# Patient Record
Sex: MALE | Race: WHITE | HISPANIC OR LATINO | Employment: OTHER | URBAN - METROPOLITAN AREA
[De-identification: names, ages, dates, MRNs, and addresses within clinical notes are randomized per-mention and may not be internally consistent; named-entity substitution may affect disease eponyms.]

---

## 2019-10-27 ENCOUNTER — TRANSFERRED RECORDS (OUTPATIENT)
Dept: HEALTH INFORMATION MANAGEMENT | Facility: CLINIC | Age: 29
End: 2019-10-27

## 2019-10-29 ENCOUNTER — TRANSFERRED RECORDS (OUTPATIENT)
Dept: HEALTH INFORMATION MANAGEMENT | Facility: CLINIC | Age: 29
End: 2019-10-29

## 2019-11-05 ENCOUNTER — MEDICAL CORRESPONDENCE (OUTPATIENT)
Dept: HEALTH INFORMATION MANAGEMENT | Facility: CLINIC | Age: 29
End: 2019-11-05

## 2019-11-20 ENCOUNTER — TRANSFERRED RECORDS (OUTPATIENT)
Dept: HEALTH INFORMATION MANAGEMENT | Facility: CLINIC | Age: 29
End: 2019-11-20

## 2019-11-22 ENCOUNTER — REFERRAL (OUTPATIENT)
Dept: TRANSPLANT | Facility: CLINIC | Age: 29
End: 2019-11-22

## 2019-11-22 DIAGNOSIS — K70.31 ALCOHOLIC CIRRHOSIS OF LIVER WITH ASCITES (H): Primary | ICD-10-CM

## 2019-11-22 DIAGNOSIS — K76.6 PORTAL HYPERTENSION (H): ICD-10-CM

## 2019-11-22 NOTE — LETTER
LIVER TRANSPLANT  EVALUATION SCHEDULE    Patient:   Obed Wiley  MR#:    [id number]  Coordinator:  Hudson       [phone]  :     Mulu                 [phone]  [facility]  Date(s):    January 6, 2020 & January 7, 2020          This is your evaluation schedule, please follow dates and times.  You will   receive reminder phone calls for other tests, but please follow this schedule  only!  If you have any questions about dates and times, please call us on  number listed above.  Thank you, Transplant Services     *NO FOOD or DRINK AFTER MIDNIGHT*  (You may only have small amounts of water)    Day/Date:    Monday, January 6, 2020  Time Location Activity   8:15 AM Imaging and Lab testing  ([facility] floor [facility] ) Blood tests    8:30 AM Imaging and Lab testing  ([facility] floor [facility] ) Chest X-ray    8:40 AM Imaging and Lab testing  ([address] [place] [address] and [facility] ) EKG   9:30 AM-11:30 AM Transplant Services  ([facility] floor [facility] and [facility]) Pre-transplant class               Day/Date:    Tuesday, January 7, 2020  Time Location Activity   6:45 AM Imaging and Lab testing  ([facility] floor [facility] ) Liver Ultrasound with dopplers=NO FOOD or DRINK 8 HOURS BEFORE TEST!!!   7:30 AM [facility]   ([facility] floor [facility]) Pulmonary Function Tests Please do not wear any deodorant, perfume, or scented products.   9:00 AM Medicine Specialties  ([facility] floor [facility]) Appointment with Dr. Santos,  Hepatologist   10:00 AM Transplant Services  ([facility] floor [facility]) Appointment with Dr. Marsh,  Transplant Surgeon   10:30 AM Transplant Services  ([facility] floor [facility]) Appointment with Anabel Gustafson  Registered Dietitian   12:00 PM Transplant Services  ([facility] floor [facility]) Appointment with Wale Trimble,     *  1:30 PM [facility]  1st Floor/Entrance of [facility]  [facility] to Hospital  (Shuttle is [facility] with Maroon Lettering)   2:00 PM [facility] Waiting Room  (2nd floor [facility]) Dobutamine Stress Echo = SEE ENCLOSED INSTRUCTIONS for TEST!               Day/Date:    Every Thursday *OPTIONAL*  Time Location Activity   12:00 PM-1:30 PM Liver Transplant Support Group  Hospital Station [facility]  Room 7-120 Every Thursday *OPTIONAL*     *IF ON LINE CHECK IN IS NOT DONE, YOU WILL NEED TO CHECK IN 15 MINUTES EARLIER THEN THE FIRST SCHEDULED APPOINTMENT*

## 2019-11-27 ENCOUNTER — TRANSFERRED RECORDS (OUTPATIENT)
Dept: HEALTH INFORMATION MANAGEMENT | Facility: CLINIC | Age: 29
End: 2019-11-27

## 2019-12-03 VITALS — HEIGHT: 72 IN | BODY MASS INDEX: 27.09 KG/M2 | WEIGHT: 200 LBS

## 2019-12-03 ASSESSMENT — MIFFLIN-ST. JEOR: SCORE: 1906.78

## 2019-12-03 NOTE — TELEPHONE ENCOUNTER
Patient was asked the following questions during liver intake call.     Referring Provider: Anjelica Reyes   Referring Diagnosis: Alcoholic Cirrhosis of the Liver   PCP: Anjelica Reyes     1)Do you know why you have liver disease: Yes             If Alcoholic Cirrhosis is present when was your last drink: 2018 (18 months ago.)             Have you ever been through treatment for alcohol: No, on waiting list.   2) Presence of Ascites: Yes Paracentesis: Yes, twice weekly.   3) Presence of Hepatic Encephalopathy: No Medications: No  4) History of GI Bleeding: Yes in the past   5) EGD: Yes at [facility]   6) Colonoscopy: Yes at [facility]   7) MELD Score: 11  8) Insurance information: [employer]       Policy ag: Self      Subscriber/policy/ID number: [id number]      Group Number:     Referral intake process completed.  Patient is aware that after financial approval is received, medical records will be requested.   Patient confirmed for a callback from transplant coordinator on 12/5/2019.  Tentative evaluation date TBD.    Confirmed coordinator will discuss evaluation process in more detail at the time of their call.   Patient is aware of the need to arrange age appropriate cancer screening, vaccinations, and dental care.  Reminded patient to complete questionnaire, complete medical records release, and review packet prior to evaluation visit .  Assessed patient for special needs (ie--wheelchair, assistance, guardian, and ):  No   Patient instructed to call [phone] with questions.     CLAUDIA Olea, LPN   Solid Organ Transplant

## 2019-12-05 NOTE — TELEPHONE ENCOUNTER
Spoke to patient and penciled in for 1/6-1/7    Will call him back next week with specifics once we have PFR approval.

## 2019-12-21 ENCOUNTER — TRANSFERRED RECORDS (OUTPATIENT)
Dept: HEALTH INFORMATION MANAGEMENT | Facility: CLINIC | Age: 29
End: 2019-12-21

## 2019-12-21 LAB
ALT SERPL-CCNC: 65 U/L (ref 30–65)
AST SERPL-CCNC: 61 U/L (ref 15–37)
HBA1C MFR BLD: 8 % (ref 4.8–6)

## 2019-12-22 LAB
CREAT SERPL-MCNC: 0.7 MG/DL (ref 0.7–1.2)
GFR SERPL CREATININE-BSD FRML MDRD: 143.4 ML/MIN/1.73M2
GLUCOSE SERPL-MCNC: 481 MG/DL (ref 70–110)
POTASSIUM SERPL-SCNC: 4.3 MEQ/L (ref 3.5–5)

## 2019-12-23 PROBLEM — K70.31 ALCOHOLIC CIRRHOSIS OF LIVER WITH ASCITES (H): Status: ACTIVE | Noted: 2019-12-23

## 2019-12-23 NOTE — TELEPHONE ENCOUNTER
Patient is a 29 year old male This is a 29-year-old male with alcoholic cirrhosis, chronic pancreatitis, HE, with DM on insulin.   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 Patient is planning on starting program closer to home now that he has moved.    History of IVC thrombosis with filter placement  History of mesenteric vein, DVT, and pulmonary embolism     MELD 18 -     Plan - testing & class on Monday 1/6/20 with full eval appt on Tuesday 1/7/20. Will plan on him making another trip back in the weeks/months after given he will have some sort of CD work to do (and MELD 18).    See notes in CE from [place] trip in 1/2019-2/2019    Discharge summary 11/27/19:  Celso Garza MD - 11/27/2019 3:46 PM CST  FINAL DIAGNOSIS:   1. Spontaneous bacterial peritonitis.  2. Acute pancreatitis, exacerbation of chronic pancreatitis.     SECONDARY DIAGNOSIS:   1. Chronic liver cirrhosis with recurrent peritoneal fluid accumulation despite maximal medical therapy.   2. Diabetes mellitus type 2.   3. Asthma.  4. Deep venous thrombosis.  5. Esophageal varices.   6. History of ischemic bowel.   7. History of pulmonary embolism.   8. Umbilical hernia.  9. Inferior vena cava thrombosis.   10. Status post insertion of inferior vena cava filter.   11. Thrombocytopenia.  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

## 2020-01-06 ENCOUNTER — PRE VISIT (OUTPATIENT)
Dept: GASTROENTEROLOGY | Facility: CLINIC | Age: 30
End: 2020-01-06

## 2020-01-06 ENCOUNTER — OFFICE VISIT (OUTPATIENT)
Dept: TRANSPLANT | Facility: CLINIC | Age: 30
End: 2020-01-06
Attending: INTERNAL MEDICINE
Payer: MEDICAID

## 2020-01-06 ENCOUNTER — DOCUMENTATION ONLY (OUTPATIENT)
Dept: LAB | Facility: CLINIC | Age: 30
End: 2020-01-06

## 2020-01-06 ENCOUNTER — ANCILLARY PROCEDURE (OUTPATIENT)
Dept: GENERAL RADIOLOGY | Facility: CLINIC | Age: 30
End: 2020-01-06
Attending: INTERNAL MEDICINE

## 2020-01-06 DIAGNOSIS — K70.31 ALCOHOLIC CIRRHOSIS OF LIVER WITH ASCITES (H): ICD-10-CM

## 2020-01-06 DIAGNOSIS — K76.6 PORTAL HYPERTENSION (H): ICD-10-CM

## 2020-01-06 DIAGNOSIS — K74.60 CIRRHOSIS OF LIVER (H): Primary | ICD-10-CM

## 2020-01-06 LAB
ABO + RH BLD: NORMAL
ALBUMIN SERPL-MCNC: 3.3 G/DL (ref 3.4–5)
ALBUMIN UR-MCNC: NEGATIVE MG/DL
ALP SERPL-CCNC: 138 U/L (ref 40–150)
ALT SERPL W P-5'-P-CCNC: 64 U/L (ref 0–70)
ANION GAP SERPL CALCULATED.3IONS-SCNC: 6 MMOL/L (ref 3–14)
APPEARANCE UR: ABNORMAL
AST SERPL W P-5'-P-CCNC: 72 U/L (ref 0–45)
BILIRUB DIRECT SERPL-MCNC: 0.4 MG/DL (ref 0–0.2)
BILIRUB SERPL-MCNC: 0.9 MG/DL (ref 0.2–1.3)
BILIRUB UR QL STRIP: NEGATIVE
BLD GP AB SCN SERPL QL: NORMAL
BLOOD BANK CMNT PATIENT-IMP: NORMAL
BUN SERPL-MCNC: 14 MG/DL (ref 7–30)
CALCIUM SERPL-MCNC: 8.8 MG/DL (ref 8.5–10.1)
CAOX CRY #/AREA URNS HPF: ABNORMAL /HPF
CHLORIDE SERPL-SCNC: 101 MMOL/L (ref 94–109)
CHOLEST SERPL-MCNC: 107 MG/DL
CO2 SERPL-SCNC: 26 MMOL/L (ref 20–32)
COLOR UR AUTO: YELLOW
CREAT SERPL-MCNC: 0.93 MG/DL (ref 0.66–1.25)
CREAT UR-MCNC: 175 MG/DL
DEPRECATED CALCIDIOL+CALCIFEROL SERPL-MC: 27 UG/L (ref 20–75)
ERYTHROCYTE [DISTWIDTH] IN BLOOD BY AUTOMATED COUNT: 21.8 % (ref 10–15)
GFR SERPL CREATININE-BSD FRML MDRD: >90 ML/MIN/{1.73_M2}
GLUCOSE SERPL-MCNC: 184 MG/DL (ref 70–99)
GLUCOSE UR STRIP-MCNC: 150 MG/DL
GRAN CASTS #/AREA URNS LPF: 3 /LPF
HCT VFR BLD AUTO: 37.3 % (ref 40–53)
HDLC SERPL-MCNC: 67 MG/DL
HGB BLD-MCNC: 11.2 G/DL (ref 13.3–17.7)
HGB UR QL STRIP: NEGATIVE
HYALINE CASTS #/AREA URNS LPF: 1 /LPF (ref 0–2)
INR PPP: 1.34 (ref 0.86–1.14)
KETONES UR STRIP-MCNC: NEGATIVE MG/DL
LDLC SERPL CALC-MCNC: 31 MG/DL
LEUKOCYTE ESTERASE UR QL STRIP: NEGATIVE
MCH RBC QN AUTO: 25.1 PG (ref 26.5–33)
MCHC RBC AUTO-ENTMCNC: 30 G/DL (ref 31.5–36.5)
MCV RBC AUTO: 84 FL (ref 78–100)
MIXED CELL CASTS #/AREA URNS LPF: 3 /LPF
MUCOUS THREADS #/AREA URNS LPF: PRESENT /LPF
NITRATE UR QL: NEGATIVE
NONHDLC SERPL-MCNC: 40 MG/DL
PH UR STRIP: 6 PH (ref 5–7)
PLATELET # BLD AUTO: 57 10E9/L (ref 150–450)
POTASSIUM SERPL-SCNC: 3.7 MMOL/L (ref 3.4–5.3)
PROT SERPL-MCNC: 7.4 G/DL (ref 6.8–8.8)
PROT UR-MCNC: 0.1 G/L
PROT/CREAT 24H UR: 0.06 G/G CR (ref 0–0.2)
RBC # BLD AUTO: 4.46 10E12/L (ref 4.4–5.9)
RBC #/AREA URNS AUTO: 1 /HPF (ref 0–2)
SODIUM SERPL-SCNC: 132 MMOL/L (ref 133–144)
SOURCE: ABNORMAL
SP GR UR STRIP: 1.02 (ref 1–1.03)
SPECIMEN EXP DATE BLD: NORMAL
SPECIMEN EXP DATE BLD: NORMAL
SQUAMOUS #/AREA URNS AUTO: <1 /HPF (ref 0–1)
T PALLIDUM AB SER QL: NONREACTIVE
T4 FREE SERPL-MCNC: 0.97 NG/DL (ref 0.76–1.46)
TRIGL SERPL-MCNC: 49 MG/DL
TSH SERPL DL<=0.005 MIU/L-ACNC: 4.09 MU/L (ref 0.4–4)
UROBILINOGEN UR STRIP-MCNC: 0 MG/DL (ref 0–2)
WBC # BLD AUTO: 3.6 10E9/L (ref 4–11)
WBC #/AREA URNS AUTO: 5 /HPF (ref 0–5)

## 2020-01-06 PROCEDURE — 86481 TB AG RESPONSE T-CELL SUSP: CPT | Performed by: INTERNAL MEDICINE

## 2020-01-06 PROCEDURE — 85027 COMPLETE CBC AUTOMATED: CPT | Performed by: INTERNAL MEDICINE

## 2020-01-06 PROCEDURE — 80061 LIPID PANEL: CPT | Performed by: INTERNAL MEDICINE

## 2020-01-06 PROCEDURE — 82306 VITAMIN D 25 HYDROXY: CPT | Performed by: INTERNAL MEDICINE

## 2020-01-06 PROCEDURE — 84443 ASSAY THYROID STIM HORMONE: CPT | Performed by: INTERNAL MEDICINE

## 2020-01-06 PROCEDURE — 81001 URINALYSIS AUTO W/SCOPE: CPT | Performed by: INTERNAL MEDICINE

## 2020-01-06 PROCEDURE — 86900 BLOOD TYPING SEROLOGIC ABO: CPT | Performed by: INTERNAL MEDICINE

## 2020-01-06 PROCEDURE — 80307 DRUG TEST PRSMV CHEM ANLYZR: CPT | Performed by: INTERNAL MEDICINE

## 2020-01-06 PROCEDURE — 86886 COOMBS TEST INDIRECT TITER: CPT | Performed by: INTERNAL MEDICINE

## 2020-01-06 PROCEDURE — 85610 PROTHROMBIN TIME: CPT | Performed by: INTERNAL MEDICINE

## 2020-01-06 PROCEDURE — 80321 ALCOHOLS BIOMARKERS 1OR 2: CPT | Performed by: INTERNAL MEDICINE

## 2020-01-06 PROCEDURE — 84439 ASSAY OF FREE THYROXINE: CPT | Performed by: INTERNAL MEDICINE

## 2020-01-06 PROCEDURE — 86901 BLOOD TYPING SEROLOGIC RH(D): CPT | Performed by: INTERNAL MEDICINE

## 2020-01-06 PROCEDURE — 36415 COLL VENOUS BLD VENIPUNCTURE: CPT | Performed by: INTERNAL MEDICINE

## 2020-01-06 PROCEDURE — 84156 ASSAY OF PROTEIN URINE: CPT | Mod: XU | Performed by: INTERNAL MEDICINE

## 2020-01-06 PROCEDURE — 80076 HEPATIC FUNCTION PANEL: CPT | Performed by: INTERNAL MEDICINE

## 2020-01-06 PROCEDURE — 86780 TREPONEMA PALLIDUM: CPT | Performed by: INTERNAL MEDICINE

## 2020-01-06 PROCEDURE — 86850 RBC ANTIBODY SCREEN: CPT | Performed by: INTERNAL MEDICINE

## 2020-01-06 PROCEDURE — 80048 BASIC METABOLIC PNL TOTAL CA: CPT | Performed by: INTERNAL MEDICINE

## 2020-01-06 NOTE — TELEPHONE ENCOUNTER
Pre liver eval appointments, pre visit planning completed by transplant office.    Radha Carrington, LEXY CMA  1/6/2020 9:09 AM

## 2020-01-06 NOTE — PROGRESS NOTES
Rapid Response Epic Documentation     Situation: Pt feeling lightheaded and nauseous after blood draw. RRT called    Objective:     Pt in lab chair feeling lightheaded, shaky, and nauseous.      Assessment: Pt A&Ox4. Pt states he has been having BGL issues lately and did fasting labs this morning. Pt took insulin this AM, and has been having high BGL's.     Vitals: 0848  BP:  95/68  Pulse: 84  Respiration: 18  Glucose: 220 mg/dl  Mental Status: Alert  CMS: Intact  Stroke Scale: Negative  EKG: Not Performed      Plan: Pt drinking water, mom with him and he will eat some eggs and sausage. Pt has continuing appointments today and will call if needing more assistance. Pt able to ambulate to bathroom without difficulties. Mom has a wheelchair to push patient to next appointment.     Medication: None      Location:    Disposition:      Stable (D/C home)    Protocol Used:     Other Lightheaded

## 2020-01-06 NOTE — LETTER
1/6/2020       RE: Obed Wiley  [address] [facility] [address]     Dear Colleague,    Thank you for referring your patient, Obed Wiley, to the [facility] SOLID ORGAN TRANSPLANT at [facility]. Please see a copy of my visit note below.    Liver Transplant Evaluation/Teaching    TEACHING TOPICS: Evaluation Process, Evaluation Items, Diagnostic Studies, Consultation, Chemical Dependency Policy, CD Eval, Donor Source, Liver Allocation, MELD System, UNOS, Waiting List, Follow up while on transplant list, Follow up after transplantation, Infection and Rejection, Immunosuppression , Medication Teaching, Lab Recording after transplant, Laboratory Frequency after transplant , Consent for evaluation and One year survival rates  INSTRUCTIONAL MATERIAL USED/GIVEN: Liver Transplant Handbook, MELD Booklet, Donor Booklet, Web Sites Options, Verbal instructions, Multiple Listing Brochure , Consent for evaluation signed, One year survival rates and SRTR (Scientific Registry) Data  Person(s) involved in teaching: patient and his mother  Asks Questions: YES  Eager to Learn: YES  Cooperative: YES  Receptive (willing/able to accept information): YES  Reason for the appointment, diagnosis and treatment plan:YES  Knowledge of proper use of medications and conditions for which they are ordered (with special attention to potential side effects or drug interactions): YES  Which situations necessitate calling provider and whom to contact:YES  Other: NA  Teaching Concerns Addressed   Comments: Patient and his mother attended transplant class. Asked excellent questions. Eval consent signed.   Proper use and care of (medical equip, care aids, etc.):YES  Nutritional needs and diet plan: YES  Pain management techniques: YES  Wound Care: YES  How and/when to access community resources: YES  Patient is aware of and agrees to required commitment to post-op care and long term follow-up: YES  Patient  has name and phone number of transplant coordinator.   Time Spent face-to-face teachin minutes.  Ruthie Dexter RN  Liver Transplant Coordinator                 Again, thank you for allowing me to participate in the care of your patient.      Sincerely,    Transplant Class

## 2020-01-07 ENCOUNTER — HOSPITAL ENCOUNTER (OUTPATIENT)
Dept: CARDIOLOGY | Facility: CLINIC | Age: 30
Discharge: HOME | End: 2020-01-07
Attending: INTERNAL MEDICINE | Admitting: INTERNAL MEDICINE
Payer: MEDICAID

## 2020-01-07 ENCOUNTER — ANCILLARY PROCEDURE (OUTPATIENT)
Dept: ULTRASOUND IMAGING | Facility: CLINIC | Age: 30
End: 2020-01-07
Attending: INTERNAL MEDICINE

## 2020-01-07 ENCOUNTER — ALLIED HEALTH/NURSE VISIT (OUTPATIENT)
Dept: TRANSPLANT | Facility: CLINIC | Age: 30
End: 2020-01-07
Attending: INTERNAL MEDICINE
Payer: MEDICAID

## 2020-01-07 ENCOUNTER — COMMITTEE REVIEW (OUTPATIENT)
Dept: TRANSPLANT | Facility: CLINIC | Age: 30
End: 2020-01-07

## 2020-01-07 ENCOUNTER — OFFICE VISIT (OUTPATIENT)
Dept: GASTROENTEROLOGY | Facility: CLINIC | Age: 30
End: 2020-01-07
Attending: INTERNAL MEDICINE
Payer: MEDICAID

## 2020-01-07 ENCOUNTER — OFFICE VISIT (OUTPATIENT)
Dept: TRANSPLANT | Facility: CLINIC | Age: 30
End: 2020-01-07
Attending: TRANSPLANT SURGERY
Payer: MEDICAID

## 2020-01-07 VITALS
BODY MASS INDEX: 25.7 KG/M2 | SYSTOLIC BLOOD PRESSURE: 110 MMHG | OXYGEN SATURATION: 96 % | HEART RATE: 98 BPM | WEIGHT: 190 LBS | DIASTOLIC BLOOD PRESSURE: 70 MMHG

## 2020-01-07 DIAGNOSIS — K70.31 ALCOHOLIC CIRRHOSIS OF LIVER WITH ASCITES (H): ICD-10-CM

## 2020-01-07 DIAGNOSIS — K76.6 PORTAL HYPERTENSION (H): ICD-10-CM

## 2020-01-07 DIAGNOSIS — Z79.4 DIABETES MELLITUS DUE TO UNDERLYING CONDITION WITH HYPEROSMOLARITY WITHOUT COMA, WITH LONG-TERM CURRENT USE OF INSULIN (H): ICD-10-CM

## 2020-01-07 DIAGNOSIS — K74.60 CIRRHOSIS OF LIVER (H): Primary | ICD-10-CM

## 2020-01-07 DIAGNOSIS — E11.9 DIABETES (H): ICD-10-CM

## 2020-01-07 DIAGNOSIS — E08.00 DIABETES MELLITUS DUE TO UNDERLYING CONDITION WITH HYPEROSMOLARITY WITHOUT COMA, WITH LONG-TERM CURRENT USE OF INSULIN (H): ICD-10-CM

## 2020-01-07 DIAGNOSIS — K70.31 ALCOHOLIC CIRRHOSIS OF LIVER WITH ASCITES (H): Primary | ICD-10-CM

## 2020-01-07 DIAGNOSIS — D68.9 COAGULOPATHY (H): Primary | ICD-10-CM

## 2020-01-07 DIAGNOSIS — I82.409 DVT (DEEP VENOUS THROMBOSIS) (H): ICD-10-CM

## 2020-01-07 DIAGNOSIS — Z76.82 AWAITING ORGAN TRANSPLANT STATUS: Primary | ICD-10-CM

## 2020-01-07 LAB
DLCOCOR-%PRED-PRE: 103 %
DLCOCOR-PRE: 35.96 ML/MIN/MMHG
DLCOUNC-%PRED-PRE: 92 %
DLCOUNC-PRE: 31.97 ML/MIN/MMHG
DLCOUNC-PRED: 34.71 ML/MIN/MMHG
ERV-%PRED-PRE: 77 %
ERV-PRE: 1.42 L
ERV-PRED: 1.82 L
ETHYL GLUCURONIDE UR QL: NEGATIVE
EXPTIME-PRE: 7.18 SEC
FEF2575-%PRED-PRE: 80 %
FEF2575-PRE: 3.86 L/SEC
FEF2575-PRED: 4.79 L/SEC
FEFMAX-%PRED-PRE: 90 %
FEFMAX-PRE: 9.73 L/SEC
FEFMAX-PRED: 10.71 L/SEC
FEV1-%PRED-PRE: 91 %
FEV1-PRE: 4.39 L
FEV1FEV6-PRE: 78 %
FEV1FEV6-PRED: 83 %
FEV1FVC-PRE: 78 %
FEV1FVC-PRED: 82 %
FEV1SVC-PRE: 78 %
FEV1SVC-PRED: 79 %
FIFMAX-PRE: 6.32 L/SEC
FRCPLETH-%PRED-PRE: 104 %
FRCPLETH-PRE: 3.61 L
FRCPLETH-PRED: 3.46 L
FVC-%PRED-PRE: 96 %
FVC-PRE: 5.6 L
FVC-PRED: 5.83 L
GAMMA INTERFERON BACKGROUND BLD IA-ACNC: 0.02 IU/ML
IC-%PRED-PRE: 99 %
IC-PRE: 4.19 L
IC-PRED: 4.21 L
INTERPRETATION ECG - MUSE: NORMAL
M TB IFN-G BLD-IMP: NEGATIVE
M TB IFN-G CD4+ BCKGRND COR BLD-ACNC: >10 IU/ML
MITOGEN IGNF BCKGRD COR BLD-ACNC: 0 IU/ML
MITOGEN IGNF BCKGRD COR BLD-ACNC: 0 IU/ML
PETH BLD-MCNC: NEGATIVE NG/ML
RVPLETH-%PRED-PRE: 119 %
RVPLETH-PRE: 2.19 L
RVPLETH-PRED: 1.83 L
TLCPLETH-%PRED-PRE: 103 %
TLCPLETH-PRE: 7.79 L
TLCPLETH-PRED: 7.53 L
VA-%PRED-PRE: 100 %
VA-PRE: 7.12 L
VC-%PRED-PRE: 92 %
VC-PRE: 5.61 L
VC-PRED: 6.03 L

## 2020-01-07 PROCEDURE — 25500064 ZZH RX 255 OP 636: Performed by: INTERNAL MEDICINE

## 2020-01-07 PROCEDURE — 40000264 ECHO STRESS ECHOCARDIOGRAM

## 2020-01-07 PROCEDURE — 25000128 H RX IP 250 OP 636: Performed by: INTERNAL MEDICINE

## 2020-01-07 PROCEDURE — 93321 DOPPLER ECHO F-UP/LMTD STD: CPT | Mod: 26 | Performed by: INTERNAL MEDICINE

## 2020-01-07 PROCEDURE — 93016 CV STRESS TEST SUPVJ ONLY: CPT | Performed by: INTERNAL MEDICINE

## 2020-01-07 PROCEDURE — 93018 CV STRESS TEST I&R ONLY: CPT | Performed by: INTERNAL MEDICINE

## 2020-01-07 PROCEDURE — 97802 MEDICAL NUTRITION INDIV IN: CPT | Mod: ZF

## 2020-01-07 PROCEDURE — 93325 DOPPLER ECHO COLOR FLOW MAPG: CPT | Mod: 26 | Performed by: INTERNAL MEDICINE

## 2020-01-07 PROCEDURE — 93350 STRESS TTE ONLY: CPT | Mod: 26 | Performed by: INTERNAL MEDICINE

## 2020-01-07 PROCEDURE — 25000125 ZZHC RX 250: Performed by: INTERNAL MEDICINE

## 2020-01-07 RX ORDER — QUETIAPINE FUMARATE 25 MG/1
25 TABLET, FILM COATED ORAL
COMMUNITY

## 2020-01-07 RX ORDER — FAMOTIDINE 20 MG
2000 TABLET ORAL DAILY
COMMUNITY

## 2020-01-07 RX ORDER — DOBUTAMINE HYDROCHLORIDE 200 MG/100ML
10-50 INJECTION INTRAVENOUS CONTINUOUS
Status: ACTIVE | OUTPATIENT
Start: 2020-01-07 | End: 2020-01-07

## 2020-01-07 RX ORDER — CHOLECALCIFEROL (VITAMIN D3) 1250 MCG
50000 CAPSULE ORAL
COMMUNITY

## 2020-01-07 RX ORDER — SODIUM CHLORIDE 9 MG/ML
INJECTION, SOLUTION INTRAVENOUS CONTINUOUS
Status: ACTIVE | OUTPATIENT
Start: 2020-01-07 | End: 2020-01-07

## 2020-01-07 RX ORDER — INSULIN GLARGINE 100 [IU]/ML
26 INJECTION, SOLUTION SUBCUTANEOUS 2 TIMES DAILY
Status: ON HOLD | COMMUNITY
End: 2020-01-10

## 2020-01-07 RX ORDER — FOLIC ACID 1 MG/1
1 TABLET ORAL 2 TIMES DAILY
COMMUNITY

## 2020-01-07 RX ORDER — CALCIUM CARBONATE/VITAMIN D3 500-10/5ML
400 LIQUID (ML) ORAL 3 TIMES DAILY
COMMUNITY

## 2020-01-07 RX ORDER — METOPROLOL TARTRATE 1 MG/ML
1-20 INJECTION, SOLUTION INTRAVENOUS
Status: ACTIVE | OUTPATIENT
Start: 2020-01-07 | End: 2020-01-07

## 2020-01-07 RX ORDER — PROMETHAZINE HYDROCHLORIDE 12.5 MG/1
25 TABLET ORAL EVERY 6 HOURS PRN
COMMUNITY

## 2020-01-07 RX ORDER — POLYETHYLENE GLYCOL 3350 17 G/17G
1 POWDER, FOR SOLUTION ORAL DAILY
COMMUNITY

## 2020-01-07 RX ORDER — OXYCODONE AND ACETAMINOPHEN 10; 325 MG/1; MG/1
1 TABLET ORAL EVERY 4 HOURS PRN
Status: ON HOLD | COMMUNITY
End: 2020-07-27

## 2020-01-07 RX ORDER — PANTOPRAZOLE SODIUM 40 MG/1
40 TABLET, DELAYED RELEASE ORAL DAILY
COMMUNITY

## 2020-01-07 RX ORDER — SPIRONOLACTONE 100 MG/1
100 TABLET, FILM COATED ORAL 3 TIMES DAILY
COMMUNITY

## 2020-01-07 RX ORDER — FUROSEMIDE 40 MG
40 TABLET ORAL 3 TIMES DAILY
Status: ON HOLD | COMMUNITY
End: 2020-01-10

## 2020-01-07 RX ORDER — TRAZODONE HYDROCHLORIDE 50 MG/1
150 TABLET, FILM COATED ORAL AT BEDTIME
Status: ON HOLD | COMMUNITY
End: 2021-02-24

## 2020-01-07 RX ORDER — TRETINOIN 0.25 MG/G
CREAM TOPICAL AT BEDTIME
COMMUNITY

## 2020-01-07 RX ORDER — GABAPENTIN 400 MG/1
400 CAPSULE ORAL 3 TIMES DAILY
COMMUNITY

## 2020-01-07 RX ORDER — LACTULOSE 10 G/15ML
20 SOLUTION ORAL 3 TIMES DAILY
Status: ON HOLD | COMMUNITY
End: 2020-01-10

## 2020-01-07 RX ORDER — FERROUS SULFATE 325(65) MG
325 TABLET ORAL
COMMUNITY

## 2020-01-07 RX ORDER — ATROPINE SULFATE 0.4 MG/ML
.2-2 AMPUL (ML) INJECTION
Status: COMPLETED | OUTPATIENT
Start: 2020-01-07 | End: 2020-01-07

## 2020-01-07 RX ORDER — CIPROFLOXACIN 500 MG/1
500 TABLET, FILM COATED ORAL 2 TIMES DAILY
Status: ON HOLD | COMMUNITY
End: 2020-01-10

## 2020-01-07 RX ADMIN — METOPROLOL TARTRATE 5 MG: 5 INJECTION INTRAVENOUS at 14:38

## 2020-01-07 RX ADMIN — DOBUTAMINE HYDROCHLORIDE 10 MCG/KG/MIN: 200 INJECTION INTRAVENOUS at 14:20

## 2020-01-07 RX ADMIN — ATROPINE SULFATE 2 MG: 0.4 INJECTION, SOLUTION INTRAMUSCULAR; INTRAVENOUS; SUBCUTANEOUS at 14:32

## 2020-01-07 RX ADMIN — HUMAN ALBUMIN MICROSPHERES AND PERFLUTREN 7 ML: 10; .22 INJECTION, SOLUTION INTRAVENOUS at 14:37

## 2020-01-07 NOTE — PROGRESS NOTES
Outpatient MNT: Liver Transplant Evaluation    Current BMI: 26 kg/m2 (HT 72 in,  lbs/86 kg)  BMI is within criteria of <40 for liver transplant    Additional:  Hx of Yan-en-Y in 2008     Time Spent: 15 minutes  Visit Type: Initial   Referring Physician: Maximo   Pt accompanied by: Mom    Medical dx associated with RD referral  Liver cirrhosis     History of previous txp: none     Nutrition Assessment  Appetite: The pt reports that his appetite is fair. He has uncontrolled DM2 and watches his CHO intake closely. He also has a history of Yan-en-y and takes several laxatives to prevent encephalopathy so he feels like food goes right through him with out being absorbed. Pt currently follows a moderate CHO (3-4 CHO choices per meal) and high fat diet to maintain/gain weight. He eats TID meals and 3-4 snacks per day.       Vitamins, Supplements, Pertinent Meds: Folic acid, multivitamin, Ca, magnesium, D3, Iron, Hair and nail multivitamin   Herbal Medicines/Supplements: Topical tiger bomb for back pain     Diet Recall  Breakfast Toast with jelly, 3-6 eggs, sausage and other meat, yogurt, oat meal, granola   Lunch Salad, sandwich, wrap   Dinner Salad, soup, grilled poultry, pork, steak, fish   Snacks Beef jerky, cheese, salad, nuts, seeds, berries, melon, citrus fruit, popcorn, frozen yogurt, hard boiled eggs    Beverages Water, crystal light, Fresca, Gatorade no EtOH - sober for last 1.5 years   Dining out 2 meals per month, fast food     Physical Activity  Minimal d/t pain      Anthropometrics  Height:   72 in   BMI:    26 kg/m2    Weight Status:Overweight BMI 25-29.9   Weight:  190 lbs (86 kg)       Wt Readings from Last 10 Encounters:   01/07/20 86.2 kg (190 lb)   12/03/19 90.7 kg (200 lb)       IBW (lb): 178#  % IBW: 107%    Wt Hx: pt reports losing >200# after gastric bypass surgery but not has a hard time keeping wt on. The pt has unintentionally lost 10# (5%) in 1 month.      Adj/dosing BW: 190 lbs/86  kg       Malnutrition  % Intake: No decreased intake noted  % Weight Loss: Up to 5% in 1 month (non-severe malnutrition)  Subcutaneous Fat Loss: None noted   Muscle Loss: Difficult to assess d/t excess skin after wt loss from gastric bypass   Fluid Accumulation/Edema: None noted  Malnutrition Diagnosis: Patient does not meet two of the above criteria necessary for diagnosing malnutrition    Estimated Nutrition Needs  Energy  5065-1405+ kcal/day      (25-30+ kcal/kg for increased needs with altered GI anatomy and unintentional wt loss)   Protein   g/day    (1-1.2 g/kg for increased needs)      Fluid  1 ml/kcal or per MD        Micronutrient   Na+: <2000 mg/day            Nutrition Diagnosis  Food and nutrition related knowledge deficit r/t pre liver transplant eval AEB pt verbalized not hearing pre/post transplant diet guidelines.    Nutrition Intervention  Nutrition education provided:  Discussed sodium intake (low sodium foods and drinks, seasoning food without salt and tips for low sodium diet).    Reviewed adequate protein intake. Encouraged receiving protein from both animal and plant based sources.     Reviewed post txp diet guidelines in brief (will review in further detail post txp):  (1) Review of proper food safety measures d/t immunosuppressant therapy post-op and increased risk for food-borne illness    (2) Avoid the following post txp d/t risk for rejection, unknown effects on the organs, and/or potential interactions with immunosuppressants:  - Herbal, Chinese, holistic, chiropractic, natural, alternative medicines and supplements  - Detoxes and cleanses  - Weight loss pills  - Protein powders or other products with extracts or herbs (ie green tea extract)    (3) Med regimen and possible side effects    Patient Understanding: Pt verbalized understanding of education provided.  Expected Compliance: Good   Follow-Up Plans: PRN     Nutrition Goals  1. Limit Na+ <2000mg/day  2. Pt to verbalize  understanding of 3 aspects of post txp education provided      Bridget Ybarra RD, LD  [facility] [phone]

## 2020-01-07 NOTE — COMMITTEE REVIEW
Abdominal Committee Review Note     Evaluation Date: 1/6/2020  Committee Review Date: 1/7/2020    Organ being evaluated for: Liver    Transplant Phase: Evaluation  Transplant Status: Active    Transplant Coordinator: Konrad Gaitan Jr.  Transplant Surgeon:   Terrance Marhs    Referring Physician: Anjelica Reyes    Primary Diagnosis: Alcoholic Cirrhosis  Secondary Diagnosis:     Committee Review Members:  Nutrition Bridget Ybarra, RD   Pharmacy Artie Samayoa, [facility]    - Clinical Wale Trimble, MSW, Caridad Sosa, [facility]   Transplant Celso Najera MD, Jr Konrad Gaitan, HUY, Mulu Garcia MD, Airam Rodríguez, APRN CNP, Kat Coffman, RN, Viola Santos MD, Chet Elder MD, Ruthie Dexter, HUY, Levi Galarza MD       Transplant Eligibility: Cirrhosis with MELD, ETOH    Committee Review Decision: Needs Re-presentation    Relative Contraindications: Other, pending evaluation    Absolute Contraindications: None    Committee Chair Viola Santos MD verbally attested to the committee's decision.    Committee Discussion Details:     Re-discuss pending, but not limited, the following:    - Patient to see Lexi asap for pancreas follow up.   - hematology - Resighini for coag work-up and following  - Dr. Santos in 3 month  - dental  - CD treatment

## 2020-01-07 NOTE — PROGRESS NOTES
Pt here for dobutamine stress test.  Test, meds and side effects reviewed with patient.  Patient fell short of target HR at 50 mcg Dobutamine and a total of 2 mg IV atropine.  Patient slept throughout the stress test. Gave a total of 5 mg IV Metoprolol to bring HR back to baseline.  Post monitoring complete and VSS.  Pt escorted out to the gold waiting room.

## 2020-01-07 NOTE — LETTER
1/7/2020       RE: Obed Wiley  [address] [facility] [address]     Dear Colleague,    Thank you for referring your patient, Obed Wiley, to the [facility] SOLID ORGAN TRANSPLANT at [facility]. Please see a copy of my visit note below.    HPI      ROS      Physical Exam    Assessment and Plan:  1. liver transplant evaluation - patient is a good candidate overall. Benefits and surgical risks of a liver transplantation were discussed.  2.  End stage liver disease due to Laennec's    Surgical evaluation:  1. Portal Vein:Patent  2. Hepatic Artery: Open  3. TIPS: absent  4. Previous Abdominal Surgery: Yes; cholecystectomy, Gastric bypass done in [place]  5. Hepatocellular Carcinoma: None  6. Ascites: Present - large amount  7. Costal Angle: narrow  8. Portopulmonary Hypertension: absent  9. Hepatopulmonary Syndrome: absent  10. Cardiac Evaluation: needs stress echocardiogram  11. Nutritional Status: Good  12. Diabetes: yes, type II on insulin  13.Hypertension no  14. Smoker:no  115: Fraility index:no      Recommendations:   He needs to complete evaluation, and consider living donors he is meld sodium today is only 9.  He does have 2 potential donors and we have strongly encouraged him to go on that path      Patients overall evaluation will be discussed at the Liver Transplant selection committee meeting with a final recommendation on the patients suitability for transplant to be made at that time.    Consult Full  Details:  Obed Wiley was seen in consultation at the request of Dr. Santos for evaluation as a potential liver transplant recipient.    Reason for Visit:  Obed Wiley is a 29 year old year old male with Laennec's, who presents for liver transplant evaluation.    HPI:  Presenting complaint: Encephatopathy     Patient has been diagnosed to have Laënnec's cirrhosis.  He has decompensated in the form of ascites and encephalopathy.  He has required multiple  abdominal paracenteses.  He does have 2 episodes of spontaneous bacterial peritonitis and is currently on prophylaxis with ciprofloxacin.  There is no history of variceal bleeding.  He received Yan-en-Y gastric bypass in 2009 in [place].  The surgical details are not clear but that is what the patient remembers.  He does not give any history of angina or cardiac disease, but he is diabetic on insulin.    Past Medical History:   Diagnosis Date     Alcoholic cirrhosis of liver with ascites (H) 12/23/2019     Diabetes (H)     Type 2 DM, Uses Insulin      Hepatitis     Hep A when an infant      History of blood transfusion     2019 at [facility]      SBP (spontaneous bacterial peritonitis) (H) 11/2019     Past Surgical History:   Procedure Laterality Date     ABDOMEN SURGERY      Gastric Bypass 2009. [facility]      CARDIAC SURGERY      [facility]      COLONOSCOPY       ENT SURGERY      Tonsils and Adenoids Removed at 6-7 Years Old      GALLBLADDER SURGERY  2017     GI SURGERY      Upper GI      Past Surgical History:   Procedure Laterality Date     ABDOMEN SURGERY      Gastric Bypass 2009. [facility]      CARDIAC SURGERY      IVC      COLONOSCOPY       ENT SURGERY      Tonsils and Adenoids Removed at 6-7 Years Old      GALLBLADDER SURGERY  2017     GI SURGERY      Upper GI      No family history on file.  Allergies   Allergen Reactions     Bee Anaphylaxis     Adhesive Tape Rash     Sulfa Drugs Itching     Prior to Admission medications    Medication Sig Start Date End Date Taking? Authorizing Provider   blood glucose (NO BRAND SPECIFIED) lancets standard Use to test blood sugar 6 times daily or as directed.   Yes Reported, Patient   Calcium Carbonate Antacid 400 MG CHEW Take by mouth 2 times daily   Yes Reported, Patient   cholecalciferol (VITAMIN D3) 27248 units (1250 mcg) capsule Take 50,000 Units by mouth every 7 days   Yes Reported, Patient   ciprofloxacin (CIPRO) 500 MG tablet Take 500 mg by mouth 2 times  daily   Yes Reported, Patient   ferrous sulfate (FEROSUL) 325 (65 Fe) MG tablet Take 325 mg by mouth daily (with breakfast)   Yes Reported, Patient   folic acid (FOLVITE) 1 MG tablet Take 1 mg by mouth 2 times daily   Yes Reported, Patient   furosemide (LASIX) 40 MG tablet Take 40 mg by mouth 3 times daily   Yes Reported, Patient   gabapentin (NEURONTIN) 400 MG capsule Take 400 mg by mouth 3 times daily   Yes Reported, Patient   insulin aspart (NOVOLOG FLEXPEN) 100 UNIT/ML pen As directed   Yes Reported, Patient   insulin glargine (LANTUS VIAL) 100 UNIT/ML vial Inject Subcutaneous At Bedtime 26 units bid daily   Yes Reported, Patient   lactulose (CHRONULAC) 10 GM/15ML solution Take 20 g by mouth 3 times daily 20mg / 30 ml tid daily/ prn   Yes Reported, Patient   magnesium oxide 400 MG CAPS Take by mouth 3 times daily   Yes Reported, Patient   MULTIPLE VITAMIN-FOLIC ACID PO Take 1 tablet by mouth 1 daily   Yes Reported, Patient   Nutritional Supplements (ENSURE PO) Ensure/ boost - 237 ml bid daily   Yes Reported, Patient   oxyCODONE-acetaminophen (PERCOCET)  MG per tablet Take 1 tablet by mouth every 4 hours   Yes Reported, Patient   pantoprazole (PROTONIX) 40 MG EC tablet Take 40 mg by mouth daily   Yes Reported, Patient   polyethylene glycol (MIRALAX/GLYCOLAX) packet Take 1 packet by mouth daily prn   Yes Reported, Patient   promethazine (PHENERGAN) 12.5 MG tablet Take 25 mg by mouth every 6 hours as needed for nausea   Yes Reported, Patient   QUEtiapine (SEROQUEL) 25 MG tablet Take 25 mg by mouth 2 times daily Taking 1-2 tabs @@ h.s   Yes Reported, Patient   rifaximin (XIFAXAN) 550 MG TABS tablet Take 200 mg by mouth 2 times daily   Yes Reported, Patient   spironolactone (ALDACTONE) 100 MG tablet Take 100 mg by mouth 3 times daily   Yes Reported, Patient   traZODone (DESYREL) 50 MG tablet Take 50 mg by mouth At Bedtime Prn   Yes Reported, Patient   tretinoin (RETIN-A) 0.025 % external cream Apply topically  At Bedtime   Yes Reported, Patient   Vitamin D, Cholecalciferol, 25 MCG (1000 UT) CAPS 2000 units daily   Yes Reported, Patient       Previous Transplant Hx: No    Cardiovascular Hx:       h/o Cardiac Issues: No       Exercise Tolerance: shortness of breath with exertion.    Potential Donor(s): Yes -  Potentially to donors    ROS:    REVIEW OF SYSTEMS (check box if normal)  [x]                GENERAL  [x]                  PULMONARY [x]                 GENITOURINARY  [x]                 CNS                 [x]                  CARDIAC  [x]                  ENDOCRINE  [x]                 EARS,NOSE,THROAT [x]                  GASTROINTESTINAL [x]                  NEUROLOGIC    [x]                 MUSCLOSKELTAL  [x]                   HEMATOLOGY    Examination:     Vitals:  /70   Pulse 98   Wt 86.2 kg (190 lb)   SpO2 96%   BMI 25.70 kg/m       GENERAL APPEARANCE: alert and no distress  EYES: PERRL  HENT: mouth without ulcers or lesions  NECK: supple, no adenopathy  RESP: lungs clear to auscultation - no rales, rhonchi or wheezes  CV: regular rhythm, normal rate, no rub   ABDOMEN:  soft, nontender,  large amount of ascites present and umbilical hernia present,.no HSM or masses and bowel sounds normal  MS: extremities normal- no gross deformities noted, no evidence of inflammation in joints, no muscle tenderness  SKIN: no rash  NEURO: Normal strength and tone, sensory exam grossly normal, mentation intact and speech normal  PSYCH: mentation appears normal. and affect normal/bright      Results:   Recent Results (from the past 168 hour(s))   EKG 12-lead, tracing only [EKG1]    Collection Time: 01/06/20  8:07 AM   Result Value Ref Range    Interpretation ECG Click View Image link to view waveform and result    Treponema Abs w Reflex to RPR and Titer    Collection Time: 01/06/20  8:31 AM   Result Value Ref Range    Treponema Antibodies Nonreactive NR^Nonreactive   CBC with platelets    Collection Time: 01/06/20   8:31 AM   Result Value Ref Range    WBC 3.6 (L) 4.0 - 11.0 10e9/L    RBC Count 4.46 4.4 - 5.9 10e12/L    Hemoglobin 11.2 (L) 13.3 - 17.7 g/dL    Hematocrit 37.3 (L) 40.0 - 53.0 %    MCV 84 78 - 100 fl    MCH 25.1 (L) 26.5 - 33.0 pg    MCHC 30.0 (L) 31.5 - 36.5 g/dL    RDW 21.8 (H) 10.0 - 15.0 %    Platelet Count 57 (L) 150 - 450 10e9/L   INR    Collection Time: 01/06/20  8:31 AM   Result Value Ref Range    INR 1.34 (H) 0.86 - 1.14   Vitamin D Deficiency    Collection Time: 01/06/20  8:31 AM   Result Value Ref Range    Vitamin D Deficiency screening 27 20 - 75 ug/L   TSH with free T4 reflex    Collection Time: 01/06/20  8:31 AM   Result Value Ref Range    TSH 4.09 (H) 0.40 - 4.00 mU/L   Lipid Profile    Collection Time: 01/06/20  8:31 AM   Result Value Ref Range    Cholesterol 107 <200 mg/dL    Triglycerides 49 <150 mg/dL    HDL Cholesterol 67 >39 mg/dL    LDL Cholesterol Calculated 31 <100 mg/dL    Non HDL Cholesterol 40 <130 mg/dL   Hepatic panel    Collection Time: 01/06/20  8:31 AM   Result Value Ref Range    Bilirubin Direct 0.4 (H) 0.0 - 0.2 mg/dL    Bilirubin Total 0.9 0.2 - 1.3 mg/dL    Albumin 3.3 (L) 3.4 - 5.0 g/dL    Protein Total 7.4 6.8 - 8.8 g/dL    Alkaline Phosphatase 138 40 - 150 U/L    ALT 64 0 - 70 U/L    AST 72 (H) 0 - 45 U/L   Basic metabolic panel    Collection Time: 01/06/20  8:31 AM   Result Value Ref Range    Sodium 132 (L) 133 - 144 mmol/L    Potassium 3.7 3.4 - 5.3 mmol/L    Chloride 101 94 - 109 mmol/L    Carbon Dioxide 26 20 - 32 mmol/L    Anion Gap 6 3 - 14 mmol/L    Glucose 184 (H) 70 - 99 mg/dL    Urea Nitrogen 14 7 - 30 mg/dL    Creatinine 0.93 0.66 - 1.25 mg/dL    GFR Estimate >90 >60 mL/min/[1.73_m2]    GFR Estimate If Black >90 >60 mL/min/[1.73_m2]    Calcium 8.8 8.5 - 10.1 mg/dL   ABO/Rh type and screen    Collection Time: 01/06/20  8:31 AM   Result Value Ref Range    ABO AB     RH(D) Pos     Antibody Screen Neg     Test Valid Only At          [facility]  [facility]    Specimen Expires 01/09/2020    T4 free    Collection Time: 01/06/20  8:31 AM   Result Value Ref Range    T4 Free 0.97 0.76 - 1.46 ng/dL   ABO type [SRR9773]    Collection Time: 01/06/20  8:34 AM   Result Value Ref Range    ABO AB     RH(D) Pos     Specimen Expires 01/09/2020    UA reflex to Microscopic and Culture    Collection Time: 01/06/20  9:14 AM   Result Value Ref Range    Color Urine Yellow     Appearance Urine Slightly Cloudy     Glucose Urine 150 (A) NEG^Negative mg/dL    Bilirubin Urine Negative NEG^Negative    Ketones Urine Negative NEG^Negative mg/dL    Specific Gravity Urine 1.020 1.003 - 1.035    Blood Urine Negative NEG^Negative    pH Urine 6.0 5.0 - 7.0 pH    Protein Albumin Urine Negative NEG^Negative mg/dL    Urobilinogen mg/dL 0.0 0.0 - 2.0 mg/dL    Nitrite Urine Negative NEG^Negative    Leukocyte Esterase Urine Negative NEG^Negative    Source Midstream Urine     RBC Urine 1 0 - 2 /HPF    WBC Urine 5 0 - 5 /HPF    Squamous Epithelial /HPF Urine <1 0 - 1 /HPF    Mucous Urine Present (A) NEG^Negative /LPF    Hyaline Casts 1 0 - 2 /LPF    Granular Casts 3 (A) NEG^Negative /LPF    Cellular Cast 3 (A) NEG^Negative /LPF    Calcium Oxalate Few (A) NEG^Negative /HPF   Protein  random urine with Creat Ratio    Collection Time: 01/06/20  9:14 AM   Result Value Ref Range    Protein Random Urine 0.10 g/L    Protein Total Urine g/gr Creatinine 0.06 0 - 0.2 g/g Cr   Ethyl Glucuronide Urine    Collection Time: 01/06/20  9:14 AM   Result Value Ref Range    Ethyl Glucuronide Urine Negative      Creatinine urine calculation only    Collection Time: 01/06/20  9:14 AM   Result Value Ref Range    Creatinine Urine 175 mg/dL   General PFT Lab (Please always keep checked)    Collection Time: 01/07/20  7:34 AM   Result Value Ref Range    FVC-Pred 5.83 L    FVC-Pre 5.60 L    FVC-%Pred-Pre 96 %    FEV1-Pre 4.39 L    FEV1-%Pred-Pre 91 %    FEV1FVC-Pred 82 %    FEV1FVC-Pre 78 %    FEFMax-Pred 10.71  L/sec    FEFMax-Pre 9.73 L/sec    FEFMax-%Pred-Pre 90 %    FEF2575-Pred 4.79 L/sec    FEF2575-Pre 3.86 L/sec    ABS2071-%Pred-Pre 80 %    ExpTime-Pre 7.18 sec    FIFMax-Pre 6.32 L/sec    VC-Pred 6.03 L    VC-Pre 5.61 L    VC-%Pred-Pre 92 %    IC-Pred 4.21 L    IC-Pre 4.19 L    IC-%Pred-Pre 99 %    ERV-Pred 1.82 L    ERV-Pre 1.42 L    ERV-%Pred-Pre 77 %    FEV1FEV6-Pred 83 %    FEV1FEV6-Pre 78 %    FRCPleth-Pred 3.46 L    FRCPleth-Pre 3.61 L    FRCPleth-%Pred-Pre 104 %    RVPleth-Pred 1.83 L    RVPleth-Pre 2.19 L    RVPleth-%Pred-Pre 119 %    TLCPleth-Pred 7.53 L    TLCPleth-Pre 7.79 L    TLCPleth-%Pred-Pre 103 %    DLCOunc-Pred 34.71 ml/min/mmHg    DLCOunc-Pre 31.97 ml/min/mmHg    DLCOunc-%Pred-Pre 92 %    DLCOcor-Pre 35.96 ml/min/mmHg    DLCOcor-%Pred-Pre 103 %    VA-Pre 7.12 L    VA-%Pred-Pre 100 %    FEV1SVC-Pred 79 %    FEV1SVC-Pre 78 %     I had a long discussion with the patient regarding liver transplantation which included but was not limited to  the following points:    1. Liver transplant selection committee process.  2. The federal rules for cadaveric waiting list, the size and blood type matching of the organ. The availability of living-related donor transplantation.  3. The types of donors: brain death donors, non-heart beating donors, partial liver grafts: splits and living donor grafts  4. Extended criteria  Donors (older age, steasosis) and the increased  risk of primary non-function using the extended criteria donors  5. The CDC high risk donors,  Risk of donor transmitted infections and donor transmitted malignancy  6. The liver transplant operation and the associated risks and technical complications which can include intraoperative death, post operative death,  Primary non-function, bleeding requiring re-operations, arterial and biliary complications, bowel perforations, and intra abdominal abscess. Some of these complicaitons may require a second operation.  7. The postoperative course, the ICU  stay and risk of postoperative complications which can include sepsis, MI, stroke, brain injury, pneumonia, pleural effusions, and renal dysfunction.  8. The current 1 year and 5 year graft and patient survivals.  9. The need for life long immunosuppressive therapy and the side effects of these medications, including the possibility of toxicity, opportunistic infections, risk of cancer including lymphoma, and the possibility of rejection even if the patient is taking the medication exactly as prescribed.  10. The need for compliance with medications and follow-up visits in the clinic and thereafter.  11. The patient and family understand these risks and wish to proceed to transplantation       I spent .60......minutes with the patient and more than 50% of the time was spend in direct face to face counseling.      Again, thank you for allowing me to participate in the care of your patient.      Sincerely,    MD LORIE Goddard JESSICA L    Copy to patient  Parent(s) of Obed Wiley  [address] [place] [id number]

## 2020-01-07 NOTE — PROGRESS NOTES
[facility]    Hepatology New Patient Visit    Referring provider:  Anjelica Reyes    CHIEF COMPLAINT/REASON FOR VISIT:  Alcoholic liver disease.      HISTORY OF PRESENT ILLNESS:  Mr. Wiley is a 29-year-old white male with a history of Yan-en-Y for obesity in 2007, type 2 diabetes mellitus, insulin requiring, a past history of DVT in both lower extremities, a history of PE, IVC filter placement and a long history of alcohol use.  He did progress to cirrhosis and, in fact, developed fluid retention mostly manifesting itself with ascites.  He initially required biweekly paracentesis and weekly paracentesis, but in the last 1 month, he did only once.  He has also hepatic encephalopathy and has required admissions to the hospital.  At one point, and that was sometime in 01/2009, he had hepatic encephalopathy and developed acute hypoxic hypercapnic respiratory failure.  At that time he was intubated, and that is the only time he got intubated.  Otherwise, he did not have any significant gastrointestinal bleeding, although he had hematochezia.  Had EGDs in the past and had banding.    Today he is telling me he is lucid and clear, although that changes every now and then.  He does not have confusion or memory issues.  He narrates his history appropriately.  He has no melena, also, and no hematemesis, but has occasional hematochezia.  He claims that he had a colonoscopy, and this was identified as coming from the lower GI tract.  He does not have any jaundice now.  He has minimal edema, but still has abdominal distention.  He has a past history of SBP, which required hospitalizations, and in fact, he has multiple hospital admissions for SBP and also for hepatic encephalopathy.  He has abdominal pain, according to him.  He has nausea, but no vomiting.  He is moving his bowels 5-7 times a day.  In the last month he lost 10 pounds, although he has lost more than that, according to his mother,  prior to that.     Medical hx Surgical hx   Past Medical History:   Diagnosis Date     Alcoholic cirrhosis of liver with ascites (H) 12/23/2019     Diabetes (H)     Type 2 DM, Uses Insulin      DVT (deep vein thrombosis) in pregnancy      H/O protein C deficiency      Hepatic encephalopathy (H)      Hepatitis     Hep A when an infant      History of blood transfusion     2019 at [facility]      SBP (spontaneous bacterial peritonitis) (H) 11/2019      Past Surgical History:   Procedure Laterality Date     ABDOMEN SURGERY      Gastric Bypass 2009. [facility]      CARDIAC SURGERY      IVC      COLONOSCOPY       ENT SURGERY      Tonsils and Adenoids Removed at 6-7 Years Old      GALLBLADDER SURGERY  2017     GI SURGERY      Upper GI           Medications  Prior to Admission medications    Medication Sig Start Date End Date Taking? Authorizing Provider   blood glucose (NO BRAND SPECIFIED) lancets standard Use to test blood sugar 6 times daily or as directed.    Reported, Patient   Calcium Carbonate Antacid 400 MG CHEW Take by mouth 2 times daily    Reported, Patient   cholecalciferol (VITAMIN D3) 22965 units (1250 mcg) capsule Take 50,000 Units by mouth every 7 days    Reported, Patient   ciprofloxacin (CIPRO) 500 MG tablet Take 500 mg by mouth 2 times daily    Reported, Patient   ferrous sulfate (FEROSUL) 325 (65 Fe) MG tablet Take 325 mg by mouth daily (with breakfast)    Reported, Patient   folic acid (FOLVITE) 1 MG tablet Take 1 mg by mouth 2 times daily    Reported, Patient   furosemide (LASIX) 40 MG tablet Take 40 mg by mouth 3 times daily    Reported, Patient   gabapentin (NEURONTIN) 400 MG capsule Take 400 mg by mouth 3 times daily    Reported, Patient   insulin aspart (NOVOLOG FLEXPEN) 100 UNIT/ML pen As directed    Reported, Patient   insulin glargine (LANTUS VIAL) 100 UNIT/ML vial Inject Subcutaneous At Bedtime 26 units bid daily    Reported, Patient   lactulose (CHRONULAC) 10 GM/15ML solution Take 20 g by  mouth 3 times daily 20mg / 30 ml tid daily/ prn    Reported, Patient   magnesium oxide 400 MG CAPS Take by mouth 3 times daily    Reported, Patient   MULTIPLE VITAMIN-FOLIC ACID PO Take 1 tablet by mouth 1 daily    Reported, Patient   Nutritional Supplements (ENSURE PO) Ensure/ boost - 237 ml bid daily    Reported, Patient   oxyCODONE-acetaminophen (PERCOCET)  MG per tablet Take 1 tablet by mouth every 4 hours    Reported, Patient   pantoprazole (PROTONIX) 40 MG EC tablet Take 40 mg by mouth daily    Reported, Patient   polyethylene glycol (MIRALAX/GLYCOLAX) packet Take 1 packet by mouth daily prn    Reported, Patient   promethazine (PHENERGAN) 12.5 MG tablet Take 25 mg by mouth every 6 hours as needed for nausea    Reported, Patient   QUEtiapine (SEROQUEL) 25 MG tablet Take 25 mg by mouth 2 times daily Taking 1-2 tabs @@ h.s    Reported, Patient   rifaximin (XIFAXAN) 550 MG TABS tablet Take 200 mg by mouth 2 times daily    Reported, Patient   spironolactone (ALDACTONE) 100 MG tablet Take 100 mg by mouth 3 times daily    Reported, Patient   traZODone (DESYREL) 50 MG tablet Take 50 mg by mouth At Bedtime Prn    Reported, Patient   tretinoin (RETIN-A) 0.025 % external cream Apply topically At Bedtime    Reported, Patient   Vitamin D, Cholecalciferol, 25 MCG (1000 UT) CAPS 2000 units daily    Reported, Patient       Allergies  Allergies   Allergen Reactions     Bee Anaphylaxis     Adhesive Tape Rash     Sulfa Drugs Itching       Family hx Social hx   Family History   Problem Relation Age of Onset     Protein C deficiency Mother      Pancreatic Cancer Father      Deonte Parkinson White syndrome Father      Alcoholism Brother      Heart Failure Maternal Grandmother      Heart Failure Maternal Grandfather       Social History     Tobacco Use     Smoking status: Never Smoker     Smokeless tobacco: Never Used   Substance Use Topics     Alcohol use: Not Currently     Drug use: Not Currently          REVIEW OF SYMPTOMS:   Mr. Wiley denies any infections now.  He has significant fatigue.  No headaches, but had 1 episode of seizure, which he is not able to identify the cause.  He does not have any cough, but has shortness of breath when he is extremely distended.  No chest pain.  He has also no joint issues.  He claims that he has anxiety and is on medications.  He has also anemia and bruises easily.  Above all, he has protein C deficiency, which led to all those clotting issues.  He has been on anticoagulation at one point, but he did discontinue now.  He has also no eye or hearing problems, and he does not have any skin disease except that he has the easy bruising and related skin sequelae.  Otherwise, a comprehensive review of symptoms was noncontributory.     Examination  /70   Pulse 98   Wt 86.2 kg (190 lb)   SpO2 96%   BMI 25.70 kg/m    Gen- well, NAD, A+Ox3, normal color  Eye- EOMI  ENT- MMM, normal oropharynx  Lym- no palpable lymphadenopathy  CVS- S1, S2 normal, no added sounds, RRR  RS- CTA  Abd- Distended. Positive shifting dullness.  Extr- pulses good, no BETH  MS- hands normal- no clubbing  Neuro- A+Ox3, no asterixis  Skin- no rash or jaundice  Psych- normal mood    Laboratory  Lab Results   Component Value Date     01/06/2020    POTASSIUM 3.7 01/06/2020    CHLORIDE 101 01/06/2020    CO2 26 01/06/2020    BUN 14 01/06/2020    CR 0.93 01/06/2020       Lab Results   Component Value Date    BILITOTAL 0.9 01/06/2020    ALT 64 01/06/2020    AST 72 01/06/2020    ALKPHOS 138 01/06/2020       Lab Results   Component Value Date    ALBUMIN 3.3 01/06/2020    PROTTOTAL 7.4 01/06/2020        Lab Results   Component Value Date    WBC 3.6 01/06/2020    HGB 11.2 01/06/2020    MCV 84 01/06/2020    PLT 57 01/06/2020       Lab Results   Component Value Date    INR 1.34 01/06/2020     MELD-Na score: 9 at 1/6/2020  8:31 AM  MELD score: 9 at 1/6/2020  8:31 AM  Calculated from:  Serum Creatinine: 0.93 mg/dL (Rounded to 1 mg/dL) at  2020  8:31 AM  Serum Sodium: 132 mmol/L at 2020  8:31 AM  Total Bilirubin: 0.9 mg/dL (Rounded to 1 mg/dL) at 2020  8:31 AM  INR(ratio): 1.34 at 2020  8:31 AM  Age: 29 years      Radiology    ASSESSMENT AND PLAN:  Alcoholic liver disease.        Mr. Wiley has alcoholic liver disease.  He had drunk for quite a long time and a significant amount of this.  He did progress to cirrhosis and decompensated.  His main issues are ascites and hepatic encephalopathy.  His ascites initially required therapeutic paracentesis twice a week and then once a week and now in the last 1 month only once.  He had a couple of  episodes of spontaneous bacterial peritonitis.  It might be prudent to reduce the number of paracenteses and do a paracentesis if he requires it.  This is dictated usually by shortness of breath related with that or significant abdominal distention with a lot of discomfort.  As far as the hepatic encephalopathy is concerned, his MELD score is 9, so we thought that there could be a precipitant, and in this case, my feeling is that it could be narcotics, which the patient takes.  We advised against it, and he is open to listening to us.  Besides that, he has thrombophilia.  He has inherited from his mom protein C deficiency.  He had historically bilateral DVTs of his lower extremities, and he did have, also, pulmonary emboli.  We told him that he will need followup with his hematologist, and in the meantime, he will come also here and see one of our hematologists if at all we decide to proceed forward with the evaluation.  He is going to see our surgeon, also, today.  Intriguing is that the patient had 2 episodes of pancreatitis.  He is 29 years old, and his dad  of pancreatic cancer at age 36, so a correlation between these two is not very clear.  We did talk with our pancreas group.  They will need to talk with him, and they will need, also, to decide if he could have an EUS to check the  pancreas.  Otherwise, he will go on surveillance for HCC with imaging.  We will continue doing EGD surveillance for esophageal varices, and he will be seen here in 3-6 months for followup.  We did explain that to him and his mom.      This was a 1 hour visit, of which more than 50% was spent in explaining to the patient and his mom what our plan of care was.  We answered all their questions.      cc:     Primary Care Provider   CC:  Lj Acosta MD   [facility]    [address] [place] [id number]         Viola Santos MD  Hepatology  [facility]

## 2020-01-07 NOTE — PROGRESS NOTES
HPI      ROS      Physical Exam    Assessment and Plan:  1. liver transplant evaluation - patient is a good candidate overall. Benefits and surgical risks of a liver transplantation were discussed.  2.  End stage liver disease due to Laennec's    Surgical evaluation:  1. Portal Vein:Patent  2. Hepatic Artery: Open  3. TIPS: absent  4. Previous Abdominal Surgery: Yes; cholecystectomy, Gastric bypass done in [place]  5. Hepatocellular Carcinoma: None  6. Ascites: Present - large amount  7. Costal Angle: narrow  8. Portopulmonary Hypertension: absent  9. Hepatopulmonary Syndrome: absent  10. Cardiac Evaluation: needs stress echocardiogram  11. Nutritional Status: Good  12. Diabetes: yes, type II on insulin  13.Hypertension no  14. Smoker:no  115: Fraility index:no      Recommendations:   He needs to complete evaluation, and consider living donors he is meld sodium today is only 9.  He does have 2 potential donors and we have strongly encouraged him to go on that path      Patients overall evaluation will be discussed at the Liver Transplant selection committee meeting with a final recommendation on the patients suitability for transplant to be made at that time.    Consult Full  Details:  Obed Wiley was seen in consultation at the request of Dr. Santos for evaluation as a potential liver transplant recipient.    Reason for Visit:  Obed Wiley is a 29 year old year old male with Laennec's, who presents for liver transplant evaluation.    HPI:  Presenting complaint: Encephatopathy     Patient has been diagnosed to have Laënnec's cirrhosis.  He has decompensated in the form of ascites and encephalopathy.  He has required multiple abdominal paracenteses.  He does have 2 episodes of spontaneous bacterial peritonitis and is currently on prophylaxis with ciprofloxacin.  There is no history of variceal bleeding.  He received Yan-en-Y gastric bypass in 2009 in [place].  The surgical details are not clear but that is  what the patient remembers.  He does not give any history of angina or cardiac disease, but he is diabetic on insulin.    Past Medical History:   Diagnosis Date     Alcoholic cirrhosis of liver with ascites (H) 12/23/2019     Diabetes (H)     Type 2 DM, Uses Insulin      Hepatitis     Hep A when an infant      History of blood transfusion     2019 at [facility]      SBP (spontaneous bacterial peritonitis) (H) 11/2019     Past Surgical History:   Procedure Laterality Date     ABDOMEN SURGERY      Gastric Bypass 2009. [facility]      CARDIAC SURGERY      [facility]      COLONOSCOPY       ENT SURGERY      Tonsils and Adenoids Removed at 6-7 Years Old      GALLBLADDER SURGERY  2017     GI SURGERY      Upper GI      Past Surgical History:   Procedure Laterality Date     ABDOMEN SURGERY      Gastric Bypass 2009. [facility]      CARDIAC SURGERY      [facility]      COLONOSCOPY       ENT SURGERY      Tonsils and Adenoids Removed at 6-7 Years Old      GALLBLADDER SURGERY  2017     GI SURGERY      Upper GI      No family history on file.  Allergies   Allergen Reactions     Bee Anaphylaxis     Adhesive Tape Rash     Sulfa Drugs Itching     Prior to Admission medications    Medication Sig Start Date End Date Taking? Authorizing Provider   blood glucose (NO BRAND SPECIFIED) lancets standard Use to test blood sugar 6 times daily or as directed.   Yes Reported, Patient   Calcium Carbonate Antacid 400 MG CHEW Take by mouth 2 times daily   Yes Reported, Patient   cholecalciferol (VITAMIN D3) 50071 units (1250 mcg) capsule Take 50,000 Units by mouth every 7 days   Yes Reported, Patient   ciprofloxacin (CIPRO) 500 MG tablet Take 500 mg by mouth 2 times daily   Yes Reported, Patient   ferrous sulfate (FEROSUL) 325 (65 Fe) MG tablet Take 325 mg by mouth daily (with breakfast)   Yes Reported, Patient   folic acid (FOLVITE) 1 MG tablet Take 1 mg by mouth 2 times daily   Yes Reported, Patient   furosemide (LASIX) 40 MG tablet Take 40 mg by mouth 3  times daily   Yes Reported, Patient   gabapentin (NEURONTIN) 400 MG capsule Take 400 mg by mouth 3 times daily   Yes Reported, Patient   insulin aspart (NOVOLOG FLEXPEN) 100 UNIT/ML pen As directed   Yes Reported, Patient   insulin glargine (LANTUS VIAL) 100 UNIT/ML vial Inject Subcutaneous At Bedtime 26 units bid daily   Yes Reported, Patient   lactulose (CHRONULAC) 10 GM/15ML solution Take 20 g by mouth 3 times daily 20mg / 30 ml tid daily/ prn   Yes Reported, Patient   magnesium oxide 400 MG CAPS Take by mouth 3 times daily   Yes Reported, Patient   MULTIPLE VITAMIN-FOLIC ACID PO Take 1 tablet by mouth 1 daily   Yes Reported, Patient   Nutritional Supplements (ENSURE PO) Ensure/ boost - 237 ml bid daily   Yes Reported, Patient   oxyCODONE-acetaminophen (PERCOCET)  MG per tablet Take 1 tablet by mouth every 4 hours   Yes Reported, Patient   pantoprazole (PROTONIX) 40 MG EC tablet Take 40 mg by mouth daily   Yes Reported, Patient   polyethylene glycol (MIRALAX/GLYCOLAX) packet Take 1 packet by mouth daily prn   Yes Reported, Patient   promethazine (PHENERGAN) 12.5 MG tablet Take 25 mg by mouth every 6 hours as needed for nausea   Yes Reported, Patient   QUEtiapine (SEROQUEL) 25 MG tablet Take 25 mg by mouth 2 times daily Taking 1-2 tabs @@ h.s   Yes Reported, Patient   rifaximin (XIFAXAN) 550 MG TABS tablet Take 200 mg by mouth 2 times daily   Yes Reported, Patient   spironolactone (ALDACTONE) 100 MG tablet Take 100 mg by mouth 3 times daily   Yes Reported, Patient   traZODone (DESYREL) 50 MG tablet Take 50 mg by mouth At Bedtime Prn   Yes Reported, Patient   tretinoin (RETIN-A) 0.025 % external cream Apply topically At Bedtime   Yes Reported, Patient   Vitamin D, Cholecalciferol, 25 MCG (1000 UT) CAPS 2000 units daily   Yes Reported, Patient       Previous Transplant Hx: No    Cardiovascular Hx:       h/o Cardiac Issues: No       Exercise Tolerance: shortness of breath with exertion.    Potential Donor(s):  Yes -  Potentially to donors    ROS:    REVIEW OF SYSTEMS (check box if normal)  [x]                GENERAL  [x]                  PULMONARY [x]                 GENITOURINARY  [x]                 CNS                 [x]                  CARDIAC  [x]                  ENDOCRINE  [x]                 EARS,NOSE,THROAT [x]                  GASTROINTESTINAL [x]                  NEUROLOGIC    [x]                 MUSCLOSKELTAL  [x]                   HEMATOLOGY    Examination:     Vitals:  /70   Pulse 98   Wt 86.2 kg (190 lb)   SpO2 96%   BMI 25.70 kg/m      GENERAL APPEARANCE: alert and no distress  EYES: PERRL  HENT: mouth without ulcers or lesions  NECK: supple, no adenopathy  RESP: lungs clear to auscultation - no rales, rhonchi or wheezes  CV: regular rhythm, normal rate, no rub   ABDOMEN:  soft, nontender,  large amount of ascites present and umbilical hernia present,.no HSM or masses and bowel sounds normal  MS: extremities normal- no gross deformities noted, no evidence of inflammation in joints, no muscle tenderness  SKIN: no rash  NEURO: Normal strength and tone, sensory exam grossly normal, mentation intact and speech normal  PSYCH: mentation appears normal. and affect normal/bright      Results:   Recent Results (from the past 168 hour(s))   EKG 12-lead, tracing only [EKG1]    Collection Time: 01/06/20  8:07 AM   Result Value Ref Range    Interpretation ECG Click View Image link to view waveform and result    Treponema Abs w Reflex to RPR and Titer    Collection Time: 01/06/20  8:31 AM   Result Value Ref Range    Treponema Antibodies Nonreactive NR^Nonreactive   CBC with platelets    Collection Time: 01/06/20  8:31 AM   Result Value Ref Range    WBC 3.6 (L) 4.0 - 11.0 10e9/L    RBC Count 4.46 4.4 - 5.9 10e12/L    Hemoglobin 11.2 (L) 13.3 - 17.7 g/dL    Hematocrit 37.3 (L) 40.0 - 53.0 %    MCV 84 78 - 100 fl    MCH 25.1 (L) 26.5 - 33.0 pg    MCHC 30.0 (L) 31.5 - 36.5 g/dL    RDW 21.8 (H) 10.0 - 15.0 %     Platelet Count 57 (L) 150 - 450 10e9/L   INR    Collection Time: 01/06/20  8:31 AM   Result Value Ref Range    INR 1.34 (H) 0.86 - 1.14   Vitamin D Deficiency    Collection Time: 01/06/20  8:31 AM   Result Value Ref Range    Vitamin D Deficiency screening 27 20 - 75 ug/L   TSH with free T4 reflex    Collection Time: 01/06/20  8:31 AM   Result Value Ref Range    TSH 4.09 (H) 0.40 - 4.00 mU/L   Lipid Profile    Collection Time: 01/06/20  8:31 AM   Result Value Ref Range    Cholesterol 107 <200 mg/dL    Triglycerides 49 <150 mg/dL    HDL Cholesterol 67 >39 mg/dL    LDL Cholesterol Calculated 31 <100 mg/dL    Non HDL Cholesterol 40 <130 mg/dL   Hepatic panel    Collection Time: 01/06/20  8:31 AM   Result Value Ref Range    Bilirubin Direct 0.4 (H) 0.0 - 0.2 mg/dL    Bilirubin Total 0.9 0.2 - 1.3 mg/dL    Albumin 3.3 (L) 3.4 - 5.0 g/dL    Protein Total 7.4 6.8 - 8.8 g/dL    Alkaline Phosphatase 138 40 - 150 U/L    ALT 64 0 - 70 U/L    AST 72 (H) 0 - 45 U/L   Basic metabolic panel    Collection Time: 01/06/20  8:31 AM   Result Value Ref Range    Sodium 132 (L) 133 - 144 mmol/L    Potassium 3.7 3.4 - 5.3 mmol/L    Chloride 101 94 - 109 mmol/L    Carbon Dioxide 26 20 - 32 mmol/L    Anion Gap 6 3 - 14 mmol/L    Glucose 184 (H) 70 - 99 mg/dL    Urea Nitrogen 14 7 - 30 mg/dL    Creatinine 0.93 0.66 - 1.25 mg/dL    GFR Estimate >90 >60 mL/min/[1.73_m2]    GFR Estimate If Black >90 >60 mL/min/[1.73_m2]    Calcium 8.8 8.5 - 10.1 mg/dL   ABO/Rh type and screen    Collection Time: 01/06/20  8:31 AM   Result Value Ref Range    ABO AB     RH(D) Pos     Antibody Screen Neg     Test Valid Only At          [facility]    Specimen Expires 01/09/2020    T4 free    Collection Time: 01/06/20  8:31 AM   Result Value Ref Range    T4 Free 0.97 0.76 - 1.46 ng/dL   ABO type [[id number]]    Collection Time: 01/06/20  8:34 AM   Result Value Ref Range    ABO AB     RH(D) Pos     Specimen Expires 01/09/2020     UA reflex to Microscopic and Culture    Collection Time: 01/06/20  9:14 AM   Result Value Ref Range    Color Urine Yellow     Appearance Urine Slightly Cloudy     Glucose Urine 150 (A) NEG^Negative mg/dL    Bilirubin Urine Negative NEG^Negative    Ketones Urine Negative NEG^Negative mg/dL    Specific Gravity Urine 1.020 1.003 - 1.035    Blood Urine Negative NEG^Negative    pH Urine 6.0 5.0 - 7.0 pH    Protein Albumin Urine Negative NEG^Negative mg/dL    Urobilinogen mg/dL 0.0 0.0 - 2.0 mg/dL    Nitrite Urine Negative NEG^Negative    Leukocyte Esterase Urine Negative NEG^Negative    Source Midstream Urine     RBC Urine 1 0 - 2 /HPF    WBC Urine 5 0 - 5 /HPF    Squamous Epithelial /HPF Urine <1 0 - 1 /HPF    Mucous Urine Present (A) NEG^Negative /LPF    Hyaline Casts 1 0 - 2 /LPF    Granular Casts 3 (A) NEG^Negative /LPF    Cellular Cast 3 (A) NEG^Negative /LPF    Calcium Oxalate Few (A) NEG^Negative /HPF   Protein  random urine with Creat Ratio    Collection Time: 01/06/20  9:14 AM   Result Value Ref Range    Protein Random Urine 0.10 g/L    Protein Total Urine g/gr Creatinine 0.06 0 - 0.2 g/g Cr   Ethyl Glucuronide Urine    Collection Time: 01/06/20  9:14 AM   Result Value Ref Range    Ethyl Glucuronide Urine Negative      Creatinine urine calculation only    Collection Time: 01/06/20  9:14 AM   Result Value Ref Range    Creatinine Urine 175 mg/dL   General PFT Lab (Please always keep checked)    Collection Time: 01/07/20  7:34 AM   Result Value Ref Range    FVC-Pred 5.83 L    FVC-Pre 5.60 L    FVC-%Pred-Pre 96 %    FEV1-Pre 4.39 L    FEV1-%Pred-Pre 91 %    FEV1FVC-Pred 82 %    FEV1FVC-Pre 78 %    FEFMax-Pred 10.71 L/sec    FEFMax-Pre 9.73 L/sec    FEFMax-%Pred-Pre 90 %    FEF2575-Pred 4.79 L/sec    FEF2575-Pre 3.86 L/sec    EUL7143-%Pred-Pre 80 %    ExpTime-Pre 7.18 sec    FIFMax-Pre 6.32 L/sec    VC-Pred 6.03 L    VC-Pre 5.61 L    VC-%Pred-Pre 92 %    IC-Pred 4.21 L    IC-Pre 4.19 L    IC-%Pred-Pre 99 %     ERV-Pred 1.82 L    ERV-Pre 1.42 L    ERV-%Pred-Pre 77 %    FEV1FEV6-Pred 83 %    FEV1FEV6-Pre 78 %    FRCPleth-Pred 3.46 L    FRCPleth-Pre 3.61 L    FRCPleth-%Pred-Pre 104 %    RVPleth-Pred 1.83 L    RVPleth-Pre 2.19 L    RVPleth-%Pred-Pre 119 %    TLCPleth-Pred 7.53 L    TLCPleth-Pre 7.79 L    TLCPleth-%Pred-Pre 103 %    DLCOunc-Pred 34.71 ml/min/mmHg    DLCOunc-Pre 31.97 ml/min/mmHg    DLCOunc-%Pred-Pre 92 %    DLCOcor-Pre 35.96 ml/min/mmHg    DLCOcor-%Pred-Pre 103 %    VA-Pre 7.12 L    VA-%Pred-Pre 100 %    FEV1SVC-Pred 79 %    FEV1SVC-Pre 78 %     I had a long discussion with the patient regarding liver transplantation which included but was not limited to  the following points:    1. Liver transplant selection committee process.  2. The federal rules for cadaveric waiting list, the size and blood type matching of the organ. The availability of living-related donor transplantation.  3. The types of donors: brain death donors, non-heart beating donors, partial liver grafts: splits and living donor grafts  4. Extended criteria  Donors (older age, steasosis) and the increased  risk of primary non-function using the extended criteria donors  5. The CDC high risk donors,  Risk of donor transmitted infections and donor transmitted malignancy  6. The liver transplant operation and the associated risks and technical complications which can include intraoperative death, post operative death,  Primary non-function, bleeding requiring re-operations, arterial and biliary complications, bowel perforations, and intra abdominal abscess. Some of these complicaitons may require a second operation.  7. The postoperative course, the ICU stay and risk of postoperative complications which can include sepsis, MI, stroke, brain injury, pneumonia, pleural effusions, and renal dysfunction.  8. The current 1 year and 5 year graft and patient survivals.  9. The need for life long immunosuppressive therapy and the side effects of these  medications, including the possibility of toxicity, opportunistic infections, risk of cancer including lymphoma, and the possibility of rejection even if the patient is taking the medication exactly as prescribed.  10. The need for compliance with medications and follow-up visits in the clinic and thereafter.  11. The patient and family understand these risks and wish to proceed to transplantation       I spent .60......minutes with the patient and more than 50% of the time was spend in direct face to face counseling.  CC  MARGAUX CANO    Copy to patient  MARIA DEL ROSARIO KENNY   [address] [place] [id number]

## 2020-01-07 NOTE — PROGRESS NOTES
Liver Transplant Evaluation/Teaching    TEACHING TOPICS: Evaluation Process, Evaluation Items, Diagnostic Studies, Consultation, Chemical Dependency Policy, CD Eval, Donor Source, Liver Allocation, MELD System, UNOS, Waiting List, Follow up while on transplant list, Follow up after transplantation, Infection and Rejection, Immunosuppression , Medication Teaching, Lab Recording after transplant, Laboratory Frequency after transplant , Consent for evaluation and One year survival rates  INSTRUCTIONAL MATERIAL USED/GIVEN: Liver Transplant Handbook, MELD Booklet, Donor Booklet, Web Sites Options, Verbal instructions, Multiple Listing Brochure , Consent for evaluation signed, One year survival rates and SRTR (Scientific Registry) Data  Person(s) involved in teaching: patient and his mother  Asks Questions: YES  Eager to Learn: YES  Cooperative: YES  Receptive (willing/able to accept information): YES  Reason for the appointment, diagnosis and treatment plan:YES  Knowledge of proper use of medications and conditions for which they are ordered (with special attention to potential side effects or drug interactions): YES  Which situations necessitate calling provider and whom to contact:YES  Other: NA  Teaching Concerns Addressed   Comments: Patient and his mother attended transplant class. Asked excellent questions. Eval consent signed.   Proper use and care of (medical equip, care aids, etc.):YES  Nutritional needs and diet plan: YES  Pain management techniques: YES  Wound Care: YES  How and/when to access community resources: YES  Patient is aware of and agrees to required commitment to post-op care and long term follow-up: YES  Patient has name and phone number of transplant coordinator.   Time Spent face-to-face teachin minutes.  Ruthie Dexter RN  Liver Transplant Coordinator

## 2020-01-07 NOTE — LETTER
1/7/2020       RE: Obed Wiley  [address] [facility] [address]     Dear Colleague,    Thank you for referring your patient, Obed Wiley, to the [facility] HEPATOLOGY at [facility]. Please see a copy of my visit note below.    [facility]    Hepatology New Patient Visit    Referring provider:  Anjelica Reyes    CHIEF COMPLAINT/REASON FOR VISIT:  Alcoholic liver disease.      HISTORY OF PRESENT ILLNESS:  Mr. Wiley is a 29-year-old white male with a history of Yan-en-Y for obesity in 2007, type 2 diabetes mellitus, insulin requiring, a past history of DVT in both lower extremities, a history of PE, IVC filter placement and a long history of alcohol use.  He did progress to cirrhosis and, in fact, developed fluid retention mostly manifesting itself with ascites.  He initially required biweekly paracentesis and weekly paracentesis, but in the last 1 month, he did only once.  He has also hepatic encephalopathy and has required admissions to the hospital.  At one point, and that was sometime in 01/2009, he had hepatic encephalopathy and developed acute hypoxic hypercapnic respiratory failure.  At that time he was intubated, and that is the only time he got intubated.  Otherwise, he did not have any significant gastrointestinal bleeding, although he had hematochezia.  Had EGDs in the past and had banding.    Today he is telling me he is lucid and clear, although that changes every now and then.  He does not have confusion or memory issues.  He narrates his history appropriately.  He has no melena, also, and no hematemesis, but has occasional hematochezia.  He claims that he had a colonoscopy, and this was identified as coming from the lower GI tract.  He does not have any jaundice now.  He has minimal edema, but still has abdominal distention.  He has a past history of SBP, which required hospitalizations, and in fact, he has multiple  hospital admissions for SBP and also for hepatic encephalopathy.  He has abdominal pain, according to him.  He has nausea, but no vomiting.  He is moving his bowels 5-7 times a day.  In the last month he lost 10 pounds, although he has lost more than that, according to his mother, prior to that.     Medical hx Surgical hx   Past Medical History:   Diagnosis Date     Alcoholic cirrhosis of liver with ascites (H) 12/23/2019     Diabetes (H)     Type 2 DM, Uses Insulin      DVT (deep vein thrombosis) in pregnancy      H/O protein C deficiency      Hepatic encephalopathy (H)      Hepatitis     Hep A when an infant      History of blood transfusion     2019 at [facility]      SBP (spontaneous bacterial peritonitis) (H) 11/2019      Past Surgical History:   Procedure Laterality Date     ABDOMEN SURGERY      Gastric Bypass 2009. [facility]      CARDIAC SURGERY      IVC      COLONOSCOPY       ENT SURGERY      Tonsils and Adenoids Removed at 6-7 Years Old      GALLBLADDER SURGERY  2017     GI SURGERY      Upper GI           Medications  Prior to Admission medications    Medication Sig Start Date End Date Taking? Authorizing Provider   blood glucose (NO BRAND SPECIFIED) lancets standard Use to test blood sugar 6 times daily or as directed.    Reported, Patient   Calcium Carbonate Antacid 400 MG CHEW Take by mouth 2 times daily    Reported, Patient   cholecalciferol (VITAMIN D3) 72549 units (1250 mcg) capsule Take 50,000 Units by mouth every 7 days    Reported, Patient   ciprofloxacin (CIPRO) 500 MG tablet Take 500 mg by mouth 2 times daily    Reported, Patient   ferrous sulfate (FEROSUL) 325 (65 Fe) MG tablet Take 325 mg by mouth daily (with breakfast)    Reported, Patient   folic acid (FOLVITE) 1 MG tablet Take 1 mg by mouth 2 times daily    Reported, Patient   furosemide (LASIX) 40 MG tablet Take 40 mg by mouth 3 times daily    Reported, Patient   gabapentin (NEURONTIN) 400 MG capsule Take 400 mg by mouth 3 times daily     Reported, Patient   insulin aspart (NOVOLOG FLEXPEN) 100 UNIT/ML pen As directed    Reported, Patient   insulin glargine (LANTUS VIAL) 100 UNIT/ML vial Inject Subcutaneous At Bedtime 26 units bid daily    Reported, Patient   lactulose (CHRONULAC) 10 GM/15ML solution Take 20 g by mouth 3 times daily 20mg / 30 ml tid daily/ prn    Reported, Patient   magnesium oxide 400 MG CAPS Take by mouth 3 times daily    Reported, Patient   MULTIPLE VITAMIN-FOLIC ACID PO Take 1 tablet by mouth 1 daily    Reported, Patient   Nutritional Supplements (ENSURE PO) Ensure/ boost - 237 ml bid daily    Reported, Patient   oxyCODONE-acetaminophen (PERCOCET)  MG per tablet Take 1 tablet by mouth every 4 hours    Reported, Patient   pantoprazole (PROTONIX) 40 MG EC tablet Take 40 mg by mouth daily    Reported, Patient   polyethylene glycol (MIRALAX/GLYCOLAX) packet Take 1 packet by mouth daily prn    Reported, Patient   promethazine (PHENERGAN) 12.5 MG tablet Take 25 mg by mouth every 6 hours as needed for nausea    Reported, Patient   QUEtiapine (SEROQUEL) 25 MG tablet Take 25 mg by mouth 2 times daily Taking 1-2 tabs @@ h.s    Reported, Patient   rifaximin (XIFAXAN) 550 MG TABS tablet Take 200 mg by mouth 2 times daily    Reported, Patient   spironolactone (ALDACTONE) 100 MG tablet Take 100 mg by mouth 3 times daily    Reported, Patient   traZODone (DESYREL) 50 MG tablet Take 50 mg by mouth At Bedtime Prn    Reported, Patient   tretinoin (RETIN-A) 0.025 % external cream Apply topically At Bedtime    Reported, Patient   Vitamin D, Cholecalciferol, 25 MCG (1000 UT) CAPS 2000 units daily    Reported, Patient       Allergies  Allergies   Allergen Reactions     Bee Anaphylaxis     Adhesive Tape Rash     Sulfa Drugs Itching       Family hx Social hx   Family History   Problem Relation Age of Onset     Protein C deficiency Mother      Pancreatic Cancer Father      Deonte Parkinson White syndrome Father      Alcoholism Brother      Heart  Failure Maternal Grandmother      Heart Failure Maternal Grandfather       Social History     Tobacco Use     Smoking status: Never Smoker     Smokeless tobacco: Never Used   Substance Use Topics     Alcohol use: Not Currently     Drug use: Not Currently          REVIEW OF SYMPTOMS:  Mr. Wiley denies any infections now.  He has significant fatigue.  No headaches, but had 1 episode of seizure, which he is not able to identify the cause.  He does not have any cough, but has shortness of breath when he is extremely distended.  No chest pain.  He has also no joint issues.  He claims that he has anxiety and is on medications.  He has also anemia and bruises easily.  Above all, he has protein C deficiency, which led to all those clotting issues.  He has been on anticoagulation at one point, but he did discontinue now.  He has also no eye or hearing problems, and he does not have any skin disease except that he has the easy bruising and related skin sequelae.  Otherwise, a comprehensive review of symptoms was noncontributory.     Examination  /70   Pulse 98   Wt 86.2 kg (190 lb)   SpO2 96%   BMI 25.70 kg/m     Gen- well, NAD, A+Ox3, normal color  Eye- EOMI  ENT- MMM, normal oropharynx  Lym- no palpable lymphadenopathy  CVS- S1, S2 normal, no added sounds, RRR  RS- CTA  Abd- Distended. Positive shifting dullness.  Extr- pulses good, no BETH  MS- hands normal- no clubbing  Neuro- A+Ox3, no asterixis  Skin- no rash or jaundice  Psych- normal mood    Laboratory  Lab Results   Component Value Date     01/06/2020    POTASSIUM 3.7 01/06/2020    CHLORIDE 101 01/06/2020    CO2 26 01/06/2020    BUN 14 01/06/2020    CR 0.93 01/06/2020       Lab Results   Component Value Date    BILITOTAL 0.9 01/06/2020    ALT 64 01/06/2020    AST 72 01/06/2020    ALKPHOS 138 01/06/2020       Lab Results   Component Value Date    ALBUMIN 3.3 01/06/2020    PROTTOTAL 7.4 01/06/2020        Lab Results   Component Value Date    WBC 3.6  01/06/2020    HGB 11.2 01/06/2020    MCV 84 01/06/2020    PLT 57 01/06/2020       Lab Results   Component Value Date    INR 1.34 01/06/2020     MELD-Na score: 9 at 1/6/2020  8:31 AM  MELD score: 9 at 1/6/2020  8:31 AM  Calculated from:  Serum Creatinine: 0.93 mg/dL (Rounded to 1 mg/dL) at 1/6/2020  8:31 AM  Serum Sodium: 132 mmol/L at 1/6/2020  8:31 AM  Total Bilirubin: 0.9 mg/dL (Rounded to 1 mg/dL) at 1/6/2020  8:31 AM  INR(ratio): 1.34 at 1/6/2020  8:31 AM  Age: 29 years      Radiology    ASSESSMENT AND PLAN:  Alcoholic liver disease.        Mr. Wiley has alcoholic liver disease.  He had drunk for quite a long time and a significant amount of this.  He did progress to cirrhosis and decompensated.  His main issues are ascites and hepatic encephalopathy.  His ascites initially required therapeutic paracentesis twice a week and then once a week and now in the last 1 month only once.  He had a couple of  episodes of spontaneous bacterial peritonitis.  It might be prudent to reduce the number of paracenteses and do a paracentesis if he requires it.  This is dictated usually by shortness of breath related with that or significant abdominal distention with a lot of discomfort.  As far as the hepatic encephalopathy is concerned, his MELD score is 9, so we thought that there could be a precipitant, and in this case, my feeling is that it could be narcotics, which the patient takes.  We advised against it, and he is open to listening to us.  Besides that, he has thrombophilia.  He has inherited from his mom protein C deficiency.  He had historically bilateral DVTs of his lower extremities, and he did have, also, pulmonary emboli.  We told him that he will need followup with his hematologist, and in the meantime, he will come also here and see one of our hematologists if at all we decide to proceed forward with the evaluation.  He is going to see our surgeon, also, today.  Intriguing is that the patient had 2 episodes of  pancreatitis.  He is 29 years old, and his dad  of pancreatic cancer at age 36, so a correlation between these two is not very clear.  We did talk with our pancreas group.  They will need to talk with him, and they will need, also, to decide if he could have an EUS to check the pancreas.  Otherwise, he will go on surveillance for HCC with imaging.  We will continue doing EGD surveillance for esophageal varices, and he will be seen here in 3-6 months for followup.  We did explain that to him and his mom.      This was a 1 hour visit, of which more than 50% was spent in explaining to the patient and his mom what our plan of care was.  We answered all their questions.      cc:     Primary Care Provider      Lj Acosta MD   [facility]    [address] [place] [address]     Viola Santos MD  Hepatology  [facility]

## 2020-01-08 ENCOUNTER — HOSPITAL ENCOUNTER (INPATIENT)
Facility: CLINIC | Age: 30
LOS: 3 days | Discharge: HOME | End: 2020-01-11
Attending: EMERGENCY MEDICINE | Admitting: SURGERY
Payer: MEDICAID

## 2020-01-08 ENCOUNTER — CARE COORDINATION (OUTPATIENT)
Dept: GASTROENTEROLOGY | Facility: CLINIC | Age: 30
End: 2020-01-08

## 2020-01-08 ENCOUNTER — APPOINTMENT (OUTPATIENT)
Dept: CT IMAGING | Facility: CLINIC | Age: 30
End: 2020-01-08
Attending: EMERGENCY MEDICINE
Payer: MEDICAID

## 2020-01-08 ENCOUNTER — APPOINTMENT (OUTPATIENT)
Dept: ULTRASOUND IMAGING | Facility: CLINIC | Age: 30
End: 2020-01-08
Attending: EMERGENCY MEDICINE
Payer: MEDICAID

## 2020-01-08 DIAGNOSIS — K70.31 ALCOHOLIC CIRRHOSIS OF LIVER WITH ASCITES (H): ICD-10-CM

## 2020-01-08 DIAGNOSIS — Z86.711 HISTORY OF PULMONARY EMBOLISM: ICD-10-CM

## 2020-01-08 DIAGNOSIS — Z79.4 DIABETES MELLITUS DUE TO UNDERLYING CONDITION WITH HYPEROSMOLARITY WITHOUT COMA, WITH LONG-TERM CURRENT USE OF INSULIN (H): Primary | ICD-10-CM

## 2020-01-08 DIAGNOSIS — K74.60 CIRRHOSIS OF LIVER WITH ASCITES, UNSPECIFIED HEPATIC CIRRHOSIS TYPE (H): ICD-10-CM

## 2020-01-08 DIAGNOSIS — K92.2 GASTROINTESTINAL HEMORRHAGE, UNSPECIFIED GASTROINTESTINAL HEMORRHAGE TYPE: ICD-10-CM

## 2020-01-08 DIAGNOSIS — K55.059 ACUTE MESENTERIC ISCHEMIA (H): ICD-10-CM

## 2020-01-08 DIAGNOSIS — E08.00 DIABETES MELLITUS DUE TO UNDERLYING CONDITION WITH HYPEROSMOLARITY WITHOUT COMA, WITH LONG-TERM CURRENT USE OF INSULIN (H): Primary | ICD-10-CM

## 2020-01-08 DIAGNOSIS — K59.00 CONSTIPATION, UNSPECIFIED CONSTIPATION TYPE: ICD-10-CM

## 2020-01-08 DIAGNOSIS — E86.0 DEHYDRATION: ICD-10-CM

## 2020-01-08 DIAGNOSIS — R18.8 CIRRHOSIS OF LIVER WITH ASCITES, UNSPECIFIED HEPATIC CIRRHOSIS TYPE (H): ICD-10-CM

## 2020-01-08 DIAGNOSIS — R00.0 SINUS TACHYCARDIA: ICD-10-CM

## 2020-01-08 DIAGNOSIS — D72.819 LEUKOPENIA, UNSPECIFIED TYPE: ICD-10-CM

## 2020-01-08 LAB
ABO + RH BLD: NORMAL
ABO + RH BLD: NORMAL
ALBUMIN SERPL-MCNC: 3.2 G/DL (ref 3.4–5)
ALP SERPL-CCNC: 114 U/L (ref 40–150)
ALT SERPL W P-5'-P-CCNC: 58 U/L (ref 0–70)
AMMONIA PLAS-SCNC: 30 UMOL/L (ref 10–50)
ANION GAP SERPL CALCULATED.3IONS-SCNC: 6 MMOL/L (ref 3–14)
APTT PPP: 33 SEC (ref 22–37)
AST SERPL W P-5'-P-CCNC: 57 U/L (ref 0–45)
BASOPHILS # BLD AUTO: 0 10E9/L (ref 0–0.2)
BASOPHILS NFR BLD AUTO: 0.3 %
BILIRUB SERPL-MCNC: 1.6 MG/DL (ref 0.2–1.3)
BLD GP AB SCN SERPL QL: NORMAL
BLOOD BANK CMNT PATIENT-IMP: NORMAL
BUN SERPL-MCNC: 21 MG/DL (ref 7–30)
CALCIUM SERPL-MCNC: 8.7 MG/DL (ref 8.5–10.1)
CHLORIDE SERPL-SCNC: 99 MMOL/L (ref 94–109)
CO2 BLDCOV-SCNC: 14 MMOL/L (ref 21–28)
CO2 BLDCOV-SCNC: 23 MMOL/L (ref 21–28)
CO2 SERPL-SCNC: 23 MMOL/L (ref 20–32)
CREAT SERPL-MCNC: 0.91 MG/DL (ref 0.66–1.25)
DIFFERENTIAL METHOD BLD: ABNORMAL
EOSINOPHIL # BLD AUTO: 0 10E9/L (ref 0–0.7)
EOSINOPHIL NFR BLD AUTO: 0.4 %
ERYTHROCYTE [DISTWIDTH] IN BLOOD BY AUTOMATED COUNT: 22.2 % (ref 10–15)
ERYTHROCYTE [DISTWIDTH] IN BLOOD BY AUTOMATED COUNT: 22.5 % (ref 10–15)
FIBRINOGEN PPP-MCNC: 230 MG/DL (ref 200–420)
GFR SERPL CREATININE-BSD FRML MDRD: >90 ML/MIN/{1.73_M2}
GLUCOSE BLDC GLUCOMTR-MCNC: 143 MG/DL (ref 70–99)
GLUCOSE BLDC GLUCOMTR-MCNC: 150 MG/DL (ref 70–99)
GLUCOSE SERPL-MCNC: 204 MG/DL (ref 70–99)
HCT VFR BLD AUTO: 35.7 % (ref 40–53)
HCT VFR BLD AUTO: 43.7 % (ref 40–53)
HGB BLD-MCNC: 10.6 G/DL (ref 13.3–17.7)
HGB BLD-MCNC: 13.3 G/DL (ref 13.3–17.7)
IMM GRANULOCYTES # BLD: 0 10E9/L (ref 0–0.4)
IMM GRANULOCYTES NFR BLD: 0.1 %
INR PPP: 1.38 (ref 0.86–1.14)
INTERPRETATION ECG - MUSE: NORMAL
IRON SATN MFR SERPL: 10 % (ref 15–46)
IRON SERPL-MCNC: 45 UG/DL (ref 35–180)
LACTATE BLD-SCNC: 1.1 MMOL/L (ref 0.7–2.1)
LACTATE BLD-SCNC: 3.2 MMOL/L (ref 0.7–2.1)
LACTATE SERPL-SCNC: 1.8 MMOL/L (ref 0.4–2)
LIPASE SERPL-CCNC: 48 U/L (ref 73–393)
LYMPHOCYTES # BLD AUTO: 0.4 10E9/L (ref 0.8–5.3)
LYMPHOCYTES NFR BLD AUTO: 5.5 %
MAGNESIUM SERPL-MCNC: 1.6 MG/DL (ref 1.6–2.3)
MCH RBC QN AUTO: 25.4 PG (ref 26.5–33)
MCH RBC QN AUTO: 25.4 PG (ref 26.5–33)
MCHC RBC AUTO-ENTMCNC: 29.7 G/DL (ref 31.5–36.5)
MCHC RBC AUTO-ENTMCNC: 30.4 G/DL (ref 31.5–36.5)
MCV RBC AUTO: 84 FL (ref 78–100)
MCV RBC AUTO: 86 FL (ref 78–100)
MONOCYTES # BLD AUTO: 0.7 10E9/L (ref 0–1.3)
MONOCYTES NFR BLD AUTO: 9.4 %
MRSA DNA SPEC QL NAA+PROBE: NEGATIVE
NEUTROPHILS # BLD AUTO: 6.2 10E9/L (ref 1.6–8.3)
NEUTROPHILS NFR BLD AUTO: 84.3 %
NRBC # BLD AUTO: 0 10*3/UL
NRBC BLD AUTO-RTO: 0 /100
PCO2 BLDV: 25 MM HG (ref 40–50)
PCO2 BLDV: 36 MM HG (ref 40–50)
PH BLDV: 7.34 PH (ref 7.32–7.43)
PH BLDV: 7.42 PH (ref 7.32–7.43)
PLATELET # BLD AUTO: 52 10E9/L (ref 150–450)
PLATELET # BLD AUTO: 60 10E9/L (ref 150–450)
PO2 BLDV: 29 MM HG (ref 25–47)
PO2 BLDV: 50 MM HG (ref 25–47)
POTASSIUM SERPL-SCNC: 4.7 MMOL/L (ref 3.4–5.3)
PROT SERPL-MCNC: 7.2 G/DL (ref 6.8–8.8)
RBC # BLD AUTO: 4.17 10E12/L (ref 4.4–5.9)
RBC # BLD AUTO: 5.23 10E12/L (ref 4.4–5.9)
RETICS # AUTO: 112.6 10E9/L (ref 25–95)
RETICS/RBC NFR AUTO: 2.7 % (ref 0.5–2)
SAO2 % BLDV FROM PO2: 56 %
SAO2 % BLDV FROM PO2: 84 %
SODIUM SERPL-SCNC: 129 MMOL/L (ref 133–144)
SPECIMEN EXP DATE BLD: NORMAL
SPECIMEN SOURCE: NORMAL
TIBC SERPL-MCNC: 442 UG/DL (ref 240–430)
TRANSFERRIN SERPL-MCNC: 300 MG/DL (ref 210–360)
WBC # BLD AUTO: 4.4 10E9/L (ref 4–11)
WBC # BLD AUTO: 7.3 10E9/L (ref 4–11)

## 2020-01-08 PROCEDURE — 86850 RBC ANTIBODY SCREEN: CPT | Performed by: EMERGENCY MEDICINE

## 2020-01-08 PROCEDURE — 36415 COLL VENOUS BLD VENIPUNCTURE: CPT | Performed by: STUDENT IN AN ORGANIZED HEALTH CARE EDUCATION/TRAINING PROGRAM

## 2020-01-08 PROCEDURE — 84466 ASSAY OF TRANSFERRIN: CPT | Performed by: EMERGENCY MEDICINE

## 2020-01-08 PROCEDURE — 83605 ASSAY OF LACTIC ACID: CPT

## 2020-01-08 PROCEDURE — 25000128 H RX IP 250 OP 636: Performed by: STUDENT IN AN ORGANIZED HEALTH CARE EDUCATION/TRAINING PROGRAM

## 2020-01-08 PROCEDURE — 96361 HYDRATE IV INFUSION ADD-ON: CPT | Performed by: EMERGENCY MEDICINE

## 2020-01-08 PROCEDURE — 83550 IRON BINDING TEST: CPT | Performed by: EMERGENCY MEDICINE

## 2020-01-08 PROCEDURE — 25000132 ZZH RX MED GY IP 250 OP 250 PS 637

## 2020-01-08 PROCEDURE — 86901 BLOOD TYPING SEROLOGIC RH(D): CPT | Performed by: EMERGENCY MEDICINE

## 2020-01-08 PROCEDURE — 85610 PROTHROMBIN TIME: CPT | Performed by: EMERGENCY MEDICINE

## 2020-01-08 PROCEDURE — 25800030 ZZH RX IP 258 OP 636

## 2020-01-08 PROCEDURE — 99292 CRITICAL CARE ADDL 30 MIN: CPT | Mod: 25 | Performed by: EMERGENCY MEDICINE

## 2020-01-08 PROCEDURE — 85730 THROMBOPLASTIN TIME PARTIAL: CPT | Performed by: EMERGENCY MEDICINE

## 2020-01-08 PROCEDURE — 85025 COMPLETE CBC W/AUTO DIFF WBC: CPT | Performed by: EMERGENCY MEDICINE

## 2020-01-08 PROCEDURE — 96365 THER/PROPH/DIAG IV INF INIT: CPT | Performed by: EMERGENCY MEDICINE

## 2020-01-08 PROCEDURE — 99221 1ST HOSP IP/OBS SF/LOW 40: CPT | Mod: GC | Performed by: SURGERY

## 2020-01-08 PROCEDURE — 25000128 H RX IP 250 OP 636: Performed by: EMERGENCY MEDICINE

## 2020-01-08 PROCEDURE — 87641 MR-STAPH DNA AMP PROBE: CPT

## 2020-01-08 PROCEDURE — 83690 ASSAY OF LIPASE: CPT | Performed by: EMERGENCY MEDICINE

## 2020-01-08 PROCEDURE — 99285 EMERGENCY DEPT VISIT HI MDM: CPT | Mod: 25 | Performed by: EMERGENCY MEDICINE

## 2020-01-08 PROCEDURE — 93975 VASCULAR STUDY: CPT | Mod: TC

## 2020-01-08 PROCEDURE — 85027 COMPLETE CBC AUTOMATED: CPT | Performed by: STUDENT IN AN ORGANIZED HEALTH CARE EDUCATION/TRAINING PROGRAM

## 2020-01-08 PROCEDURE — 83090 ASSAY OF HOMOCYSTEINE: CPT

## 2020-01-08 PROCEDURE — 93005 ELECTROCARDIOGRAM TRACING: CPT | Performed by: EMERGENCY MEDICINE

## 2020-01-08 PROCEDURE — 80053 COMPREHEN METABOLIC PANEL: CPT | Performed by: EMERGENCY MEDICINE

## 2020-01-08 PROCEDURE — 20000004 ZZH R&B ICU UMMC

## 2020-01-08 PROCEDURE — 85045 AUTOMATED RETICULOCYTE COUNT: CPT | Performed by: STUDENT IN AN ORGANIZED HEALTH CARE EDUCATION/TRAINING PROGRAM

## 2020-01-08 PROCEDURE — 83605 ASSAY OF LACTIC ACID: CPT | Performed by: STUDENT IN AN ORGANIZED HEALTH CARE EDUCATION/TRAINING PROGRAM

## 2020-01-08 PROCEDURE — 82140 ASSAY OF AMMONIA: CPT | Performed by: EMERGENCY MEDICINE

## 2020-01-08 PROCEDURE — 82803 BLOOD GASES ANY COMBINATION: CPT

## 2020-01-08 PROCEDURE — 40000141 ZZH STATISTIC PERIPHERAL IV START W/O US GUIDANCE

## 2020-01-08 PROCEDURE — 25000132 ZZH RX MED GY IP 250 OP 250 PS 637: Performed by: EMERGENCY MEDICINE

## 2020-01-08 PROCEDURE — 96366 THER/PROPH/DIAG IV INF ADDON: CPT | Performed by: EMERGENCY MEDICINE

## 2020-01-08 PROCEDURE — 83605 ASSAY OF LACTIC ACID: CPT | Performed by: PHYSICIAN ASSISTANT

## 2020-01-08 PROCEDURE — 85384 FIBRINOGEN ACTIVITY: CPT

## 2020-01-08 PROCEDURE — 96375 TX/PRO/DX INJ NEW DRUG ADDON: CPT | Performed by: EMERGENCY MEDICINE

## 2020-01-08 PROCEDURE — 87640 STAPH A DNA AMP PROBE: CPT

## 2020-01-08 PROCEDURE — 83090 ASSAY OF HOMOCYSTEINE: CPT | Performed by: INTERNAL MEDICINE

## 2020-01-08 PROCEDURE — 25000128 H RX IP 250 OP 636

## 2020-01-08 PROCEDURE — 00000146 ZZHCL STATISTIC GLUCOSE BY METER IP

## 2020-01-08 PROCEDURE — 99291 CRITICAL CARE FIRST HOUR: CPT | Mod: 25 | Performed by: EMERGENCY MEDICINE

## 2020-01-08 PROCEDURE — 86900 BLOOD TYPING SEROLOGIC ABO: CPT | Performed by: EMERGENCY MEDICINE

## 2020-01-08 PROCEDURE — 83010 ASSAY OF HAPTOGLOBIN QUANT: CPT | Performed by: EMERGENCY MEDICINE

## 2020-01-08 PROCEDURE — 74177 CT ABD & PELVIS W/CONTRAST: CPT

## 2020-01-08 PROCEDURE — C9113 INJ PANTOPRAZOLE SODIUM, VIA: HCPCS | Performed by: EMERGENCY MEDICINE

## 2020-01-08 PROCEDURE — 87040 BLOOD CULTURE FOR BACTERIA: CPT | Performed by: EMERGENCY MEDICINE

## 2020-01-08 PROCEDURE — 93010 ELECTROCARDIOGRAM REPORT: CPT | Mod: Z6 | Performed by: EMERGENCY MEDICINE

## 2020-01-08 PROCEDURE — 36415 COLL VENOUS BLD VENIPUNCTURE: CPT

## 2020-01-08 PROCEDURE — 83540 ASSAY OF IRON: CPT | Performed by: EMERGENCY MEDICINE

## 2020-01-08 PROCEDURE — 96376 TX/PRO/DX INJ SAME DRUG ADON: CPT | Performed by: EMERGENCY MEDICINE

## 2020-01-08 PROCEDURE — 36415 COLL VENOUS BLD VENIPUNCTURE: CPT | Performed by: PHYSICIAN ASSISTANT

## 2020-01-08 PROCEDURE — 96368 THER/DIAG CONCURRENT INF: CPT | Performed by: EMERGENCY MEDICINE

## 2020-01-08 PROCEDURE — 83735 ASSAY OF MAGNESIUM: CPT | Performed by: EMERGENCY MEDICINE

## 2020-01-08 PROCEDURE — 25800030 ZZH RX IP 258 OP 636: Performed by: EMERGENCY MEDICINE

## 2020-01-08 RX ORDER — ONDANSETRON 2 MG/ML
4 INJECTION INTRAMUSCULAR; INTRAVENOUS EVERY 6 HOURS PRN
Status: DISCONTINUED | OUTPATIENT
Start: 2020-01-08 | End: 2020-01-11 | Stop reason: HOSPADM

## 2020-01-08 RX ORDER — DEXTROSE MONOHYDRATE 25 G/50ML
25-50 INJECTION, SOLUTION INTRAVENOUS
Status: DISCONTINUED | OUTPATIENT
Start: 2020-01-08 | End: 2020-01-11 | Stop reason: HOSPADM

## 2020-01-08 RX ORDER — FOLIC ACID 1 MG/1
1 TABLET ORAL 2 TIMES DAILY
Status: DISCONTINUED | OUTPATIENT
Start: 2020-01-09 | End: 2020-01-11 | Stop reason: HOSPADM

## 2020-01-08 RX ORDER — ONDANSETRON 2 MG/ML
4 INJECTION INTRAMUSCULAR; INTRAVENOUS EVERY 6 HOURS PRN
Status: CANCELLED | OUTPATIENT
Start: 2020-01-08

## 2020-01-08 RX ORDER — SODIUM CHLORIDE 9 MG/ML
INJECTION, SOLUTION INTRAVENOUS CONTINUOUS
Status: DISCONTINUED | OUTPATIENT
Start: 2020-01-08 | End: 2020-01-08

## 2020-01-08 RX ORDER — ONDANSETRON 4 MG/1
4 TABLET, ORALLY DISINTEGRATING ORAL EVERY 6 HOURS PRN
Status: CANCELLED | OUTPATIENT
Start: 2020-01-08

## 2020-01-08 RX ORDER — CEFTRIAXONE 1 G/1
1 INJECTION, POWDER, FOR SOLUTION INTRAMUSCULAR; INTRAVENOUS ONCE
Status: COMPLETED | OUTPATIENT
Start: 2020-01-08 | End: 2020-01-08

## 2020-01-08 RX ORDER — QUETIAPINE FUMARATE 25 MG/1
25 TABLET, FILM COATED ORAL 2 TIMES DAILY
Status: CANCELLED | OUTPATIENT
Start: 2020-01-08

## 2020-01-08 RX ORDER — FAMOTIDINE 20 MG
TABLET ORAL DAILY
Status: CANCELLED | OUTPATIENT
Start: 2020-01-08

## 2020-01-08 RX ORDER — SODIUM CHLORIDE, SODIUM LACTATE, POTASSIUM CHLORIDE, CALCIUM CHLORIDE 600; 310; 30; 20 MG/100ML; MG/100ML; MG/100ML; MG/100ML
INJECTION, SOLUTION INTRAVENOUS CONTINUOUS
Status: DISCONTINUED | OUTPATIENT
Start: 2020-01-08 | End: 2020-01-09

## 2020-01-08 RX ORDER — SPIRONOLACTONE 100 MG/1
100 TABLET, FILM COATED ORAL 3 TIMES DAILY
Status: CANCELLED | OUTPATIENT
Start: 2020-01-08

## 2020-01-08 RX ORDER — LACTULOSE 10 G/15ML
20 SOLUTION ORAL DAILY PRN
Status: ON HOLD | COMMUNITY
End: 2020-01-10

## 2020-01-08 RX ORDER — MULTIVITAMIN,THERAPEUTIC
1 TABLET ORAL DAILY
Status: DISCONTINUED | OUTPATIENT
Start: 2020-01-09 | End: 2020-01-09

## 2020-01-08 RX ORDER — POTASSIUM CHLORIDE 29.8 MG/ML
20 INJECTION INTRAVENOUS
Status: DISCONTINUED | OUTPATIENT
Start: 2020-01-08 | End: 2020-01-11 | Stop reason: HOSPADM

## 2020-01-08 RX ORDER — IOPAMIDOL 755 MG/ML
116 INJECTION, SOLUTION INTRAVASCULAR ONCE
Status: COMPLETED | OUTPATIENT
Start: 2020-01-08 | End: 2020-01-08

## 2020-01-08 RX ORDER — LANOLIN ALCOHOL/MO/W.PET/CERES
3 CREAM (GRAM) TOPICAL
Status: DISCONTINUED | OUTPATIENT
Start: 2020-01-08 | End: 2020-01-11 | Stop reason: HOSPADM

## 2020-01-08 RX ORDER — DOCUSATE SODIUM 100 MG/1
100 CAPSULE, LIQUID FILLED ORAL 2 TIMES DAILY
Status: CANCELLED | OUTPATIENT
Start: 2020-01-08

## 2020-01-08 RX ORDER — LORAZEPAM 2 MG/ML
1 INJECTION INTRAMUSCULAR ONCE
Status: COMPLETED | OUTPATIENT
Start: 2020-01-08 | End: 2020-01-08

## 2020-01-08 RX ORDER — OCTREOTIDE ACETATE 50 UG/ML
50 INJECTION, SOLUTION INTRAVENOUS; SUBCUTANEOUS ONCE
Status: COMPLETED | OUTPATIENT
Start: 2020-01-08 | End: 2020-01-08

## 2020-01-08 RX ORDER — POTASSIUM CL/LIDO/0.9 % NACL 10MEQ/0.1L
10 INTRAVENOUS SOLUTION, PIGGYBACK (ML) INTRAVENOUS
Status: DISCONTINUED | OUTPATIENT
Start: 2020-01-08 | End: 2020-01-11 | Stop reason: HOSPADM

## 2020-01-08 RX ORDER — HYDROMORPHONE HYDROCHLORIDE 1 MG/ML
0.5 INJECTION, SOLUTION INTRAMUSCULAR; INTRAVENOUS; SUBCUTANEOUS
Status: DISCONTINUED | OUTPATIENT
Start: 2020-01-08 | End: 2020-01-08

## 2020-01-08 RX ORDER — IBUPROFEN 600 MG/1
600 TABLET, FILM COATED ORAL EVERY 6 HOURS PRN
Status: CANCELLED | OUTPATIENT
Start: 2020-01-08

## 2020-01-08 RX ORDER — QUETIAPINE FUMARATE 25 MG/1
25 TABLET, FILM COATED ORAL AT BEDTIME
Status: DISCONTINUED | OUTPATIENT
Start: 2020-01-08 | End: 2020-01-09

## 2020-01-08 RX ORDER — HEPARIN SODIUM 10000 [USP'U]/100ML
0-3500 INJECTION, SOLUTION INTRAVENOUS CONTINUOUS
Status: DISCONTINUED | OUTPATIENT
Start: 2020-01-08 | End: 2020-01-09

## 2020-01-08 RX ORDER — HYDROMORPHONE HYDROCHLORIDE 1 MG/ML
0.5 INJECTION, SOLUTION INTRAMUSCULAR; INTRAVENOUS; SUBCUTANEOUS
Status: COMPLETED | OUTPATIENT
Start: 2020-01-08 | End: 2020-01-08

## 2020-01-08 RX ORDER — ONDANSETRON 2 MG/ML
4 INJECTION INTRAMUSCULAR; INTRAVENOUS EVERY 30 MIN PRN
Status: DISCONTINUED | OUTPATIENT
Start: 2020-01-08 | End: 2020-01-08

## 2020-01-08 RX ORDER — NICOTINE POLACRILEX 4 MG
15-30 LOZENGE BUCCAL
Status: DISCONTINUED | OUTPATIENT
Start: 2020-01-08 | End: 2020-01-11 | Stop reason: HOSPADM

## 2020-01-08 RX ORDER — POLYETHYLENE GLYCOL 3350 17 G/17G
17 POWDER, FOR SOLUTION ORAL DAILY
Status: CANCELLED | OUTPATIENT
Start: 2020-01-08

## 2020-01-08 RX ORDER — POLYETHYLENE GLYCOL 3350 17 G/17G
17 POWDER, FOR SOLUTION ORAL DAILY PRN
Status: CANCELLED | OUTPATIENT
Start: 2020-01-08

## 2020-01-08 RX ORDER — POTASSIUM CHLORIDE 7.45 MG/ML
10 INJECTION INTRAVENOUS
Status: DISCONTINUED | OUTPATIENT
Start: 2020-01-08 | End: 2020-01-11 | Stop reason: HOSPADM

## 2020-01-08 RX ORDER — HYDROMORPHONE HYDROCHLORIDE 1 MG/ML
.3-.5 INJECTION, SOLUTION INTRAMUSCULAR; INTRAVENOUS; SUBCUTANEOUS
Status: DISCONTINUED | OUTPATIENT
Start: 2020-01-08 | End: 2020-01-09

## 2020-01-08 RX ORDER — FUROSEMIDE 20 MG
40 TABLET ORAL 3 TIMES DAILY
Status: CANCELLED | OUTPATIENT
Start: 2020-01-08

## 2020-01-08 RX ORDER — FOLIC ACID 1 MG/1
1 TABLET ORAL 2 TIMES DAILY
Status: CANCELLED | OUTPATIENT
Start: 2020-01-08

## 2020-01-08 RX ORDER — PROMETHAZINE HYDROCHLORIDE 25 MG/1
25 TABLET ORAL EVERY 6 HOURS PRN
Status: CANCELLED | OUTPATIENT
Start: 2020-01-08

## 2020-01-08 RX ORDER — POTASSIUM CHLORIDE 750 MG/1
20-40 TABLET, EXTENDED RELEASE ORAL
Status: DISCONTINUED | OUTPATIENT
Start: 2020-01-08 | End: 2020-01-11 | Stop reason: HOSPADM

## 2020-01-08 RX ORDER — NALOXONE HYDROCHLORIDE 0.4 MG/ML
.1-.4 INJECTION, SOLUTION INTRAMUSCULAR; INTRAVENOUS; SUBCUTANEOUS
Status: CANCELLED | OUTPATIENT
Start: 2020-01-08

## 2020-01-08 RX ORDER — MAGNESIUM SULFATE HEPTAHYDRATE 40 MG/ML
4 INJECTION, SOLUTION INTRAVENOUS EVERY 4 HOURS PRN
Status: DISCONTINUED | OUTPATIENT
Start: 2020-01-08 | End: 2020-01-11 | Stop reason: HOSPADM

## 2020-01-08 RX ORDER — PIPERACILLIN SODIUM, TAZOBACTAM SODIUM 3; .375 G/15ML; G/15ML
3.38 INJECTION, POWDER, LYOPHILIZED, FOR SOLUTION INTRAVENOUS ONCE
Status: COMPLETED | OUTPATIENT
Start: 2020-01-08 | End: 2020-01-08

## 2020-01-08 RX ORDER — NALOXONE HYDROCHLORIDE 0.4 MG/ML
.1-.4 INJECTION, SOLUTION INTRAMUSCULAR; INTRAVENOUS; SUBCUTANEOUS
Status: DISCONTINUED | OUTPATIENT
Start: 2020-01-08 | End: 2020-01-11 | Stop reason: HOSPADM

## 2020-01-08 RX ORDER — SENNOSIDES A AND B 8.6 MG/1
1 TABLET, FILM COATED ORAL 3 TIMES DAILY
Status: ON HOLD | COMMUNITY
End: 2020-01-10

## 2020-01-08 RX ORDER — PIPERACILLIN SODIUM, TAZOBACTAM SODIUM 3; .375 G/15ML; G/15ML
3.38 INJECTION, POWDER, LYOPHILIZED, FOR SOLUTION INTRAVENOUS EVERY 6 HOURS
Status: DISCONTINUED | OUTPATIENT
Start: 2020-01-08 | End: 2020-01-10

## 2020-01-08 RX ORDER — VITAMIN B COMPLEX
2000 TABLET ORAL DAILY
Status: DISCONTINUED | OUTPATIENT
Start: 2020-01-09 | End: 2020-01-09

## 2020-01-08 RX ORDER — DEXTROSE MONOHYDRATE 25 G/50ML
25-50 INJECTION, SOLUTION INTRAVENOUS
Status: CANCELLED | OUTPATIENT
Start: 2020-01-08

## 2020-01-08 RX ORDER — QUETIAPINE FUMARATE 25 MG/1
50 TABLET, FILM COATED ORAL AT BEDTIME
COMMUNITY
End: 2021-02-19

## 2020-01-08 RX ORDER — LACTULOSE 10 G/15ML
20 SOLUTION ORAL 3 TIMES DAILY
Status: CANCELLED | OUTPATIENT
Start: 2020-01-08

## 2020-01-08 RX ORDER — NICOTINE POLACRILEX 4 MG
15-30 LOZENGE BUCCAL
Status: CANCELLED | OUTPATIENT
Start: 2020-01-08

## 2020-01-08 RX ORDER — POTASSIUM CHLORIDE 1.5 G/1.58G
20-40 POWDER, FOR SOLUTION ORAL
Status: DISCONTINUED | OUTPATIENT
Start: 2020-01-08 | End: 2020-01-11 | Stop reason: HOSPADM

## 2020-01-08 RX ORDER — LIDOCAINE 40 MG/G
CREAM TOPICAL
Status: CANCELLED | OUTPATIENT
Start: 2020-01-08

## 2020-01-08 RX ORDER — OXYCODONE AND ACETAMINOPHEN 10; 325 MG/1; MG/1
1 TABLET ORAL EVERY 4 HOURS
Status: CANCELLED | OUTPATIENT
Start: 2020-01-08

## 2020-01-08 RX ADMIN — DICLOFENAC 2 G: 10 GEL TOPICAL at 22:11

## 2020-01-08 RX ADMIN — PROMETHAZINE HYDROCHLORIDE 12.5 MG: 25 INJECTION INTRAMUSCULAR; INTRAVENOUS at 13:26

## 2020-01-08 RX ADMIN — HYDROMORPHONE HYDROCHLORIDE 0.5 MG: 1 INJECTION, SOLUTION INTRAMUSCULAR; INTRAVENOUS; SUBCUTANEOUS at 20:51

## 2020-01-08 RX ADMIN — SODIUM CHLORIDE 80 MG: 9 INJECTION, SOLUTION INTRAVENOUS at 12:11

## 2020-01-08 RX ADMIN — RIFAXIMIN 550 MG: 550 TABLET ORAL at 18:31

## 2020-01-08 RX ADMIN — SODIUM CHLORIDE 1000 ML: 900 INJECTION, SOLUTION INTRAVENOUS at 11:52

## 2020-01-08 RX ADMIN — PIPERACILLIN AND TAZOBACTAM 3.38 G: 3; .375 INJECTION, POWDER, LYOPHILIZED, FOR SOLUTION INTRAVENOUS at 16:20

## 2020-01-08 RX ADMIN — SODIUM CHLORIDE 8 MG/HR: 9 INJECTION, SOLUTION INTRAVENOUS at 12:20

## 2020-01-08 RX ADMIN — HYDROMORPHONE HYDROCHLORIDE 0.5 MG: 1 INJECTION, SOLUTION INTRAMUSCULAR; INTRAVENOUS; SUBCUTANEOUS at 18:37

## 2020-01-08 RX ADMIN — LORAZEPAM 1 MG: 2 INJECTION INTRAMUSCULAR; INTRAVENOUS at 18:32

## 2020-01-08 RX ADMIN — HEPARIN SODIUM 1550 UNITS/HR: 10000 INJECTION, SOLUTION INTRAVENOUS at 20:16

## 2020-01-08 RX ADMIN — HYDROMORPHONE HYDROCHLORIDE 0.5 MG: 1 INJECTION, SOLUTION INTRAMUSCULAR; INTRAVENOUS; SUBCUTANEOUS at 22:23

## 2020-01-08 RX ADMIN — PIPERACILLIN AND TAZOBACTAM 3.38 G: 3; .375 INJECTION, POWDER, FOR SOLUTION INTRAVENOUS at 22:04

## 2020-01-08 RX ADMIN — PROMETHAZINE HYDROCHLORIDE 12.5 MG: 25 INJECTION INTRAMUSCULAR; INTRAVENOUS at 21:36

## 2020-01-08 RX ADMIN — OCTREOTIDE ACETATE 50 MCG: 50 INJECTION, SOLUTION INTRAVENOUS; SUBCUTANEOUS at 13:30

## 2020-01-08 RX ADMIN — OCTREOTIDE ACETATE 50 MCG/HR: 200 INJECTION, SOLUTION INTRAVENOUS; SUBCUTANEOUS at 13:50

## 2020-01-08 RX ADMIN — QUETIAPINE FUMARATE 25 MG: 25 TABLET ORAL at 22:04

## 2020-01-08 RX ADMIN — HYDROMORPHONE HYDROCHLORIDE 0.5 MG: 1 INJECTION, SOLUTION INTRAMUSCULAR; INTRAVENOUS; SUBCUTANEOUS at 15:40

## 2020-01-08 RX ADMIN — IOPAMIDOL 116 ML: 755 INJECTION, SOLUTION INTRAVENOUS at 14:13

## 2020-01-08 RX ADMIN — HYDROMORPHONE HYDROCHLORIDE 0.5 MG: 1 INJECTION, SOLUTION INTRAMUSCULAR; INTRAVENOUS; SUBCUTANEOUS at 12:59

## 2020-01-08 RX ADMIN — SODIUM CHLORIDE 1000 ML: 900 INJECTION, SOLUTION INTRAVENOUS at 12:10

## 2020-01-08 RX ADMIN — SODIUM CHLORIDE, POTASSIUM CHLORIDE, SODIUM LACTATE AND CALCIUM CHLORIDE: 600; 310; 30; 20 INJECTION, SOLUTION INTRAVENOUS at 20:19

## 2020-01-08 RX ADMIN — CEFTRIAXONE 1 G: 1 INJECTION, POWDER, FOR SOLUTION INTRAMUSCULAR; INTRAVENOUS at 12:11

## 2020-01-08 RX ADMIN — HYDROMORPHONE HYDROCHLORIDE 0.5 MG: 1 INJECTION, SOLUTION INTRAMUSCULAR; INTRAVENOUS; SUBCUTANEOUS at 17:46

## 2020-01-08 RX ADMIN — SODIUM CHLORIDE 1000 ML: 9 INJECTION, SOLUTION INTRAVENOUS at 17:30

## 2020-01-08 ASSESSMENT — ENCOUNTER SYMPTOMS
VOMITING: 1
FEVER: 1
NAUSEA: 1
ABDOMINAL PAIN: 1
BLOOD IN STOOL: 1
DIARRHEA: 1
FATIGUE: 1
SHORTNESS OF BREATH: 0

## 2020-01-08 ASSESSMENT — ACTIVITIES OF DAILY LIVING (ADL)
TRANSFERRING: 0-->INDEPENDENT
AMBULATION: 0-->INDEPENDENT
SWALLOWING: 0-->SWALLOWS FOODS/LIQUIDS WITHOUT DIFFICULTY
DRESS: 0-->INDEPENDENT
FALL_HISTORY_WITHIN_LAST_SIX_MONTHS: NO
COGNITION: 0 - NO COGNITION ISSUES REPORTED
RETIRED_EATING: 0-->INDEPENDENT
ADLS_ACUITY_SCORE: 10
TOILETING: 0-->INDEPENDENT
RETIRED_COMMUNICATION: 0-->UNDERSTANDS/COMMUNICATES WITHOUT DIFFICULTY
BATHING: 0-->INDEPENDENT

## 2020-01-08 ASSESSMENT — MIFFLIN-ST. JEOR: SCORE: 1897.88

## 2020-01-08 NOTE — PHARMACY-ADMISSION MEDICATION HISTORY
Admission medication history interview status for the 1/8/2020 admission is complete. See Epic admission navigator for allergy information, pharmacy, prior to admission medications and immunization status.     Medication history interview sources:  Patient and his mother    Changes made to PTA medication list (reason)  Added: germaine rawls  Deleted: none  Changed: Lantus from QHS to BID, quetiapine to QHS plus PRN, oxycodone/APAP to PRN, rifaximin 200mg to 550mg, Miralax from PRN to daily    Additional medication history information (including reliability of information, actions taken by pharmacist):None      Prior to Admission medications    Medication Sig Last Dose Taking? Auth Provider   Calcium Carbonate Antacid 400 MG CHEW Take 400 mg by mouth 2 times daily  1/7/2020 at Unknown time Yes Reported, Patient   cholecalciferol (VITAMIN D3) 62572 units (1250 mcg) capsule Take 50,000 Units by mouth every 7 days Saturdays 1/4/2020 Yes Reported, Patient   ciprofloxacin (CIPRO) 500 MG tablet Take 500 mg by mouth 2 times daily 1/7/2020 at Unknown time Yes Reported, Patient   diclofenac (VOLTAREN) 1 % topical gel Place onto the skin 4 times daily as needed for moderate pain  at prn Yes Unknown, Entered By History   ferrous sulfate (FEROSUL) 325 (65 Fe) MG tablet Take 325 mg by mouth daily (with breakfast) 1/7/2020 at Unknown time Yes Reported, Patient   folic acid (FOLVITE) 1 MG tablet Take 1 mg by mouth 2 times daily 1/7/2020 at Unknown time Yes Reported, Patient   furosemide (LASIX) 40 MG tablet Take 40 mg by mouth 3 times daily 1/7/2020 at Unknown time Yes Reported, Patient   gabapentin (NEURONTIN) 400 MG capsule Take 400 mg by mouth 3 times daily 1/7/2020 at Unknown time Yes Reported, Patient   insulin aspart (NOVOLOG FLEXPEN) 100 UNIT/ML pen Inject Subcutaneous 3 times daily (with meals) Sliding scale as directed 1/7/2020 at Unknown time Yes Reported, Patient   insulin glargine (LANTUS VIAL) 100 UNIT/ML vial  Inject 26 Units Subcutaneous 2 times daily 26 units bid daily  1/7/2020 at Unknown time Yes Reported, Patient   lactulose (CHRONULAC) 10 GM/15ML solution Take 20 g by mouth daily as needed  Yes Unknown, Entered By History   lactulose (CHRONULAC) 10 GM/15ML solution Take 20 g by mouth 3 times daily 20mg / 30 ml tid daily/ prn 1/7/2020 at Unknown time Yes Reported, Patient   magnesium oxide 400 MG CAPS Take 400 mg by mouth 3 times daily  1/7/2020 at Unknown time Yes Reported, Patient   MULTIPLE VITAMIN-FOLIC ACID PO Take 1 tablet by mouth 1 daily 1/7/2020 at Unknown time Yes Reported, Patient   oxyCODONE-acetaminophen (PERCOCET)  MG per tablet Take 1 tablet by mouth every 4 hours as needed for moderate to severe pain   at PRN Yes Reported, Patient   pantoprazole (PROTONIX) 40 MG EC tablet Take 40 mg by mouth daily 1/7/2020 at Unknown time Yes Reported, Patient   polyethylene glycol (MIRALAX/GLYCOLAX) packet Take 1 packet by mouth daily  1/7/2020 at Unknown time Yes Reported, Patient   promethazine (PHENERGAN) 12.5 MG tablet Take 25 mg by mouth every 6 hours as needed for nausea  at PRN Yes Reported, Patient   QUEtiapine (SEROQUEL) 25 MG tablet Take 50 mg by mouth At Bedtime 1/7/2020 at pm Yes Unknown, Entered By History   QUEtiapine (SEROQUEL) 25 MG tablet Take 25 mg by mouth daily as needed   at PRN Yes Reported, Patient   rifaximin (XIFAXAN) 550 MG TABS tablet Take 550 mg by mouth 2 times daily  1/7/2020 at Unknown time Yes Reported, Patient   senna (SENOKOT) 8.6 MG tablet Take 1 tablet by mouth 3 times daily 1/7/2020 at Unknown time Yes Unknown, Entered By History   spironolactone (ALDACTONE) 100 MG tablet Take 100 mg by mouth 3 times daily 1/7/2020 at Unknown time Yes Reported, Patient   traZODone (DESYREL) 50 MG tablet Take 50 mg by mouth At Bedtime Prn 1/7/2020 at Unknown time Yes Reported, Patient   tretinoin (RETIN-A) 0.025 % external cream Apply topically At Bedtime  Yes Reported, Patient   Vitamin D,  Cholecalciferol, 25 MCG (1000 UT) CAPS Take 2,000 Units by mouth daily  1/7/2020 at Unknown time Yes Reported, Patient   blood glucose (NO BRAND SPECIFIED) lancets standard Use to test blood sugar 6 times daily or as directed.   Reported, Patient   Nutritional Supplements (ENSURE PO) Ensure/ boost - 237 ml bid daily   Reported, Patient     Medication history completed by: Eva Lyles, PharmD, BCPS

## 2020-01-08 NOTE — PROGRESS NOTES
Care Coordination New Patient Referral  [facility]    NP referral date: 01/08/20  Referred to MD:  Dave    Referring MD: Tom  Referring contact information see initial referral note - no - referral from transplant  Referral for possible EUS    Diagnosis: pancreas cyst    Imaging:   CT   Location: [facility]  Date:  12/27/19; 11/25/19; 11/21/19 and multiple other scans  MRI  no    Procedures:  none    Referral will be reviewed by Dr. Acosta when imaging is here.  I have asked BECCA Gaitan RN transplant to obtain these for review and also to let us know when patient will be returning for other appointments so that we can optimize his time here.    1/9/20 patient currently admitted to hospital and per transplant may have consult done at during this time. Will update as needed.      01/09/20 patient is currently in hospital.  Per in basket message to Transplant RN BECCA Gaitan, instructed to place consult for panc/bili team to see if needed.  Will cancel this consult at outpatient.    Adela Clark  BSN, HNBC  RN Care Coordinator  [facility]  Ph: [phone]  Email: [email or web address]

## 2020-01-08 NOTE — ED PROVIDER NOTES
History     Chief Complaint   Patient presents with     Abdominal Pain     Rectal Bleeding     HPI  Obed Wiley is a 29 year old male with a history of Yan-en-Y for obesity in 2007, DM-2, DVT, PE, and alcoholic liver cirrhosis who presents to the Emergency Department today for evaluation of abdominal pain and melena.  History is provided by the patient's mother as the patient is somnolent and in pain.  Mother reports that the patient had been doing well yesterday, though notes that the day was a very long day with appointments to evaluate for liver transplant.  The mother reports that the patient was doing well last evening and received all of his medications; however, at 1:30 AM today the patient woke up with severe abdominal pain.  She reports that the patient was buckled over in pain and he was vomiting.  The mother reports that the pain was described to her as a cramping pain.  The patient had also had an increase in the amount of diarrhea he typically has (he is on lactulose and has diarrhea at baseline).  She reports that the patient did take oxycodone at 5:30 AM but subsequently vomited shortly after.  She reports that the patient then had a bowel movement that filled the toilet with bright red blood.  In route, they stopped at a gas station to the patient to use the bathroom and he reported to the mother that he had another bowel movement with bright red blood as well as black stool.  The mother notes that the patient does have a history of GI bleed in his colon with the last one being over 3 years ago.  She denies the patient having blood in his emesis.  Here, the patient is somnolent moaning in pain in the mother states that this is similar to previous presentations prior to encephalopathy.  The patient was able to state that he is having pain throughout his entire abdomen.  The patient had a blood glucose of 253 this morning and has not yet taken his diabetes medication due to vomiting.  The patient  last had a paracentesis performed about 1 month ago.  She has had abdominal infections in the past.    I have reviewed the Medications, Allergies, Past Medical and Surgical History, and Social History in the [facility] system.    Past Medical History:   Diagnosis Date     Alcoholic cirrhosis of liver with ascites (H) 12/23/2019     Diabetes (H)     Type 2 DM, Uses Insulin      DVT (deep vein thrombosis) in pregnancy      H/O protein C deficiency      Hepatic encephalopathy (H)      Hepatitis     Hep A when an infant      History of blood transfusion     2019 at [facility]      SBP (spontaneous bacterial peritonitis) (H) 11/2019     Past Surgical History:   Procedure Laterality Date     ABDOMEN SURGERY      Gastric Bypass 2009. [facility]      CARDIAC SURGERY      IVC      COLONOSCOPY       ENT SURGERY      Tonsils and Adenoids Removed at 6-7 Years Old      GALLBLADDER SURGERY  2017     GI SURGERY      Upper GI      Family History   Problem Relation Age of Onset     Protein C deficiency Mother      Pancreatic Cancer Father      Deonte Parkinson White syndrome Father      Alcoholism Brother      Heart Failure Maternal Grandmother      Heart Failure Maternal Grandfather      Social History     Tobacco Use     Smoking status: Never Smoker     Smokeless tobacco: Never Used   Substance Use Topics     Alcohol use: Not Currently     Current Facility-Administered Medications   Medication     HYDROmorphone (PF) (DILAUDID) injection 0.5 mg     naloxone (NARCAN) injection 0.1-0.4 mg     octreotide (sandoSTATIN) 1,250 mcg in sodium chloride 0.9 % 250 mL     ondansetron (ZOFRAN) injection 4 mg     pantoprazole (PROTONIX) 80 mg in sodium chloride 0.9 % 100 mL infusion     promethazine (PHENERGAN) 12.5 mg in sodium chloride 0.9 % 50 mL intermittent infusion     sodium chloride 0.9% infusion     Current Outpatient Medications   Medication     Calcium Carbonate Antacid 400 MG CHEW     cholecalciferol (VITAMIN D3) 12545 units (1250 mcg)  "capsule     ciprofloxacin (CIPRO) 500 MG tablet     diclofenac (VOLTAREN) 1 % topical gel     ferrous sulfate (FEROSUL) 325 (65 Fe) MG tablet     folic acid (FOLVITE) 1 MG tablet     furosemide (LASIX) 40 MG tablet     gabapentin (NEURONTIN) 400 MG capsule     insulin aspart (NOVOLOG FLEXPEN) 100 UNIT/ML pen     insulin glargine (LANTUS VIAL) 100 UNIT/ML vial     lactulose (CHRONULAC) 10 GM/15ML solution     lactulose (CHRONULAC) 10 GM/15ML solution     magnesium oxide 400 MG CAPS     MULTIPLE VITAMIN-FOLIC ACID PO     oxyCODONE-acetaminophen (PERCOCET)  MG per tablet     pantoprazole (PROTONIX) 40 MG EC tablet     polyethylene glycol (MIRALAX/GLYCOLAX) packet     promethazine (PHENERGAN) 12.5 MG tablet     QUEtiapine (SEROQUEL) 25 MG tablet     QUEtiapine (SEROQUEL) 25 MG tablet     rifaximin (XIFAXAN) 550 MG TABS tablet     senna (SENOKOT) 8.6 MG tablet     spironolactone (ALDACTONE) 100 MG tablet     traZODone (DESYREL) 50 MG tablet     tretinoin (RETIN-A) 0.025 % external cream     Vitamin D, Cholecalciferol, 25 MCG (1000 UT) CAPS     blood glucose (NO BRAND SPECIFIED) lancets standard     Nutritional Supplements (ENSURE PO)     Allergies   Allergen Reactions     Bee Anaphylaxis     Adhesive Tape Rash     Sulfa Drugs Itching      Review of Systems   Constitutional: Positive for fatigue and fever.   Respiratory: Negative for shortness of breath.    Cardiovascular: Negative for chest pain.   Gastrointestinal: Positive for abdominal pain, blood in stool, diarrhea, nausea and vomiting.   All other systems reviewed and are negative.    Physical Exam   BP: 123/73  Pulse: 118  Heart Rate: 114  Temp: 99.5  F (37.5  C)  Resp: 18  Height: 185.4 cm (6' 1\")  SpO2: 100 %    Physical Exam  Constitutional:       Appearance: He is ill-appearing. He is not diaphoretic.   HENT:      Head: Atraumatic.      Mouth/Throat:      Mouth: Mucous membranes are dry.      Pharynx: No oropharyngeal exudate.      Comments: Dry mucous " membranes with possible thrush on tongue  Eyes:      General: No scleral icterus.     Pupils: Pupils are equal, round, and reactive to light.   Neck:      Musculoskeletal: Neck supple.   Cardiovascular:      Rate and Rhythm: Tachycardia present.      Heart sounds: Normal heart sounds.   Pulmonary:      Effort: No respiratory distress.      Breath sounds: Normal breath sounds.   Abdominal:      General: Bowel sounds are normal.      Palpations: Abdomen is soft.      Tenderness: There is generalized abdominal tenderness. There is no guarding or rebound.      Hernia: No hernia is present.   Musculoskeletal:         General: No tenderness.   Skin:     General: Skin is warm.      Findings: No rash.       ED Course        Procedures             EKG Interpretation:      Interpreted by Jarrod Mijares MD  Time reviewed: 11:10 AM  Symptoms at time of EKG: Decreased LOC  Rhythm: Sinus tachycardia  Rate: 115  Axis: normal  Ectopy: none  Conduction: normal  ST Segments/ T Waves: No ST-T wave changes  Q Waves: none  Comparison to prior: Unchanged    Clinical Impression: Sinus tachycardia          Critical Care time:  was 75 minutes for this patient excluding procedures.       The patient has signs of Severe Sepsis  as evidenced by:    1. 2 SIRS criteria, AND  2. Suspected infection, AND   3. Organ dysfunction: Lactic Acid > 2.0 and Acute encephalopathy due to sepsis    Time severe sepsis diagnosis confirmed: 1141 01/08/20  as this was the time when Lactate resulted, and the level was > 2.0    3 Hour Severe Sepsis Bundle Completion:  1. Initial Lactic Acid Result:   Recent Labs   Lab Test 01/08/20  1642 01/08/20  1141   LACT 1.1 3.2*     2. Blood Cultures before Antibiotics: Yes  3. Broad Spectrum Antibiotics Administered:  yes       Anti-infectives (From admission through now)    Start     Dose/Rate Route Frequency Ordered Stop    01/08/20 1845  rifaximin (XIFAXAN) tablet 550 mg      550 mg Oral ONCE 01/08/20 1819  01/08/20 1831    01/08/20 1523  piperacillin-tazobactam (ZOSYN) 3.375 g vial to attach to  mL bag      3.375 g  over 30 Minutes Intravenous ONCE 01/08/20 1522 01/08/20 1655    01/08/20 1129  cefTRIAXone (ROCEPHIN) 1 g vial to attach to  mL bag for ADULTS or NS 50 mL bag for PEDS      1 g  over 15-30 Minutes Intravenous ONCE 01/08/20 1128 01/08/20 1300          4. Volume of IV Fluid administered in ED: 3000       Labs Ordered and Resulted from Time of ED Arrival Up to the Time of Departure from the ED   CBC WITH PLATELETS DIFFERENTIAL - Abnormal; Notable for the following components:       Result Value    MCH 25.4 (*)     MCHC 30.4 (*)     RDW 22.5 (*)     Platelet Count 60 (*)     Absolute Lymphocytes 0.4 (*)     All other components within normal limits   COMPREHENSIVE METABOLIC PANEL - Abnormal; Notable for the following components:    Sodium 129 (*)     Glucose 204 (*)     Bilirubin Total 1.6 (*)     Albumin 3.2 (*)     AST 57 (*)     All other components within normal limits   INR - Abnormal; Notable for the following components:    INR 1.38 (*)     All other components within normal limits   LIPASE - Abnormal; Notable for the following components:    Lipase 48 (*)     All other components within normal limits   GLUCOSE BY METER - Abnormal; Notable for the following components:    Glucose 150 (*)     All other components within normal limits   ISTAT  GASES LACTATE EDIS POCT - Abnormal; Notable for the following components:    PCO2 Venous 36 (*)     Lactic Acid 3.2 (*)     All other components within normal limits   ISTAT  GASES LACTATE EDIS POCT - Abnormal; Notable for the following components:    PCO2 Venous 25 (*)     PO2 Venous 50 (*)     Bicarbonate Venous 14 (*)     All other components within normal limits   PARTIAL THROMBOPLASTIN TIME   AMMONIA   GLUCOSE MONITOR NURSING POCT   LACTIC ACID   ISTAT CG4 GASES LACTATE EDIS NURSING POCT   FREE WATER   ISTAT CG4 GASES LACTATE EDIS NURSING POCT   VITAL  SIGNS   PAIN ASSESSMENT   INITIATE   CARDIAC CONTINUOUS MONITORING   PULSE OXIMETRY NURSING   NO VTE PROPHYLAXIS   IP ASSIGN PROVIDER TEAM TO TREATMENT TEAM   ADVANCED CARDIACE LIFE SUPPORT (ACLS) GUIDELINES   INTAKE AND OUTPUT   IV ACCESS   ACTIVITY   ABO/RH TYPE AND SCREEN   BLOOD CULTURE   BLOOD CULTURE   METHICILLIN RESIST/SENS S. AUREUS PCR            Assessments & Plan (with Medical Decision Making)   The patient is ill-appearing with signs of diffuse abdominal tenderness and strong history of GI bleed today.  He has a past history of GI bleeds and underlying liver disease.  He did vomit today without any blood making varices less likely.  He was started on a PPI, antibiotics and IV fluids immediately after seeing him.  Laboratory tests demonstrate a hemoglobin of 13, which is actually higher than it was in the clinic 2 days ago where his hemoglobin of 11.  He does appear significantly dehydrated secondary to poor p.o. intake.  He is also diabetic and his sugars have been running higher.  The patient has had paracenteses in the past but none in a month.  He does not have a large amount of fluid on exam.  Given his toxic appearance on arrival antibiotics were started but he will likely need a diagnostic paracentesis.  CT of the abdomen is ordered to evaluate for possible mesenteric ischemia given his past history of the same 3 years ago.  He is not currently anticoagulated.  Per GI he has a history of protein C deficiency and hypercoagulable state.  Arrangements were made to admit the patient to internal medicine on the intermediate care unit.  He was given IV fluids as above to resuscitate for signs of infection and possible early sepsis.  CT scan returned with signs of mesenteric ischemia with pneumatosis and air in the S MV.  The patient continues to look ill.  I spoke with general surgery who evaluated the patient.  I talked to Dr. Kang to make arrangements to admit the patient in the ICU as he will  likely continue to worsen.  He is not a good surgical candidate and Dr. Samayoa from surgery evaluated the patient and agreed that as he is starting to clinically look improved with the fluid resuscitation and antibiotics and his lactate is improving they will keep him n.p.o. tonight and continue to monitor with serial exams.  The patient has been in the ED for 8 hours and is becoming somewhat anxious.  It has been challenging to control his pain despite multiple doses of IV Dilaudid.  He was given 1 dose of Ativan to help with anxiety.  They had questions about continuing his insulin but his glucose is well managed and he is currently n.p.o.  They were also worried about skipping his lactulose as he is very prone to hepatic encephalopathy and the surgery team felt that we should hold on that tonight but could give his rifaximin.      I have reviewed the nursing notes.    I have reviewed the findings, diagnosis, plan and need for follow up with the patient.  New Prescriptions    No medications on file     Final diagnoses:   Gastrointestinal hemorrhage, unspecified gastrointestinal hemorrhage type   Cirrhosis of liver with ascites, unspecified hepatic cirrhosis type (H)   Dehydration   I, Artie Willis, am serving as a trained medical scribe to document services personally performed by Byron Mijares MD, based on the provider's statements to me.   IByron MD, was physically present and have reviewed and verified the accuracy of this note documented by Artie Willis.     1/8/2020   [facility], EMERGENCY DEPARTMENT     Jarrod Mijares MD  01/08/20 [id number]

## 2020-01-08 NOTE — H&P
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     PLAN:  ===NEURO===  # Sedation: None   # Paralysis: None     # Hepatic Encephalopathy   - Holding lactulose & rifaximin for now; consider resuming tomorrow   - Continue monitoring & place on [facility] protocol once more stable     # H/o MDD & Anxiety   Not currently on antidepressant medication. No active concerns for suicidal or homicidal ideation.    - Continue to monitor     # Insomnia   - Holding pta Seroquel 50mg    - Holding pta trazadone 50mg   - Melatonin 3mg at bedtime     ===CARDIOVASCULAR===  EKG: Sinus Tach, QTc 509  Dobutamine Stress test (1/7/20): No inducible ischemia, normal Bi-ventricular function     No active concerns at this time & hemodynamically stable at present.     ===PULMONARY===  # Asthma  # H/o Acute Hypoxic Hypercapnic Respiratory Failure requiring Intubation (2009)  PFTs normal on 1/7/20: FVC 5.6, FEV1 4.39, FVC% 96, FEV1% 91    ===GASTROINTESTINAL===  # Hematochezia   # Concern for Bowel Ischemia   # H/O ischemic bowel disease secondary to Mesenteric Vein Thrombosis (8/2014)  # Elevated Lactic Acid   # H/o Yan-en-Y gastric bypass (2007)  CT reveals portal venous gas in the SMV & mesenteric branches. No evidence on imaging for GI bleed. Hgb 13.3 on admission. Pt complains of hematochezia - could be from mesenteric ischemia, hemorrhoids, varices. Given his hemodynamic stability, brisk GI bleed is unlikely. Pain  does not seem to be out of proportion on exam. Intraabdominal hernia is possible given Yan-en-Y bypass altering his anatomy & predisposing him to hernia.   - S/p Octreotide in ED; no need to continue at this time given no variceal bleeding   - 80mg PPI push in ED; continue 40mg IV BID for now   - Trend Hgb   - Trend Lactate   - LR at 125cc/hr as maintenance fluid  - Pain mgmt with Dilaudid 0.3-0.5mg Q2  - S/p Ceftriaxone & Zosyn in the ED   - Continue Zosyn for now   - Serial abdominal exams   - Avoid oversedating/over treating pain as to mask symptoms on exam   - Consider more dedicated imaging such as CTA   - Surgery following, appreciate assistance     # Decompensated Alcoholic Cirrhosis with Ascites  # Esophageal Varices Grade III s/p Banding (10/2018)   # H/o SBP   # H/o Hepatic Encephalopathy   # Chronic Pancreatitis with h/o Pseudocyst   Established care in the hepatology clinic on 1/7/20. Currently being evaluated/worked up for liver transplant.   Several episodes of SBP. On chronic Ciprofloxacin for SBP prophylaxis. Last paracentesis was on 11/29/19. No organisms grew from culture at that time; however fluid had high WBC with high percentage of PMN. Ascitic fluid noted on CT - no safe pocket to drain at bedside.   - Trend Hgb; transfuse for Hgb < 7  - Holding pta lactulose 20 daily; consider restarting in the AM   - Holding Furosemide 40mg for now; consider restarting in the AM    - Holding spironolactone for now; consider restarting in the AM   - S/p Rifaximin in the ED; consider resuming in the AM  - Holding Docusate 100mg BID   - S/p Ceftriaxone & Zosyn in the ED   - Continue Zosyn for now   - Minimal ascitic fluid on imaging; consider doing paracentesis     MELD-Na score: 19   MELD score: 12  Calculated from:  Serum Creatinine: 0.91 mg/dL  Serum Sodium: 129 mmol/L   Total Bilirubin: 1.6 mg/dL   INR(ratio): 1.38   Age: 29 years    # H/o Incarcerated umbilical hernia   No evidence of strangulation or  incarceration at this time     # Nutrition:   - NPO in case of surgical intervention   - If no surgical intervention planned, resume diet   - Consider resuming folic acid tomorrow 1mg BID     ===RENAL===  UOP: adequate     # Hyponatremia   Na 129 on admission. Has had issues with hyponatremia in the past. Most likely secondary to volume depletion. Did not obtain urine studies or serum osms as he was s/p 3L IVF resuscitation prior to admission.   - Continue to monitor  - Caution with rapid overcorrection - Na goal is < 136 in 24 hours     # Elevated Lactic Acid - improving   LA 3.2 on admission. Down trended to 1.1 after fluid resuscitation.   - Continue to trend given concern for bowel ischemia    # Electrolyte disturbance   - Holding pta Magnesium oxide   - Electrolyte replacement protocol     # History of Vitamin D deficiency  Vitamin D level normal on recent check. No need for supplementation at this time.     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 He was dx w/ ischemic bowel 2013 (uncertain arterial or venous), tx w/ warfarin but failed d/t GIB and changed to Xarelto through 2015, where he was diagnosed with DVT 11/2015. Per chart review, it is believed that this was extended for three additional months w/ ASA. He had hemoperitoneum 8/2016 and found to have b/l pulmonary emboli for which he had IVC filter placed. He took 81 ASA for one year until he was placed on heparin 5K TID through 3/2018. He has been off all AC for 6m. Now presenting with c/f acute mesentery ischemia. History unclear regarding provoked vs unprovoked thrombi in past. His coagulation complicated by underlying  cirrhosis, recurrent GI bleeds and possible nonadherence to medicines. Previously tested negative for Factor V Leiden, prothrombin gene, thrombophilia panel, beta two glycoprotein, and antiphospholipid antibodies Thrombophilia panel 1/29/2019 previously noted synthetic liver dysfunction was with decreased VitK dep factors, elevated fibrin d dimer, decreased antithrombin and protein C. Prior h/o hypofibrinogenemia also c/w liver dysfunction.  - Hematology consulted  - Trending Hgb and lactate as noted above  - Homocysteine ordered  - Fe panel, PT/PTT, fibrinogen ordered   - Consider peripheral blood smear   - Start high dose Heparin ggt   - Daily INR     ===ENDOCRINE===  # Diabetes Mellitus II  Last Hgb A1C 8.4% (11/2019). Home regimen: 26 units glargine BID.   - Holding home insulin for now given NPO status   - Start sliding scale insulin     # Sick Euthyroid   TSH 4.09, T4 0.97 when checked for liver transplant work up.   - No indication to treat at T4 is wnl.   - Recommend outpatient follow up if signs/symptoms of hypothyroidism.     ===INFECTIOUS DISEASE===  # Potential Mesenteric Ischemia  # Potential SBP  Ascites noted on CT imaging, but pocket not large enough for bedside paracentesis. Started on Zosyn & Ceftriaxone in the ED. Will continue on Zosyn at this time, which should cover for SBP appropriately. Will also continue on antibiotics given concerns for bowel ischemia.     # Antimicrobials:  Zosyn: 1/8 - P   Ceftriaxone: 1/8   Cipro pta chronic medication (unsure of compliance)    ===SKIN/MSK===  # Chronic low back pain  # Sciatica   Home pain regimen: Percocet  Q4hrs, Gabapentin 400 TID   - Conservative management; avoid NSAIDs given bleeding risk   - Resume pta Diclofenac gel   - Holding pta Gabapentin 400 TID   - Holding pta Percocet  Q4hrs    Prophylaxis:  DVT: High Dose Heparin   GI: IV Pantoprazole   Family: Patient with plans to update   Disposition: Critically Ill     Code Status: Full      Patient was staffed with the overnight attending, Dr. Chaparro. He will be formally staffed with the MICU Team 1 attending, Dr. Kang, in the morning.     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 He was admitted given concerns for bowel ischemia.      In the ED he was afebrile & hemodynamically stable. Labs were most notable for an initial lactic acid of 3.2.   CT abdomen was concerning for bowel ischemia, although benign pneumatosis superimposed on  infectious/inflammatory colitis/enteritis or portal colopathy/enteropathy were also listed as possible etiologies. There was no evidence of active GI bleeding. Portosystemic varices, splenomegaly, & ascites were also noted, along with sequelae of chronic pancreatitis. US abdomen was normal & did not reveal gas in the portal vein or SMV. General surgery was consulted & the decision was made to manage conservatively with fluids & antibiotics & monitor closely with serial abdominal exams. Low threshold for surgical intervention if acutely worsening. He was started on Zosyn & Ceftriaxone & given 3L IVF before being admitted to the MICU service for further management.            Review of Systems:   Review of systems negative unless otherwise indicated in the HPI.          Past Medical History:   Medical History reviewed.   Past Medical History:   Diagnosis Date     Alcoholic cirrhosis of liver with ascites (H)  12/23/2019     Diabetes (H)     Type 2 DM, Uses Insulin      DVT (deep vein thrombosis) in pregnancy      H/O protein C deficiency      Hepatic encephalopathy (H)      Hepatitis     Hep A when an infant      History of blood transfusion     2019 at [facility]      SBP (spontaneous bacterial peritonitis) (H) 11/2019             Past Surgical History:   Surgical History reviewed.   Past Surgical History:   Procedure Laterality Date     ABDOMEN SURGERY      Gastric Bypass 2009. [facility]      CARDIAC SURGERY      IVC      COLONOSCOPY       ENT SURGERY      Tonsils and Adenoids Removed at 6-7 Years Old      GALLBLADDER SURGERY  2017     GI SURGERY      Upper GI              Social History:   Social History reviewed.  Social History     Tobacco Use     Smoking status: Never Smoker     Smokeless tobacco: Never Used   Substance Use Topics     Alcohol use: Not Currently             Family History:   Family History reviewed.   Family History   Problem Relation Age of Onset     Protein C deficiency Mother      Pancreatic Cancer Father      Deonte Parkinson White syndrome Father      Alcoholism Brother      Heart Failure Maternal Grandmother      Heart Failure Maternal Grandfather              Allergies:     Allergies   Allergen Reactions     Bee Anaphylaxis     Adhesive Tape Rash     Sulfa Drugs Itching             Medications:   Medications Reviewed.   Current Facility-Administered Medications   Medication     0.9% sodium chloride BOLUS     naloxone (NARCAN) injection 0.1-0.4 mg     octreotide (sandoSTATIN) 1,250 mcg in sodium chloride 0.9 % 250 mL     ondansetron (ZOFRAN) injection 4 mg     pantoprazole (PROTONIX) 80 mg in sodium chloride 0.9 % 100 mL infusion     piperacillin-tazobactam (ZOSYN) 3.375 g vial to attach to  mL bag     promethazine (PHENERGAN) 12.5 mg in sodium chloride 0.9 % 50 mL intermittent infusion     sodium chloride 0.9% infusion     Current Outpatient Medications   Medication Sig     Calcium  Carbonate Antacid 400 MG CHEW Take 400 mg by mouth 2 times daily      cholecalciferol (VITAMIN D3) 61435 units (1250 mcg) capsule Take 50,000 Units by mouth every 7 days Saturdays     ciprofloxacin (CIPRO) 500 MG tablet Take 500 mg by mouth 2 times daily     diclofenac (VOLTAREN) 1 % topical gel Place onto the skin 4 times daily as needed for moderate pain     ferrous sulfate (FEROSUL) 325 (65 Fe) MG tablet Take 325 mg by mouth daily (with breakfast)     folic acid (FOLVITE) 1 MG tablet Take 1 mg by mouth 2 times daily     furosemide (LASIX) 40 MG tablet Take 40 mg by mouth 3 times daily     gabapentin (NEURONTIN) 400 MG capsule Take 400 mg by mouth 3 times daily     insulin aspart (NOVOLOG FLEXPEN) 100 UNIT/ML pen Inject Subcutaneous 3 times daily (with meals) Sliding scale as directed     insulin glargine (LANTUS VIAL) 100 UNIT/ML vial Inject 26 Units Subcutaneous 2 times daily 26 units bid daily      lactulose (CHRONULAC) 10 GM/15ML solution Take 20 g by mouth daily as needed     lactulose (CHRONULAC) 10 GM/15ML solution Take 20 g by mouth 3 times daily 20mg / 30 ml tid daily/ prn     magnesium oxide 400 MG CAPS Take 400 mg by mouth 3 times daily      MULTIPLE VITAMIN-FOLIC ACID PO Take 1 tablet by mouth 1 daily     oxyCODONE-acetaminophen (PERCOCET)  MG per tablet Take 1 tablet by mouth every 4 hours as needed for moderate to severe pain      pantoprazole (PROTONIX) 40 MG EC tablet Take 40 mg by mouth daily     polyethylene glycol (MIRALAX/GLYCOLAX) packet Take 1 packet by mouth daily      promethazine (PHENERGAN) 12.5 MG tablet Take 25 mg by mouth every 6 hours as needed for nausea     QUEtiapine (SEROQUEL) 25 MG tablet Take 50 mg by mouth At Bedtime     QUEtiapine (SEROQUEL) 25 MG tablet Take 25 mg by mouth daily as needed      rifaximin (XIFAXAN) 550 MG TABS tablet Take 550 mg by mouth 2 times daily      senna (SENOKOT) 8.6 MG tablet Take 1 tablet by mouth 3 times daily     spironolactone (ALDACTONE)  "100 MG tablet Take 100 mg by mouth 3 times daily     traZODone (DESYREL) 50 MG tablet Take 50 mg by mouth At Bedtime Prn     tretinoin (RETIN-A) 0.025 % external cream Apply topically At Bedtime     Vitamin D, Cholecalciferol, 25 MCG (1000 UT) CAPS Take 2,000 Units by mouth daily      blood glucose (NO BRAND SPECIFIED) lancets standard Use to test blood sugar 6 times daily or as directed.     Nutritional Supplements (ENSURE PO) Ensure/ boost - 237 ml bid daily             Physical Exam:   Vitals were reviewed.  Blood pressure 99/64, pulse 83, temperature 99.5  F (37.5  C), temperature source Oral, resp. rate 8, height 1.854 m (6' 1\"), SpO2 98 %.    General: AAOx3, uncomfortable but in no acute distress    Skin: Not jaundiced, no rash, no ecchymoses  HEENT: no scleral icterus,PERRLA, EOM intact  CV: RRR, normal S1S2, no murmur, clicks, rubs  Resp: Clear to auscultation bilaterally, no wheezes, rhonchi  Abd: normoactive bowel sounds, soft, diffuse tenderness in all quadrants, no guarding or rebound, reducible umbilical hernia noted   Extremities: warm and well perfused, palpable pulses, no edema  Neuro: No lateralizing symptoms or focal neurologic deficits           Data:   No intake/output data recorded.    ROUTINE LABS (Last four results)  CMP  Recent Labs   Lab 01/08/20  1140 01/06/20  0831   * 132*   POTASSIUM 4.7 3.7   CHLORIDE 99 101   CO2 23 26   ANIONGAP 6 6   * 184*   BUN 21 14   CR 0.91 0.93   GFRESTIMATED >90 >90   GFRESTBLACK >90 >90   NATE 8.7 8.8   PROTTOTAL 7.2 7.4   ALBUMIN 3.2* 3.3*   BILITOTAL 1.6* 0.9   ALKPHOS 114 138   AST 57* 72*   ALT 58 64     CBC  Recent Labs   Lab 01/08/20  1140 01/06/20  0831   WBC 7.3 3.6*   RBC 5.23 4.46   HGB 13.3 11.2*   HCT 43.7 37.3*   MCV 84 84   MCH 25.4* 25.1*   MCHC 30.4* 30.0*   RDW 22.5* 21.8*   PLT 60* 57*     INR  Recent Labs   Lab 01/08/20  1140 01/06/20  0831   INR 1.38* 1.34*       Microbiology:   BCx 1/8: Pending     IMAGING  CT Abdomen/Pelvis " (1/8/20)  1. Portal venous gas, predominantly involving the superior mesenteric  vein and mesenteric venous branches, with bowel wall thickening,  hypoenhancement, and possible pneumatosis involving the right colon  and ileum, as described. These findings are concerning for bowel  ischemia, however benign pneumatosis superimposed on  infectious/inflammatory colitis/enteritis or portal  colopathy/enteropathy remain on the differential.  2. No evidence of active GI bleed.  3. Cirrhosis with evidence of portal hypertension including  portosystemic varices, splenomegaly, and ascites.  4. Sequelae of chronic pancreatitis.    Abdominal US 1/8/20  Normal abdominal ultrasound with Doppler assessment. The known gas in  the portal vein and superior mesenteric vein is not appreciated on  this ultrasound.

## 2020-01-08 NOTE — ED NOTES
Bed: IN01  Expected date:   Expected time:   Means of arrival:   Comments:  Obed Wiley M - [phone]   29 yr male  Alcoholic cirrhosis, diabetes  GI bleed - blood in stool

## 2020-01-08 NOTE — ED NOTES
[facility], [place]   ED Nurse to Floor Handoff     Obed Wiley is a 29 year old male who speaks English and lives with others,  in a home  They arrived in the ED by car from home    ED Chief Complaint: Abdominal Pain and Rectal Bleeding    ED Dx;   Final diagnoses:   Gastrointestinal hemorrhage, unspecified gastrointestinal hemorrhage type   Cirrhosis of liver with ascites, unspecified hepatic cirrhosis type (H)   Dehydration         Needed?: No    Allergies:   Allergies   Allergen Reactions     Bee Anaphylaxis     Adhesive Tape Rash     Sulfa Drugs Itching   .  Past Medical Hx:   Past Medical History:   Diagnosis Date     Alcoholic cirrhosis of liver with ascites (H) 12/23/2019     Diabetes (H)     Type 2 DM, Uses Insulin      DVT (deep vein thrombosis) in pregnancy      H/O protein C deficiency      Hepatic encephalopathy (H)      Hepatitis     Hep A when an infant      History of blood transfusion     2019 at [facility]      SBP (spontaneous bacterial peritonitis) (H) 11/2019      Baseline Mental status: WDL  Current Mental Status changes: at basesline    Infection present or suspected this encounter: cultures pending  Sepsis suspected: Yes  Isolation type: No active isolations     Activity level - Baseline/Home:  Stand with Assist  Activity Level - Current:   Unknown    Bariatric equipment needed?: No    In the ED these meds were given:   Medications   0.9% sodium chloride BOLUS (0 mLs Intravenous Stopped 1/8/20 1258)     Followed by   0.9% sodium chloride BOLUS (0 mLs Intravenous Stopped 1/8/20 1258)     Followed by   sodium chloride 0.9% infusion (has no administration in time range)   pantoprazole (PROTONIX) 80 mg in sodium chloride 0.9 % 100 mL infusion (8 mg/hr Intravenous New Bag 1/8/20 1220)   ondansetron (ZOFRAN) injection 4 mg (has no administration in time range)   promethazine (PHENERGAN) 12.5 mg in sodium chloride 0.9 % 50 mL intermittent infusion (has no  administration in time range)   octreotide (sandoSTATIN) injection 50 mcg (has no administration in time range)   octreotide (sandoSTATIN) 1,250 mcg in sodium chloride 0.9 % 250 mL (has no administration in time range)   pantoprazole (PROTONIX) 80 mg in sodium chloride 0.9 % 100 mL intermittent infusion (0 mg Intravenous Stopped 1/8/20 1220)   cefTRIAXone (ROCEPHIN) 1 g vial to attach to  mL bag for ADULTS or NS 50 mL bag for PEDS (0 g Intravenous Stopped 1/8/20 1300)   HYDROmorphone (PF) (DILAUDID) injection 0.5 mg (0.5 mg Intravenous Given 1/8/20 1259)       Drips running?  Yes Protonix 8ml/hr.    Home pump  No    Current LDAs  Peripheral IV 01/08/20 Right Lower forearm (Active)   Site Assessment [facility] 1/8/2020 11:46 AM   Line Status Infusing 1/8/2020 11:46 AM   Phlebitis Scale 0-->no symptoms 1/8/2020 11:46 AM   Infiltration Scale 0 1/8/2020 11:46 AM   Number of days: 0       Peripheral IV 01/08/20 Right Upper forearm (Active)   Site Assessment [facility] 1/8/2020 12:00 PM   Line Status Saline locked 1/8/2020 12:00 PM   Number of days: 0       Labs results:   Labs Ordered and Resulted from Time of ED Arrival Up to the Time of Departure from the ED   CBC WITH PLATELETS DIFFERENTIAL - Abnormal; Notable for the following components:       Result Value    MCH 25.4 (*)     MCHC 30.4 (*)     RDW 22.5 (*)     Platelet Count 60 (*)     Absolute Lymphocytes 0.4 (*)     All other components within normal limits   COMPREHENSIVE METABOLIC PANEL - Abnormal; Notable for the following components:    Sodium 129 (*)     Glucose 204 (*)     Bilirubin Total 1.6 (*)     Albumin 3.2 (*)     AST 57 (*)     All other components within normal limits   INR - Abnormal; Notable for the following components:    INR 1.38 (*)     All other components within normal limits   LIPASE - Abnormal; Notable for the following components:    Lipase 48 (*)     All other components within normal limits   ISTAT  GASES LACTATE EDIS POCT - Abnormal; Notable for  the following components:    PCO2 Venous 36 (*)     Lactic Acid 3.2 (*)     All other components within normal limits   PARTIAL THROMBOPLASTIN TIME   AMMONIA   ISTAT CG4 GASES LACTATE EDIS NURSING POCT   FREE WATER   ABO/RH TYPE AND SCREEN   BLOOD CULTURE   BLOOD CULTURE       Imaging Studies:   Recent Results (from the past 24 hour(s))   Echo Dobutamine Stress Test    Narrative    [id number]  [id number]  [id number]  [id number]^SAUL^YARELI^ENRIQUE FUNES           [facility],[place]  Echocardiography Laboratory  [address] [place] [id number]     Name: ELIDA KENNY  MRN: [id number]  : 1990  Study Date: 2020 02:07 PM  Age: 29 yrs  Gender: Male  Patient Location: [facility]  Reason For Study: Alcoholic cirrhosis of liver with ascites, Portal  hypertension  Ordering Physician: YARELI HUGHES  Referring Physician: YARELI HUGHES  Performed By: Carrie Clemons RDCS     BSA: 2.1 m2  Height: 72 in  Weight: 190 lb  HR: 83  BP: 99/61 mmHg  _____________________________________________________________________________  __     _____________________________________________________________________________  __        Interpretation Summary  Almost maximal dobutamine stress echocardiogram. At the achieved level of  stress there was no evidence of inducible ischemia.     Target heart rate was almost achieved. Heart rate and blood pressure response  to dobutamine were normal. No regional wall motion abnormalities at rest. With  stress, the left ventricular ejection fraction increased from 55-60% to  greater than 65% and the left ventricular size decreased appropriately.  No subjective symptoms to suggest ischemia.  There was no ECG evidence of inducible ischemia at the achieved level of  stress..     Screening echocardiogram is with normal biventricular function. There is trace  MR and TR. Normal estimated pulmonary artery pressure. The aortic root appears  dilated at around  "4cm.  _____________________________________________________________________________  __     Stress  The patient did not exhibit any symptoms during drug infusion.  Drug infusion stopped due to maximum amount of Dobutamine/atropine used.  The maximum dose of dobutamine was 50mcg/kg/min.  The maximum dose of atropine was 2.0mg.  The maximum dose of metoprolol was 5.0mg.  Optison (NDC #6588-1998-27) given intravenously.  Patient was given 7 ml mixture of 3 ml Optison and 6 ml saline.  2 ml wasted.  Optison Expiration 02/25/21 .  Optison Lot # [id number] .     Stress Results                                       Maximum Predicted HR:   190 bpm             Target HR: 162 bpm        % Maximum Predicted HR: 80 %                           Stage DurationHeart Rate  BP   Dose                               (mm:ss)   (bpm)                      Baseline  0:00      83     99/61 0.00                        Peak    13:52     152   108/4950.00                           Stress Duration:   13:52 mm:ss *                     Maximum Stress HR: 152 bpm *     Procedure  Dobutamine stress echo with two dimensional color and spectral Doppler  performed. Contrast Optison.  _____________________________________________________________________________  __     MMode/2D Measurements & Calculations  Ao root diam: 4.1 cm  asc Aorta Diam: 3.5 cm  LVOT diam: 2.5 cm  LVOT area: 4.8 cm2                    _____________________________________________________________________________  __        Report approved by: MILY Martin 01/07/2020 03:14 PM          Recent vital signs:   /66   Pulse 99   Temp 99.5  F (37.5  C) (Oral)   Resp 17   Ht 1.854 m (6' 1\")   SpO2 96%   BMI 25.07 kg/m      [place] Coma Scale Score: 15 (01/08/20 1130)       Cardiac Rhythm: Normal Sinus  Pt needs tele? Yes  Skin/wound Issues: None    Code Status: Not on file.    Pain control: fair    Nausea control: fair    Abnormal labs/tests/findings requiring intervention: " Lactic acid 3.2    Family present during ED course? Yes   Family Comments/Social Situation comments: family at bedside involved in pt care.     Tasks needing completion: None    Twin Schuster, RN  [phone] [facility]

## 2020-01-08 NOTE — ED NOTES
[facility], [place]   ED Nurse to Floor Handoff     Obed Wiley is a 29 year old male who speaks English and lives with family members,  in a home  They arrived in the ED by car from home    ED Chief Complaint: Abdominal Pain and Rectal Bleeding    ED Dx;   Final diagnoses:   Gastrointestinal hemorrhage, unspecified gastrointestinal hemorrhage type   Cirrhosis of liver with ascites, unspecified hepatic cirrhosis type (H)   Dehydration         Needed?: No    Allergies:   Allergies   Allergen Reactions     Bee Anaphylaxis     Adhesive Tape Rash     Sulfa Drugs Itching   .  Past Medical Hx:   Past Medical History:   Diagnosis Date     Alcoholic cirrhosis of liver with ascites (H) 12/23/2019     Diabetes (H)     Type 2 DM, Uses Insulin      DVT (deep vein thrombosis) in pregnancy      H/O protein C deficiency      Hepatic encephalopathy (H)      Hepatitis     Hep A when an infant      History of blood transfusion     2019 at [facility]      SBP (spontaneous bacterial peritonitis) (H) 11/2019      Baseline Mental status: WDL  Current Mental Status changes: at basesline    Infection present or suspected this encounter: yes skin/wound/contact  Sepsis suspected: No  Isolation type: No active isolations     Activity level - Baseline/Home:  Independent  Activity Level - Current:   Stand with Assist    Bariatric equipment needed?: No    In the ED these meds were given:   Medications   0.9% sodium chloride BOLUS (0 mLs Intravenous Stopped 1/8/20 1258)     Followed by   0.9% sodium chloride BOLUS (0 mLs Intravenous Stopped 1/8/20 1258)     Followed by   sodium chloride 0.9% infusion (has no administration in time range)   pantoprazole (PROTONIX) 80 mg in sodium chloride 0.9 % 100 mL infusion (8 mg/hr Intravenous Rate/Dose Verify 1/8/20 1622)   ondansetron (ZOFRAN) injection 4 mg (has no administration in time range)   promethazine (PHENERGAN) 12.5 mg in sodium chloride 0.9 % 50 mL  intermittent infusion (12.5 mg Intravenous Given 1/8/20 1326)   octreotide (sandoSTATIN) 1,250 mcg in sodium chloride 0.9 % 250 mL (50 mcg/hr Intravenous Rate/Dose Verify 1/8/20 1622)   0.9% sodium chloride BOLUS (has no administration in time range)   naloxone (NARCAN) injection 0.1-0.4 mg (has no administration in time range)   pantoprazole (PROTONIX) 80 mg in sodium chloride 0.9 % 100 mL intermittent infusion (0 mg Intravenous Stopped 1/8/20 1220)   cefTRIAXone (ROCEPHIN) 1 g vial to attach to  mL bag for ADULTS or NS 50 mL bag for PEDS (0 g Intravenous Stopped 1/8/20 1300)   HYDROmorphone (PF) (DILAUDID) injection 0.5 mg (0.5 mg Intravenous Given 1/8/20 1259)   octreotide (sandoSTATIN) injection 50 mcg (50 mcg Intravenous Given 1/8/20 1330)   iopamidol (ISOVUE-370) solution 116 mL (116 mLs Intravenous Given 1/8/20 1413)   sodium chloride (PF) 0.9% PF flush 79 mL (79 mLs Intravenous Given 1/8/20 1413)   HYDROmorphone (PF) (DILAUDID) injection 0.5 mg (0.5 mg Intravenous Given 1/8/20 1540)   piperacillin-tazobactam (ZOSYN) 3.375 g vial to attach to  mL bag (0 g Intravenous Stopped 1/8/20 1655)       Drips running?  Yes    Home pump  No    Current LDAs  Peripheral IV 01/08/20 Right Lower forearm (Active)   Site Assessment [facility] 1/8/2020 11:46 AM   Line Status Infusing 1/8/2020 11:46 AM   Phlebitis Scale 0-->no symptoms 1/8/2020 11:46 AM   Infiltration Scale 0 1/8/2020 11:46 AM   Number of days: 0       Peripheral IV 01/08/20 Right Upper forearm (Active)   Site Assessment [facility] 1/8/2020 12:00 PM   Line Status Saline locked 1/8/2020 12:00 PM   Number of days: 0       Labs results:   Labs Ordered and Resulted from Time of ED Arrival Up to the Time of Departure from the ED   CBC WITH PLATELETS DIFFERENTIAL - Abnormal; Notable for the following components:       Result Value    MCH 25.4 (*)     MCHC 30.4 (*)     RDW 22.5 (*)     Platelet Count 60 (*)     Absolute Lymphocytes 0.4 (*)     All other components  "within normal limits   COMPREHENSIVE METABOLIC PANEL - Abnormal; Notable for the following components:    Sodium 129 (*)     Glucose 204 (*)     Bilirubin Total 1.6 (*)     Albumin 3.2 (*)     AST 57 (*)     All other components within normal limits   INR - Abnormal; Notable for the following components:    INR 1.38 (*)     All other components within normal limits   LIPASE - Abnormal; Notable for the following components:    Lipase 48 (*)     All other components within normal limits   GLUCOSE BY METER - Abnormal; Notable for the following components:    Glucose 150 (*)     All other components within normal limits   ISTAT  GASES LACTATE EDIS POCT - Abnormal; Notable for the following components:    PCO2 Venous 36 (*)     Lactic Acid 3.2 (*)     All other components within normal limits   ISTAT  GASES LACTATE EDIS POCT - Abnormal; Notable for the following components:    PCO2 Venous 25 (*)     PO2 Venous 50 (*)     Bicarbonate Venous 14 (*)     All other components within normal limits   PARTIAL THROMBOPLASTIN TIME   AMMONIA   GLUCOSE MONITOR NURSING POCT   LACTIC ACID   ISTAT CG4 GASES LACTATE EDIS NURSING POCT   FREE WATER   ISTAT CG4 GASES LACTATE EDIS NURSING POCT   VITAL SIGNS   PAIN ASSESSMENT   INITIATE   CARDIAC CONTINUOUS MONITORING   PULSE OXIMETRY NURSING   NO VTE PROPHYLAXIS   IP ASSIGN PROVIDER TEAM TO TREATMENT TEAM   ADVANCED CARDIACE LIFE SUPPORT (ACLS) GUIDELINES   INTAKE AND OUTPUT   IV ACCESS   ACTIVITY   ABO/RH TYPE AND SCREEN   BLOOD CULTURE   BLOOD CULTURE   METHICILLIN RESIST/SENS S. AUREUS PCR       Imaging Studies:   Recent Results (from the past 24 hour(s))   CT Abdomen Pelvis w Contrast    Narrative    Exam: CT abdomen and pelvis with contrast    Comparison: None    History: History of \"clot and intestines\" that presented the the same.  History of cirrhosis, now with GI bleed and diffuse abdominal pain    Technique: CT of the abdomen and pelvis was obtained with intravenous  contrast. Images " were obtained in the arterial and portal venous  phases. Coronal and sagittal reconstructions were obtained and  reviewed.    Contrast dose: iopamidol (ISOVUE-370) solution 116 mL    Findings:    Abdomen/pelvis: There are scattered foci of gas within the main portal  vein, superior mesenteric vein, and smaller mesenteric venous  branches. Additional scattered foci of gas within the intrahepatic  portal venous system. There is diffuse bowel wall thickening,  predominantly involving the terminal ileum, ascending colon, and the  hepatic flexure, with subtle hypoenhancement of the bowel wall in  these regions. There are a few tiny foci of gas inferior to the liver  margin (series 11 image 64) which are suspicious for pneumatosis.  Additional questionable trace pneumatosis involving a loop of small  bowel in the right mid abdomen (for example series 10 image 295).  Additional branching foci of air in the region of the ascending colon  may represent pneumatosis versus air within decompressed segments of  colon. No dilated loops of small bowel. No pooling of contrast to  suggest active GI bleeding. Postoperative changes of Yan-en-Y gastric  bypass.    Cirrhotic configuration of the liver. Splenomegaly. Calcified splenic  granuloma. Cholecystectomy. Pancreatic calcifications and pancreatic  atrophy, likely secondary to chronic pancreatitis. Normal adrenal  glands and kidneys (with the exception of a 9 mm simple cyst arising  from the right kidney). No obstructing urinary tract calculi. Bladder  is partially distended and unremarkable. Normal prostate.    Normal caliber of the infrarenal aorta. No appreciable arterial  occlusion. IVC filter. No convincing free intraperitoneal air.  Portosystemic varices, including a splenorenal shunt. Small volume  ascites. Diffuse mesenteric edema. Numerous prominent mesenteric nodes  measuring up to 1.2 cm (series 10 image 247), nonspecific in the  setting of ascites and intrinsic hepatic  parenchymal disease.    Lower chest: Dependent atelectasis. No consolidative airspace opacity.  No pleural effusion or pneumothorax. Small hiatal hernia.    Bones: Degenerative changes of the spine. No acute or suspicious  appearing bony abnormalities. Small umbilical hernia which contains  ascitic fluid.      Impression    Impression:   1. Portal venous gas, predominantly involving the superior mesenteric  vein and mesenteric venous branches, with bowel wall thickening,  hypoenhancement, and possible pneumatosis involving the right colon  and ileum, as described. These findings are concerning for bowel  ischemia, however benign pneumatosis superimposed on  infectious/inflammatory colitis/enteritis or portal  colopathy/enteropathy remain on the differential.  2. No evidence of active GI bleed.  3. Cirrhosis with evidence of portal hypertension including  portosystemic varices, splenomegaly, and ascites.  4. Sequelae of chronic pancreatitis.      [Result: Findings concerning for bowel ischemia]    Finding was identified on 1/8/2020 2:45 PM.     Dr. Mijares was contacted by Dr. Pickering on 1/8/2020 3:10 PM and  verbalized understanding of the result.    I have personally reviewed the examination and initial interpretation  and I agree with the findings.    GURINDER ARITA, DO   US Abd/Pelvis Duplex Complete Portable    Narrative    EXAMINATION: US ABDOMEN OR PELVIS DOPPLER COMPLETE PORTABLE, 1/8/2020  4:05 PM     COMPARISON: CTA today    HISTORY: worsening abd pain with air in smv;     TECHNIQUE: Limited midline Doppler evaluation of the abdomen.    Findings:    Liver: Nodular contour of the liver with coarsened echotexture. No  evidence of a focal hepatic mass.     Extrahepatic portal vein flow is antegrade, measuring 19 cm/sec.  Right portal vein flow is antegrade, measuring 17 cm/sec.  Left portal vein flow is antegrade, measuring 7 cm/sec.      Flow in the hepatic artery is towards the liver and:  66 cm/sec peak  "systolic  0.78 resistive index.     The splenic vein is patent and flow is towards the liver.  The left,  middle, and right hepatic veins are patent with flow towards the IVC.     Gallbladder: Cholecystectomy.          Impression    Impression:   Normal abdominal ultrasound with Doppler assessment. The known gas in  the portal vein and superior mesenteric vein is not appreciated on  this ultrasound.    I have personally reviewed the examination and initial interpretation  and I agree with the findings.    RAMANA CARRASCO MD       Recent vital signs:   BP 99/64   Pulse 83   Temp 99.5  F (37.5  C) (Oral)   Resp 13   Ht 1.854 m (6' 1\")   SpO2 100%   BMI 25.07 kg/m      [place] Coma Scale Score: 15 (01/08/20 1130)       Cardiac Rhythm: Normal Sinus  Pt needs tele? Yes  Skin/wound Issues: None    Code Status: Full Code    Pain control: poor    Nausea control: fair    Abnormal labs/tests/findings requiring intervention: see epic    Family present during ED course? Yes   Family Comments/Social Situation comments:     Tasks needing completion: None    Alem Guajardo, RN  [phone] [facility]      "

## 2020-01-08 NOTE — CONSULTS
"GENERAL SURGERY ADMISSION HISTORY & PHYSICAL  Obed Wiley MRN# [id number]   YOB: 1990 Age: 29 year old     Date of Admission: 01/08/20    CC: mesenteric ischemia    HPI: Obed Wiley is a 29 year old year old male with a history of diabetes, alcoholic liver cirrhosis, DVT and PE, recurrent pancreatitis, morbid obesity s/p mykel-en-y in 2007, who started having severe crampy abdominal pain at approximately 1 am this morning. Also had nausea and vomiting at that time. He had two bowel movement of bright red blood at home and another bowel movement with mixed melena and bright red blood on his way in to the ED. He had a previous colonic GI bleed 3 years ago.  In the ED, he had a CT scan which had findings concerning for mesenteric ischemia.     He has persistent diarrhea at baseline due to lactulose therapy.     Of note, he has had \"at least a half dozen\" episodes of pancreatitis and has had \"over a hundred\" paracenteses, which typically occur weekly for 7-8L of fluid removed. His most recent MELD, calculated yesterday, was 9.    REVIEW OF SYSTEMS: The remainder of the complete ROS was negative unless noted in the HPI. Denies visual changes, headache, sore throat, rhinorrhea, chest pain, sob, constipation, fevers, night sweats, weight loss.    PAST MEDICAL HISTORY:   Past Medical History:   Diagnosis Date     Alcoholic cirrhosis of liver with ascites (H) 12/23/2019     Diabetes (H)     Type 2 DM, Uses Insulin      DVT (deep vein thrombosis) in pregnancy      H/O protein C deficiency      Hepatic encephalopathy (H)      Hepatitis     Hep A when an infant      History of blood transfusion     2019 at [facility]      SBP (spontaneous bacterial peritonitis) (H) 11/2019       PAST SURGICAL HISTORY:   Past Surgical History:   Procedure Laterality Date     ABDOMEN SURGERY      Gastric Bypass 2009. [facility]      CARDIAC SURGERY      IVC      COLONOSCOPY       ENT SURGERY      Tonsils and Adenoids " Removed at 6-7 Years Old      GALLBLADDER SURGERY  2017     GI SURGERY      Upper GI        ALLERGIES:    Allergies   Allergen Reactions     Bee Anaphylaxis     Adhesive Tape Rash     Sulfa Drugs Itching       HOME MEDICATIONS: [] DOBUTamine 500 mg in dextrose 5% 250 mL (adult std conc) premix  [] metoprolol (LOPRESSOR) injection 1-20 mg  [] sodium chloride 0.9% infusion    Calcium Carbonate Antacid 400 MG CHEW, Take 400 mg by mouth 2 times daily   cholecalciferol (VITAMIN D3) 01241 units (1250 mcg) capsule, Take 50,000 Units by mouth every 7 days   ciprofloxacin (CIPRO) 500 MG tablet, Take 500 mg by mouth 2 times daily  diclofenac (VOLTAREN) 1 % topical gel, Place onto the skin 4 times daily as needed for moderate pain  ferrous sulfate (FEROSUL) 325 (65 Fe) MG tablet, Take 325 mg by mouth daily (with breakfast)  folic acid (FOLVITE) 1 MG tablet, Take 1 mg by mouth 2 times daily  furosemide (LASIX) 40 MG tablet, Take 40 mg by mouth 3 times daily  gabapentin (NEURONTIN) 400 MG capsule, Take 400 mg by mouth 3 times daily  insulin aspart (NOVOLOG FLEXPEN) 100 UNIT/ML pen, Inject Subcutaneous 3 times daily (with meals) Sliding scale as directed  insulin glargine (LANTUS VIAL) 100 UNIT/ML vial, Inject 26 Units Subcutaneous 2 times daily 26 units bid daily   lactulose (CHRONULAC) 10 GM/15ML solution, Take 20 g by mouth daily as needed  lactulose (CHRONULAC) 10 GM/15ML solution, Take 20 g by mouth 3 times daily 20mg / 30 ml tid daily/ prn  magnesium oxide 400 MG CAPS, Take 400 mg by mouth 3 times daily   MULTIPLE VITAMIN-FOLIC ACID PO, Take 1 tablet by mouth 1 daily  oxyCODONE-acetaminophen (PERCOCET)  MG per tablet, Take 1 tablet by mouth every 4 hours as needed for moderate to severe pain   pantoprazole (PROTONIX) 40 MG EC tablet, Take 40 mg by mouth daily  polyethylene glycol (MIRALAX/GLYCOLAX) packet, Take 1 packet by mouth daily   promethazine (PHENERGAN) 12.5 MG tablet, Take 25 mg  "by mouth every 6 hours as needed for nausea  QUEtiapine (SEROQUEL) 25 MG tablet, Take 50 mg by mouth At Bedtime  QUEtiapine (SEROQUEL) 25 MG tablet, Take 25 mg by mouth daily as needed   rifaximin (XIFAXAN) 550 MG TABS tablet, Take 550 mg by mouth 2 times daily   senna (SENOKOT) 8.6 MG tablet, Take 1 tablet by mouth 3 times daily  spironolactone (ALDACTONE) 100 MG tablet, Take 100 mg by mouth 3 times daily  traZODone (DESYREL) 50 MG tablet, Take 50 mg by mouth At Bedtime Prn  tretinoin (RETIN-A) 0.025 % external cream, Apply topically At Bedtime  Vitamin D, Cholecalciferol, 25 MCG (1000 UT) CAPS, Take 2,000 Units by mouth daily   blood glucose (NO BRAND SPECIFIED) lancets standard, Use to test blood sugar 6 times daily or as directed.  Nutritional Supplements (ENSURE PO), Ensure/ boost - 237 ml bid daily        SOCIAL HISTORY:   Social History     Tobacco Use     Smoking status: Never Smoker     Smokeless tobacco: Never Used   Substance Use Topics     Alcohol use: Not Currently     Drug use: Not Currently       FAMILY HISTORY:   Family History   Problem Relation Age of Onset     Protein C deficiency Mother      Pancreatic Cancer Father      Deonte Parkinson White syndrome Father      Alcoholism Brother      Heart Failure Maternal Grandmother      Heart Failure Maternal Grandfather        PHYSICAL EXAMINATION:  BP 99/64   Pulse 83   Temp 99.5  F (37.5  C) (Oral)   Resp 8   Ht 1.854 m (6' 1\")   SpO2 98%   BMI 25.07 kg/m       General: NAD, awake and alert   CV: Non-cyanotic, RRR  Pulm: No increased work of breathing on room air   Abd: soft, globally tender to palpation, nondistended, reducible umbilical hernia  Extremities: WWP without edema  Neuro: No focal deficits noted, patient moves all extremities spontaneously    LABS:  Recent Labs   Lab 01/08/20  1140   WBC 7.3   RBC 5.23   HGB 13.3   HCT 43.7   MCV 84   MCH 25.4*   MCHC 30.4*   RDW 22.5*   PLT 60*       Recent Labs   Lab 01/08/20  1140   * "   POTASSIUM 4.7   CHLORIDE 99   CO2 23   BUN 21   CR 0.91   *   NATE 8.7       Recent Labs   Lab 01/08/20  1140   AST 57*   ALT 58   ALKPHOS 114   BILITOTAL 1.6*   ALBUMIN 3.2*   INR 1.38*       IMAGING:  Exam: CT abdomen and pelvis with contrast                                                                Impression:   1. Portal venous gas, predominantly involving the superior mesenteric  vein and mesenteric venous branches, with bowel wall thickening,  hypoenhancement, and possible pneumatosis involving the right colon  and ileum, as described. These findings are concerning for bowel  ischemia, however benign pneumatosis superimposed on  infectious/inflammatory colitis/enteritis or portal  colopathy/enteropathy remain on the differential.  2. No evidence of active GI bleed.  3. Cirrhosis with evidence of portal hypertension including  portosystemic varices, splenomegaly, and ascites.  4. Sequelae of chronic pancreatitis.       A/P: Obed Wiley is a 29 year old male with alcoholic cirrhosis, MELD of 9, diabetes, DVT and PE, recurrent pancreatitis, morbid obesity s/p mykel-en-y in 2007, now with GI bleed and mesenteric ischemia    - Keep NPO  - IVF resuscitation  - Agree with abx  - Will review imaging further with staff    Discussed with Dr. OLVIN Gomez who will discuss with staff.    Constantino Gates MD  General Surgery PGY1  [phone]

## 2020-01-09 LAB
ALBUMIN SERPL-MCNC: 2.3 G/DL (ref 3.4–5)
ALBUMIN SERPL-MCNC: 2.4 G/DL (ref 3.4–5)
ALP SERPL-CCNC: 73 U/L (ref 40–150)
ALP SERPL-CCNC: 80 U/L (ref 40–150)
ALT SERPL W P-5'-P-CCNC: 39 U/L (ref 0–70)
ALT SERPL W P-5'-P-CCNC: 43 U/L (ref 0–70)
ANION GAP SERPL CALCULATED.3IONS-SCNC: 5 MMOL/L (ref 3–14)
ANION GAP SERPL CALCULATED.3IONS-SCNC: 7 MMOL/L (ref 3–14)
AST SERPL W P-5'-P-CCNC: 35 U/L (ref 0–45)
AST SERPL W P-5'-P-CCNC: 42 U/L (ref 0–45)
BASOPHILS # BLD AUTO: 0 10E9/L (ref 0–0.2)
BASOPHILS NFR BLD AUTO: 1.7 %
BILIRUB SERPL-MCNC: 1.1 MG/DL (ref 0.2–1.3)
BILIRUB SERPL-MCNC: 1.4 MG/DL (ref 0.2–1.3)
BUN SERPL-MCNC: 21 MG/DL (ref 7–30)
BUN SERPL-MCNC: 21 MG/DL (ref 7–30)
CALCIUM SERPL-MCNC: 7.3 MG/DL (ref 8.5–10.1)
CALCIUM SERPL-MCNC: 7.5 MG/DL (ref 8.5–10.1)
CHLORIDE SERPL-SCNC: 103 MMOL/L (ref 94–109)
CHLORIDE SERPL-SCNC: 104 MMOL/L (ref 94–109)
CO2 SERPL-SCNC: 21 MMOL/L (ref 20–32)
CO2 SERPL-SCNC: 22 MMOL/L (ref 20–32)
CREAT SERPL-MCNC: 0.9 MG/DL (ref 0.66–1.25)
CREAT SERPL-MCNC: 0.92 MG/DL (ref 0.66–1.25)
DIFFERENTIAL METHOD BLD: ABNORMAL
EOSINOPHIL # BLD AUTO: 0.1 10E9/L (ref 0–0.7)
EOSINOPHIL NFR BLD AUTO: 4.7 %
ERYTHROCYTE [DISTWIDTH] IN BLOOD BY AUTOMATED COUNT: 21.5 % (ref 10–15)
ERYTHROCYTE [DISTWIDTH] IN BLOOD BY AUTOMATED COUNT: 21.6 % (ref 10–15)
ERYTHROCYTE [DISTWIDTH] IN BLOOD BY AUTOMATED COUNT: 22 % (ref 10–15)
FERRITIN SERPL-MCNC: 19 NG/ML (ref 26–388)
FOLATE SERPL-MCNC: 26.5 NG/ML
GFR SERPL CREATININE-BSD FRML MDRD: >90 ML/MIN/{1.73_M2}
GFR SERPL CREATININE-BSD FRML MDRD: >90 ML/MIN/{1.73_M2}
GLUCOSE BLDC GLUCOMTR-MCNC: 138 MG/DL (ref 70–99)
GLUCOSE BLDC GLUCOMTR-MCNC: 182 MG/DL (ref 70–99)
GLUCOSE BLDC GLUCOMTR-MCNC: 187 MG/DL (ref 70–99)
GLUCOSE BLDC GLUCOMTR-MCNC: 208 MG/DL (ref 70–99)
GLUCOSE SERPL-MCNC: 207 MG/DL (ref 70–99)
GLUCOSE SERPL-MCNC: 385 MG/DL (ref 70–99)
HAPTOGLOB SERPL-MCNC: 3 MG/DL (ref 32–197)
HAPTOGLOB SERPL-MCNC: <3 MG/DL (ref 32–197)
HCT VFR BLD AUTO: 31.6 % (ref 40–53)
HCT VFR BLD AUTO: 32.2 % (ref 40–53)
HCT VFR BLD AUTO: 33.6 % (ref 40–53)
HGB BLD-MCNC: 10.2 G/DL (ref 13.3–17.7)
HGB BLD-MCNC: 9.6 G/DL (ref 13.3–17.7)
HGB BLD-MCNC: 9.9 G/DL (ref 13.3–17.7)
IMM GRANULOCYTES # BLD: 0 10E9/L (ref 0–0.4)
IMM GRANULOCYTES NFR BLD: 0 %
INR PPP: 1.62 (ref 0.86–1.14)
INR PPP: 1.75 (ref 0.86–1.14)
IRON SATN MFR SERPL: 9 % (ref 15–46)
IRON SATN MFR SERPL: NORMAL % (ref 15–46)
IRON SERPL-MCNC: 22 UG/DL (ref 35–180)
IRON SERPL-MCNC: NORMAL UG/DL (ref 35–180)
LACTATE BLD-SCNC: 1 MMOL/L (ref 0.7–2)
LACTATE BLD-SCNC: 2 MMOL/L (ref 0.7–2)
LACTATE BLD-SCNC: 2.1 MMOL/L (ref 0.7–2)
LACTATE BLD-SCNC: 2.3 MMOL/L (ref 0.7–2)
LACTATE BLD-SCNC: 3.2 MMOL/L (ref 0.7–2)
LMWH PPP CHRO-ACNC: 0.24 IU/ML
LMWH PPP CHRO-ACNC: 0.26 IU/ML
LYMPHOCYTES # BLD AUTO: 0.8 10E9/L (ref 0.8–5.3)
LYMPHOCYTES NFR BLD AUTO: 36.1 %
MAGNESIUM SERPL-MCNC: 1.5 MG/DL (ref 1.6–2.3)
MCH RBC QN AUTO: 25.5 PG (ref 26.5–33)
MCH RBC QN AUTO: 25.9 PG (ref 26.5–33)
MCH RBC QN AUTO: 26 PG (ref 26.5–33)
MCHC RBC AUTO-ENTMCNC: 30.4 G/DL (ref 31.5–36.5)
MCHC RBC AUTO-ENTMCNC: 30.4 G/DL (ref 31.5–36.5)
MCHC RBC AUTO-ENTMCNC: 30.7 G/DL (ref 31.5–36.5)
MCV RBC AUTO: 84 FL (ref 78–100)
MCV RBC AUTO: 85 FL (ref 78–100)
MCV RBC AUTO: 85 FL (ref 78–100)
MONOCYTES # BLD AUTO: 0.3 10E9/L (ref 0–1.3)
MONOCYTES NFR BLD AUTO: 14.6 %
NEUTROPHILS # BLD AUTO: 1 10E9/L (ref 1.6–8.3)
NEUTROPHILS NFR BLD AUTO: 42.9 %
NRBC # BLD AUTO: 0 10*3/UL
NRBC BLD AUTO-RTO: 0 /100
PLATELET # BLD AUTO: 34 10E9/L (ref 150–450)
PLATELET # BLD AUTO: 42 10E9/L (ref 150–450)
PLATELET # BLD AUTO: 42 10E9/L (ref 150–450)
POTASSIUM SERPL-SCNC: 3.8 MMOL/L (ref 3.4–5.3)
POTASSIUM SERPL-SCNC: 4 MMOL/L (ref 3.4–5.3)
PROT SERPL-MCNC: 5.4 G/DL (ref 6.8–8.8)
PROT SERPL-MCNC: 5.6 G/DL (ref 6.8–8.8)
RBC # BLD AUTO: 3.76 10E12/L (ref 4.4–5.9)
RBC # BLD AUTO: 3.81 10E12/L (ref 4.4–5.9)
RBC # BLD AUTO: 3.94 10E12/L (ref 4.4–5.9)
RETICS # AUTO: 100.6 10E9/L (ref 25–95)
RETICS/RBC NFR AUTO: 2.6 % (ref 0.5–2)
SODIUM SERPL-SCNC: 129 MMOL/L (ref 133–144)
SODIUM SERPL-SCNC: 133 MMOL/L (ref 133–144)
TIBC SERPL-MCNC: 261 UG/DL (ref 240–430)
TIBC SERPL-MCNC: NORMAL UG/DL (ref 240–430)
VIT B12 SERPL-MCNC: 373 PG/ML (ref 193–986)
WBC # BLD AUTO: 2.3 10E9/L (ref 4–11)
WBC # BLD AUTO: 2.6 10E9/L (ref 4–11)
WBC # BLD AUTO: 3.4 10E9/L (ref 4–11)

## 2020-01-09 PROCEDURE — 85027 COMPLETE CBC AUTOMATED: CPT | Performed by: STUDENT IN AN ORGANIZED HEALTH CARE EDUCATION/TRAINING PROGRAM

## 2020-01-09 PROCEDURE — 85610 PROTHROMBIN TIME: CPT | Performed by: INTERNAL MEDICINE

## 2020-01-09 PROCEDURE — 25000128 H RX IP 250 OP 636: Performed by: STUDENT IN AN ORGANIZED HEALTH CARE EDUCATION/TRAINING PROGRAM

## 2020-01-09 PROCEDURE — 82607 VITAMIN B-12: CPT | Performed by: INTERNAL MEDICINE

## 2020-01-09 PROCEDURE — 25000132 ZZH RX MED GY IP 250 OP 250 PS 637: Performed by: STUDENT IN AN ORGANIZED HEALTH CARE EDUCATION/TRAINING PROGRAM

## 2020-01-09 PROCEDURE — 83550 IRON BINDING TEST: CPT | Performed by: INTERNAL MEDICINE

## 2020-01-09 PROCEDURE — 12000001 ZZH R&B MED SURG/OB UMMC

## 2020-01-09 PROCEDURE — 00000146 ZZHCL STATISTIC GLUCOSE BY METER IP

## 2020-01-09 PROCEDURE — 85025 COMPLETE CBC W/AUTO DIFF WBC: CPT | Performed by: INTERNAL MEDICINE

## 2020-01-09 PROCEDURE — 40000611 ZZHCL STATISTIC MORPHOLOGY W/INTERP HEMEPATH TC 85060: Performed by: INTERNAL MEDICINE

## 2020-01-09 PROCEDURE — 25000132 ZZH RX MED GY IP 250 OP 250 PS 637: Performed by: INTERNAL MEDICINE

## 2020-01-09 PROCEDURE — C9113 INJ PANTOPRAZOLE SODIUM, VIA: HCPCS

## 2020-01-09 PROCEDURE — 25800030 ZZH RX IP 258 OP 636: Performed by: STUDENT IN AN ORGANIZED HEALTH CARE EDUCATION/TRAINING PROGRAM

## 2020-01-09 PROCEDURE — 85045 AUTOMATED RETICULOCYTE COUNT: CPT | Performed by: INTERNAL MEDICINE

## 2020-01-09 PROCEDURE — 80053 COMPREHEN METABOLIC PANEL: CPT | Performed by: STUDENT IN AN ORGANIZED HEALTH CARE EDUCATION/TRAINING PROGRAM

## 2020-01-09 PROCEDURE — 25000128 H RX IP 250 OP 636

## 2020-01-09 PROCEDURE — 83735 ASSAY OF MAGNESIUM: CPT | Performed by: STUDENT IN AN ORGANIZED HEALTH CARE EDUCATION/TRAINING PROGRAM

## 2020-01-09 PROCEDURE — 83540 ASSAY OF IRON: CPT | Performed by: INTERNAL MEDICINE

## 2020-01-09 PROCEDURE — 83605 ASSAY OF LACTIC ACID: CPT | Performed by: STUDENT IN AN ORGANIZED HEALTH CARE EDUCATION/TRAINING PROGRAM

## 2020-01-09 PROCEDURE — 25000131 ZZH RX MED GY IP 250 OP 636 PS 637

## 2020-01-09 PROCEDURE — 82728 ASSAY OF FERRITIN: CPT | Performed by: INTERNAL MEDICINE

## 2020-01-09 PROCEDURE — 25000132 ZZH RX MED GY IP 250 OP 250 PS 637

## 2020-01-09 PROCEDURE — 25000131 ZZH RX MED GY IP 250 OP 636 PS 637: Performed by: STUDENT IN AN ORGANIZED HEALTH CARE EDUCATION/TRAINING PROGRAM

## 2020-01-09 PROCEDURE — 83010 ASSAY OF HAPTOGLOBIN QUANT: CPT

## 2020-01-09 PROCEDURE — 83605 ASSAY OF LACTIC ACID: CPT | Performed by: INTERNAL MEDICINE

## 2020-01-09 PROCEDURE — 99232 SBSQ HOSP IP/OBS MODERATE 35: CPT | Mod: GC | Performed by: INTERNAL MEDICINE

## 2020-01-09 PROCEDURE — 25000131 ZZH RX MED GY IP 250 OP 636 PS 637: Performed by: NURSE PRACTITIONER

## 2020-01-09 PROCEDURE — 85610 PROTHROMBIN TIME: CPT | Performed by: STUDENT IN AN ORGANIZED HEALTH CARE EDUCATION/TRAINING PROGRAM

## 2020-01-09 PROCEDURE — 85520 HEPARIN ASSAY: CPT | Performed by: INTERNAL MEDICINE

## 2020-01-09 PROCEDURE — 82746 ASSAY OF FOLIC ACID SERUM: CPT | Performed by: INTERNAL MEDICINE

## 2020-01-09 PROCEDURE — 85520 HEPARIN ASSAY: CPT | Performed by: STUDENT IN AN ORGANIZED HEALTH CARE EDUCATION/TRAINING PROGRAM

## 2020-01-09 PROCEDURE — 83010 ASSAY OF HAPTOGLOBIN QUANT: CPT | Performed by: STUDENT IN AN ORGANIZED HEALTH CARE EDUCATION/TRAINING PROGRAM

## 2020-01-09 RX ORDER — HYDROXYZINE HYDROCHLORIDE 25 MG/1
25 TABLET, FILM COATED ORAL ONCE
Status: DISCONTINUED | OUTPATIENT
Start: 2020-01-09 | End: 2020-01-09

## 2020-01-09 RX ORDER — POLYETHYLENE GLYCOL 3350 17 G/17G
17 POWDER, FOR SOLUTION ORAL
Status: COMPLETED | OUTPATIENT
Start: 2020-01-09 | End: 2020-01-09

## 2020-01-09 RX ORDER — LACTULOSE 10 G/15ML
30 SOLUTION ORAL 3 TIMES DAILY
Status: DISCONTINUED | OUTPATIENT
Start: 2020-01-09 | End: 2020-01-11 | Stop reason: HOSPADM

## 2020-01-09 RX ORDER — PANTOPRAZOLE SODIUM 40 MG/1
40 TABLET, DELAYED RELEASE ORAL
Status: DISCONTINUED | OUTPATIENT
Start: 2020-01-09 | End: 2020-01-11 | Stop reason: HOSPADM

## 2020-01-09 RX ORDER — DIPHENHYDRAMINE HCL 25 MG
12.5 CAPSULE ORAL EVERY 6 HOURS PRN
Status: DISCONTINUED | OUTPATIENT
Start: 2020-01-09 | End: 2020-01-09

## 2020-01-09 RX ORDER — MULTIVITAMIN,THERAPEUTIC
1 TABLET ORAL DAILY
Status: DISCONTINUED | OUTPATIENT
Start: 2020-01-09 | End: 2020-01-11 | Stop reason: HOSPADM

## 2020-01-09 RX ORDER — CIPROFLOXACIN 500 MG/1
500 TABLET, FILM COATED ORAL 2 TIMES DAILY
Status: DISCONTINUED | OUTPATIENT
Start: 2020-01-09 | End: 2020-01-10

## 2020-01-09 RX ORDER — DIPHENHYDRAMINE HYDROCHLORIDE 50 MG/ML
12.5 INJECTION INTRAMUSCULAR; INTRAVENOUS ONCE
Status: COMPLETED | OUTPATIENT
Start: 2020-01-09 | End: 2020-01-09

## 2020-01-09 RX ORDER — VITAMIN B COMPLEX
2000 TABLET ORAL DAILY
Status: DISCONTINUED | OUTPATIENT
Start: 2020-01-09 | End: 2020-01-11 | Stop reason: HOSPADM

## 2020-01-09 RX ORDER — PROMETHAZINE HYDROCHLORIDE 25 MG/1
12.5 TABLET ORAL EVERY 6 HOURS PRN
Status: DISCONTINUED | OUTPATIENT
Start: 2020-01-09 | End: 2020-01-11 | Stop reason: HOSPADM

## 2020-01-09 RX ORDER — QUETIAPINE FUMARATE 25 MG/1
50 TABLET, FILM COATED ORAL AT BEDTIME
Status: DISCONTINUED | OUTPATIENT
Start: 2020-01-09 | End: 2020-01-11 | Stop reason: HOSPADM

## 2020-01-09 RX ORDER — DIPHENHYDRAMINE HCL 25 MG
25 CAPSULE ORAL EVERY 6 HOURS PRN
Status: DISCONTINUED | OUTPATIENT
Start: 2020-01-09 | End: 2020-01-11 | Stop reason: HOSPADM

## 2020-01-09 RX ORDER — DIPHENHYDRAMINE HYDROCHLORIDE 50 MG/ML
50 INJECTION INTRAMUSCULAR; INTRAVENOUS
Status: DISCONTINUED | OUTPATIENT
Start: 2020-01-09 | End: 2020-01-11 | Stop reason: HOSPADM

## 2020-01-09 RX ORDER — LACTULOSE 10 G/15ML
20 SOLUTION ORAL 3 TIMES DAILY
Status: DISCONTINUED | OUTPATIENT
Start: 2020-01-09 | End: 2020-01-09

## 2020-01-09 RX ORDER — SENNOSIDES 8.6 MG
8.6 TABLET ORAL 2 TIMES DAILY PRN
Status: DISCONTINUED | OUTPATIENT
Start: 2020-01-09 | End: 2020-01-11 | Stop reason: HOSPADM

## 2020-01-09 RX ORDER — PROMETHAZINE HYDROCHLORIDE 25 MG/1
25 TABLET ORAL EVERY 6 HOURS PRN
Status: DISCONTINUED | OUTPATIENT
Start: 2020-01-09 | End: 2020-01-09

## 2020-01-09 RX ORDER — METHYLPREDNISOLONE SODIUM SUCCINATE 125 MG/2ML
125 INJECTION, POWDER, LYOPHILIZED, FOR SOLUTION INTRAMUSCULAR; INTRAVENOUS
Status: DISCONTINUED | OUTPATIENT
Start: 2020-01-09 | End: 2020-01-11 | Stop reason: HOSPADM

## 2020-01-09 RX ORDER — HYDROMORPHONE HYDROCHLORIDE 1 MG/ML
.3-.5 INJECTION, SOLUTION INTRAMUSCULAR; INTRAVENOUS; SUBCUTANEOUS
Status: DISCONTINUED | OUTPATIENT
Start: 2020-01-09 | End: 2020-01-09

## 2020-01-09 RX ORDER — HEPARIN SODIUM 10000 [USP'U]/100ML
0-3500 INJECTION, SOLUTION INTRAVENOUS CONTINUOUS
Status: DISCONTINUED | OUTPATIENT
Start: 2020-01-09 | End: 2020-01-09

## 2020-01-09 RX ORDER — MAGNESIUM OXIDE 400 MG/1
400 TABLET ORAL 3 TIMES DAILY
Status: DISCONTINUED | OUTPATIENT
Start: 2020-01-09 | End: 2020-01-11 | Stop reason: HOSPADM

## 2020-01-09 RX ORDER — QUETIAPINE FUMARATE 25 MG/1
25 TABLET, FILM COATED ORAL DAILY PRN
Status: DISCONTINUED | OUTPATIENT
Start: 2020-01-09 | End: 2020-01-11 | Stop reason: HOSPADM

## 2020-01-09 RX ORDER — TRAZODONE HYDROCHLORIDE 50 MG/1
50 TABLET, FILM COATED ORAL AT BEDTIME
Status: DISCONTINUED | OUTPATIENT
Start: 2020-01-09 | End: 2020-01-11 | Stop reason: HOSPADM

## 2020-01-09 RX ORDER — LORAZEPAM 2 MG/ML
0.5 INJECTION INTRAMUSCULAR ONCE
Status: COMPLETED | OUTPATIENT
Start: 2020-01-09 | End: 2020-01-09

## 2020-01-09 RX ORDER — LACTULOSE 10 G/15ML
10 SOLUTION ORAL
Status: DISCONTINUED | OUTPATIENT
Start: 2020-01-09 | End: 2020-01-11 | Stop reason: HOSPADM

## 2020-01-09 RX ORDER — OXYCODONE AND ACETAMINOPHEN 10; 325 MG/1; MG/1
1 TABLET ORAL EVERY 4 HOURS PRN
Status: DISCONTINUED | OUTPATIENT
Start: 2020-01-09 | End: 2020-01-09

## 2020-01-09 RX ORDER — CALCIUM CARBONATE 500 MG/1
500 TABLET, CHEWABLE ORAL 2 TIMES DAILY
Status: DISCONTINUED | OUTPATIENT
Start: 2020-01-09 | End: 2020-01-11 | Stop reason: HOSPADM

## 2020-01-09 RX ORDER — PANTOPRAZOLE SODIUM 40 MG/1
40 TABLET, DELAYED RELEASE ORAL
Status: DISCONTINUED | OUTPATIENT
Start: 2020-01-10 | End: 2020-01-09

## 2020-01-09 RX ORDER — OXYCODONE HYDROCHLORIDE 5 MG/1
5 TABLET ORAL
Status: COMPLETED | OUTPATIENT
Start: 2020-01-09 | End: 2020-01-10

## 2020-01-09 RX ORDER — ACETAMINOPHEN 500 MG
500 TABLET ORAL EVERY 6 HOURS PRN
Status: DISCONTINUED | OUTPATIENT
Start: 2020-01-09 | End: 2020-01-11 | Stop reason: HOSPADM

## 2020-01-09 RX ADMIN — MELATONIN 2000 UNITS: at 16:09

## 2020-01-09 RX ADMIN — SODIUM CHLORIDE, POTASSIUM CHLORIDE, SODIUM LACTATE AND CALCIUM CHLORIDE 1000 ML: 600; 310; 30; 20 INJECTION, SOLUTION INTRAVENOUS at 03:08

## 2020-01-09 RX ADMIN — Medication 400 MG: at 16:09

## 2020-01-09 RX ADMIN — DICLOFENAC 2 G: 10 GEL TOPICAL at 08:21

## 2020-01-09 RX ADMIN — DIPHENHYDRAMINE HYDROCHLORIDE 25 MG: 25 CAPSULE ORAL at 10:11

## 2020-01-09 RX ADMIN — INSULIN ASPART 2 UNITS: 100 INJECTION, SOLUTION INTRAVENOUS; SUBCUTANEOUS at 17:58

## 2020-01-09 RX ADMIN — QUETIAPINE FUMARATE 50 MG: 25 TABLET ORAL at 21:10

## 2020-01-09 RX ADMIN — DIPHENHYDRAMINE HYDROCHLORIDE 12.5 MG: 50 INJECTION, SOLUTION INTRAMUSCULAR; INTRAVENOUS at 02:41

## 2020-01-09 RX ADMIN — IRON SUCROSE 200 MG: 20 INJECTION, SOLUTION INTRAVENOUS at 19:33

## 2020-01-09 RX ADMIN — LACTULOSE 30 G: 20 SOLUTION ORAL at 19:30

## 2020-01-09 RX ADMIN — CIPROFLOXACIN HYDROCHLORIDE 500 MG: 500 TABLET, FILM COATED ORAL at 19:28

## 2020-01-09 RX ADMIN — RIFAXIMIN 550 MG: 550 TABLET ORAL at 19:28

## 2020-01-09 RX ADMIN — LORAZEPAM 0.5 MG: 2 INJECTION INTRAMUSCULAR; INTRAVENOUS at 08:28

## 2020-01-09 RX ADMIN — THERA TABS 1 TABLET: TAB at 16:09

## 2020-01-09 RX ADMIN — QUETIAPINE FUMARATE 25 MG: 25 TABLET ORAL at 16:09

## 2020-01-09 RX ADMIN — GABAPENTIN 400 MG: 300 CAPSULE ORAL at 16:08

## 2020-01-09 RX ADMIN — INSULIN GLARGINE 20 UNITS: 100 INJECTION, SOLUTION SUBCUTANEOUS at 21:47

## 2020-01-09 RX ADMIN — LACTULOSE 30 G: 20 SOLUTION ORAL at 16:09

## 2020-01-09 RX ADMIN — HYDROMORPHONE HYDROCHLORIDE 0.5 MG: 1 INJECTION, SOLUTION INTRAMUSCULAR; INTRAVENOUS; SUBCUTANEOUS at 04:22

## 2020-01-09 RX ADMIN — PIPERACILLIN AND TAZOBACTAM 3.38 G: 3; .375 INJECTION, POWDER, FOR SOLUTION INTRAVENOUS at 21:47

## 2020-01-09 RX ADMIN — PIPERACILLIN AND TAZOBACTAM 3.38 G: 3; .375 INJECTION, POWDER, FOR SOLUTION INTRAVENOUS at 04:22

## 2020-01-09 RX ADMIN — INSULIN ASPART 2 UNITS: 100 INJECTION, SOLUTION INTRAVENOUS; SUBCUTANEOUS at 00:53

## 2020-01-09 RX ADMIN — LACTULOSE 20 G: 20 POWDER, FOR SOLUTION ORAL at 13:28

## 2020-01-09 RX ADMIN — RIFAXIMIN 550 MG: 550 TABLET ORAL at 08:21

## 2020-01-09 RX ADMIN — PIPERACILLIN AND TAZOBACTAM 3.38 G: 3; .375 INJECTION, POWDER, FOR SOLUTION INTRAVENOUS at 10:12

## 2020-01-09 RX ADMIN — SODIUM CHLORIDE, POTASSIUM CHLORIDE, SODIUM LACTATE AND CALCIUM CHLORIDE 1000 ML: 600; 310; 30; 20 INJECTION, SOLUTION INTRAVENOUS at 05:39

## 2020-01-09 RX ADMIN — HEPARIN SODIUM 1850 UNITS/HR: 10000 INJECTION, SOLUTION INTRAVENOUS at 14:30

## 2020-01-09 RX ADMIN — HYDROMORPHONE HYDROCHLORIDE 0.5 MG: 1 INJECTION, SOLUTION INTRAMUSCULAR; INTRAVENOUS; SUBCUTANEOUS at 00:30

## 2020-01-09 RX ADMIN — LACTULOSE 20 G: 20 POWDER, FOR SOLUTION ORAL at 11:15

## 2020-01-09 RX ADMIN — TRAZODONE HYDROCHLORIDE 50 MG: 50 TABLET ORAL at 21:10

## 2020-01-09 RX ADMIN — DIPHENHYDRAMINE HYDROCHLORIDE 12.5 MG: 50 INJECTION, SOLUTION INTRAMUSCULAR; INTRAVENOUS at 03:07

## 2020-01-09 RX ADMIN — POLYETHYLENE GLYCOL 3350 17 G: 17 POWDER, FOR SOLUTION ORAL at 11:52

## 2020-01-09 RX ADMIN — PIPERACILLIN AND TAZOBACTAM 3.38 G: 3; .375 INJECTION, POWDER, FOR SOLUTION INTRAVENOUS at 16:09

## 2020-01-09 RX ADMIN — HYDROMORPHONE HYDROCHLORIDE 0.5 MG: 1 INJECTION, SOLUTION INTRAMUSCULAR; INTRAVENOUS; SUBCUTANEOUS at 02:27

## 2020-01-09 RX ADMIN — ENOXAPARIN SODIUM 90 MG: 100 INJECTION SUBCUTANEOUS at 18:15

## 2020-01-09 RX ADMIN — Medication 400 MG: at 19:28

## 2020-01-09 RX ADMIN — PANTOPRAZOLE SODIUM 40 MG: 40 TABLET, DELAYED RELEASE ORAL at 16:09

## 2020-01-09 RX ADMIN — CALCIUM CARBONATE (ANTACID) CHEW TAB 500 MG 500 MG: 500 CHEW TAB at 19:28

## 2020-01-09 RX ADMIN — GABAPENTIN 400 MG: 300 CAPSULE ORAL at 19:28

## 2020-01-09 RX ADMIN — INSULIN ASPART 4 UNITS: 100 INJECTION, SOLUTION INTRAVENOUS; SUBCUTANEOUS at 04:56

## 2020-01-09 RX ADMIN — HEPARIN SODIUM 1700 UNITS/HR: 10000 INJECTION, SOLUTION INTRAVENOUS at 05:41

## 2020-01-09 RX ADMIN — INSULIN ASPART 10 UNITS: 100 INJECTION, SOLUTION INTRAVENOUS; SUBCUTANEOUS at 12:44

## 2020-01-09 RX ADMIN — FOLIC ACID 1 MG: 1 TABLET ORAL at 19:28

## 2020-01-09 RX ADMIN — PANTOPRAZOLE SODIUM 40 MG: 40 INJECTION, POWDER, FOR SOLUTION INTRAVENOUS at 08:21

## 2020-01-09 ASSESSMENT — ACTIVITIES OF DAILY LIVING (ADL)
ADLS_ACUITY_SCORE: 11
ADLS_ACUITY_SCORE: 10
ADLS_ACUITY_SCORE: 11
ADLS_ACUITY_SCORE: 10
ADLS_ACUITY_SCORE: 10
ADLS_ACUITY_SCORE: 11

## 2020-01-09 NOTE — PROGRESS NOTES
"Surgery Progress Note    S: Abdominal exam benign overnight. Vitals stable. Pain is improved this morning. Passing flatus, no BM (has not been taking lactulose). No nausea or vomiting.     O:   BP 98/57   Pulse 80   Temp 98.4  F (36.9  C) (Oral)   Resp 14   Ht 1.854 m (6' 1\")   Wt 88.4 kg (194 lb 14.2 oz)   SpO2 93%   BMI 25.71 kg/m      GEN: NAD  CV: Non-cyanotic   RESP: Nonlabored breathing on RA   ABD: soft, no tenderness to even deep palpation, without guarding or rebound tenderness.   PSYCH: cooperative     Labs reviewed   Cr 0.92  Lactic acid 2.0 (3.2)     Imaging none     Obed Wiley is a 29 year old male with alcoholic cirrhosis, MELD of 9, diabetes, DVT and PE, recurrent pancreatitis, morbid obesity s/p mykel-en-y in 2007, now with GI bleed and concern for mesenteric ischemia. Abdominal pain is improving.        Do not recommend an urgent operation at this time.     Recommend NPO, surgery will continue serial abdominal exams today.     IVF resuscitation    Agree with elio Lopez   Surgery Resident   [id number]             "

## 2020-01-09 NOTE — PROGRESS NOTES
Care Coordinator - Discharge Planning    Admission Date/Time:  1/8/2020  Attending MD:  Shefali att. providers found     Data  Chart reviewed, discussed with interdisciplinary team.   Patient was admitted for:   1. Gastrointestinal hemorrhage, unspecified gastrointestinal hemorrhage type    2. Cirrhosis of liver with ascites, unspecified hepatic cirrhosis type (H)    3. Dehydration    4. Sinus tachycardia        Pt admitted with a medical history significant for alcoholic cirrhosis, grade II esophageal varices s/p banding, hepatic encephalpathy, chronic pancreatities, DVT, PE s/p IVF fliter, type II DM and Yan-eY gastric bypass with concern noted rectal bleeding.     Noted per chart review that pt is restricted status:    Restricted Recipient Program   Please call the [facility] unit for additional information. The telephone number is [phone] or [phone].   Provider number [id number], [facility] CANO} is a provider for a Restricted Recipient for: Physician Services ,  Nurse Practitioner Services  Provider number [id number], [facility]} is a provider for a Restricted Recipient for: Physician Services ,  Nurse Practitioner Services ,  Diagnostic Lab  Provider number [id number], [facility]} is a provider for a Restricted Recipient for: Physician Services ,  Inpatient Hospital ,  Outpatient Hospital ,  Diagnostic Lab  Provider number [id number], [facility]} is a provider for a Restricted Recipient for: Pharmacy     When pt is cleared medically for discharge will need to coordinate plan of care with patients PCP.       Plan  Anticipated Discharge Date:  tBD  Anticipated Discharge Plan:  TBD    Julia Darden, RN BSN, PHN RN Care Coordinator  Internal Medicine   [phone]  Pager: [phone]  [facility] RN Care Coordinator job code * * * [phone]  [facility] [place]  1/9/2020 3:35 PM

## 2020-01-09 NOTE — PROGRESS NOTES
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 He was admitted given concerns for bowel ischemia.      In the ED he was afebrile & hemodynamically stable. Labs were most notable for an initial lactic acid of 3.2.   CT abdomen was concerning for bowel ischemia, although benign pneumatosis superimposed on  infectious/inflammatory colitis/enteritis or portal colopathy/enteropathy were also listed as possible etiologies. There was no evidence of active GI bleeding. Portosystemic varices, splenomegaly, & ascites were also noted, along with sequelae of chronic pancreatitis. US abdomen was normal & did not reveal gas in the portal vein or SMV. General surgery was consulted & the decision was made to manage conservatively with fluids & antibiotics & monitor closely with serial abdominal exams. Low threshold for surgical intervention if acutely worsening. He was started on Zosyn & Ceftriaxone & given 3L IVF before being admitted to the MICU service for further management.     In the MICU, he was placed on mIVF while NPO & continued on Zosyn. He remained hemodynamically stable. General surgery followed, doing frequent abdominal exams. Abdominal exam continued to improve & lactic acid stayed stable. Decision was made to continue to monitor & attempt to avoid surgical intervention as able. Diet was advanced & he tolerated PO intake well. Home medications were also  "resumed & he had brown bowel movements. Hematology will see this patient today & help establish an anticoagulation plan moving forward. Platelets dropped today, but not at threshold to transfuse. Most likely dilutional. Pt expressed feeling much improved & was very eager to go home. He denied chest pain, palpitations, shortness of breath, difficulty with urination, worsening abdominal pain. His main complaint was anxiety related to his current hospitalization. He was transferred to the general medicine team given clinical stability.     PHYSICAL EXAM    Vital signs:  Temp: 98.4  F (36.9  C) Temp src: Oral BP: 104/61 Pulse: 79 Heart Rate: 91 Resp: 14 SpO2: 96 % O2 Device: None (Room air)   Height: 185.4 cm (6' 1\") Weight: 88.4 kg (194 lb 14.2 oz)  Estimated body mass index is 25.71 kg/m  as calculated from the following:    Height as of this encounter: 1.854 m (6' 1\").    Weight as of this encounter: 88.4 kg (194 lb 14.2 oz).    Intake/Output Summary (Last 24 hours) at 1/9/2020 0712  Last data filed at 1/9/2020 0700  Gross per 24 hour   Intake 3134.29 ml   Output --   Net 3134.29 ml       GEN: NAAD, resting comfortably on exam but vocalizing anxiety & frustration that he remains hospitalized  HEENT: no scleral icterus, EOMI, PERRL, missing several teeth   CV: RRR, nl S1/S2, no mumurs  PULM: CTAB, no wheezing   Abdomen: normoactive bowel sounds, soft, non tender/distended   EXTREMITIES: warm, well-perfused, no pedal edema, peripheral pulses intact  SKIN: describing itching, but no rashes, sores or ulcerations,   NEURO: AAOx4, CN II-XII grossly intact, moving all extremities, walking the halls frequently, no obvious deficits     DIAGNOSTIC STUDIES  ROUTINE ICU LABS (Last four results)  CMP  Recent Labs   Lab 01/09/20  0433 01/09/20  0048 01/08/20  1140 01/06/20  0831   * 133 129* 132*   POTASSIUM 3.8 4.0 4.7 3.7   CHLORIDE 103 104 99 101   CO2 21 22 23 26   ANIONGAP 5 7 6 6   * 207* 204* 184*   BUN 21 21 " 21 14   CR 0.92 0.90 0.91 0.93   GFRESTIMATED >90 >90 >90 >90   GFRESTBLACK >90 >90 >90 >90   NATE 7.3* 7.5* 8.7 8.8   MAG  --   --  1.6  --    PROTTOTAL 5.4* 5.6* 7.2 7.4   ALBUMIN 2.3* 2.4* 3.2* 3.3*   BILITOTAL 1.1 1.4* 1.6* 0.9   ALKPHOS 73 80 114 138   AST 35 42 57* 72*   ALT 39 43 58 64     CBC  Recent Labs   Lab 01/09/20  0432 01/09/20  0048 01/08/20  2053 01/08/20  1140   WBC 2.6* 3.4* 4.4 7.3   RBC 3.76* 3.94* 4.17* 5.23   HGB 9.6* 10.2* 10.6* 13.3   HCT 31.6* 33.6* 35.7* 43.7   MCV 84 85 86 84   MCH 25.5* 25.9* 25.4* 25.4*   MCHC 30.4* 30.4* 29.7* 30.4*   RDW 21.6* 22.0* 22.2* 22.5*   PLT 34* 42* 52* 60*     INR  Recent Labs   Lab 01/09/20  0048 01/08/20  1140 01/06/20  0831   INR 1.75* 1.38* 1.34*     Arterial Blood GasNo lab results found in last 7 days.    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 He has improved with conservative measures & exam is much improved. General surgery is following & believes he does not meet threshold for surgical intervention at this time. He does not require ICU level cares & will be transferred to General Medicine.     PLAN:  - Full liquid diet, advance as tolerated   - Monitor for bowel movements   - Stop IV fluids   - Resume home medications, including insulin   - Stop IV analgesics    - Hematology to see today  - Continue Heparin for now, follow up heme recs   - Stop telemetry   - Transfer to General Medicine   - General surgery will continue to follow     ===NEURO===  # Sedation: None   # Paralysis: None      # Hepatic  Encephalopathy   No issues during this hospitalization.   - Resume lactulose & rifaximin  - One time dose Miralax & Senna per patient request     # H/o MDD & Anxiety   Not currently on antidepressant medication. No active concerns for suicidal or homicidal ideation. Spot dosing anxiolytics.   - Continue to monitor     # Insomnia   - Resume pta Seroquel 50mg    - Holding pta trazadone 50mg (given QT prolongation)  - Add Melatonin 3mg at bedtime     ===CARDIOVASCULAR===  EKG: Sinus Tach, QTc 509  Dobutamine Stress test (1/7/20): No inducible ischemia, normal Bi-ventricular function     No active concerns at this time & hemodynamically stable at present.  Avoid QTc prolonging medications as able      ===PULMONARY===  # Ashtma  # H/o Acute Hypoxic Hypercapnic Respiratory Failure requiring Intubation (2009)  PFTs normal on 1/7/20: FVC 5.6, FEV1 4.39, FVC% 96, FEV1% 91.   No active concerns at this time.     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 No evidence on imaging for GI bleed. Hgb 13.3 on admission. Pt complains of hematochezia - could be from mesenteric ischemia, hemorrhoids, varices. Given his hemodynamic stability, brisk GI bleed is unlikely. Pain improving & certainly not out of proportion on exam. Intraabdominal hernia is possible given Yan-en-Y bypass altering his anatomy. Continue to monitor, but overall seems improving.    - S/p Octreotide in ED; no indication to continue at this time    - Transition to oral PPI   - Stop IV pain medications   - Continue Zosyn for now; consider transitioning to oral  - Serial abdominal exams   - Avoid oversedating/overtreating pain as to mask symptoms on exam   - Trending Lactate Q4 for now   - Surgery following, appreciate assistance      ===RENAL===  UOP: adequate     # Hyponatremia   Na 129 on admission. Has had issues with hyponatremia in the past. Most likely secondary to volume depletion. Did not obtain urine studies or serum osms as he was s/p 3L IVF resuscitation prior to admission.   - Continue to monitor    # Elevated Lactic Acid  LA 3.2 on admission. Downtrended to 1.1 after fluid resusitation. However, has continued to wax/wane throughout course.   - Trending Q4 for now while concerned for bowel ischemia     # Electrolyte Distruvbance   - Pta Magnesium oxide   - Electrolyte replacement protocol     # History of Vitamin D deficiency  Vitamin D level normal on recent check. No need for supplementation at this time.      # Fluids: S/p 3L IVF in the ED, mIVF: LR 125cc/hr   Advancing diet, no need for further IVF at this time.    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 He was dx w/ ischemic bowel 2013 (uncertain arterial or venous), tx w/ warfarin but failed d/t GIB and changed to Xarelto through 2015, where he was diagnosed with DVT 11/2015. Per chart review, it is believed that this was extended for three additional months w/ ASA. He had hemoperitoneum 8/2016 and found to have b/l pulmonary emboli for which he had IVC filter placed. He took 81 ASA for one year until he was placed on heparin 5K TID through 3/2018. He has been off all AC for 6m. Now presenting with c/f acute mesentery ischemia. History unclear regarding provoked vs unprovoked thrombi in past. His coagulation complicated by underlying cirrhosis, recurrent GI bleeds and possible nonadherence to medicines. Previously tested negative for Factor V Leiden, prothrombin gene, thrombophilia panel, beta two  glycoprotein, and antiphospholipid antibodies Thrombophilia panel 1/29/2019 previously noted synthetic liver dysfunction was with decreased VitK dep factors, elevated fibrin d dimer, decreased antithrombin and protein C. Prior h/o hypofibrinogenemia also c/w liver dysfunction.  - Hematology consulted, appreciate recs   - Trending Hgb and lactate as noted above  - Homocysteine ordered  - Fe panel, PT/PTT, fibrinogen ordered   - Consider peripheral blood smear   - Continue Heparin ggt   - Platelets dropped today; continue to monitor given heparin ggt     - Daily INR    ===ENDOCRINE===  # Diabetes Mellitus II  Last Hgb A1C 8.4% (11/2019). Home regimen: 26 units glargine BID.   - Resumed home regimen of 26 units glargine BID   - Start sliding scale insulin      # Sick Euthyroid   TSH 4.09, T4 0.97 when checked for liver transplant work up.   - No indication to treat at T4 is wnl.   - Recommend outpatient follow up if signs/symptoms of hypothyroidism.     ===INFECTIOUS DISEASE===  # Potential Mesenteric Ischemia  # Potential SBP  Ascites noted on CT imaging, but pocket not large enough for bedside paracentesis. Started on Zosyn & Ceftriaxone in the ED. Will continue on Zosyn at this time, which should cover for SBP appropriately. Will also continue on antibiotics given concerns for bowel ischemia.      # Antimicrobials:  Zosyn: 1/8 - P   Ceftriaxone: 1/8   Cipro pta chronic medication (unsure of compliance)    ===SKIN/MSK===  # Chronic low back pain  # Sciatica   Home pain regimen: Percocet  Q4hrs, Gabapentin 400 TID   - Avoid NSAIDs given bleeding risk   - Resume pta Diclofenac gel   - Holding pta Gabapentin 400 TID   - Resume pta Percocet  Q4hrs    # Pruritis  Pt described pruritis today. No associated rash or redness. Alleviated by Benadryl.   - Prn Benadryl     Prophylaxis:  DVT: High Dose Heparin   GI: Oral Pantoprazole   Family: Patient with plans to update   Disposition: Improving/stable, no longer  requiring ICU level cares      Code Status: Full     Patient was seen and discussed with attending physician Dr. Kang, who agrees with above assessment and plan.    Gema Duran  Internal Medicine, PGY1  Pager [phone]

## 2020-01-09 NOTE — PROGRESS NOTES
Admitted/transferred from: ED  Reason for admission/transfer: bowel ischemia    Patient status upon admission/transfer: patient arrived to floor approx 1830 prior to writer's arrival.  Interventions: settled in room, oriented to unit routines, discussed plan for pain management, labs, etc.   Plan: trend labs, monitor lactic  2 RN skin assessment: completed by UHY Marin and Jamison SON  Result of skin assessment and interventions/actions: skin intact, scars from previous abd surgeries  Height, weight, drug calc weight: done  Patient belongings (see Flowsheet - Adult Profile for details): remain with patient  MDRO education (if applicable): n/a

## 2020-01-09 NOTE — CONSULTS
[facility], [place]  Consult Note - Hematology   Date of Admission:  1/8/2020  Reason for Consult: Complicated h/o clotting, failed multiple anticoagulants, now with concern for mesenteric ischemia, assist with anticoagulation     Assessment & Plan   Obed Wiley is a 29 year old male with a PMH of possible Protein C deficiency, complicated h/o thomboses and failed anticoagulation, decompensated alcoholic cirrhosis, h/o Yan-en-Y gastric bypass (2007) who presented to hospital with acute abdominal pain and brbpr, with imaging findings concerning for bowel ischemia.     Coagulopathy  Heterozygous Protein C Deficiency   Complicated h/o PE and multiple DVTs, some while on AC  Given that his mother has Protein C deficiency, patient likely has heterozygous Protein C mutation. He had a thrombophillia workup in 1/2019 at [facility], Protein C activity at that time was 23, Factors globally decreased (II 39, V 36, VII 23, X 45), and when mixed 50:50 with normal plasma his coagulopathy corrected, suggesting factor deficiencies as etiology. Heterozygous Protein C carries a 7 fold increase in clot risk over a person without a hereditary coagulopathy so he certainly has risk of clot. It is possible that his underlying liver disease is also contributing to both his risk of clot and risk of bleeding. His h/o Yan-en-Y gastric bypass surgery complicates management of his thrombotic events as oral agents will be inconsistently absorbed and difficult to manage, as likely was the case with his Warfarin and Xarelto. Given that he is so high risk for clot and that his symptoms dramatically improved with IV heparin favor therapeutic anticoagulation acknowledging risk of bleed in this patient. Lovenox is an appropriate choice given his PMH of failed anticoagulation and gastric bypass surgery. His clotting events seem to somewhat correlate with his acute illnesses and hospitalizations and it could be that the acute  inflammation increases his risk of clot when his AC is likely held.     Pancytopenia w component CLARITA  Likely his pancytopenia is multifactorial - with contributions of chronic illness, nutritional deficiencies and liver disease with synthetic dysfunction. He certainly has a component of CLARITA, has been on oral iron supplementation however concern for absorption given his Yan-en-Y anatomy. Ferritin on 1/9/2020 is 19.     H/o Yan-en-Y Gastric Bypass Surgery   Patient likely has nutrient deficiencies with his h/o bypass surgery. He states that he eats a lot of green leafy vegetables but potential for vit K deficiency could be contributing to his coagulopathy. Would also recommend routine check of vit D and b12. With his Yan-en-Y anatomy he will not readily absorb oral iron supplementation and we recommend repletion with injectable iron. Should be noted that given his abnormal anatomy oral anticoagulants with likely also be inconsistently absorbed and hard to manage, this could partially explain his history of failure on Warfarin and Xarelto.     Peripheral smear personally reviewed - noted thrombocytopenia, leukopenia, red cell morphology consistent with CLARITA (cigar cells, target cells), occasional hypersegmented PMNs    Recommendations:   - Recommend continuing Heparin and transitioning to therapeutic   Lovenox (1mg/kg Q12) at discharge with close hematology followup (whether here or with someone closer to home)   - Peripheral smear and add-on differential (done for you)   - Injectable Ferric Carboxymaltose (Injectafer) once while in hospital, should follow up for repeat administration after 7 days  - Will check Vit B12, D, and folate (done for you)       The patient's care was discussed with the Attending Physician, Dr. Calhoun.    Sharee Hung MD  [facility], [place]  Pager: [phone]    Attending  The patient was seen and examined by me separate from the resident/fellow  provider.The note above reflects my assessment and plan. I have personally reviewed today's labs,vital and radiology results. The points of care that were added by me are:    Agree that IV heparin can be transitioned to enoxaparin 1mg/kg BID. Would also address his bariatric surgery anatomy as outlined above. Be sure he has adequate B12  Vit K, folate and Vit D and trace minerals.Stop oral iron and give injectafer 750mg IV no and another 750 in one or 2 weeks.  He should follow up in the [facility] in next 4-8 weeks  John Calhoun M.D.  [phone]      ______________________________________________________________________    Chief Complaint   Abdominal pain     History is obtained from the patient and the patient's mother who is at bedside.    History of Present Illness   Obed Wiley is a 29 year old male with a PMH of PMH of possible Protein C deficiency, complicated h/o thomboses and failed anticoagulation, decompensated alcoholic cirrhosis, h/o Yan-en-Y gastric bypass (2007) who presented to hospital with acute abdominal pain and brbpr, with imaging findings concerning for bowel ischemia.     He began noticing abdominal pain around 1 am on 1/8/19. He describes the pain as being severe, diffuse pain, slightly worse on the R side. Then around 5am he noticed some brbpr. His pain persisted which prompted him to come to the hospital. He otherwise denies feeling lightheaded, fatigued, no n/v/d, no hematemesis, had a good appetite throughout. He was otherwise afebrile and HDS on arrival to the ED, lactate mildly elevated to 3.2 but downtrended with fluid resuscitation. At this time his abdominal pain is much improved, he has not had any new episodes blood loss. Otherwise feeling much better. Has ongoing issues with HE and feeling cloudy.     He has a long complicated history of coagulopathy and anticoagulation. He initially presented in 2013 or 2014 with intestinal ischemia. We was put on  Coumadin. Apparently INR was difficult to control and he was occasionally supratherapeutic. He was on the Warfarin for about 3-4 months before he experienced a DVT while on the Warfarin. He was then transitioned to Xarelto. In 11/2015 he developed a LE DVT. Was transitioned to therapeutic Lovenox which he was on for some time. In 8/2016 he was hospitalized with hemoperitoneum and was found at that time to also have PE. Due to his recent bleed he was not placed on AC but had an IVC filter placed. He is a bit uncertain about timeline but he was eventually put back on Lovenox and developed another LE DVT 10 days after starting the Lovenox. He was transitioned to ASA 81mg only for some time and was then started on Heparin subcutaneous 5000 TID which he did not tolerate well due to abdominal scarring and pain. He saw a hematologist at [place] in 1/2019 and after discussing risks and benefits with him they decided on a prophylactic dose of Lovenox (40mg every day). 6 months ago he stopped all AC and has been off since then.     Review of Systems   The 10 point Review of Systems is negative other than noted in the HPI or here.     Past Medical History    I have reviewed this patient's medical history and updated it with pertinent information if needed.   Past Medical History:   Diagnosis Date     Alcoholic cirrhosis of liver with ascites (H) 12/23/2019     Diabetes (H)     Type 2 DM, Uses Insulin      DVT (deep vein thrombosis) in pregnancy      H/O protein C deficiency      Hepatic encephalopathy (H)      Hepatitis     Hep A when an infant      History of blood transfusion     2019 at [facility]      SBP (spontaneous bacterial peritonitis) (H) 11/2019       Past Surgical History   I have reviewed this patient's surgical history and updated it with pertinent information if needed.  Past Surgical History:   Procedure Laterality Date     ABDOMEN SURGERY      Gastric Bypass 2009. [facility]      CARDIAC SURGERY      IVC       COLONOSCOPY       ENT SURGERY      Tonsils and Adenoids Removed at 6-7 Years Old      GALLBLADDER SURGERY  2017     GI SURGERY      Upper GI        Social History   I have reviewed this patient's social history and updated it with pertinent information if needed.  Social History     Tobacco Use     Smoking status: Never Smoker     Smokeless tobacco: Never Used   Substance Use Topics     Alcohol use: Not Currently     Drug use: Not Currently   Patient has been sober for 20 months     Family History   I have reviewed this patient's family history and updated it with pertinent information if needed.   Family History   Problem Relation Age of Onset     Protein C deficiency Mother      Pancreatic Cancer Father      Deonte Parkinson White syndrome Father      Alcoholism Brother      Heart Failure Maternal Grandmother      Heart Failure Maternal Grandfather        Medications   Medications Prior to Admission   Medication Sig Dispense Refill Last Dose     Calcium Carbonate Antacid 400 MG CHEW Take 400 mg by mouth 2 times daily    1/7/2020 at Unknown time     cholecalciferol (VITAMIN D3) 38599 units (1250 mcg) capsule Take 50,000 Units by mouth every 7 days Saturdays 1/4/2020     ciprofloxacin (CIPRO) 500 MG tablet Take 500 mg by mouth 2 times daily   1/7/2020 at Unknown time     diclofenac (VOLTAREN) 1 % topical gel Place onto the skin 4 times daily as needed for moderate pain    at prn     ferrous sulfate (FEROSUL) 325 (65 Fe) MG tablet Take 325 mg by mouth daily (with breakfast)   1/7/2020 at Unknown time     folic acid (FOLVITE) 1 MG tablet Take 1 mg by mouth 2 times daily   1/7/2020 at Unknown time     furosemide (LASIX) 40 MG tablet Take 40 mg by mouth 3 times daily   1/7/2020 at Unknown time     gabapentin (NEURONTIN) 400 MG capsule Take 400 mg by mouth 3 times daily   1/7/2020 at Unknown time     insulin aspart (NOVOLOG FLEXPEN) 100 UNIT/ML pen Inject Subcutaneous 3 times daily (with meals) Sliding scale as directed    1/7/2020 at Unknown time     insulin glargine (LANTUS VIAL) 100 UNIT/ML vial Inject 26 Units Subcutaneous 2 times daily 26 units bid daily    1/7/2020 at Unknown time     lactulose (CHRONULAC) 10 GM/15ML solution Take 20 g by mouth daily as needed        lactulose (CHRONULAC) 10 GM/15ML solution Take 20 g by mouth 3 times daily 20mg / 30 ml tid daily/ prn   1/7/2020 at Unknown time     magnesium oxide 400 MG CAPS Take 400 mg by mouth 3 times daily    1/7/2020 at Unknown time     MULTIPLE VITAMIN-FOLIC ACID PO Take 1 tablet by mouth 1 daily   1/7/2020 at Unknown time     oxyCODONE-acetaminophen (PERCOCET)  MG per tablet Take 1 tablet by mouth every 4 hours as needed for moderate to severe pain     at PRN     pantoprazole (PROTONIX) 40 MG EC tablet Take 40 mg by mouth daily   1/7/2020 at Unknown time     polyethylene glycol (MIRALAX/GLYCOLAX) packet Take 1 packet by mouth daily    1/7/2020 at Unknown time     promethazine (PHENERGAN) 12.5 MG tablet Take 25 mg by mouth every 6 hours as needed for nausea    at PRN     QUEtiapine (SEROQUEL) 25 MG tablet Take 50 mg by mouth At Bedtime   1/7/2020 at pm     QUEtiapine (SEROQUEL) 25 MG tablet Take 25 mg by mouth daily as needed     at PRN     rifaximin (XIFAXAN) 550 MG TABS tablet Take 550 mg by mouth 2 times daily    1/7/2020 at Unknown time     senna (SENOKOT) 8.6 MG tablet Take 1 tablet by mouth 3 times daily   1/7/2020 at Unknown time     spironolactone (ALDACTONE) 100 MG tablet Take 100 mg by mouth 3 times daily   1/7/2020 at Unknown time     traZODone (DESYREL) 50 MG tablet Take 50 mg by mouth At Bedtime Prn   1/7/2020 at Unknown time     tretinoin (RETIN-A) 0.025 % external cream Apply topically At Bedtime   1/6/2020     Vitamin D, Cholecalciferol, 25 MCG (1000 UT) CAPS Take 2,000 Units by mouth daily    1/7/2020 at Unknown time     blood glucose (NO BRAND SPECIFIED) lancets standard Use to test blood sugar 6 times daily or as directed.   1/7/2020      "Nutritional Supplements (ENSURE PO) Ensure/ boost - 237 ml bid daily   1/7/2020       Allergies   Allergies   Allergen Reactions     Bee Anaphylaxis     Adhesive Tape Rash     Sulfa Drugs Itching       Physical Exam   Vital Signs: Temp: 98.6  F (37  C) Temp src: Oral BP: 97/63 Pulse: 77 Heart Rate: 81 Resp: 18 SpO2: 95 % O2 Device: None (Room air)    Weight: 194 lbs 14.19 oz    General: well appearing male, sitting up comfortably in bed, NAD  HEENT: No Scleral icterus. PERRLA, OMM, missing teeth  Cardiac: Normal rate, regular rhythm. No m/r/g. Normal S1, S2.  Pulm: CTAB, no wheezes, or crackles. Normal respiratory effort  Abd: Soft, non distended, slight ttp RUQ and RLQ, small reducible umbilical hernia, no rashes or bruises, no rigidity or rebound   Skin: No jaundice, no rash on limited skin exam  Extremities: No LE edema  Neuro: A&Ox3, no gross focal deficits, moving all extremities   Nails: flat and chewed some koilnychia      Data   Results for orders placed or performed during the hospital encounter of 01/08/20 (from the past 24 hour(s))   Blood culture   Result Value Ref Range    Specimen Description Blood Right Arm     Special Requests Aerobic and anaerobic bottles received     Culture Micro No growth after 18 hours    CT Abdomen Pelvis w Contrast    Narrative    Exam: CT abdomen and pelvis with contrast    Comparison: None    History: History of \"clot and intestines\" that presented the the same.  History of cirrhosis, now with GI bleed and diffuse abdominal pain    Technique: CT of the abdomen and pelvis was obtained with intravenous  contrast. Images were obtained in the arterial and portal venous  phases. Coronal and sagittal reconstructions were obtained and  reviewed.    Contrast dose: iopamidol (ISOVUE-370) solution 116 mL    Findings:    Abdomen/pelvis: There are scattered foci of gas within the main portal  vein, superior mesenteric vein, and smaller mesenteric venous  branches. Additional scattered " foci of gas within the intrahepatic  portal venous system. There is diffuse bowel wall thickening,  predominantly involving the terminal ileum, ascending colon, and the  hepatic flexure, with subtle hypoenhancement of the bowel wall in  these regions. There are a few tiny foci of gas inferior to the liver  margin (series 11 image 64) which are suspicious for pneumatosis.  Additional questionable trace pneumatosis involving a loop of small  bowel in the right mid abdomen (for example series 10 image 295).  Additional branching foci of air in the region of the ascending colon  may represent pneumatosis versus air within decompressed segments of  colon. No dilated loops of small bowel. No pooling of contrast to  suggest active GI bleeding. Postoperative changes of Yan-en-Y gastric  bypass.    Cirrhotic configuration of the liver. Splenomegaly. Calcified splenic  granuloma. Cholecystectomy. Pancreatic calcifications and pancreatic  atrophy, likely secondary to chronic pancreatitis. Normal adrenal  glands and kidneys (with the exception of a 9 mm simple cyst arising  from the right kidney). No obstructing urinary tract calculi. Bladder  is partially distended and unremarkable. Normal prostate.    Normal caliber of the infrarenal aorta. No appreciable arterial  occlusion. IVC filter. No convincing free intraperitoneal air.  Portosystemic varices, including a splenorenal shunt. Small volume  ascites. Diffuse mesenteric edema. Numerous prominent mesenteric nodes  measuring up to 1.2 cm (series 10 image 247), nonspecific in the  setting of ascites and intrinsic hepatic parenchymal disease.    Lower chest: Dependent atelectasis. No consolidative airspace opacity.  No pleural effusion or pneumothorax. Small hiatal hernia.    Bones: Degenerative changes of the spine. No acute or suspicious  appearing bony abnormalities. Small umbilical hernia which contains  ascitic fluid.      Impression    Impression:   1. Portal venous gas,  predominantly involving the superior mesenteric  vein and mesenteric venous branches, with bowel wall thickening,  hypoenhancement, and possible pneumatosis involving the right colon  and ileum, as described. These findings are concerning for bowel  ischemia, however benign pneumatosis superimposed on  infectious/inflammatory colitis/enteritis or portal  colopathy/enteropathy remain on the differential.  2. No evidence of active GI bleed.  3. Cirrhosis with evidence of portal hypertension including  portosystemic varices, splenomegaly, and ascites.  4. Sequelae of chronic pancreatitis.      [Result: Findings concerning for bowel ischemia]    Finding was identified on 1/8/2020 2:45 PM.     Dr. Mijares was contacted by Dr. Pickering on 1/8/2020 3:10 PM and  verbalized understanding of the result.    I have personally reviewed the examination and initial interpretation  and I agree with the findings.    GURINDER ARITA, DO   Surgery General Adult IP Consult: Patient to be seen: STAT within 1 hr; Call back #: already spoke with resident; mesenteric ischemia; Consultant may enter orders: Yes; Requesting provider? Attending physician    Constantino Galo MD     1/8/2020  4:49 PM  GENERAL SURGERY ADMISSION HISTORY & PHYSICAL  Obed Wiley MRN# [id number]   YOB: 1990 Age: 29 year old     Date of Admission: 01/08/20    CC: mesenteric ischemia    HPI: Obed Wiley is a 29 year old year old male with a history   of diabetes, alcoholic liver cirrhosis, DVT and PE, recurrent   pancreatitis, morbid obesity s/p myekl-en-y in 2007, who started   having severe crampy abdominal pain at approximately 1 am this   morning. Also had nausea and vomiting at that time. He had two   bowel movement of bright red blood at home and another bowel   movement with mixed melena and bright red blood on his way in to   the ED. He had a previous colonic GI bleed 3 years ago.  In the   ED, he had a CT scan which  "had findings concerning for mesenteric   ischemia.     He has persistent diarrhea at baseline due to lactulose therapy.     Of note, he has had \"at least a half dozen\" episodes of   pancreatitis and has had \"over a hundred\" paracenteses, which   typically occur weekly for 7-8L of fluid removed. His most recent   MELD, calculated yesterday, was 9.    REVIEW OF SYSTEMS: The remainder of the complete ROS was negative   unless noted in the HPI. Denies visual changes, headache, sore   throat, rhinorrhea, chest pain, sob, constipation, fevers, night   sweats, weight loss.    PAST MEDICAL HISTORY:   Past Medical History:   Diagnosis Date     Alcoholic cirrhosis of liver with ascites (H) 2019     Diabetes (H)     Type 2 DM, Uses Insulin      DVT (deep vein thrombosis) in pregnancy      H/O protein C deficiency      Hepatic encephalopathy (H)      Hepatitis     Hep A when an infant      History of blood transfusion      at hospitals      SBP (spontaneous bacterial peritonitis) (H) 2019       PAST SURGICAL HISTORY:   Past Surgical History:   Procedure Laterality Date     ABDOMEN SURGERY      Gastric Bypass . [facility]      CARDIAC SURGERY      IVC      COLONOSCOPY       ENT SURGERY      Tonsils and Adenoids Removed at 6-7 Years Old      GALLBLADDER SURGERY  2017     GI SURGERY      Upper GI        ALLERGIES:    Allergies   Allergen Reactions     Bee Anaphylaxis     Adhesive Tape Rash     Sulfa Drugs Itching       HOME MEDICATIONS: [] DOBUTamine 500 mg in dextrose 5% 250   mL (adult std conc) premix  [] metoprolol (LOPRESSOR) injection 1-20 mg  [] sodium chloride 0.9% infusion    Calcium Carbonate Antacid 400 MG CHEW, Take 400 mg by mouth 2   times daily   cholecalciferol (VITAMIN D3) 13045 units (1250 mcg) capsule, Take   50,000 Units by mouth every 7 days   ciprofloxacin (CIPRO) 500 MG tablet, Take 500 mg by mouth 2 times   daily  diclofenac (VOLTAREN) 1 % topical gel, " Place onto the skin 4   times daily as needed for moderate pain  ferrous sulfate (FEROSUL) 325 (65 Fe) MG tablet, Take 325 mg by   mouth daily (with breakfast)  folic acid (FOLVITE) 1 MG tablet, Take 1 mg by mouth 2 times   daily  furosemide (LASIX) 40 MG tablet, Take 40 mg by mouth 3 times   daily  gabapentin (NEURONTIN) 400 MG capsule, Take 400 mg by mouth 3   times daily  insulin aspart (NOVOLOG FLEXPEN) 100 UNIT/ML pen, Inject   Subcutaneous 3 times daily (with meals) Sliding scale as directed  insulin glargine (LANTUS VIAL) 100 UNIT/ML vial, Inject 26 Units   Subcutaneous 2 times daily 26 units bid daily   lactulose (CHRONULAC) 10 GM/15ML solution, Take 20 g by mouth   daily as needed  lactulose (CHRONULAC) 10 GM/15ML solution, Take 20 g by mouth 3   times daily 20mg / 30 ml tid daily/ prn  magnesium oxide 400 MG CAPS, Take 400 mg by mouth 3 times daily   MULTIPLE VITAMIN-FOLIC ACID PO, Take 1 tablet by mouth 1 daily  oxyCODONE-acetaminophen (PERCOCET)  MG per tablet, Take 1   tablet by mouth every 4 hours as needed for moderate to severe   pain   pantoprazole (PROTONIX) 40 MG EC tablet, Take 40 mg by mouth   daily  polyethylene glycol (MIRALAX/GLYCOLAX) packet, Take 1 packet by   mouth daily   promethazine (PHENERGAN) 12.5 MG tablet, Take 25 mg by mouth   every 6 hours as needed for nausea  QUEtiapine (SEROQUEL) 25 MG tablet, Take 50 mg by mouth At   Bedtime  QUEtiapine (SEROQUEL) 25 MG tablet, Take 25 mg by mouth daily as   needed   rifaximin (XIFAXAN) 550 MG TABS tablet, Take 550 mg by mouth 2   times daily   senna (SENOKOT) 8.6 MG tablet, Take 1 tablet by mouth 3 times   daily  spironolactone (ALDACTONE) 100 MG tablet, Take 100 mg by mouth 3   times daily  traZODone (DESYREL) 50 MG tablet, Take 50 mg by mouth At Bedtime   Prn  tretinoin (RETIN-A) 0.025 % external cream, Apply topically At   Bedtime  Vitamin D, Cholecalciferol, 25 MCG (1000 UT) CAPS, Take 2,000   Units by mouth daily   blood glucose  "(NO BRAND SPECIFIED) lancets standard, Use to test   blood sugar 6 times daily or as directed.  Nutritional Supplements (ENSURE PO), Ensure/ boost - 237 ml bid   daily        SOCIAL HISTORY:   Social History     Tobacco Use     Smoking status: Never Smoker     Smokeless tobacco: Never Used   Substance Use Topics     Alcohol use: Not Currently     Drug use: Not Currently       FAMILY HISTORY:   Family History   Problem Relation Age of Onset     Protein C deficiency Mother      Pancreatic Cancer Father      Deonte Parkinson White syndrome Father      Alcoholism Brother      Heart Failure Maternal Grandmother      Heart Failure Maternal Grandfather        PHYSICAL EXAMINATION:  BP 99/64   Pulse 83   Temp 99.5  F (37.5  C) (Oral)   Resp 8     Ht 1.854 m (6' 1\")   SpO2 98%   BMI 25.07 kg/m       General: NAD, awake and alert   CV: Non-cyanotic, RRR  Pulm: No increased work of breathing on room air   Abd: soft, globally tender to palpation, nondistended, reducible   umbilical hernia  Extremities: WWP without edema  Neuro: No focal deficits noted, patient moves all extremities   spontaneously    LABS:  Recent Labs   Lab 01/08/20  1140   WBC 7.3   RBC 5.23   HGB 13.3   HCT 43.7   MCV 84   MCH 25.4*   MCHC 30.4*   RDW 22.5*   PLT 60*       Recent Labs   Lab 01/08/20  1140   *   POTASSIUM 4.7   CHLORIDE 99   CO2 23   BUN 21   CR 0.91   *   NATE 8.7       Recent Labs   Lab 01/08/20  1140   AST 57*   ALT 58   ALKPHOS 114   BILITOTAL 1.6*   ALBUMIN 3.2*   INR 1.38*       IMAGING:  Exam: CT abdomen and pelvis with contrast                                                                  Impression:   1. Portal venous gas, predominantly involving the superior   mesenteric  vein and mesenteric venous branches, with bowel wall thickening,  hypoenhancement, and possible pneumatosis involving the right   colon  and ileum, as described. These findings are concerning for bowel  ischemia, however benign pneumatosis " superimposed on  infectious/inflammatory colitis/enteritis or portal  colopathy/enteropathy remain on the differential.  2. No evidence of active GI bleed.  3. Cirrhosis with evidence of portal hypertension including  portosystemic varices, splenomegaly, and ascites.  4. Sequelae of chronic pancreatitis.       A/P: Obed Wiley is a 29 year old male with alcoholic   cirrhosis, MELD of 9, diabetes, DVT and PE, recurrent   pancreatitis, morbid obesity s/p mykel-en-y in 2007, now with GI   bleed and mesenteric ischemia    - Keep NPO  - IVF resuscitation  - Agree with abx  - Will review imaging further with staff    Discussed with Dr. OLVIN Gomez who will discuss with staff.    Constantino Gates MD  General Surgery PGY1  [phone]     Methicillin Resist/Sens S. aureus PCR   Result Value Ref Range    Specimen Description Nares     Methicillin Resist/Sens S. aureus PCR Negative NEG^Negative   US Abd/Pelvis Duplex Complete Portable    Narrative    EXAMINATION: US ABDOMEN OR PELVIS DOPPLER COMPLETE PORTABLE, 1/8/2020  4:05 PM     COMPARISON: CTA today    HISTORY: worsening abd pain with air in smv;     TECHNIQUE: Limited midline Doppler evaluation of the abdomen.    Findings:    Liver: Nodular contour of the liver with coarsened echotexture. No  evidence of a focal hepatic mass.     Extrahepatic portal vein flow is antegrade, measuring 19 cm/sec.  Right portal vein flow is antegrade, measuring 17 cm/sec.  Left portal vein flow is antegrade, measuring 7 cm/sec.      Flow in the hepatic artery is towards the liver and:  66 cm/sec peak systolic  0.78 resistive index.     The splenic vein is patent and flow is towards the liver.  The left,  middle, and right hepatic veins are patent with flow towards the IVC.     Gallbladder: Cholecystectomy.          Impression    Impression:   Normal abdominal ultrasound with Doppler assessment. The known gas in  the portal vein and superior mesenteric vein is not appreciated  on  this ultrasound.    I have personally reviewed the examination and initial interpretation  and I agree with the findings.    RAMANA CARRASCO MD   Glucose by meter   Result Value Ref Range    Glucose 150 (H) 70 - 99 mg/dL   ISTAT gases lactate tripp POCT   Result Value Ref Range    Ph Venous 7.34 7.32 - 7.43 pH    PCO2 Venous 25 (L) 40 - 50 mm Hg    PO2 Venous 50 (H) 25 - 47 mm Hg    Bicarbonate Venous 14 (L) 21 - 28 mmol/L    O2 Sat Venous 84 %    Lactic Acid 1.1 0.7 - 2.1 mmol/L   Glucose by meter   Result Value Ref Range    Glucose 143 (H) 70 - 99 mg/dL   Lactic acid   Result Value Ref Range    Lactic Acid 1.8 0.4 - 2.0 mmol/L   CBC with platelets   Result Value Ref Range    WBC 4.4 4.0 - 11.0 10e9/L    RBC Count 4.17 (L) 4.4 - 5.9 10e12/L    Hemoglobin 10.6 (L) 13.3 - 17.7 g/dL    Hematocrit 35.7 (L) 40.0 - 53.0 %    MCV 86 78 - 100 fl    MCH 25.4 (L) 26.5 - 33.0 pg    MCHC 29.7 (L) 31.5 - 36.5 g/dL    RDW 22.2 (H) 10.0 - 15.0 %    Platelet Count 52 (L) 150 - 450 10e9/L   Fibrinogen activity (AM Draw)   Result Value Ref Range    Fibrinogen 230 200 - 420 mg/dL   Reticulocyte count   Result Value Ref Range    % Retic 2.7 (H) 0.5 - 2.0 %    Absolute Retic 112.6 (H) 25 - 95 10e9/L   Lactic acid whole blood   Result Value Ref Range    Lactic Acid 2.1 (H) 0.7 - 2.0 mmol/L   Haptoglobin   Result Value Ref Range    Haptoglobin 3 (L) 32 - 197 mg/dL   Heparin Xa (10a) Level   Result Value Ref Range    Heparin 10A Level 0.26 IU/mL   CBC with platelets   Result Value Ref Range    WBC 3.4 (L) 4.0 - 11.0 10e9/L    RBC Count 3.94 (L) 4.4 - 5.9 10e12/L    Hemoglobin 10.2 (L) 13.3 - 17.7 g/dL    Hematocrit 33.6 (L) 40.0 - 53.0 %    MCV 85 78 - 100 fl    MCH 25.9 (L) 26.5 - 33.0 pg    MCHC 30.4 (L) 31.5 - 36.5 g/dL    RDW 22.0 (H) 10.0 - 15.0 %    Platelet Count 42 (LL) 150 - 450 10e9/L   Comprehensive metabolic panel   Result Value Ref Range    Sodium 133 133 - 144 mmol/L    Potassium 4.0 3.4 - 5.3 mmol/L    Chloride 104 94  - 109 mmol/L    Carbon Dioxide 22 20 - 32 mmol/L    Anion Gap 7 3 - 14 mmol/L    Glucose 207 (H) 70 - 99 mg/dL    Urea Nitrogen 21 7 - 30 mg/dL    Creatinine 0.90 0.66 - 1.25 mg/dL    GFR Estimate >90 >60 mL/min/[1.73_m2]    GFR Estimate If Black >90 >60 mL/min/[1.73_m2]    Calcium 7.5 (L) 8.5 - 10.1 mg/dL    Bilirubin Total 1.4 (H) 0.2 - 1.3 mg/dL    Albumin 2.4 (L) 3.4 - 5.0 g/dL    Protein Total 5.6 (L) 6.8 - 8.8 g/dL    Alkaline Phosphatase 80 40 - 150 U/L    ALT 43 0 - 70 U/L    AST 42 0 - 45 U/L   INR   Result Value Ref Range    INR 1.75 (H) 0.86 - 1.14   Glucose by meter   Result Value Ref Range    Glucose 208 (H) 70 - 99 mg/dL   CBC (AM Draw)   Result Value Ref Range    WBC 2.6 (L) 4.0 - 11.0 10e9/L    RBC Count 3.76 (L) 4.4 - 5.9 10e12/L    Hemoglobin 9.6 (L) 13.3 - 17.7 g/dL    Hematocrit 31.6 (L) 40.0 - 53.0 %    MCV 84 78 - 100 fl    MCH 25.5 (L) 26.5 - 33.0 pg    MCHC 30.4 (L) 31.5 - 36.5 g/dL    RDW 21.6 (H) 10.0 - 15.0 %    Platelet Count 34 (LL) 150 - 450 10e9/L   Lactic acid whole blood   Result Value Ref Range    Lactic Acid 3.2 (H) 0.7 - 2.0 mmol/L   Comprehensive metabolic panel   Result Value Ref Range    Sodium 129 (L) 133 - 144 mmol/L    Potassium 3.8 3.4 - 5.3 mmol/L    Chloride 103 94 - 109 mmol/L    Carbon Dioxide 21 20 - 32 mmol/L    Anion Gap 5 3 - 14 mmol/L    Glucose 385 (H) 70 - 99 mg/dL    Urea Nitrogen 21 7 - 30 mg/dL    Creatinine 0.92 0.66 - 1.25 mg/dL    GFR Estimate >90 >60 mL/min/[1.73_m2]    GFR Estimate If Black >90 >60 mL/min/[1.73_m2]    Calcium 7.3 (L) 8.5 - 10.1 mg/dL    Bilirubin Total 1.1 0.2 - 1.3 mg/dL    Albumin 2.3 (L) 3.4 - 5.0 g/dL    Protein Total 5.4 (L) 6.8 - 8.8 g/dL    Alkaline Phosphatase 73 40 - 150 U/L    ALT 39 0 - 70 U/L    AST 35 0 - 45 U/L   Magnesium   Result Value Ref Range    Magnesium 1.5 (L) 1.6 - 2.3 mg/dL   Glucose by meter   Result Value Ref Range    Glucose 138 (H) 70 - 99 mg/dL   Lactic acid whole blood   Result Value Ref Range    Lactic Acid  2.0 0.7 - 2.0 mmol/L   CBC with platelets differential   Result Value Ref Range    WBC 2.3 (L) 4.0 - 11.0 10e9/L    RBC Count 3.81 (L) 4.4 - 5.9 10e12/L    Hemoglobin 9.9 (L) 13.3 - 17.7 g/dL    Hematocrit 32.2 (L) 40.0 - 53.0 %    MCV 85 78 - 100 fl    MCH 26.0 (L) 26.5 - 33.0 pg    MCHC 30.7 (L) 31.5 - 36.5 g/dL    RDW 21.5 (H) 10.0 - 15.0 %    Platelet Count 42 (LL) 150 - 450 10e9/L    Diff Method PENDING     % Neutrophils 42.9 %    % Lymphocytes 36.1 %    % Monocytes 14.6 %    % Eosinophils 4.7 %    % Basophils 1.7 %    % Immature Granulocytes 0.0 %    Nucleated RBCs 0 0 /100    Absolute Neutrophil 1.0 (L) 1.6 - 8.3 10e9/L    Absolute Lymphocytes 0.8 0.8 - 5.3 10e9/L    Absolute Monocytes 0.3 0.0 - 1.3 10e9/L    Absolute Eosinophils 0.1 0.0 - 0.7 10e9/L    Absolute Basophils 0.0 0.0 - 0.2 10e9/L    Abs Immature Granulocytes 0.0 0 - 0.4 10e9/L    Absolute Nucleated RBC 0.0    Reticulocyte count   Result Value Ref Range    % Retic 2.6 (H) 0.5 - 2.0 %    Absolute Retic 100.6 (H) 25 - 95 10e9/L

## 2020-01-09 NOTE — PROGRESS NOTES
Psychosocial Assessment for Liver Transplant  Obed Wiley was seen in the Transplant Center as part of her evaluation as a potential liver transplant recipient.  Obed's mother Olive was also present for this interview.  Living Situation: Obed and his mother Olive live together in a house they rent in [place], [place].  They moved to this rental three months ago.  Obed's mother assists with his medication management.  Obed is independent with his personal cares and ambulation.  [place], [place] is over three hours away from the transplant center.  Obed and Olive are staying at a relative's house in [place], [place] for Obed's appointments this week.  We discussed the requirement to stay locally for up to thirty days post discharge from the hospital at the time of transplantation, and the need for a caregiver to accompany patient.  We also discussed local lodging options, including the [address].   The Caregiver Agreement for Liver Transplant was presented and discussed with patient and his mother.  Financial assistance with travel and lodging through [facility] was discussed.  Education/Employment: Obed graduated high school and completed some college.  He has not been able to work since June of 2018 due to his health.  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   He also has a history of working as a . Obed is not a .  Financial /Income: Obed receives SSDI benefits.  He was approved for disability payments one year ago.  Health Insurance:  [place] Medicaid Restricted.  Obed does not recall why he is restricted but does understand he can only use one pharmacy for medications.  He does plan to appeal his restriction.  This writer talked with Obed and his mother about the financial risks of transplant, particularly about the high cost of transplant related medications and the importance of maintaining adequate health insurance  coverage.  Family/Social Support: Obed has never  and he is not currently in a relationship.    Obed's father is .  He  of pancreatic cancer when Obed was just [age] years of age.  Obed's mother Olive is present today and demonstrating her support of Obed.  She attends all of Obed's medical appointments with him, and she assists with his medication management as previously mentioned.   She is retired, able and willing to provide care giving for Obed.  Obed has one brother, Cihdi, who lives in [place], [place] which is a half hour away from patient and his mother.  This writer stressed the importance of having a stable and involved support network before and after transplant.  Provided Obed and his mother with education about the relationship between a stable support system and better surgical and post-transplant outcomes compared to patients with a limited support system.    Functional Status:  There are no functional concerns to transplantation.  Chemical Dependency:  Obed reports he quit smoking one year ago.  He denies any history of illicit drug use.  He denies pain medication abuse, through his health insurance restricts him to using one pharmacy.    In regards to alcohol, Obed has a history of significant alcohol consumption.  He was diagnosed with alcoholic cirrhosis three years ago and advised to discontinue all consumption.  Obed was also told he would need a liver transplant at this time.  He had been working as a  prior to this and was drinking three to five drinks daily.   Obed stopped drinking alcohol for approximately one year before returning to drinking a couple of days per week, and consuming three to four drinks per occasion.   Obed discontinued all consumption in 2018.  He plans to complete a Rule 25 assessment and attend treatment at [facility] in [place], [place].  He has no history of treatment, or legal consequences of his  consumption.  Mental Health: Obed reports a diagnosis of anxiety for which he takes Seroquel prescribed by his primary care physician.  He denies any history of suicidal ideation or hospitalization for mental health treatment.  Obed is not currently under the care of a mental health provider.  I did encourage him to consider a referral to a mental health provider.  PHQ-9 score today: 13  AKANKSHA-7 score today: 18  Adjustment to Illness:  Obed has alcoholic cirrhosis diagnosed three years ago.  Obed was advised to discontinue all alcohol consumption, which he did for one year before returning to drinking.  Obed and his mother felt the message about alcohol consumption was unclear, and Obed believed he could consume alcohol is reduced amounts.    Obed is having very frequent hospitalizations, and he has chronic pain issues.      This writer provided Obed and his mother with supportive counseling throughout this interview.  This writer also encouraged Obed and his mother Olive to attend the liver transplant support group for additional support and encouragement.   Impression/Recommendations:   Obed and his mother Olive verbalize understanding the psychosocial risks of transplant and teaching provided during this evaluation.  Obed has been sober since June of 2018 and he meets sobriety criteria recommended for listing, however this writer recommends Obed complete a chemical dependency evaluation and any treatment recommendations before being listed for transplant.  He plans to obtain a Rule 25 assessment and complete treatment at [facility] in [place], [place].  I asked Obed to provide me with a copy of his Rule 25 assessment and keep me apprised of his treatment plan.  A referral to pain management should be considered if patient's need for pain medication remains a concern.  Obed's mental health should be monitored.  He is not currently under the care of a mental health provider but is prescribed  medications for his anxiety symptoms.  A mental health evaluation will likely be a part of his substance abuse treatment program.    The Caregiver Agreement for Liver Transplantation was discussed with Obed and his mother Olive.  Olive is his primary and only care giver.  Olive is retired, able and willing to provide care giving for Obed.  Their living situation is adequate.  They would stay locally at the time of transplantation, and funding would be requested from [facility].  They are aware [facility] may only pay $50 per night of lodging costs, and would not likely pay for lodging while patient is hospitalized.  Obed has adequate finances and health insurance for transplant.    This writer will remain available to assist patient throughout the evaluation process and will follow patient through transplant if he is listed.  It was a pleasure to evaluate this patient for liver transplant.   Teaching completed during assessment:  1.     Housing and relocation needs post transplant.  2.     Caregiver needs post transplant.  3.     Financial issues related to transplant.  4.     Risks of alcohol use post transplant.  5.     Common psychosocial stressors pre/post transplant.        6.     Liver Transplant support group availability.        7.     [employer] Directive-patient has a directive at home.  He was advised to bring a copy to clinic or email/fax a copy to me.             Psychosocial Risks of Transplant Reviewed:  1.     Increased stress related to your emotional, family, social, employment, or   financial situation.  2.     Affect on work and/or disability benefits.  3.     Affect on future health and life insurance.  4.     Transplant outcome expectations may not be met.  5.     Mental Health risks: anxiety, depression, PTSD, guilt, grief and chronic fatigue.     OLGA Alvarado

## 2020-01-09 NOTE — PLAN OF CARE
ICU End of Shift Summary. See flowsheets for vital signs and detailed assessment.    Changes this shift: working on pain control and trending labs surgery following per consultation from MICU team.    Patient has expressed frustration with the sit back and wait approach voicing his wish to go home today if something doesn't happen.      Plan: continue to monitor and assess abd. Notify primary team with any changes.      Problem: Adult Inpatient Plan of Care  Goal: Plan of Care Review  Outcome: No Change     Problem: Pain Acute  Goal: Optimal Pain Control  Outcome: No Change     Problem: Diabetes Comorbidity  Goal: Blood Glucose Level Within Desired Range  Outcome: No Change     Problem: Gastrointestinal Condition Comorbidity  Goal: Gastrointestinal Condition  Description  Patient comorbidity will be monitored for signs and symptoms of Gastrointestinal condition.  Problems will be absent, minimized or managed by discharge/transition of care.  Outcome: No Change

## 2020-01-09 NOTE — PROGRESS NOTES
Serial abdominal exam done at 2110 01/08/2020    Saw the patient- he continues to endorse pain in his abdomen mostly in the RUQ. He states that dilaudid helps with pain for an hour or so but pain comes back, although, not worse than what he came in with. He is complaining of nausea but no episodes of emesis. While I was in the room he was normotensive with SBP in 110-120 range and HR in 85-95 and was maintaining sats on room air. No new labs in the interim    Exam: abdomen soft, non distended, mild tenderness to palpation mostly in RUQ with deep palpation. No signs of diffuse peritonitis. Some mild involuntary guarding in periumbilical area.    Next exam at 0300.    Kae Dai MD  General Surgery PGY-2  [phone]

## 2020-01-10 PROBLEM — K59.00 CONSTIPATION: Status: ACTIVE | Noted: 2020-01-10

## 2020-01-10 LAB
ALBUMIN SERPL-MCNC: 2.1 G/DL (ref 3.4–5)
ALP SERPL-CCNC: 62 U/L (ref 40–150)
ALT SERPL W P-5'-P-CCNC: 29 U/L (ref 0–70)
ANION GAP SERPL CALCULATED.3IONS-SCNC: 2 MMOL/L (ref 3–14)
AST SERPL W P-5'-P-CCNC: 24 U/L (ref 0–45)
BASOPHILS # BLD AUTO: 0 10E9/L (ref 0–0.2)
BASOPHILS NFR BLD AUTO: 0.6 %
BILIRUB SERPL-MCNC: 0.6 MG/DL (ref 0.2–1.3)
BUN SERPL-MCNC: 16 MG/DL (ref 7–30)
CALCIUM SERPL-MCNC: 7.6 MG/DL (ref 8.5–10.1)
CHLORIDE SERPL-SCNC: 112 MMOL/L (ref 94–109)
CO2 SERPL-SCNC: 24 MMOL/L (ref 20–32)
COPATH REPORT: NORMAL
COPATH REPORT: NORMAL
CREAT SERPL-MCNC: 0.69 MG/DL (ref 0.66–1.25)
DIFFERENTIAL METHOD BLD: ABNORMAL
EOSINOPHIL # BLD AUTO: 0.1 10E9/L (ref 0–0.7)
EOSINOPHIL NFR BLD AUTO: 2.9 %
ERYTHROCYTE [DISTWIDTH] IN BLOOD BY AUTOMATED COUNT: 21.2 % (ref 10–15)
ERYTHROCYTE [DISTWIDTH] IN BLOOD BY AUTOMATED COUNT: 21.3 % (ref 10–15)
GFR SERPL CREATININE-BSD FRML MDRD: >90 ML/MIN/{1.73_M2}
GLUCOSE BLDC GLUCOMTR-MCNC: 145 MG/DL (ref 70–99)
GLUCOSE BLDC GLUCOMTR-MCNC: 186 MG/DL (ref 70–99)
GLUCOSE BLDC GLUCOMTR-MCNC: 250 MG/DL (ref 70–99)
GLUCOSE BLDC GLUCOMTR-MCNC: 91 MG/DL (ref 70–99)
GLUCOSE SERPL-MCNC: 83 MG/DL (ref 70–99)
HCT VFR BLD AUTO: 28.7 % (ref 40–53)
HCT VFR BLD AUTO: 31.6 % (ref 40–53)
HGB BLD-MCNC: 8.6 G/DL (ref 13.3–17.7)
HGB BLD-MCNC: 9.6 G/DL (ref 13.3–17.7)
IMM GRANULOCYTES # BLD: 0 10E9/L (ref 0–0.4)
IMM GRANULOCYTES NFR BLD: 0.6 %
INR PPP: 1.58 (ref 0.86–1.14)
LACTATE BLD-SCNC: 1.2 MMOL/L (ref 0.7–2)
LACTATE BLD-SCNC: 2.1 MMOL/L (ref 0.7–2)
LACTATE BLD-SCNC: 2.3 MMOL/L (ref 0.7–2)
LACTATE BLD-SCNC: 2.5 MMOL/L (ref 0.7–2)
LYMPHOCYTES # BLD AUTO: 0.7 10E9/L (ref 0.8–5.3)
LYMPHOCYTES NFR BLD AUTO: 39.5 %
MCH RBC QN AUTO: 25.4 PG (ref 26.5–33)
MCH RBC QN AUTO: 25.9 PG (ref 26.5–33)
MCHC RBC AUTO-ENTMCNC: 30 G/DL (ref 31.5–36.5)
MCHC RBC AUTO-ENTMCNC: 30.4 G/DL (ref 31.5–36.5)
MCV RBC AUTO: 85 FL (ref 78–100)
MCV RBC AUTO: 85 FL (ref 78–100)
MONOCYTES # BLD AUTO: 0.2 10E9/L (ref 0–1.3)
MONOCYTES NFR BLD AUTO: 11 %
NEUTROPHILS # BLD AUTO: 0.8 10E9/L (ref 1.6–8.3)
NEUTROPHILS NFR BLD AUTO: 45.4 %
NRBC # BLD AUTO: 0 10*3/UL
NRBC BLD AUTO-RTO: 0 /100
PF4 HEPARIN CMPLX AB SER QL: NEGATIVE
PLATELET # BLD AUTO: 31 10E9/L (ref 150–450)
PLATELET # BLD AUTO: 31 10E9/L (ref 150–450)
POTASSIUM SERPL-SCNC: 3.6 MMOL/L (ref 3.4–5.3)
PROT SERPL-MCNC: 4.8 G/DL (ref 6.8–8.8)
RBC # BLD AUTO: 3.38 10E12/L (ref 4.4–5.9)
RBC # BLD AUTO: 3.71 10E12/L (ref 4.4–5.9)
RETICS # AUTO: 82.2 10E9/L (ref 25–95)
RETICS/RBC NFR AUTO: 2.4 % (ref 0.5–2)
SODIUM SERPL-SCNC: 138 MMOL/L (ref 133–144)
WBC # BLD AUTO: 1.7 10E9/L (ref 4–11)
WBC # BLD AUTO: 2.6 10E9/L (ref 4–11)

## 2020-01-10 PROCEDURE — 36415 COLL VENOUS BLD VENIPUNCTURE: CPT | Performed by: STUDENT IN AN ORGANIZED HEALTH CARE EDUCATION/TRAINING PROGRAM

## 2020-01-10 PROCEDURE — 83605 ASSAY OF LACTIC ACID: CPT | Performed by: INTERNAL MEDICINE

## 2020-01-10 PROCEDURE — 85045 AUTOMATED RETICULOCYTE COUNT: CPT | Performed by: STUDENT IN AN ORGANIZED HEALTH CARE EDUCATION/TRAINING PROGRAM

## 2020-01-10 PROCEDURE — 83605 ASSAY OF LACTIC ACID: CPT | Performed by: STUDENT IN AN ORGANIZED HEALTH CARE EDUCATION/TRAINING PROGRAM

## 2020-01-10 PROCEDURE — 85610 PROTHROMBIN TIME: CPT | Performed by: INTERNAL MEDICINE

## 2020-01-10 PROCEDURE — 85027 COMPLETE CBC AUTOMATED: CPT | Performed by: STUDENT IN AN ORGANIZED HEALTH CARE EDUCATION/TRAINING PROGRAM

## 2020-01-10 PROCEDURE — 25800030 ZZH RX IP 258 OP 636: Performed by: STUDENT IN AN ORGANIZED HEALTH CARE EDUCATION/TRAINING PROGRAM

## 2020-01-10 PROCEDURE — 80053 COMPREHEN METABOLIC PANEL: CPT | Performed by: STUDENT IN AN ORGANIZED HEALTH CARE EDUCATION/TRAINING PROGRAM

## 2020-01-10 PROCEDURE — 25000132 ZZH RX MED GY IP 250 OP 250 PS 637: Performed by: STUDENT IN AN ORGANIZED HEALTH CARE EDUCATION/TRAINING PROGRAM

## 2020-01-10 PROCEDURE — 12000001 ZZH R&B MED SURG/OB UMMC

## 2020-01-10 PROCEDURE — 86022 PLATELET ANTIBODIES: CPT | Performed by: STUDENT IN AN ORGANIZED HEALTH CARE EDUCATION/TRAINING PROGRAM

## 2020-01-10 PROCEDURE — 25000132 ZZH RX MED GY IP 250 OP 250 PS 637

## 2020-01-10 PROCEDURE — 25000128 H RX IP 250 OP 636

## 2020-01-10 PROCEDURE — 25000128 H RX IP 250 OP 636: Performed by: STUDENT IN AN ORGANIZED HEALTH CARE EDUCATION/TRAINING PROGRAM

## 2020-01-10 PROCEDURE — 99233 SBSQ HOSP IP/OBS HIGH 50: CPT | Performed by: INTERNAL MEDICINE

## 2020-01-10 PROCEDURE — 36415 COLL VENOUS BLD VENIPUNCTURE: CPT | Performed by: INTERNAL MEDICINE

## 2020-01-10 PROCEDURE — 00000146 ZZHCL STATISTIC GLUCOSE BY METER IP

## 2020-01-10 PROCEDURE — 85004 AUTOMATED DIFF WBC COUNT: CPT | Performed by: STUDENT IN AN ORGANIZED HEALTH CARE EDUCATION/TRAINING PROGRAM

## 2020-01-10 RX ORDER — SENNOSIDES A AND B 8.6 MG/1
1 TABLET, FILM COATED ORAL DAILY PRN
Start: 2020-01-10

## 2020-01-10 RX ORDER — CIPROFLOXACIN 500 MG/1
500 TABLET, FILM COATED ORAL
Status: DISCONTINUED | OUTPATIENT
Start: 2020-01-10 | End: 2020-01-10

## 2020-01-10 RX ORDER — CIPROFLOXACIN 500 MG/1
500 TABLET, FILM COATED ORAL EVERY 24 HOURS
Qty: 4 TABLET | Refills: 0 | Status: SHIPPED | OUTPATIENT
Start: 2020-01-11 | End: 2020-05-12

## 2020-01-10 RX ORDER — LACTULOSE 10 G/15ML
10 SOLUTION ORAL
Qty: 946 ML | Refills: 0
Start: 2020-01-10 | End: 2021-02-19

## 2020-01-10 RX ORDER — OXYCODONE HYDROCHLORIDE 5 MG/1
5 TABLET ORAL
Status: COMPLETED | OUTPATIENT
Start: 2020-01-10 | End: 2020-01-10

## 2020-01-10 RX ORDER — CIPROFLOXACIN 500 MG/1
500 TABLET, FILM COATED ORAL
Status: DISCONTINUED | OUTPATIENT
Start: 2020-01-11 | End: 2020-01-10

## 2020-01-10 RX ORDER — CIPROFLOXACIN 500 MG/1
500 TABLET, FILM COATED ORAL
Status: DISCONTINUED | OUTPATIENT
Start: 2020-01-11 | End: 2020-01-11 | Stop reason: HOSPADM

## 2020-01-10 RX ORDER — LACTULOSE 10 G/15ML
30 SOLUTION ORAL 3 TIMES DAILY
Start: 2020-01-10

## 2020-01-10 RX ADMIN — TRAZODONE HYDROCHLORIDE 50 MG: 50 TABLET ORAL at 21:43

## 2020-01-10 RX ADMIN — CALCIUM CARBONATE (ANTACID) CHEW TAB 500 MG 500 MG: 500 CHEW TAB at 08:08

## 2020-01-10 RX ADMIN — QUETIAPINE FUMARATE 50 MG: 25 TABLET ORAL at 21:42

## 2020-01-10 RX ADMIN — FOLIC ACID 1 MG: 1 TABLET ORAL at 08:08

## 2020-01-10 RX ADMIN — ENOXAPARIN SODIUM 90 MG: 100 INJECTION SUBCUTANEOUS at 08:10

## 2020-01-10 RX ADMIN — CALCIUM CARBONATE (ANTACID) CHEW TAB 500 MG 500 MG: 500 CHEW TAB at 21:42

## 2020-01-10 RX ADMIN — PANTOPRAZOLE SODIUM 40 MG: 40 TABLET, DELAYED RELEASE ORAL at 08:08

## 2020-01-10 RX ADMIN — AMOXICILLIN AND CLAVULANATE POTASSIUM 1 TABLET: 875; 125 TABLET, FILM COATED ORAL at 13:32

## 2020-01-10 RX ADMIN — Medication 400 MG: at 13:36

## 2020-01-10 RX ADMIN — Medication 400 MG: at 08:08

## 2020-01-10 RX ADMIN — FOLIC ACID 1 MG: 1 TABLET ORAL at 21:43

## 2020-01-10 RX ADMIN — INSULIN ASPART 5 UNITS: 100 INJECTION, SOLUTION INTRAVENOUS; SUBCUTANEOUS at 11:54

## 2020-01-10 RX ADMIN — MELATONIN 2000 UNITS: at 08:08

## 2020-01-10 RX ADMIN — PIPERACILLIN AND TAZOBACTAM 3.38 G: 3; .375 INJECTION, POWDER, FOR SOLUTION INTRAVENOUS at 04:24

## 2020-01-10 RX ADMIN — GABAPENTIN 400 MG: 300 CAPSULE ORAL at 08:09

## 2020-01-10 RX ADMIN — GABAPENTIN 400 MG: 300 CAPSULE ORAL at 21:42

## 2020-01-10 RX ADMIN — OXYCODONE HYDROCHLORIDE 5 MG: 5 TABLET ORAL at 12:57

## 2020-01-10 RX ADMIN — AMOXICILLIN AND CLAVULANATE POTASSIUM 1 TABLET: 875; 125 TABLET, FILM COATED ORAL at 21:42

## 2020-01-10 RX ADMIN — POTASSIUM CHLORIDE 20 MEQ: 750 TABLET, EXTENDED RELEASE ORAL at 08:07

## 2020-01-10 RX ADMIN — CIPROFLOXACIN HYDROCHLORIDE 500 MG: 500 TABLET, FILM COATED ORAL at 08:07

## 2020-01-10 RX ADMIN — IRON SUCROSE 200 MG: 20 INJECTION, SOLUTION INTRAVENOUS at 15:17

## 2020-01-10 RX ADMIN — PIPERACILLIN AND TAZOBACTAM 3.38 G: 3; .375 INJECTION, POWDER, FOR SOLUTION INTRAVENOUS at 10:03

## 2020-01-10 RX ADMIN — Medication 400 MG: at 21:47

## 2020-01-10 RX ADMIN — RIFAXIMIN 550 MG: 550 TABLET ORAL at 21:42

## 2020-01-10 RX ADMIN — RIFAXIMIN 550 MG: 550 TABLET ORAL at 08:09

## 2020-01-10 RX ADMIN — LACTULOSE 30 G: 20 SOLUTION ORAL at 08:06

## 2020-01-10 RX ADMIN — THERA TABS 1 TABLET: TAB at 08:07

## 2020-01-10 RX ADMIN — OXYCODONE HYDROCHLORIDE 5 MG: 5 TABLET ORAL at 21:43

## 2020-01-10 RX ADMIN — QUETIAPINE FUMARATE 25 MG: 25 TABLET ORAL at 11:48

## 2020-01-10 RX ADMIN — GABAPENTIN 400 MG: 300 CAPSULE ORAL at 13:36

## 2020-01-10 RX ADMIN — INSULIN ASPART 2 UNITS: 100 INJECTION, SOLUTION INTRAVENOUS; SUBCUTANEOUS at 16:38

## 2020-01-10 RX ADMIN — LACTULOSE 30 G: 20 SOLUTION ORAL at 13:35

## 2020-01-10 RX ADMIN — LACTULOSE 30 G: 20 SOLUTION ORAL at 21:43

## 2020-01-10 ASSESSMENT — ACTIVITIES OF DAILY LIVING (ADL)
ADLS_ACUITY_SCORE: 10

## 2020-01-10 ASSESSMENT — PAIN DESCRIPTION - DESCRIPTORS
DESCRIPTORS: ACHING;SORE
DESCRIPTORS: ACHING;SORE

## 2020-01-10 NOTE — PROGRESS NOTES
"Serial abdominal exam    Saw the patient who has been resting comfortably and having no pain. Tolerating a diet and having bowel movements    BP 95/55   Pulse 80   Temp 98.1  F (36.7  C) (Oral)   Resp 18   Ht 1.854 m (6' 1\")   Wt 88.4 kg (194 lb 14.2 oz)   SpO2 92%   BMI 25.71 kg/m      Lactic 1.0    Exam: Abdomen soft, non-tender, non-distended No rebound, rigidity, or guarding    Raegan Cuellar DO PGY-1  "

## 2020-01-10 NOTE — PROGRESS NOTES
[facility], [place]    Progress Note - Hematology Service        Date of Admission:  1/8/2020    Assessment & Plan   Obed Wiley is a 29 year old male with a PMH of possible Protein C deficiency, complicated h/o thomboses and failed anticoagulation, decompensated alcoholic cirrhosis, h/o Yan-en-Y gastric bypass (2007) who presented to hospital with acute abdominal pain and brbpr, with imaging findings concerning for bowel ischemia.     Ongoing active issues:  1. Coagulopathy  Heterozygous Protein C Deficiency   Complicated h/o PE and multiple DVTs, some while on AC  2. Thrombocytopenia with concern for HIT   3. Pancytopenia with component CLARITA  4. H/o Yan-en-Y Gastric Bypass    Of note Mr Wiley has ongoing downtrending platelets, 31 today from 60 on 1/8/20. This represents an approximate 50% decrease since admission. Possibly related to his Heparin exposure. 4Ts HIT score calculated with intermediate risk of HIT. Recommend checking for HIT antibodies and decreasing Lovenox dose to daily. Should his HIT screen be negative would be reasonable to discharge with daily Lovenox as long as he has close follow up. If negative for HIT would expect platelets to slowly uptrend. If positive recommended that patient stay in hospital. Discussed this at length with patient and his mother who was bedside, they expressed great frustration at possibly having to spend another night but were in agreement with the plan.     Recommendations:   - Decrease Lovenox to 1mg/kg once daily  - HIT antibody screen   - Continue to trend platelets, if patient wanting to be discharged would recommend rechecking platelets prior to him leaving   - Continue IV iron infusion, would recommend he follow up for possible subcutaneous iron as an outpatient     The patient's care was discussed with the Attending Physician, Dr. Calhoun .    Sharee Hung MD  Hematology Service  [facility]  [facility]  Attending  The patient was seen and examined by me separate from the resident/fellow provider.The note above reflects my assessment and plan. I have personally reviewed today's labs,vital and radiology results. The points of care that were added by me are:    Unfortunately platelet count fell to 31 k. I told pt we should check  HIT panel related to IV heparin. If negative would decrease lovenox to 90 subcutaneous once daily until platelets > 50k Should get iron sucrose and as out pt injecafer 750mg IV x 1  John Calhoun M.D.  [phone]      ______________________________________________________________________    Interval History   NAEO, patient is having anxiety at the idea of having to stay overnight in the hospital again. Tried to reassure him that this was the best choice to make sure he will not have clotting once he leaves the hospital and he expressed understanding. Patient did receive IV iron which he tolerated well. Is feeling better overall, less abdominal pain, has been able to be up ambulating. No e/o bleeding. No fevers, chills, has good appetite.      Data reviewed today: I reviewed all medications, new labs and imaging results over the last 24 hours.    Physical Exam   Vital Signs: Temp: 97.9  F (36.6  C) Temp src: Oral BP: 98/62 Pulse: 91   Resp: 16 SpO2: 96 % O2 Device: None (Room air)    Weight: 194 lbs 14.19 oz  General: Pleasant male, sitting up comfortably in bed, NAD   HEENT: No Scleral icterus.   Cardiac: Normal rate, regular rhythm. No m/r/g. Normal S1, S2.  Pulm: CTAB, no wheezes, or crackles. Normal respiratory effort  Abd: Soft, minimally distended abdomen, non tender.   Skin: No jaundice, no rash on limited skin exam  Neuro: A&Ox3, no gross focal deficits, normal gait      Data   Recent Labs   Lab 01/10/20  0525 01/09/20  1354 01/09/20  0922 01/09/20  0433 01/09/20  0432 01/09/20  0048  01/08/20  1140   WBC 1.7*  --  2.3*  --  2.6* 3.4*   < > 7.3   HGB 8.6*  --   9.9*  --  9.6* 10.2*   < > 13.3   MCV 85  --  85  --  84 85   < > 84   PLT 31*  --  42*  --  34* 42*   < > 60*   INR 1.58* 1.62*  --   --   --  1.75*  --  1.38*     --   --  129*  --  133  --  129*   POTASSIUM 3.6  --   --  3.8  --  4.0  --  4.7   CHLORIDE 112*  --   --  103  --  104  --  99   CO2 24  --   --  21  --  22  --  23   BUN 16  --   --  21  --  21  --  21   CR 0.69  --   --  0.92  --  0.90  --  0.91   ANIONGAP 2*  --   --  5  --  7  --  6   NATE 7.6*  --   --  7.3*  --  7.5*  --  8.7   GLC 83  --   --  385*  --  207*  --  204*   ALBUMIN 2.1*  --   --  2.3*  --  2.4*  --  3.2*   PROTTOTAL 4.8*  --   --  5.4*  --  5.6*  --  7.2   BILITOTAL 0.6  --   --  1.1  --  1.4*  --  1.6*   ALKPHOS 62  --   --  73  --  80  --  114   ALT 29  --   --  39  --  43  --  58   AST 24  --   --  35  --  42  --  57*   LIPASE  --   --   --   --   --   --   --  48*    < > = values in this interval not displayed.

## 2020-01-10 NOTE — PROGRESS NOTES
[facility], [place]    Progress Note - Maroon 1 Service        Date of Admission:  1/8/2020    Assessment & Plan   Obed Wiley is a 29 year old male admitted on 1/8/2020. 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 He has improved with conservative measures & exam is much improved. General surgery is following & believes he does not meet threshold for surgical intervention at this time.    Changes today:  - Concern for HIT, HIT screen negative  - Continue Lovenox 1mg/kg once daily per Heme  - Recheck platelets in the AM   - Resume home dosing of insulin glargine 26U BID  - Discontinue IV Zosyn, begin PO Augmentin 875 BID for 5 more days  - Continue to hold Spironolactone and Furosemide in setting of elevated lactate  - q6h lactic acid until <2       Concern for Mesenteric ischemia  Hematochezia / Rectal bleeding -- resolved  Lactic acidosis iso ESLD and concern for Mesenteric Ischemia  Patient with history hypercoagulability related to liver disease as well as possible thrombophilia. 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 Previous anticoagulation complicated by GI bleeding. Off of all anticoagulation past 6 months. Presented with acute onset abdominal pain, rectal bleeding, lactic acidosis and CT abdomen (1/8/20) with findings concerning for bowel ischemia. General surgery consulted, patient kept NPO with IVF resuscitation and IV Zosyn from 1/8-1/9/20. One dose of IV Ceftriaxone 1/8. Hematology consulted for anticoagulation recommendations. High  intensity heparin drip initiated 1/8/20, switched to lovenox 1mg/kg once daily on 1/9/20 per Hematology. Abdominal pain improved, tolerating PO, normal bowel movements without blood in stool as of 1/9/20. Patient stable without need for surgical intervention at this time and general surgery signed off on 1/10/20.   - S/p Octreotide in ED; no indication to continue at this time    - Discontinue IV Zosyn (1/8-1/10) and begin PO Augmentin 875 BID for total treatment course of 7 days (1/10-1/14)  - Lactic acid q6h until <2  - Continue once daily anticoagulation Lovenox 1mg/kg per Hematology  - Follow-up with Hematology in 2 weeks post-discharge re: anticoagulation    Acute on chronic thrombocytopenia  Concern for HIT  Platelets 57 on 1/6/20, 60 on 1/8/20, 41 on 1/10/20. Heparin drip begun 1/8/20. 4T score 3.   - HIT screen negative 1/10/20  - Follow-up with PCP in 1 week for repeat CBC, monitor platelets  - Follow-up with Heme in 2 weeks to discuss anticoagulation, as above    Iron deficiency anemia  - Iron panel 1/9/20, Iron 22, Iron Binding Capacity 261, Iron Saturation Index 9%  - IV Venofer 200mg daily for 5 days while inpatient  - Heme also recommends IV Injectafer as outpatient    Concern for coagulapathy   History unclear regarding provoked vs unprovoked thrombi in past. His coagulation complicated by underlying cirrhosis, recurrent GI bleeds and possible nonadherence to medicines. Previously tested negative for Factor V Leiden, prothrombin gene, thrombophilia panel, beta two glycoprotein, and antiphospholipid antibodies Thrombophilia panel 1/29/2019 previously noted synthetic liver dysfunction was with decreased VitK dep factors, elevated fibrin d dimer, decreased antithrombin and protein C. Prior h/o hypofibrinogenemia also c/w liver dysfunction.  - Follow-up with Heme in 2 weeks to discuss anticoagulation, as above    Hyponatremia -- resolved  Na 129 on admission. Has had issues with hyponatremia in the past.  Most likely secondary to volume depletion. Did not obtain urine studies or serum osms as he was s/p 3L IVF resuscitation prior to admission.   - Continue to monitor    Pruritis  Pt described pruritis today. No associated rash or redness. Alleviated by Benadryl.   - Prn Benadryl      Chronic Stable Issues:  Cirrhosis c/b HE, SBP, EV, Coagulopathy, Thrombocytopenia  Abdominal ultrasound with doppler 1/7/20, no portal venous thrombosis.   - Continue PTA ciprofloxacin 500mg once daily for SBP prophylaxis  - Continue PTA lactulose & rifaximin  - Hold PTA spironolactone and furosemide iso lactic acidosis    Diabetes Mellitus II  Last Hgb A1C 8.4% (11/2019). Home regimen: 26 units glargine BID.   - Increase inpatient basal to home regimen, 26U BID   - High dose sliding scale insulin      Sick Euthyroid   TSH 4.09, T4 0.97 when checked for liver transplant work up.   - No indication to treat at T4 is wnl.   - Recommend outpatient follow up if signs/symptoms of hypothyroidism.      Chronic low back pain  Sciatica   Home pain regimen: Percocet  Q4hrs, Gabapentin 400 TID   - Avoid NSAIDs given bleeding risk   - Resume pta Diclofenac gel   - Resume pta Gabapentin 400 TID   - Resume pta Percocet  Q4hrs      Insomnia   - Resume pta Seroquel 50mg    - Resume pta trazadone 50mg (EKG showing QTc prolongation, will monitor)  - Add Melatonin 3mg at bedtime         Diet: Consistent Carbohydrate Diet 1213-2893 Calories: Moderate Consistent CHO (4-6 CHO units/meal)  Snacks/Supplements Adult: Boost Plus; With Meals    Fluids: NA  Lines: PIV  DVT Prophylaxis: on Lovenox once daily AC  Glaser Catheter: not present  Code Status: Full Code      Disposition Plan   Expected discharge: Tomorrow, recommended to prior living arrangement once platelets stable and Lactic acidosis resolved.  Entered: Brooke Ceabllos MD 01/10/2020, 2:10 PM       The patient's care was discussed with the Attending Physician, Dr. Oneal, Bedside Nurse and  Patient.    MD Jenae Lucas 1 Service  [facility], [place]  Pager: [phone]  Please see sticky note for cross cover information  ______________________________________________________________________    Interval History   NAEO    Patient asking when he can leave. Denies further abdominal pain overnight.  Tolerating PO, having regular bowel movements. Denies hematochezia/melena.     4 point ROS reviewed and otherwise negative    Data reviewed today: I reviewed all medications, new labs and imaging results over the last 24 hours.    Physical Exam   Vital Signs: Temp: 97.9  F (36.6  C) Temp src: Oral BP: 98/62 Pulse: 91   Resp: 16 SpO2: 96 % O2 Device: None (Room air)    Weight: 194 lbs 14.19 oz  Exam:  Constitutional: Alert, no distress and cooperative  HEENT: Normocephalic, PERLL, EOMI, anicteric sclera  Cardiovascular: RRR. No murmurs, gallops or rub, No edema or JVD.  Respiratory: Good diaphragmatic excursion. No wheezes, No rhonchi  Gastrointestinal: Abdomen soft, non-tender, distended with ascites, reducible umbilical hernia present, BS normal.   : Deferred  Musculoskeletal: Extremities normal with no gross deformities noted, normal muscle tone, peripheral pulses normal and normal ROM  Skin: No suspicious lesions or rashes  Neurologic: Gait normal. Reflexes normal and symmetric. Sensation grossly WNL.  Psychiatric: Mentation appears normal and affect normal/bright      Data   Recent Labs   Lab 01/10/20  1647 01/10/20  0525 01/09/20  1354 01/09/20  0922 01/09/20  0433  01/09/20  0048  01/08/20  1140   WBC 2.6* 1.7*  --  2.3*  --    < > 3.4*   < > 7.3   HGB 9.6* 8.6*  --  9.9*  --    < > 10.2*   < > 13.3   MCV 85 85  --  85  --    < > 85   < > 84   PLT 31* 31*  --  42*  --    < > 42*   < > 60*   INR  --  1.58* 1.62*  --   --   --  1.75*  --  1.38*   NA  --  138  --   --  129*  --  133  --  129*   POTASSIUM  --  3.6  --   --  3.8  --  4.0  --  4.7   CHLORIDE  --  112*  --   --   103  --  104  --  99   CO2  --  24  --   --  21  --  22  --  23   BUN  --  16  --   --  21  --  21  --  21   CR  --  0.69  --   --  0.92  --  0.90  --  0.91   ANIONGAP  --  2*  --   --  5  --  7  --  6   NATE  --  7.6*  --   --  7.3*  --  7.5*  --  8.7   GLC  --  83  --   --  385*  --  207*  --  204*   ALBUMIN  --  2.1*  --   --  2.3*  --  2.4*  --  3.2*   PROTTOTAL  --  4.8*  --   --  5.4*  --  5.6*  --  7.2   BILITOTAL  --  0.6  --   --  1.1  --  1.4*  --  1.6*   ALKPHOS  --  62  --   --  73  --  80  --  114   ALT  --  29  --   --  39  --  43  --  58   AST  --  24  --   --  35  --  42  --  57*   LIPASE  --   --   --   --   --   --   --   --  48*    < > = values in this interval not displayed.

## 2020-01-10 NOTE — PROGRESS NOTES
"Surgery Progress Note    S: Vitals stable. No acute events. Tolerating regular diet, having bowel movements, passing flatus. Not complaining of pain.     O:   BP 98/62 (BP Location: Left arm)   Pulse 91   Temp 97.9  F (36.6  C) (Oral)   Resp 16   Ht 1.854 m (6' 1\")   Wt 88.4 kg (194 lb 14.2 oz)   SpO2 95%   BMI 25.71 kg/m      GEN: NAD  CV: Non-cyanotic   RESP: Nonlabored breathing on RA   ABD: soft, no tenderness to even deep palpation, without guarding or rebound tenderness.   PSYCH: cooperative     Labs reviewed   Lactic 1.2   WBC 1.7     Imaging none     Obed Wiley is a 29 year old male with alcoholic cirrhosis, MELD of 9, diabetes, DVT and PE, recurrent pancreatitis, morbid obesity s/p mykel-en-y in 2007, now with GI bleed and concern for mesenteric ischemia. No surgical intervention has been performed. He has been improving with time and is now tolerating a regular diet, having anterograde bowel function, and is without pain.        Do not recommend an urgent operation     Okay for regular diet.     Remainder of cares per primary team.     Surgery will sign off at this time, please call with any concerns.       Olive Lopez   Surgery Resident   [id number]             "

## 2020-01-10 NOTE — PLAN OF CARE
ICU End of Shift Summary. See flowsheets for vital signs and detailed assessment.    Changes this shift: A&Ox4. Anxious at times. Ativan given x1. PRN Seroquel given x1. Not on telemetry - OK per MD. Note in chart. Room air. Lactulose restarted, 3 bowel movements so far. Heparin drip stopped. Transitioned to Lovenox. Hematology consulted. Iron ordered for tonight. Lactic ordered for 22:00. Up ad red    Plan: transfer to floor. Continue to monitor. Notify team of changes.

## 2020-01-11 VITALS
TEMPERATURE: 97.7 F | RESPIRATION RATE: 16 BRPM | SYSTOLIC BLOOD PRESSURE: 106 MMHG | DIASTOLIC BLOOD PRESSURE: 74 MMHG | HEART RATE: 88 BPM | BODY MASS INDEX: 27.67 KG/M2 | WEIGHT: 208.8 LBS | OXYGEN SATURATION: 96 % | HEIGHT: 73 IN

## 2020-01-11 PROBLEM — D72.819 LEUKOPENIA: Status: ACTIVE | Noted: 2020-01-11

## 2020-01-11 LAB
ALBUMIN SERPL-MCNC: 2.4 G/DL (ref 3.4–5)
ALP SERPL-CCNC: 81 U/L (ref 40–150)
ALT SERPL W P-5'-P-CCNC: 27 U/L (ref 0–70)
ANION GAP SERPL CALCULATED.3IONS-SCNC: 6 MMOL/L (ref 3–14)
AST SERPL W P-5'-P-CCNC: 24 U/L (ref 0–45)
BILIRUB SERPL-MCNC: 0.5 MG/DL (ref 0.2–1.3)
BUN SERPL-MCNC: 11 MG/DL (ref 7–30)
CALCIUM SERPL-MCNC: 7.5 MG/DL (ref 8.5–10.1)
CHLORIDE SERPL-SCNC: 110 MMOL/L (ref 94–109)
CO2 SERPL-SCNC: 21 MMOL/L (ref 20–32)
CREAT SERPL-MCNC: 0.65 MG/DL (ref 0.66–1.25)
ERYTHROCYTE [DISTWIDTH] IN BLOOD BY AUTOMATED COUNT: 21.2 % (ref 10–15)
GFR SERPL CREATININE-BSD FRML MDRD: >90 ML/MIN/{1.73_M2}
GLUCOSE BLDC GLUCOMTR-MCNC: 112 MG/DL (ref 70–99)
GLUCOSE BLDC GLUCOMTR-MCNC: 154 MG/DL (ref 70–99)
GLUCOSE SERPL-MCNC: 186 MG/DL (ref 70–99)
HCT VFR BLD AUTO: 29 % (ref 40–53)
HGB BLD-MCNC: 8.6 G/DL (ref 13.3–17.7)
INR PPP: 1.31 (ref 0.86–1.14)
LACTATE BLD-SCNC: 1.3 MMOL/L (ref 0.7–2)
MAGNESIUM SERPL-MCNC: 1.5 MG/DL (ref 1.6–2.3)
MCH RBC QN AUTO: 25.7 PG (ref 26.5–33)
MCHC RBC AUTO-ENTMCNC: 29.7 G/DL (ref 31.5–36.5)
MCV RBC AUTO: 87 FL (ref 78–100)
PHOSPHATE SERPL-MCNC: 2.2 MG/DL (ref 2.5–4.5)
PLATELET # BLD AUTO: 31 10E9/L (ref 150–450)
POTASSIUM SERPL-SCNC: 3.5 MMOL/L (ref 3.4–5.3)
PROT SERPL-MCNC: 5.2 G/DL (ref 6.8–8.8)
RBC # BLD AUTO: 3.35 10E12/L (ref 4.4–5.9)
SODIUM SERPL-SCNC: 137 MMOL/L (ref 133–144)
WBC # BLD AUTO: 1.7 10E9/L (ref 4–11)

## 2020-01-11 PROCEDURE — 80053 COMPREHEN METABOLIC PANEL: CPT | Performed by: STUDENT IN AN ORGANIZED HEALTH CARE EDUCATION/TRAINING PROGRAM

## 2020-01-11 PROCEDURE — 25000132 ZZH RX MED GY IP 250 OP 250 PS 637

## 2020-01-11 PROCEDURE — 25000128 H RX IP 250 OP 636: Performed by: STUDENT IN AN ORGANIZED HEALTH CARE EDUCATION/TRAINING PROGRAM

## 2020-01-11 PROCEDURE — 25000125 ZZHC RX 250: Performed by: STUDENT IN AN ORGANIZED HEALTH CARE EDUCATION/TRAINING PROGRAM

## 2020-01-11 PROCEDURE — 25000132 ZZH RX MED GY IP 250 OP 250 PS 637: Performed by: STUDENT IN AN ORGANIZED HEALTH CARE EDUCATION/TRAINING PROGRAM

## 2020-01-11 PROCEDURE — P9047 ALBUMIN (HUMAN), 25%, 50ML: HCPCS | Performed by: STUDENT IN AN ORGANIZED HEALTH CARE EDUCATION/TRAINING PROGRAM

## 2020-01-11 PROCEDURE — 85610 PROTHROMBIN TIME: CPT | Performed by: INTERNAL MEDICINE

## 2020-01-11 PROCEDURE — 85027 COMPLETE CBC AUTOMATED: CPT | Performed by: STUDENT IN AN ORGANIZED HEALTH CARE EDUCATION/TRAINING PROGRAM

## 2020-01-11 PROCEDURE — 90686 IIV4 VACC NO PRSV 0.5 ML IM: CPT | Performed by: INTERNAL MEDICINE

## 2020-01-11 PROCEDURE — 36415 COLL VENOUS BLD VENIPUNCTURE: CPT | Performed by: INTERNAL MEDICINE

## 2020-01-11 PROCEDURE — 25800030 ZZH RX IP 258 OP 636: Performed by: STUDENT IN AN ORGANIZED HEALTH CARE EDUCATION/TRAINING PROGRAM

## 2020-01-11 PROCEDURE — 93005 ELECTROCARDIOGRAM TRACING: CPT

## 2020-01-11 PROCEDURE — 99239 HOSP IP/OBS DSCHRG MGMT >30: CPT | Performed by: INTERNAL MEDICINE

## 2020-01-11 PROCEDURE — 83735 ASSAY OF MAGNESIUM: CPT | Performed by: STUDENT IN AN ORGANIZED HEALTH CARE EDUCATION/TRAINING PROGRAM

## 2020-01-11 PROCEDURE — 93010 ELECTROCARDIOGRAM REPORT: CPT | Performed by: INTERNAL MEDICINE

## 2020-01-11 PROCEDURE — 83605 ASSAY OF LACTIC ACID: CPT | Performed by: STUDENT IN AN ORGANIZED HEALTH CARE EDUCATION/TRAINING PROGRAM

## 2020-01-11 PROCEDURE — 25000128 H RX IP 250 OP 636: Performed by: INTERNAL MEDICINE

## 2020-01-11 PROCEDURE — P9047 ALBUMIN (HUMAN), 25%, 50ML: HCPCS

## 2020-01-11 PROCEDURE — 36415 COLL VENOUS BLD VENIPUNCTURE: CPT | Performed by: STUDENT IN AN ORGANIZED HEALTH CARE EDUCATION/TRAINING PROGRAM

## 2020-01-11 PROCEDURE — 84100 ASSAY OF PHOSPHORUS: CPT | Performed by: STUDENT IN AN ORGANIZED HEALTH CARE EDUCATION/TRAINING PROGRAM

## 2020-01-11 PROCEDURE — 00000146 ZZHCL STATISTIC GLUCOSE BY METER IP

## 2020-01-11 PROCEDURE — 25000128 H RX IP 250 OP 636

## 2020-01-11 RX ORDER — ALBUMIN (HUMAN) 12.5 G/50ML
SOLUTION INTRAVENOUS
Status: COMPLETED
Start: 2020-01-11 | End: 2020-01-11

## 2020-01-11 RX ORDER — OXYCODONE HYDROCHLORIDE 5 MG/1
5 TABLET ORAL ONCE
Status: COMPLETED | OUTPATIENT
Start: 2020-01-11 | End: 2020-01-11

## 2020-01-11 RX ORDER — SALIVA STIMULANT COMB. NO.3
1 SPRAY, NON-AEROSOL (ML) MUCOUS MEMBRANE 4 TIMES DAILY PRN
Status: DISCONTINUED | OUTPATIENT
Start: 2020-01-11 | End: 2020-01-11 | Stop reason: HOSPADM

## 2020-01-11 RX ORDER — ALBUMIN (HUMAN) 12.5 G/50ML
25 SOLUTION INTRAVENOUS ONCE
Status: COMPLETED | OUTPATIENT
Start: 2020-01-11 | End: 2020-01-11

## 2020-01-11 RX ADMIN — LACTULOSE 30 G: 20 SOLUTION ORAL at 07:39

## 2020-01-11 RX ADMIN — Medication 1 SPRAY: at 05:20

## 2020-01-11 RX ADMIN — MELATONIN 2000 UNITS: at 07:38

## 2020-01-11 RX ADMIN — INFLUENZA A VIRUS A/BRISBANE/02/2018 IVR-190 (H1N1) ANTIGEN (FORMALDEHYDE INACTIVATED), INFLUENZA A VIRUS A/KANSAS/14/2017 X-327 (H3N2) ANTIGEN (FORMALDEHYDE INACTIVATED), INFLUENZA B VIRUS B/PHUKET/3073/2013 ANTIGEN (FORMALDEHYDE INACTIVATED), AND INFLUENZA B VIRUS B/MARYLAND/15/2016 BX-69A ANTIGEN (FORMALDEHYDE INACTIVATED) 0.5 ML: 15; 15; 15; 15 INJECTION, SUSPENSION INTRAMUSCULAR at 09:22

## 2020-01-11 RX ADMIN — RIFAXIMIN 550 MG: 550 TABLET ORAL at 07:39

## 2020-01-11 RX ADMIN — SODIUM PHOSPHATE, MONOBASIC, MONOHYDRATE AND SODIUM PHOSPHATE, DIBASIC, ANHYDROUS 15 MMOL: 276; 142 INJECTION, SOLUTION INTRAVENOUS at 07:51

## 2020-01-11 RX ADMIN — PANTOPRAZOLE SODIUM 40 MG: 40 TABLET, DELAYED RELEASE ORAL at 07:39

## 2020-01-11 RX ADMIN — THERA TABS 1 TABLET: TAB at 07:39

## 2020-01-11 RX ADMIN — FOLIC ACID 1 MG: 1 TABLET ORAL at 07:38

## 2020-01-11 RX ADMIN — GABAPENTIN 400 MG: 300 CAPSULE ORAL at 07:39

## 2020-01-11 RX ADMIN — CALCIUM CARBONATE (ANTACID) CHEW TAB 500 MG 500 MG: 500 CHEW TAB at 07:39

## 2020-01-11 RX ADMIN — CIPROFLOXACIN HYDROCHLORIDE 500 MG: 500 TABLET, FILM COATED ORAL at 09:17

## 2020-01-11 RX ADMIN — MAGNESIUM SULFATE HEPTAHYDRATE 4 G: 40 INJECTION, SOLUTION INTRAVENOUS at 06:15

## 2020-01-11 RX ADMIN — ENOXAPARIN SODIUM 90 MG: 100 INJECTION SUBCUTANEOUS at 09:17

## 2020-01-11 RX ADMIN — ALBUMIN HUMAN 12.5 G: 0.25 SOLUTION INTRAVENOUS at 01:35

## 2020-01-11 RX ADMIN — ALBUMIN HUMAN 25 G: 0.25 SOLUTION INTRAVENOUS at 01:25

## 2020-01-11 RX ADMIN — INSULIN ASPART 1 UNITS: 100 INJECTION, SOLUTION INTRAVENOUS; SUBCUTANEOUS at 11:39

## 2020-01-11 RX ADMIN — OXYCODONE HYDROCHLORIDE 5 MG: 5 TABLET ORAL at 01:24

## 2020-01-11 RX ADMIN — Medication 400 MG: at 07:39

## 2020-01-11 RX ADMIN — POTASSIUM CHLORIDE 20 MEQ: 750 TABLET, EXTENDED RELEASE ORAL at 06:03

## 2020-01-11 RX ADMIN — AMOXICILLIN AND CLAVULANATE POTASSIUM 1 TABLET: 875; 125 TABLET, FILM COATED ORAL at 07:38

## 2020-01-11 ASSESSMENT — ACTIVITIES OF DAILY LIVING (ADL)
ADLS_ACUITY_SCORE: 10

## 2020-01-11 ASSESSMENT — MIFFLIN-ST. JEOR: SCORE: 1960.99

## 2020-01-11 ASSESSMENT — PAIN DESCRIPTION - DESCRIPTORS
DESCRIPTORS: ACHING;SORE
DESCRIPTORS: ACHING;SORE

## 2020-01-11 NOTE — PLAN OF CARE
ICU End of Shift Summary. See flowsheets for vital signs and detailed assessment.    Changes this shift: Pt alert and oriented. Independent in room and ambulating hallway fine. Multiple BMs throughout the day. Room air. VSS.    Plan: Waiting for AM labs to discharge in the AM.

## 2020-01-11 NOTE — PLAN OF CARE
ICU End of Shift Summary. See flowsheets for vital signs and detailed assessment.    Changes this shift: Oxy 5 mg given x2 for c/o abdominal and back pain. Voiding and stooling wnl's per patient. Albumin 25 mg given for lactic of 2.3; morning recheck 1.3. EKG 12 lead done to follow-up prolonged QT. K+ 3.5 replaced with 20 mEq. Mg 1.5 replaced with 4 g. Phos 2.2; replacement requested from pharmacy. MD notified of PLT's (31); no new orders.    Plan: Discharge home

## 2020-01-11 NOTE — PROGRESS NOTES
[facility], [place]    Progress Note - Hematology Service        Date of Admission:  1/8/2020    Assessment & Plan   Obed Wiley is a 29 year old male with a PMH of possible Protein C deficiency, complicated h/o thomboses and failed anticoagulation, decompensated alcoholic cirrhosis, h/o Yan-en-Y gastric bypass (2007) who presented to hospital with acute abdominal pain and brbpr, with imaging findings concerning for bowel ischemia.     Ongoing active issues:  1. Coagulopathy  Heterozygous Protein C Deficiency   Complicated h/o PE and multiple DVTs, some while on AC  2. Thrombocytopenia with concern for HIT   3. Pancytopenia with component CLARITA  4. H/o Yan-en-Y Gastric Bypass    Of note Mr Wiley has ongoing downtrending platelets, 31 today from 60 on 1/8/20. This represents an approximate 50% decrease since admission. Possibly related to his Heparin exposure. 4Ts HIT score calculated with intermediate risk of HIT.     HIT screen was negative. Primary team planning to discharge today. Recommend discharging with daily lovenox 1mg/kg. Check CBC with platelets on Monday. If platelets improve to >50K, twice daily lovenox may be considered with close monitoring of platelet levels. He is set up with iron infusions out-patient by primary team,. Out-patient follow-up has been set up with Dr. Jameson on 2/26/20.     Assessment and plan discussed with Dr. Jameson.     Pepper Ballard  Hematology, Oncology & Transplantation fellow  Pager: [phone]  1/11/2020  .  ______________________________________________________________________    Interval History   No acute overnight events. Platelets remain at 31K. He denies any bleeding symptoms. He is eager to discharge.     Data reviewed today: I reviewed all medications, new labs and imaging results over the last 24 hours.    Physical Exam   Vital Signs: Temp: 97.7  F (36.5  C) Temp src: Axillary BP: 106/74 Pulse: 88 Heart Rate: 82 Resp: 16 SpO2:  96 % O2 Device: None (Room air)    Weight: 208 lbs 12.8 oz  General: Pleasant male, sitting up comfortably in bed, NAD   HEENT: No Scleral icterus.   Cardiac: Normal rate, regular rhythm. No m/r/g. Normal S1, S2.  Pulm: CTAB, no wheezes, or crackles. Normal respiratory effort  Abd: Soft, minimally distended abdomen, non tender.   Skin: No jaundice, no rash on limited skin exam  Neuro: A&Ox3, no gross focal deficits, normal gait    Data   Recent Labs   Lab 01/11/20  0811 01/11/20  0502 01/10/20  1647 01/10/20  0525 01/09/20  1354  01/09/20  0433  01/08/20  1140   WBC  --  1.7* 2.6* 1.7*  --    < >  --    < > 7.3   HGB  --  8.6* 9.6* 8.6*  --    < >  --    < > 13.3   MCV  --  87 85 85  --    < >  --    < > 84   PLT  --  31* 31* 31*  --    < >  --    < > 60*   INR 1.31*  --   --  1.58* 1.62*  --   --    < > 1.38*   NA  --  137  --  138  --   --  129*   < > 129*   POTASSIUM  --  3.5  --  3.6  --   --  3.8   < > 4.7   CHLORIDE  --  110*  --  112*  --   --  103   < > 99   CO2  --  21  --  24  --   --  21   < > 23   BUN  --  11  --  16  --   --  21   < > 21   CR  --  0.65*  --  0.69  --   --  0.92   < > 0.91   ANIONGAP  --  6  --  2*  --   --  5   < > 6   NATE  --  7.5*  --  7.6*  --   --  7.3*   < > 8.7   GLC  --  186*  --  83  --   --  385*   < > 204*   ALBUMIN  --  2.4*  --  2.1*  --   --  2.3*   < > 3.2*   PROTTOTAL  --  5.2*  --  4.8*  --   --  5.4*   < > 7.2   BILITOTAL  --  0.5  --  0.6  --   --  1.1   < > 1.6*   ALKPHOS  --  81  --  62  --   --  73   < > 114   ALT  --  27  --  29  --   --  39   < > 58   AST  --  24  --  24  --   --  35   < > 57*   LIPASE  --   --   --   --   --   --   --   --  48*    < > = values in this interval not displayed.

## 2020-01-11 NOTE — PROGRESS NOTES
Sepsis Evaluation Progress Note    I was called to see Obed Wiley due to lactate of 2.3. He is on antibiotics for possible intra-abdominal infection.     Physical Exam   Vital Signs:  Temp: 98.5  F (36.9  C) Temp src: Oral BP: 115/74 Pulse: 88 Heart Rate: 91 Resp: 16 SpO2: 96 % O2 Device: None (Room air)      Lab:  Lactic Acid   Date Value Ref Range Status   01/10/2020 2.3 (H) 0.7 - 2.0 mmol/L Final     The patient is at baseline mental status. Denies increasing abdominal pain, cough, or shortness of breath. Is c/o backpain but this has been an ongoing problem for him.     Assessment & Plan   NO EVIDENCE OF SEPSIS at this time.  Vital sign, physical exam, and lab findings are likely due to ESLD, possible although less likely mesenteric ischemia (already worked up).    Disposition: The patient will remain on the current unit. We will continue to monitor this patient closely.    Plan:   - 25mg of 25% albumin   - Recheck lactic acid in 6 hours     Additional problems discussed with patient and nurse include his concern for his prolonged QTc. Ciprofloxacin dosing changed for Q24H because of this. Patient is requesting another EKG check so I'll order this for the morning. Also asking for additional oxycodone for his back pain. Per patient he takes 10mg as needed at home and progress note today states that his PTA percocet would be resumed. For tonight, I'll just order an additional 5mg oxycodone dose for now.     Ashleigh Choi MD    Sepsis Criteria   Sepsis: 2+ SIRS criteria due to infection  Severe Sepsis: Sepsis AND 1+ new sign of acute organ dysfunction (Note: lactate >2 is organ dysfunction)  Septic Shock: Sepsis AND hypotension despite volume resuscitation with 30 ml/kg crystalloid

## 2020-01-11 NOTE — PROGRESS NOTES
Discharged to: Home with Mother at 1230  Belongings: With Patient  AVS (After Visit Summary) discussed with: Patient and Mother.

## 2020-01-12 ENCOUNTER — PATIENT OUTREACH (OUTPATIENT)
Dept: CARE COORDINATION | Facility: CLINIC | Age: 30
End: 2020-01-12

## 2020-01-12 NOTE — DISCHARGE SUMMARY
[facility]  Discharge Summary - Medicine & Pediatrics       Date of Admission:  1/8/2020  Date of Discharge:  1/11/2020 12:30 PM  Discharging Provider: Brooke Ceballos MD  Discharge Service: Jenae 1    Discharge Diagnoses   Mesenteric ischemia   Multifactorial Hypercoagulability requiring lifelong anticoagulation  Hematochezia/ rectal bleeding -- resolved  Iron deficiency anemia  Acute on chronic leukopenia  Acute on chronic thrombocytopenia  Prolonged QTc      Follow-ups Needed After Discharge   Follow-up Appointments     Follow Up and recommended labs and tests      Follow up with primary care provider, Anjelica Reyes, on 1/13/2020, to   evaluate medication change, for hospital follow- up and regarding new   diagnosis. The following labs/tests are recommended: CBC to monitor   platelets.          For PCP Follow-up:  - CBC to monitor thrombocytopenia, anemia, and leukopenia  - Hematology recommending IV Injectafer 750mg once weekly for 2 doses  - Patient will need prescription for lovenox 88 mg (1mg/kg) once daily, indefinitely (follow-up with Hematology at [facility] scheduled with Dr. Jameson on 2/26/20)  - Please monitor EKG for prolonged QTc, patient to follow-up with Hepatology to discuss Ciprofloxacin as SBP prophylaxis in setting of prolonged QTc.    Unresulted Labs Ordered in the Past 30 Days of this Admission     Date and Time Order Name Status Description    1/8/2020 2210 Homocysteine In process     1/8/2020 1126 Blood culture Preliminary     1/8/2020 1126 Blood culture Preliminary     1/6/2020 0831 T4 free In process       These results will be followed up by PCP    Discharge Disposition   Discharged to home  Condition at discharge: Stable    Hospital Course   Obed Wiley was admitted on 1/8/2020 for mesenteric ischemia.  The following problems were addressed during his hospitalization:      Mesenteric ischemia  Hematochezia/ rectal bleeding -- resolved  Patient  with history hypercoagulability related to liver disease as well as possible thrombophilia. 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 Previous anticoagulation complicated by GI bleeding. Off of all anticoagulation past 6 months. Presented with acute onset abdominal pain, rectal bleeding, lactic acidosis and CT abdomen (1/8/20) with findings concerning for bowel ischemia. General surgery consulted, patient kept NPO with IVF resuscitation and IV Zosyn from 1/8-1/9/20. One dose of IV Ceftriaxone 1/8. Hematology consulted for anticoagulation recommendations. High intensity heparin drip initiated 1/8/20, switched to lovenox 1mg/kg once daily on 1/9/20 per Hematology. Abdominal pain improved, tolerating PO, normal bowel movements without blood in stool as of 1/9/20. Patient stable without need for surgical intervention at this time and general surgery signed off on 1/10/20.   - S/p Octreotide in ED; no indication to continue at this time    - Received 3 days of IV Zosyn (1/8-1/10), then transitioned to PO Augmentin 875 BID for total treatment course of 7 days (1/10-1/14)  - Per Hematology, continued once daily anticoagulation Lovenox 1mg/kg  - Follow-up with [facility], Dr Jameson, on 2/26/2020     Acute on chronic thrombocytopenia  Platelets 57 on 1/6/20, 60 on 1/8/20, 41 on 1/10/20. Heparin drip begun 1/8/20. 4T score 3.   - HIT screen negative 1/10/20  - Will continue Lovenox 1mg/kg once daily (instead of typical therapeutic dosing of BID) in setting of acutely worsening thrombocytopenia and history of breakthrough bleeding while on anticoagulation  - Follow-up with PCP on Monday, 1/13 for repeat CBC, monitor platelets    Iron deficiency anemia  - Iron panel 1/9/20, Iron 22, Iron Binding Capacity 261, Iron Saturation Index 9%  - IV Venofer 200mg once daily, received two doses while inpation  - Hematology  recommends IV Injectafer 750mg weekly for two doses as outpatient    Prolonged QTc -- resolved  On presentation, EKG showed QTc 509 on 1/8/20. Patient's BID Ciprofloxacin was held on 1/8, and then resumed at once daily dosing on 1/9 and 1/10. EKG prior to discharge with QTc 453 on 1/11/20.  - PCP and Hepatology to review options for SBP prophylaxis in setting of transiently prolonged QTc.    Chronic Stable Issues:  Cirrhosis c/b HE, SBP, EV, Coagulopathy, Thrombocytopenia  Abdominal ultrasound with doppler 1/7/20, no portal venous thrombosis.   - Continue PTA ciprofloxacin 500mg once daily for SBP prophylaxis (decreased from twice daily due to prolonged QTc on EKG)  - Continue PTA lactulose & rifaximin  - Held PTA spironolactone and furosemide iso lactic acidosis--> okay to resume on discharge     Diabetes Mellitus II  Last Hgb A1C 8.4% (11/2019). Home regimen: 26 units glargine BID.   - Increase inpatient basal to home regimen, 26U BID   - High dose sliding scale insulin      Sick Euthyroid   TSH 4.09, T4 0.97 when checked for liver transplant work up.   - No indication to treat as T4 is wnl.   - Recommend outpatient follow up if signs/symptoms of hypothyroidism.      Chronic low back pain  Sciatica   Home pain regimen: Percocet  Q4hrs, Gabapentin 400 TID   - Avoid NSAIDs given bleeding risk   - Resume pta Diclofenac gel   - Resume pta Gabapentin 400 TID   - Resume pta Percocet  Q4hrs      Insomnia   - Resume pta Seroquel 50mg    - Resume pta trazadone 50mg (EKG showing QTc prolongation, will monitor)  - Add Melatonin 3mg at bedtime     Consultations This Hospital Stay   MEDICATION HISTORY IP PHARMACY CONSULT  SURGERY GENERAL ADULT IP CONSULT  HEMATOLOGY ADULT IP CONSULT  VASCULAR ACCESS CARE ADULT IP CONSULT  GI LUMINAL ADULT IP CONSULT    Code Status   Prior       The patient was discussed with MD Jenae Leach 1 Service  [facility], [place]  Pager:  [phone]  ______________________________________________________________________    Physical Exam   Vital Signs: Temp: 97.7  F (36.5  C) Temp src: Axillary BP: 106/74   Heart Rate: 82 Resp: 16 SpO2: 96 % O2 Device: None (Room air)    Weight: 208 lbs 12.8 oz    Exam:  Constitutional: Alert, no distress and cooperative  HEENT: Normocephalic, PERLL, EOMI, anicteric sclera, no cervical lymphadenopathy  Cardiovascular: RRR. No murmurs, gallops or rub, no lower extremity edema  Respiratory: Good air movement throughout. No wheezes, No rhonchi  Gastrointestinal: Abdomen soft, non-tender, distended with ascites, reducible umbilical hernia. BS normal.   : Deferred  Musculoskeletal: Extremities normal with no gross deformities noted, normal muscle tone, and normal ROM  Skin: No suspicious lesions or rashes  Neurologic: Gait normal.   Psychiatric: Mentation appears normal and affect normal/bright        Primary Care Physician   Anjelica Reyes    Discharge Orders      CBC with platelets    Last Lab Result: Hemoglobin (g/dL)       Date                     Value                 01/11/2020               8.6 (L)          ----------     Onc/Heme Adult Referral      Reason for your hospital stay    You were admitted for concern for mesenteric ischemia (reduced blood flow to your abdomen)     Follow Up and recommended labs and tests    Follow up with primary care provider, Anjelica Reyes, on 1/13/2020, to evaluate medication change, for hospital follow- up and regarding new diagnosis. The following labs/tests are recommended: CBC to monitor platelets.     Activity    Your activity upon discharge: activity as tolerated     Diet    Follow this diet upon discharge: Snacks/Supplements Adult: Boost Plus; With Meals      Consistent Carbohydrate Diet 2294-3739 Calories: Moderate Consistent CHO (4-6 CHO units/meal)       Significant Results and Procedures   Most Recent 3 CBC's:  Recent Labs   Lab Test 01/11/20  0502 01/10/20  1647  "01/10/20  0525   WBC 1.7* 2.6* 1.7*   HGB 8.6* 9.6* 8.6*   MCV 87 85 85   PLT 31* 31* 31*     Most Recent 3 BMP's:  Recent Labs   Lab Test 01/11/20  0502 01/10/20  0525 01/09/20  0433    138 129*   POTASSIUM 3.5 3.6 3.8   CHLORIDE 110* 112* 103   CO2 21 24 21   BUN 11 16 21   CR 0.65* 0.69 0.92   ANIONGAP 6 2* 5   NATE 7.5* 7.6* 7.3*   * 83 385*     Most Recent 2 LFT's:  Recent Labs   Lab Test 01/11/20  0502 01/10/20  0525   AST 24 24   ALT 27 29   ALKPHOS 81 62   BILITOTAL 0.5 0.6     Most Recent Anemia Panel:  Recent Labs   Lab Test 01/11/20  0502  01/10/20  0525 01/09/20  1530 01/09/20  1354   WBC 1.7*   < > 1.7*  --   --    HGB 8.6*   < > 8.6*  --   --    HCT 29.0*   < > 28.7*  --   --    MCV 87   < > 85  --   --    PLT 31*   < > 31*  --   --    IRON  --   --   --  22* Canceled, Test credited   IRONSAT  --   --   --  9* Not Calculated   RETICABSCT  --   --  82.2  --   --    RETP  --   --  2.4*  --   --    FEB  --   --   --  261 Canceled, Test credited   DEMOND  --   --   --   --  19*   B12  --   --   --   --  373   FOLIC  --   --   --   --  26.5    < > = values in this interval not displayed.   ,   Results for orders placed or performed during the hospital encounter of 01/08/20   CT Abdomen Pelvis w Contrast    Narrative    Exam: CT abdomen and pelvis with contrast    Comparison: None    History: History of \"clot and intestines\" that presented the the same.  History of cirrhosis, now with GI bleed and diffuse abdominal pain    Technique: CT of the abdomen and pelvis was obtained with intravenous  contrast. Images were obtained in the arterial and portal venous  phases. Coronal and sagittal reconstructions were obtained and  reviewed.    Contrast dose: iopamidol (ISOVUE-370) solution 116 mL    Findings:    Abdomen/pelvis: There are scattered foci of gas within the main portal  vein, superior mesenteric vein, and smaller mesenteric venous  branches. Additional scattered foci of gas within the " intrahepatic  portal venous system. There is diffuse bowel wall thickening,  predominantly involving the terminal ileum, ascending colon, and the  hepatic flexure, with subtle hypoenhancement of the bowel wall in  these regions. There are a few tiny foci of gas inferior to the liver  margin (series 11 image 64) which are suspicious for pneumatosis.  Additional questionable trace pneumatosis involving a loop of small  bowel in the right mid abdomen (for example series 10 image 295).  Additional branching foci of air in the region of the ascending colon  may represent pneumatosis versus air within decompressed segments of  colon. No dilated loops of small bowel. No pooling of contrast to  suggest active GI bleeding. Postoperative changes of Yan-en-Y gastric  bypass.    Cirrhotic configuration of the liver. Splenomegaly. Calcified splenic  granuloma. Cholecystectomy. Pancreatic calcifications and pancreatic  atrophy, likely secondary to chronic pancreatitis. Normal adrenal  glands and kidneys (with the exception of a 9 mm simple cyst arising  from the right kidney). No obstructing urinary tract calculi. Bladder  is partially distended and unremarkable. Normal prostate.    Normal caliber of the infrarenal aorta. No appreciable arterial  occlusion. IVC filter. No convincing free intraperitoneal air.  Portosystemic varices, including a splenorenal shunt. Small volume  ascites. Diffuse mesenteric edema. Numerous prominent mesenteric nodes  measuring up to 1.2 cm (series 10 image 247), nonspecific in the  setting of ascites and intrinsic hepatic parenchymal disease.    Lower chest: Dependent atelectasis. No consolidative airspace opacity.  No pleural effusion or pneumothorax. Small hiatal hernia.    Bones: Degenerative changes of the spine. No acute or suspicious  appearing bony abnormalities. Small umbilical hernia which contains  ascitic fluid.      Impression    Impression:   1. Portal venous gas, predominantly  involving the superior mesenteric  vein and mesenteric venous branches, with bowel wall thickening,  hypoenhancement, and possible pneumatosis involving the right colon  and ileum, as described. These findings are concerning for bowel  ischemia, however benign pneumatosis superimposed on  infectious/inflammatory colitis/enteritis or portal  colopathy/enteropathy remain on the differential.  2. No evidence of active GI bleed.  3. Cirrhosis with evidence of portal hypertension including  portosystemic varices, splenomegaly, and ascites.  4. Sequelae of chronic pancreatitis.      [Result: Findings concerning for bowel ischemia]    Finding was identified on 1/8/2020 2:45 PM.     Dr. Mijares was contacted by Dr. Pickering on 1/8/2020 3:10 PM and  verbalized understanding of the result.    I have personally reviewed the examination and initial interpretation  and I agree with the findings.    GURINDER ARITA, DO   US Abd/Pelvis Duplex Complete Portable    Narrative    EXAMINATION: US ABDOMEN OR PELVIS DOPPLER COMPLETE PORTABLE, 1/8/2020  4:05 PM     COMPARISON: CTA today    HISTORY: worsening abd pain with air in smv;     TECHNIQUE: Limited midline Doppler evaluation of the abdomen.    Findings:    Liver: Nodular contour of the liver with coarsened echotexture. No  evidence of a focal hepatic mass.     Extrahepatic portal vein flow is antegrade, measuring 19 cm/sec.  Right portal vein flow is antegrade, measuring 17 cm/sec.  Left portal vein flow is antegrade, measuring 7 cm/sec.      Flow in the hepatic artery is towards the liver and:  66 cm/sec peak systolic  0.78 resistive index.     The splenic vein is patent and flow is towards the liver.  The left,  middle, and right hepatic veins are patent with flow towards the IVC.     Gallbladder: Cholecystectomy.          Impression    Impression:   Normal abdominal ultrasound with Doppler assessment. The known gas in  the portal vein and superior mesenteric vein is not  appreciated on  this ultrasound.    I have personally reviewed the examination and initial interpretation  and I agree with the findings.    RAMANA CARRASCO MD       Discharge Medications   Discharge Medication List as of 1/11/2020 11:27 AM      START taking these medications    Details   amoxicillin-clavulanate (AUGMENTIN) 875-125 MG tablet Take 1 tablet by mouth every 12 hours for 2 days, Disp-4 tablet, R-0, E-Prescribe      enoxaparin ANTICOAGULANT (LOVENOX) 100 MG/ML syringe Inject 0.88 mLs (88 mg) Subcutaneous every 24 hours for 4 doses, Disp-3.52 mL, R-0, E-Prescribe         CONTINUE these medications which have CHANGED    Details   ciprofloxacin (CIPRO) 500 MG tablet Take 1 tablet (500 mg) by mouth every 24 hours for 4 days, Disp-4 tablet, R-0, E-Prescribe      insulin glargine (LANTUS PEN) 100 UNIT/ML pen Inject 26 Units Subcutaneous 2 times daily, No Print OutIf Lantus is not covered by insurance, may substitute Basaglar at same dose and frequency.        !! lactulose (CHRONULAC) 10 GM/15ML solution Take 45 mLs (30 g) by mouth 3 times daily 20mg / 30 ml tid daily/ prn, No Print Out      !! lactulose (CHRONULAC) 10 GM/15ML solution Take 15 mLs (10 g) by mouth every hour as needed for constipation, Disp-946 mL, R-0, No Print Out      senna (SENOKOT) 8.6 MG tablet Take 1 tablet by mouth daily as needed for constipation, No Print Out       !! - Potential duplicate medications found. Please discuss with provider.      CONTINUE these medications which have NOT CHANGED    Details   blood glucose (NO BRAND SPECIFIED) lancets standard Use to test blood sugar 6 times daily or as directed.Historical      Calcium Carbonate Antacid 400 MG CHEW Take 400 mg by mouth 2 times daily , Historical      !! cholecalciferol (VITAMIN D3) 61548 units (1250 mcg) capsule Take 50,000 Units by mouth every 7 days Saturdays, Historical      diclofenac (VOLTAREN) 1 % topical gel Place onto the skin 4 times daily as needed for moderate  pain, Historical      ferrous sulfate (FEROSUL) 325 (65 Fe) MG tablet Take 325 mg by mouth daily (with breakfast), Historical      folic acid (FOLVITE) 1 MG tablet Take 1 mg by mouth 2 times daily, Historical      gabapentin (NEURONTIN) 400 MG capsule Take 400 mg by mouth 3 times daily, Historical      insulin aspart (NOVOLOG FLEXPEN) 100 UNIT/ML pen Inject Subcutaneous 3 times daily (with meals) Sliding scale as directed, Historical      magnesium oxide 400 MG CAPS Take 400 mg by mouth 3 times daily , Historical      MULTIPLE VITAMIN-FOLIC ACID PO Take 1 tablet by mouth 1 daily, Historical      Nutritional Supplements (ENSURE PO) Ensure/ boost - 237 ml bid daily, Historical      oxyCODONE-acetaminophen (PERCOCET)  MG per tablet Take 1 tablet by mouth every 4 hours as needed for moderate to severe pain , Historical      pantoprazole (PROTONIX) 40 MG EC tablet Take 40 mg by mouth daily, Historical      polyethylene glycol (MIRALAX/GLYCOLAX) packet Take 1 packet by mouth daily , Historical      promethazine (PHENERGAN) 12.5 MG tablet Take 25 mg by mouth every 6 hours as needed for nausea, Historical      !! QUEtiapine (SEROQUEL) 25 MG tablet Take 50 mg by mouth At Bedtime, Historical      !! QUEtiapine (SEROQUEL) 25 MG tablet Take 25 mg by mouth daily as needed , Historical      rifaximin (XIFAXAN) 550 MG TABS tablet Take 550 mg by mouth 2 times daily , Historical      spironolactone (ALDACTONE) 100 MG tablet Take 100 mg by mouth 3 times daily, Historical      traZODone (DESYREL) 50 MG tablet Take 50 mg by mouth At Bedtime Prn, Historical      tretinoin (RETIN-A) 0.025 % external cream Apply topically At BedtimeHistorical      !! Vitamin D, Cholecalciferol, 25 MCG (1000 UT) CAPS Take 2,000 Units by mouth daily , Historical       !! - Potential duplicate medications found. Please discuss with provider.      STOP taking these medications       furosemide (LASIX) 40 MG tablet Comments:   Reason for Stopping:              Allergies   Allergies   Allergen Reactions     Bee Anaphylaxis     Adhesive Tape Rash     Sulfa Drugs Itching

## 2020-01-13 LAB — INTERPRETATION ECG - MUSE: NORMAL

## 2020-01-13 NOTE — TELEPHONE ENCOUNTER
"[facility]: Post-Discharge Note  SITUATION                                                      Admission:    Admission Date: 01/08/20   Reason for Admission: Mesenteric ischemia  Discharge:   Discharge Date: 01/11/20  Discharge Diagnosis: Mesenteric ischemia  Discharge Service: Medicine and Pediatrics    BACKGROUND                                                      Obed Wiley is a 29 year old male with a history of alcoholic cirrhosis, grade III esophageal varices s/p banding, recurrent SBP, hepatic encephalopathy, chronic pancreatitis, Yan-en-Y gastric bypass (2007), DVT, PE s/p IVC filter (2016), mesentery ischemia secondary to mesenteric vein thrombosis, questionable clotting disorder, & DMII who presented to the [facility] ED on 1/8/20 with abdominal pain that began abruptly at 1am in the morning & rectal bleeding. He was admitted given concerns for bowel ischemia.       In the ED he was afebrile & hemodynamically stable. Labs were most notable for an initial lactic acid of 3.2.   CT abdomen was concerning for bowel ischemia, although benign pneumatosis superimposed on  infectious/inflammatory colitis/enteritis or portal colopathy/enteropathy were also listed as possible etiologies. There was no evidence of active GI bleeding. Portosystemic varices, splenomegaly, & ascites were also noted, along with sequelae of chronic pancreatitis. US abdomen was normal & did not reveal gas in the portal vein or SMV. General surgery was consulted & the decision was made to manage conservatively with fluids & antibiotics & monitor closely with serial abdominal exams. Low threshold for surgical intervention if acutely worsening. He was started on Zosyn & Ceftriaxone & given 3L IVF before being admitted to the MICU service for further management.     ASSESSMENT      Discharge Assessment  Patient reports symptoms are: Unchanged;Other(Patient reports his blood sugar has been running \"a little low\". His PCP " manages his medication for this and he was going to contact her. )  Does the patient have all of their medications?: Yes  Does patient know what their new medications are for?: Not applicable  Does patient have a follow-up appointment scheduled?: Yes  Does patient have any other questions or concerns?: No    Post-op  Did the patient have surgery or a procedure: No  Fever: No  Chills: No  Eating & Drinking: eating and drinking without complaints/concerns  PO Intake: other(Consistent Carbohydrate Diet 1767-9242 Calories: Moderate Consistent CHO (4-6 CHO units/meal))  Bowel Function: normal  Urinary Status: voiding without complaint/concerns    PLAN                                                      Outpatient Plan:      Follow up with primary care provider, Anjelica Reyes, on 1/13/2020, to evaluate medication change, for hospital follow- up and regarding new diagnosis. The following labs/tests are recommended: CBC to monitor platelets.    Future Appointments   Date Time Provider Department Center   2/6/2020  2:00 PM [facility]           [place] [facility]

## 2020-01-14 LAB
BACTERIA SPEC CULT: NO GROWTH
BACTERIA SPEC CULT: NO GROWTH
Lab: NORMAL
SPECIMEN SOURCE: NORMAL
SPECIMEN SOURCE: NORMAL

## 2020-01-15 LAB — HCYS SERPL-SCNC: 11 UMOL/L (ref 4–12)

## 2020-01-17 LAB
ALT SERPL-CCNC: 22 U/L (ref 8–51)
AST SERPL-CCNC: 29 U/L (ref 10–36)
CREAT SERPL-MCNC: 0.6 MG/DL (ref 0.64–1.34)
GFR SERPL CREATININE-BSD FRML MDRD: >60 ML/MIN/1.73M2
GLUCOSE SERPL-MCNC: 138 MG/DL (ref 70–110)
POTASSIUM SERPL-SCNC: 3.4 MMOL/L (ref 3.5–5.1)

## 2020-01-24 ENCOUNTER — CARE COORDINATION (OUTPATIENT)
Dept: ONCOLOGY | Facility: CLINIC | Age: 30
End: 2020-01-24

## 2020-01-24 DIAGNOSIS — K86.89 PANCREATIC INSUFFICIENCY: Primary | ICD-10-CM

## 2020-01-24 DIAGNOSIS — K74.60 CIRRHOSIS OF LIVER (H): Primary | ICD-10-CM

## 2020-01-24 NOTE — PROGRESS NOTES
The pancreas cyst has resolved.    I believe the concern raised was regarding his family history of pancreatic cancer at young age and whether he needs screening.    His imaging shows calcific chronic pancreatitis and prior gastric bypass. This in the setting of alcoholic liver disease. There is clearly no role for endoscopic screening in the setting of calcific chronic pancreatitis and gastric bypass. No concern re cystic neoplasm given the cyst has resolved.    I can meet with pt in clinic, review family history and discuss possible referral to genetics. Unless an identifiable genetic syndrome is identified, I do not believe there will be a role for any screening (which would require either CT or MRI based on anatomy).    OK for elective clinic consult.   Please have pt submit fecal elastase prior to visit.     STEVEN Acosta MD  Associate Professor of Medicine  Division of Gastroenterology, Hepatology and Nutrition  [facility]

## 2020-01-24 NOTE — PROGRESS NOTES
Care Coordination New Patient Referral  [facility]    NP referral date: original referral note 1/8/20  Referred to MD: Dave    See 1/8/20 note for further information.     All imaging now available per BECCA Gaitan, transplant coordinator    Referral sent to Dr. Acosta to review - 1/24/20 will schedule elective clinic consult and order stool for pancreas enzymes to be done prior to the visit.    Referral sent to New Patient scheduling on 01/24/20 at 2:45 pm via in [employer] staff message and the patient will be contacted with appointment.     Patient contacted and informed of stool test for pancreas that is needed prior to clinic visit with Dr. Acosta.  He has asked that we attempt to make arrangements for stool test to be done locally but does not know where order would be sent.  Will send message to BECCA Gaitan RN to discuss and plan.    Adela Clark  BSN, HNBC  RN Care Coordinator  [facility]  Ph: [phone]  Email: [email or web address]

## 2020-01-27 NOTE — TELEPHONE ENCOUNTER
ONCOLOGY INTAKE: Records Information      APPT INFORMATION: 3/9/20 - Dave Brambila CSC  Referring provider: LUDMILA Santos  Referring provider s clinic:  [facility]  Reason for visit/diagnosis:  pancreatic insufficiency   Has patient been notified of appointment date and time?: Yes    RECORDS INFORMATION:  Were the records received with the referral (via [employer])? Internal referral    Has patient been seen for any external appt for this diagnosis? No    If yes, where? NA    Has patient had any imaging or procedures outside of [place] for this condition? No      If Yes, where? NA    ADDITIONAL INFORMATION:  Scheduled via IB from Adela GLOVER called & confirmed with patient

## 2020-03-05 ENCOUNTER — CARE COORDINATION (OUTPATIENT)
Dept: GASTROENTEROLOGY | Facility: CLINIC | Age: 30
End: 2020-03-05

## 2020-03-09 ENCOUNTER — OFFICE VISIT (OUTPATIENT)
Dept: GASTROENTEROLOGY | Facility: CLINIC | Age: 30
End: 2020-03-09
Attending: INTERNAL MEDICINE
Payer: COMMERCIAL

## 2020-03-09 ENCOUNTER — PRE VISIT (OUTPATIENT)
Dept: GASTROENTEROLOGY | Facility: CLINIC | Age: 30
End: 2020-03-09

## 2020-03-09 VITALS
WEIGHT: 208.1 LBS | SYSTOLIC BLOOD PRESSURE: 116 MMHG | HEART RATE: 76 BPM | TEMPERATURE: 97 F | RESPIRATION RATE: 14 BRPM | OXYGEN SATURATION: 96 % | BODY MASS INDEX: 28.19 KG/M2 | HEIGHT: 72 IN | DIASTOLIC BLOOD PRESSURE: 72 MMHG

## 2020-03-09 DIAGNOSIS — K85.10 ACUTE BILIARY PANCREATITIS WITHOUT INFECTION OR NECROSIS: ICD-10-CM

## 2020-03-09 DIAGNOSIS — K70.31 ALCOHOLIC CIRRHOSIS OF LIVER WITH ASCITES (H): ICD-10-CM

## 2020-03-09 DIAGNOSIS — K85.00 IDIOPATHIC ACUTE PANCREATITIS WITHOUT INFECTION OR NECROSIS: Primary | ICD-10-CM

## 2020-03-09 PROCEDURE — G0463 HOSPITAL OUTPT CLINIC VISIT: HCPCS | Mod: ZF

## 2020-03-09 ASSESSMENT — PAIN SCALES - GENERAL: PAINLEVEL: SEVERE PAIN (7)

## 2020-03-09 ASSESSMENT — MIFFLIN-ST. JEOR: SCORE: 1939.56

## 2020-03-09 NOTE — PATIENT INSTRUCTIONS
You will find a brief summary of your discussion and care plan from today's visit below.  Please review this information with your primary care provider.  ______________________________________________________________________    As discussed today by Dr. Acosta, we will plan the followin.  Plan to have MRI with Secretin done here for follow up    2.  You will need to do a stool test to check for pancreas enzymes and you should stop in the lab today to  these supplies .      3.  Have blood work done today.      4.  Follow up with Dr. Acosta will be determined after the above is one.        _______________________________________________________________________    It was a pleasure seeing you in clinic today - please contact us if there are any further questions about upcoming appointments that arise following today's visit.  During business hours, you may reach the Clinic Coordinator at [phone].  For urgent/emergent questions after business hours, you may reach the on-call GI Fellow by contacting the [facility]  at [phone].    Any benign/non-urgent test results are usually communicated via letter or [employer] message within 1-2 weeks after completion.  Urgent results (those that require a change in the previously-discussed care plan) are usually communicated via a phone call once available from our clinic staff to discuss the results and the next steps in your evaluation.    I recommend signing up for Flitto access if you have not already done so and are comfortable with using a computer.  This allows for online access to your lab results and also helps you communicate efficiently with the clinic should any questions arise in your care.    My role as your RN care coordinator is to assist with any additional questions or concerns regarding your diagnosis and plan of care and to be your advocate.  I am happy to be part of your care team at the [facility]  [place].      Sincerely,      Adela TAYLORN, HNBC, STAR-T  RN Care Coordinator  Ph: [phone]  FAX: [phone]

## 2020-03-09 NOTE — LETTER
3/9/2020       RE: Obed Wiley  [address] [address] [address]     Dear Colleague,    Thank you for referring your patient, Obed Wiley, to the [facility] at [facility]. Please see a copy of my visit note below.    PRESENTING COMPLAINT:  The patient is a 30-year-old white male who I was asked to see in consultation at the request of Dr. Viola Santos for evaluation of chronic pancreatitis, relapsing acute pancreatitis and family history of pancreatic carcinoma.      HISTORY OF PRESENT ILLNESS:  The patient has a significant past history of gastric bypass for obesity with a weight of 400 pounds prior to his surgery at age 17.  Subsequently, he has a significant past history of alcohol use and alcohol-related liver disease with progression to cirrhosis.  He has had intermittent episodes of abdominal pain and has been diagnosed with acute pancreatitis.  He has been seen at multiple institutions, including in [place] at the [facility] and recently was referred here to see our Hepatology Service regarding possible transplant candidacy.  He was seen by Dr. Santos on 01/07/2000, at which time note was made of ongoing issues with intermittent pancreatitis as well family history.      The patient describes his first episode of pancreatitis approximately 6 years ago.  I see that he was admitted in [place] in 07/2018.  This is the last admission that I have mention of pancreatitis; however, the patient describes having episodes of pain every 2-3 months with the emergency room visits in either [place] or [place].  He does not identify any specific precipitating events.        In the past, his pancreatitis has been attributed to alcohol abuse; however, reports being abstinent since approximately 07/2018 and having ongoing issues.  He has not been able to identify any obvious precipitating factors for his more recent episodes of pancreatitis.   He states that the pain will last between 1 and 7 days and then resolve.  He reports being asymptomatic between episodes, although his mother who is present, states that he seems to be intermittently having pain that he has not mentioned.      He has had decompensation of his liver disease with intermittent encephalopathy as well as ascites.  He undergoes repeated paracentesis, which in the past have been 2-3 times a month and now is down to approximately 1 time per month.  He is on chronic lactulose therapy as well as rifaximin.  He reports having had a paracentesis yesterday or the day before.  In the past, he has had a significantly higher MELD than present.  His current MELD score is 9.      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      PAST SURGICAL HISTORY:  Yan-en-Y gastric bypass , cholecystectomy  and tonsillectomy.      ALLERGIES:  Tape, bees and sulfa.       MEDICATIONS:  Reviewed and documented in [employer].      FAMILY HISTORY:  His father reportedly  of pancreatic adenocarcinoma at age 36.  There are no other family members who had pancreatic adenocarcinoma.  His maternal grandfather had chronic pancreatitis and there are several other relatives who have chronic pancreatitis, which has been attributed to alcohol.  His mother has protein C deficiency.      SOCIAL HISTORY:  There is no history of tobacco use.  He does not currently drink alcohol.      REVIEW OF SYSTEMS:  See History of Present Illness.      I should note  the patient was recently admitted to our hospital in January with abdominal pain.  At that time, he had CT imaging which showed portal venous gas as well  as some diffuse bowel wall thickening and possible pneumatosis near the hepatic flexure.  He was evaluated by the Surgical Service.  The discharge summary mentions consultation with the Luminal GI service, but I see no consultation.  Imaging at that time incidentally identified multiple pancreatic calculi without apparent obstruction of the pancreatic duct.  He was managed conservatively.  He had been off anticoagulation for at least 6 months despite his history of protein C deficiency.  He was seen by Hematology/Oncology and their recommendation was to treat with high-dose Lovenox given his gastric bypass and possible unreliable absorption of oral anticoagulants.  He is scheduled to see Hematology tomorrow.      PHYSICAL EXAMINATION:     OBJECTIVE:   VITAL SIGNS:  Documented in Epic.   GENERAL:  The patient is awake and alert with apparently appropriate responses to questions.   HEENT:  His sclerae are anicteric.   LUNGS:  Clear.   CARDIAC:  Regular rate and rhythm without murmur, gallop or rub.   ABDOMEN:  Shows an easily reducible periumbilical hernia.  He has not had significant distention or obvious ascites on exam today, but reports just recently having paracentesis.  There is no palpable hepatosplenomegaly.  He does have tenderness to deep palpation in the right lower quadrant and at the paracentesis site.   EXTREMITIES:  No edema.   SKIN:  Shows no jaundice.      LABORATORY DATA:  Labs from 01/11 show an albumin of 2.4, total bilirubin 0.5.  Other liver enzymes normal.  White blood cell count of 1.7, platelet count of 31,000.  At that time, hemoglobin 8.6.      RADIOLOGIC DATA:  See history of present illness:  Multiple recent imaging studies were reviewed.  I do see that in the past he had a cystic lesion in the pancreatic tail, which on 12/21 measured up to 2.6 cm in diameter.  This has now resolved.      ASSESSMENT AND PLAN:   1.  Pancreatic cyst.  There was some concern regarding whether he had a  neoplastic pancreatic cyst, such as intraductal papillary mucinous neoplasm.  On serial imaging, the cyst has resolved, which would not be expected in any type of neoplasm.  This is consistent with his acute pancreatitis and chronic pancreatitis; per se, the cyst does not require further intervention or specific surveillance given its resolution.   2.  Family history of pancreatic adenocarcinoma.  We discussed that screening for pancreatic adenocarcinoma is currently somewhat controversial, although there are many centers pursuing this for people who meet very specific entry guidelines.  This will be considered if he had a known family history of a hereditary syndrome predisposing to malignancy, which he does not.  It would also be considered in patients who had more than 1 relative of which 1 is a first-degree relative.  He only has a single relative, and this would not meet the entry criteria for ongoing studies for screening for pancreatic cancer.  In addition, I do not think there will be any role for endoscopic screening given the presence of calcific chronic pancreatitis, which significantly interferes with ultrasound imaging.  His gastric bypass would only allow sonographic imaging of approximately half of his pancreas and thus there is no reason for further endoscopic imaging.  If we were to pursue screening, it would be via MRI which again I do not believe is warranted given his history.   3.  Relapsing acute pancreatitis.  I would like to get records from his previous emergency room evaluations which we do not have.  He does have evidence of an equivocal calcific chronic pancreatitis on CT imaging and I do not see obvious pancreatic ductal obstruction.  My recommendation at this time was that we obtain an MRCP with secretin to assess whether he has a functionally significant pancreatic duct stricture.  Even in that event, it is unclear to me at this time whether we would pursue intervention.  I discussed  that definitive intervention with endoscopic stenting or stone extraction would require either endoscopic reversal of his gastric bypass or laparoscopic assistance and I am not sure at present that he is having sufficient symptoms to warrant this, however, we will review his outside records.      At this point, we will obtain an MRI, MRCP.  I would like to obtain a fecal elastase to see if he has evidence of pancreatic exocrine insufficiency, which would warrant treatment with pancreatic enzymes.  I will also check a calcium and triglyceride level for uncommon causes of relapsing acute pancreatitis.  I do not believe that he requires genetic testing for hereditary pancreatitis in light of his past history of alcohol abuse.  He is not currently on any medications that would be expected to cause drug-induced pancreatitis.      I will contact him with the results of his MRCP.  At this point, I will defer further followup to Hepatology.  We will discuss with him if there is suggestion of a lesion amenable to endoscopic intervention on his MRCP, and I would certainly like to keep abreast if he has additional episodes of acute pancreatitis.     Again, thank you for allowing me to participate in the care of your patient.      Sincerely,    Aleksandar Acosta MD

## 2020-03-09 NOTE — NURSING NOTE
"Oncology Rooming Note    March 9, 2020 9:15 AM   Obed Wiley is a 30 year old male who presents for:    Chief Complaint   Patient presents with     Oncology Clinic Visit     New; Pancreatic Insufficiency     Initial Vitals: /72 (BP Location: Left arm, Patient Position: Chair, Cuff Size: Adult Regular)   Pulse 76   Temp 97  F (36.1  C) (Oral)   Resp 14   Ht 1.833 m (6' 0.17\")   Wt 94.4 kg (208 lb 1.6 oz)   SpO2 96%   BMI 28.09 kg/m   Estimated body mass index is 28.09 kg/m  as calculated from the following:    Height as of this encounter: 1.833 m (6' 0.17\").    Weight as of this encounter: 94.4 kg (208 lb 1.6 oz). Body surface area is 2.19 meters squared.  Severe Pain (7) Comment: Data Unavailable   No LMP for male patient.  Allergies reviewed: Yes  Medications reviewed: Yes    Medications: Medication refills not needed today.  Pharmacy name entered into [facility]: [facility] [id number] [facility] [phone]    Clinical concerns: Would like to discuss treatment options - follow up from hospital stay last month.        Anabel Calhoun CMA              "

## 2020-03-09 NOTE — LETTER
March 9, 2020       TO: Obed Wliey  [address] [address] [address]         Dear Obed Clay had appointment today for evaluation at [facility] with Dr. Acosta.    He will also need to return tomorrow for a MRI for ongoing evaluation.      Sincerely,    Adela Clark RN   BSN, HNBC, STAR-T  Advanced GI Service  Care Coordinator  Ph: [phone]  FAX: [phone]

## 2020-03-09 NOTE — PROGRESS NOTES
PRESENTING COMPLAINT:  The patient is a 30-year-old white male who I was asked to see in consultation at the request of Dr. Viola Santos for evaluation of chronic pancreatitis, relapsing acute pancreatitis and family history of pancreatic carcinoma.      HISTORY OF PRESENT ILLNESS:  The patient has a significant past history of gastric bypass for obesity with a weight of 400 pounds prior to his surgery at age 17.  Subsequently, he has a significant past history of alcohol use and alcohol-related liver disease with progression to cirrhosis.  He has had intermittent episodes of abdominal pain and has been diagnosed with acute pancreatitis.  He has been seen at multiple institutions, including in [place] at the [facility] and recently was referred here to see our Hepatology Service regarding possible transplant candidacy.  He was seen by Dr. Santos on 01/07/2000, at which time note was made of ongoing issues with intermittent pancreatitis as well family history.      The patient describes his first episode of pancreatitis approximately 6 years ago.  I see that he was admitted in [place] in 07/2018.  This is the last admission that I have mention of pancreatitis; however, the patient describes having episodes of pain every 2-3 months with the emergency room visits in either [place] or [place].  He does not identify any specific precipitating events.        In the past, his pancreatitis has been attributed to alcohol abuse; however, reports being abstinent since approximately 07/2018 and having ongoing issues.  He has not been able to identify any obvious precipitating factors for his more recent episodes of pancreatitis.  He states that the pain will last between 1 and 7 days and then resolve.  He reports being asymptomatic between episodes, although his mother who is present, states that he seems to be intermittently having pain that he has not mentioned.      He has had decompensation of his liver disease with  intermittent encephalopathy as well as ascites.  He undergoes repeated paracentesis, which in the past have been 2-3 times a month and now is down to approximately 1 time per month.  He is on chronic lactulose therapy as well as rifaximin.  He reports having had a paracentesis yesterday or the day before.  In the past, he has had a significantly higher MELD than present.  His current MELD score is 9.      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      PAST SURGICAL HISTORY:  Yan-en-Y gastric bypass , cholecystectomy  and tonsillectomy.      ALLERGIES:  Tape, bees and sulfa.       MEDICATIONS:  Reviewed and documented in Epic.      FAMILY HISTORY:  His father reportedly  of pancreatic adenocarcinoma at age 36.  There are no other family members who had pancreatic adenocarcinoma.  His maternal grandfather had chronic pancreatitis and there are several other relatives who have chronic pancreatitis, which has been attributed to alcohol.  His mother has protein C deficiency.      SOCIAL HISTORY:  There is no history of tobacco use.  He does not currently drink alcohol.      REVIEW OF SYSTEMS:  See History of Present Illness.      I should note  the patient was recently admitted to our hospital in January with abdominal pain.  At that time, he had CT imaging which showed portal venous gas as well as some diffuse bowel wall thickening and possible pneumatosis near the hepatic flexure.  He was evaluated by the Surgical Service.  The discharge summary mentions consultation with the Luminal GI service, but I see no consultation.  Imaging at that time incidentally identified multiple  pancreatic calculi without apparent obstruction of the pancreatic duct.  He was managed conservatively.  He had been off anticoagulation for at least 6 months despite his history of protein C deficiency.  He was seen by Hematology/Oncology and their recommendation was to treat with high-dose Lovenox given his gastric bypass and possible unreliable absorption of oral anticoagulants.  He is scheduled to see Hematology tomorrow.      PHYSICAL EXAMINATION:     OBJECTIVE:   VITAL SIGNS:  Documented in Epic.   GENERAL:  The patient is awake and alert with apparently appropriate responses to questions.   HEENT:  His sclerae are anicteric.   LUNGS:  Clear.   CARDIAC:  Regular rate and rhythm without murmur, gallop or rub.   ABDOMEN:  Shows an easily reducible periumbilical hernia.  He has not had significant distention or obvious ascites on exam today, but reports just recently having paracentesis.  There is no palpable hepatosplenomegaly.  He does have tenderness to deep palpation in the right lower quadrant and at the paracentesis site.   EXTREMITIES:  No edema.   SKIN:  Shows no jaundice.      LABORATORY DATA:  Labs from 01/11 show an albumin of 2.4, total bilirubin 0.5.  Other liver enzymes normal.  White blood cell count of 1.7, platelet count of 31,000.  At that time, hemoglobin 8.6.      RADIOLOGIC DATA:  See history of present illness:  Multiple recent imaging studies were reviewed.  I do see that in the past he had a cystic lesion in the pancreatic tail, which on 12/21 measured up to 2.6 cm in diameter.  This has now resolved.      ASSESSMENT AND PLAN:   1.  Pancreatic cyst.  There was some concern regarding whether he had a neoplastic pancreatic cyst, such as intraductal papillary mucinous neoplasm.  On serial imaging, the cyst has resolved, which would not be expected in any type of neoplasm.  This is consistent with his acute pancreatitis and chronic pancreatitis; per se, the cyst does not require further  intervention or specific surveillance given its resolution.   2.  Family history of pancreatic adenocarcinoma.  We discussed that screening for pancreatic adenocarcinoma is currently somewhat controversial, although there are many centers pursuing this for people who meet very specific entry guidelines.  This will be considered if he had a known family history of a hereditary syndrome predisposing to malignancy, which he does not.  It would also be considered in patients who had more than 1 relative of which 1 is a first-degree relative.  He only has a single relative, and this would not meet the entry criteria for ongoing studies for screening for pancreatic cancer.  In addition, I do not think there will be any role for endoscopic screening given the presence of calcific chronic pancreatitis, which significantly interferes with ultrasound imaging.  His gastric bypass would only allow sonographic imaging of approximately half of his pancreas and thus there is no reason for further endoscopic imaging.  If we were to pursue screening, it would be via MRI which again I do not believe is warranted given his history.   3.  Relapsing acute pancreatitis.  I would like to get records from his previous emergency room evaluations which we do not have.  He does have evidence of an equivocal calcific chronic pancreatitis on CT imaging and I do not see obvious pancreatic ductal obstruction.  My recommendation at this time was that we obtain an MRCP with secretin to assess whether he has a functionally significant pancreatic duct stricture.  Even in that event, it is unclear to me at this time whether we would pursue intervention.  I discussed that definitive intervention with endoscopic stenting or stone extraction would require either endoscopic reversal of his gastric bypass or laparoscopic assistance and I am not sure at present that he is having sufficient symptoms to warrant this, however, we will review his outside  records.      At this point, we will obtain an MRI, MRCP.  I would like to obtain a fecal elastase to see if he has evidence of pancreatic exocrine insufficiency, which would warrant treatment with pancreatic enzymes.  I will also check a calcium and triglyceride level for uncommon causes of relapsing acute pancreatitis.  I do not believe that he requires genetic testing for hereditary pancreatitis in light of his past history of alcohol abuse.  He is not currently on any medications that would be expected to cause drug-induced pancreatitis.      I will contact him with the results of his MRCP.  At this point, I will defer further followup to Hepatology.  We will discuss with him if there is suggestion of a lesion amenable to endoscopic intervention on his MRCP, and I would certainly like to keep abreast if he has additional episodes of acute pancreatitis.

## 2020-03-10 ENCOUNTER — ANCILLARY PROCEDURE (OUTPATIENT)
Dept: MRI IMAGING | Facility: CLINIC | Age: 30
End: 2020-03-10
Attending: INTERNAL MEDICINE
Payer: COMMERCIAL

## 2020-03-10 VITALS — BODY MASS INDEX: 27.68 KG/M2 | WEIGHT: 205 LBS

## 2020-03-10 DIAGNOSIS — K85.10 ACUTE BILIARY PANCREATITIS WITHOUT INFECTION OR NECROSIS: ICD-10-CM

## 2020-03-10 RX ORDER — GADOBUTROL 604.72 MG/ML
10 INJECTION INTRAVENOUS ONCE
Status: COMPLETED | OUTPATIENT
Start: 2020-03-10 | End: 2020-03-10

## 2020-03-10 RX ADMIN — GADOBUTROL 9 ML: 604.72 INJECTION INTRAVENOUS at 11:52

## 2020-03-11 ENCOUNTER — TELEPHONE (OUTPATIENT)
Dept: TRANSPLANT | Facility: CLINIC | Age: 30
End: 2020-03-11

## 2020-03-11 ENCOUNTER — HEALTH MAINTENANCE LETTER (OUTPATIENT)
Age: 30
End: 2020-03-11

## 2020-03-11 NOTE — Clinical Note
J - please set up with  labs, ROE,and Dr. Santos on 4/22. I have a slot held for Dr. Santos. Thanks, tk

## 2020-03-11 NOTE — TELEPHONE ENCOUNTER
Patient had ride issues today due to illness of .    He is asking to be rescheduled for 4/22 when the hem/onc team already rescheduled him.    He will do labs, SW,and Dr. Santos on 4/22

## 2020-04-09 ENCOUNTER — DOCUMENTATION ONLY (OUTPATIENT)
Dept: CARE COORDINATION | Facility: CLINIC | Age: 30
End: 2020-04-09

## 2020-04-15 ENCOUNTER — TELEPHONE (OUTPATIENT)
Dept: HEMATOLOGY | Facility: CLINIC | Age: 30
End: 2020-04-15

## 2020-04-15 NOTE — TELEPHONE ENCOUNTER
Mr. 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 I spoke to him regarding the need for updated laboratory evaluation prior to his scheduled visit with Dr. Mcallister on 4/22/2020 at 10 am. He requested that I send him the list of studies needed and he would arrange through his primary physician to have them completed. As requested the list from Dr. Mcallister was sent via itzat and the fax number to direct results. For the video visit he uses his phone. He will need instructions for phone set up e-mailed to him. E-mail in epic is current.

## 2020-04-21 LAB
ALT SERPL-CCNC: 29 U/L (ref 30–65)
AST SERPL-CCNC: 37 U/L (ref 15–37)
CREAT SERPL-MCNC: 0.7 MG/DL (ref 0.7–1.2)
GFR SERPL CREATININE-BSD FRML MDRD: 140.6 ML/MIN/1.73M2
GLUCOSE SERPL-MCNC: 182 MG/DL (ref 70–110)
POTASSIUM SERPL-SCNC: 3.8 MEQ/L (ref 3.5–5)

## 2020-04-22 ENCOUNTER — VIRTUAL VISIT (OUTPATIENT)
Dept: TRANSPLANT | Facility: CLINIC | Age: 30
End: 2020-04-22
Attending: INTERNAL MEDICINE
Payer: COMMERCIAL

## 2020-04-22 ENCOUNTER — VIRTUAL VISIT (OUTPATIENT)
Dept: HEMATOLOGY | Facility: CLINIC | Age: 30
End: 2020-04-22
Attending: INTERNAL MEDICINE
Payer: COMMERCIAL

## 2020-04-22 ENCOUNTER — TELEPHONE (OUTPATIENT)
Dept: BEHAVIORAL HEALTH | Facility: CLINIC | Age: 30
End: 2020-04-22

## 2020-04-22 ENCOUNTER — VIRTUAL VISIT (OUTPATIENT)
Dept: GASTROENTEROLOGY | Facility: CLINIC | Age: 30
End: 2020-04-22
Attending: INTERNAL MEDICINE
Payer: COMMERCIAL

## 2020-04-22 DIAGNOSIS — I82.409 DVT (DEEP VENOUS THROMBOSIS) (H): ICD-10-CM

## 2020-04-22 DIAGNOSIS — E11.9 DIABETES (H): ICD-10-CM

## 2020-04-22 DIAGNOSIS — K74.60 CIRRHOSIS OF LIVER (H): Primary | ICD-10-CM

## 2020-04-22 DIAGNOSIS — K70.31 ALCOHOLIC CIRRHOSIS OF LIVER WITH ASCITES (H): Primary | ICD-10-CM

## 2020-04-22 DIAGNOSIS — K70.31 ALCOHOLIC CIRRHOSIS OF LIVER WITH ASCITES (H): ICD-10-CM

## 2020-04-22 DIAGNOSIS — D68.9 COAGULOPATHY (H): ICD-10-CM

## 2020-04-22 PROCEDURE — 99203 OFFICE O/P NEW LOW 30 MIN: CPT | Mod: 95 | Performed by: INTERNAL MEDICINE

## 2020-04-22 RX ORDER — FUROSEMIDE 40 MG
40 TABLET ORAL 3 TIMES DAILY
COMMUNITY
Start: 2020-04-20

## 2020-04-22 ASSESSMENT — PAIN SCALES - GENERAL: PAINLEVEL: NO PAIN (0)

## 2020-04-22 NOTE — PROGRESS NOTES
Patient was contacted to complete the pre-visit call prior to their video visit with the provider.  The following statement was read:       Because of Coronavirus we are instituting video visits when possible to keep everyone safe. This video visit will be conducted between you and the provider.  This service lets us provide the care you need with a video conversation.  If a prescription is necessary, we can send it directly to your pharmacy.If lab work or other testing is needed, we can help arrange a place/time for that to be done at a later date.If during the course of the call the provider feels a video visit is not appropriate, then your insurance company will not be billed.       Allergies and medications were reviewed and travel screening complete.     I thanked them for their time to cover this information     Morales Sellers CMA

## 2020-04-22 NOTE — PROGRESS NOTES
"Obed Wiley is a 30 year old male who is being evaluated via a billable video visit.      The patient has been notified of following:     \"This video visit will be conducted via a call between you and your physician/provider. We have found that certain health care needs can be provided without the need for an in-person physical exam.  This service lets us provide the care you need with a video conversation.  If a prescription is necessary we can send it directly to your pharmacy.  If lab work is needed we can place an order for that and you can then stop by our lab to have the test done at a later time.    Video visits are billed at different rates depending on your insurance coverage.  Please reach out to your insurance provider with any questions.    If during the course of the call the physician/provider feels a video visit is not appropriate, you will not be charged for this service.\"    Patient has given verbal consent for Video visit? Yes    How would you like to obtain your AVS? Chrissy    Patient would like the video invitation sent by: Text to cell phone: [phone]    Will anyone else be joining your video visit? No      Video Start Time: 11:14    [facility]    Hepatology follow-up    Subjective:  30 year old male with a history of decompensated cirrhosis secondary to alcohol with history of hepatic encephalopathy, ascites, SBP. He also has history of relapsing acute pancreatitis, Yan-en-Y for obesity in 2007, diabetes on insulin, hypercoagulable state due to protein C deficiency (DVT in bilateral lower extremities, history of PE, history of IVC filter).  He was last seen by Dr. Santos on 1/7/2020 for initiation of transplant evaluation.  On that same day, he was also seen by SOT team, Dr. Marsh who felt that patient was a good overall transplant candidate.    Due to history of acute relapsing pancreatitis as well as history of pancreatic adenocarcinoma in his father " at age 36, patient was referred for pancreas evaluation on 3/9/2020 by Dr. Aleksandar Acosta.  Per Dr. Acosta, history of pancreatic cyst does not require further intubation or specific surveillance given its resolution.  Furthermore, patient does not meet very specific entry guidelines for screening for pancreatic adenocarcinoma.  An MRCP with secretin was obtained to rule out a functionally significant pancreatic duct stricture, which was not concerning.  At this time, patient has still not had his fecal elastase, calcium, triglyceride level obtained.    Overall, patient feels very well.  He denies any cough or shortness of breath. He occasionally has lower extremity edema if he is standing for too long.  He has not required a paracentesis for over 2 months now.  Patient denies jaundice, lower extremity edema, abdominal distension, lethargy or confusion. Patient denies melena, hematemesis or hematochezia. Patient denies fevers, sweats or chills.  Patient states that he has been losing some weight; we discussed the importance of his obtaining of a fecal elastase to check if he has malabsorption.    He continues to remain sober.  He states that he quit smoking in December 2019.  Before that smoked for about 15 years about half a pack a day.  He states that his last alcoholic drink was 6/2018.  He denies any other recreational drug use including marijuana, cocaine, methamphetamines.  He occasionally uses Tylenol.        Medical hx Surgical hx   Past Medical History:   Diagnosis Date     Alcoholic cirrhosis of liver with ascites (H) 12/23/2019     Diabetes (H)     Type 2 DM, Uses Insulin      DVT (deep vein thrombosis) in pregnancy      H/O protein C deficiency      Hepatic encephalopathy (H)      Hepatitis     Hep A when an infant      History of blood transfusion     2019 at [facility]      Leukopenia 1/11/2020     SBP (spontaneous bacterial peritonitis) (H) 11/2019      Past Surgical History:   Procedure Laterality  Date     ABDOMEN SURGERY      Gastric Bypass 2009. [facility]      CARDIAC SURGERY      IVC      COLONOSCOPY       ENT SURGERY      Tonsils and Adenoids Removed at 6-7 Years Old      GALLBLADDER SURGERY  2017     GI SURGERY      Upper GI           Medications  Prior to Admission medications    Medication Sig Start Date End Date Taking? Authorizing Provider   blood glucose (NO BRAND SPECIFIED) lancets standard Use to test blood sugar 6 times daily or as directed.   Yes Reported, Patient   Calcium Carbonate Antacid 400 MG CHEW Take 400 mg by mouth 2 times daily    Yes Reported, Patient   cholecalciferol (VITAMIN D3) 72982 units (1250 mcg) capsule Take 50,000 Units by mouth every 7 days Saturdays   Yes Reported, Patient   diclofenac (VOLTAREN) 1 % topical gel Place onto the skin 4 times daily as needed for moderate pain   Yes Unknown, Entered By History   ferrous sulfate (FEROSUL) 325 (65 Fe) MG tablet Take 325 mg by mouth daily (with breakfast)   Yes Reported, Patient   folic acid (FOLVITE) 1 MG tablet Take 1 mg by mouth 2 times daily   Yes Reported, Patient   furosemide (LASIX) 40 MG tablet Take 40 mg by mouth 3 times daily 4/20/20  Yes Reported, Patient   gabapentin (NEURONTIN) 400 MG capsule Take 400 mg by mouth 3 times daily   Yes Reported, Patient   insulin aspart (NOVOLOG FLEXPEN) 100 UNIT/ML pen Inject Subcutaneous 3 times daily (with meals) Sliding scale as directed   Yes Reported, Patient   insulin glargine (LANTUS PEN) 100 UNIT/ML pen Inject 26 Units Subcutaneous 2 times daily 1/10/20  Yes Tahir Acosta MD   lactulose (CHRONULAC) 10 GM/15ML solution Take 15 mLs (10 g) by mouth every hour as needed for constipation 1/10/20  Yes Jesusita Oneal MD   magnesium oxide 400 MG CAPS Take 400 mg by mouth 3 times daily    Yes Reported, Patient   MULTIPLE VITAMIN-FOLIC ACID PO Take 1 tablet by mouth 1 daily   Yes Reported, Patient   Nutritional Supplements (ENSURE PO) Ensure/ boost - 237 ml bid daily    Yes Reported, Patient   oxyCODONE-acetaminophen (PERCOCET)  MG per tablet Take 1 tablet by mouth every 4 hours as needed for moderate to severe pain    Yes Reported, Patient   pantoprazole (PROTONIX) 40 MG EC tablet Take 40 mg by mouth daily   Yes Reported, Patient   polyethylene glycol (MIRALAX/GLYCOLAX) packet Take 1 packet by mouth daily    Yes Reported, Patient   promethazine (PHENERGAN) 12.5 MG tablet Take 25 mg by mouth every 6 hours as needed for nausea   Yes Reported, Patient   QUEtiapine (SEROQUEL) 25 MG tablet Take 50 mg by mouth At Bedtime   Yes Unknown, Entered By History   QUEtiapine (SEROQUEL) 25 MG tablet Take 25 mg by mouth daily as needed    Yes Reported, Patient   rifaximin (XIFAXAN) 550 MG TABS tablet Take 550 mg by mouth 2 times daily    Yes Reported, Patient   senna (SENOKOT) 8.6 MG tablet Take 1 tablet by mouth daily as needed for constipation 1/10/20  Yes Jesusita Oneal MD   spironolactone (ALDACTONE) 100 MG tablet Take 100 mg by mouth 3 times daily   Yes Reported, Patient   traZODone (DESYREL) 50 MG tablet Take 50 mg by mouth At Bedtime Prn   Yes Reported, Patient   tretinoin (RETIN-A) 0.025 % external cream Apply topically At Bedtime   Yes Reported, Patient   Vitamin D, Cholecalciferol, 25 MCG (1000 UT) CAPS Take 2,000 Units by mouth daily    Yes Reported, Patient   amoxicillin-clavulanate (AUGMENTIN) 875-125 MG tablet Take 1 tablet by mouth every 12 hours for 2 days 1/10/20 1/12/20  Jesusita Oneal MD   ciprofloxacin (CIPRO) 500 MG tablet Take 1 tablet (500 mg) by mouth every 24 hours for 4 days 1/11/20 1/15/20  Jesusita Oneal MD   enoxaparin ANTICOAGULANT (LOVENOX) 100 MG/ML syringe Inject 0.88 mLs (88 mg) Subcutaneous every 24 hours for 4 doses 1/11/20 1/15/20  Jesusita Oneal MD   lactulose (CHRONULAC) 10 GM/15ML solution Take 45 mLs (30 g) by mouth 3 times daily 20mg / 30 ml tid daily/ prn  Patient not taking: Reported on 4/22/2020 1/10/20   Jesusita Oneal MD        Allergies  Allergies   Allergen Reactions     Bee Anaphylaxis     Adhesive Tape Rash     Sulfa Drugs Itching       Review of systems  A 10-point review of systems was negative    Examination  There were no vitals taken for this visit.  There is no height or weight on file to calculate BMI.    On visual exam, patient is nontoxic-appearing, able to speak in full sentences without any respiratory distress.  No overt jaundice or scleral icterus is visible.    LaboratoryRadiology    Patient's most recent labs were obtained at [facility] on 4/21/2020    Sodium 135, potassium 3.8, chloride 100, BUN 13 creatinine 0.7    Total protein 7.4, albumin 3.4, total bilirubin 2.08, AST 37, ALT 29, alk phos 105.  INR 1.3.      Assessment    CIRRHOSIS, SECONDARY TO ALCOHOL  Decompensated disease, based on the presence of ascites and hepatic encephalopathy    Etiology: Alcohol  MELD-Na of 16    Hepatic encephalopathy: None present currently.  Patient has a history of type C, episodic hepatic encephalopathy requiring hospitalizations in the past.  He remains compliant with his lactulose 3 times daily (with additional dose as necessary if not meeting his goal of 4-5 bowel movements daily) as well as rifaximin.    Ascites: Well-controlled with current diuretic regimen (Lasix 40 mg 3 times daily and spironolactone 100 mg 3 times daily) and salt restriction.  He states that he has not needed a paracentesis for 2 months.  Prior to this he has needed paracenteses up to weekly.  Patient also has a history of SBP and is on SBP prophylaxis.    TIPS: None.    Esophageal/Gastric varices: Last EGD was 10/2/2018 at [facility] with grade 3 varices found in the lower third of the esophagus, 7 mm in largest diameter.  5 bands were placed with complete eradication.  Portal hypertension gastropathy noted.    Hepatocellular carcinoma: Last abdominal imaging on 3/10/2020 MR abdomen  "showed a \"1.6 cm focus of hypoenhancement on delayed sequences with mildly low signal on GRE in phase images and most suggestive of a siderotic or dysplastic nodule.\"    Transplant: Pending evaluation.  Patient has been seen by , who felt that patient was overall a good candidate.  Patient states that his last alcoholic drink was in June 2018.    Nutrition: Patient reports some ongoing weight loss; patient is currently pending fecal elastase for work-up of pancreatic insufficiency    Pain control: Patient continues to struggle with pain issues related to abdominal pain as well as sciatica.  We have encouraged him to wean narcotics as able.  He states that tramadol does not work.  He would like to meet with the pain team to discuss nonnarcotic alternatives to pain control.  We discussed that a cortisone shot for his sciatica would be okay.    RECOMMENDATIONS:  -- Rifaximin 550 mg BID  -- Lactulose PO, titrate to 3-4 BMs per day  -- Sodium restriction to 2000 mg per day  -- Continue Lasix at 40 TID and spironolactone at 100mg TID (for unclear reasons, Lasix does not appear in his medication list, but patient confirms that he is taking this)  -- Adequate protein diet (1.2-1.5g/kg per day) w/ supplements in between meals  -- Continue ciprofloxacin for SBP prophylaxis.   -- Recommend placement of Pain Consult by Primary care     Pt was seen and discussed with my Hepatology attending, Dr. Viola Santos.     Jennie Rivera MD, [facility]  Gastroenterology Fellow, PGY4  Pager [phone]    Attestation:  This patient has been seen and evaluated by me, Viola Santos.  Discussed with the house staff team or resident(s) and agree with the findings and plan in this note.     Video-Visit Details    Type of service:  Video Visit    Video End Time (time video stopped): 11:34 AM    Originating Location (pt. Location): Home    Distant Location (provider location):  [facility] HEPATOLOGY     Mode of Communication:  " Video Conference via [facility]      Viola Santos MD

## 2020-04-22 NOTE — PROGRESS NOTES
[facility]  [address] [place] [address] [address], [place] [address]  Main: [phone], Fax: [phone]    Patient seen at: [facility] at [address] [place] [address]    Video Virtual Visit Note:    Patient: Obed Wiley  MRN: [id number]  : 1990  CURLY: 2020    Due to the ongoing COVID-19 outbreak, this visit was conducted by video, with the patient's approval.    Physician has received verbal consent for a Video Visit from the patient? Yes    Patient would like the video invitation sent by: Text to cell phone: [phone]    Video Start Time: 10:12 am    Reason of today's visit:  Pre-transplant evaluation for patient with multiple VTE.    Assessment:  1. Complex history of pulmonary embolism, VTE  2. Heterozygous protein C deficiency  3. Liver-disease associated coagulopathy  4. 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 History of Yan-en-Y gastric bypass  6. Chronic anemia, multifactorial  7. History of leukopenia  8. Chronic indwelling IVC filter in need for chronic anticoagulation    Obed has complex history. He has ESLD with liver-related hemostatic enzyme pattern. His prior protein C values are slightly lower than would be expected for liver disease alone, and likely reflect his heterozygosity for protein C deficiency. Obed has had Yan-en-Y bypass surgery, which limits ability to use direct oral anticoagulants (DOACs) due to concern for absorption. Furthermore, his history of variceal and GI bleeding also makes use of DOACs less appealing. He has been tolerating LMWH, therefore would not change dose. Will change prepartaion.     If Obed were to be listed for liver transplant, would recommend he remain on either LMWH or change back to subcutaneous heparin. These agents have test to monitor levels and can be reversed with protamine. Post-operatively, Obed would need to be initiated on treatment-dose heparin (IV) within  24-48 h. Given his permanent IVC filter he should be on heparin or warfarin.     Obed has history pancytopenia. Last CBC (via [facility]) with persistent thrombocytopenia. Prior etiology of his pancytopenia is multifactorial - with contributions of chronic illness, nutritional deficiencies and liver disease with synthetic dysfunction. Given history of Yan-en-Y anatomy and GI bleeding, he is on iron supplement. Would recommend repeating ferritin, iron saturation, B12, folate and replacing aggressively. Would also check other micronutrients (copper, zinc, vitamin D, B6, A). Thrombocytopenia due to liver disease. Would not hold anticoagulation unless he were to experience bleeding and/or have platelets fall to <30,000.     Recommendations:  1. Continue with LMWH 80 mg subcutaneous daily. Discussion with pharmacy and patient- will try using insulin needs and multi-dose vials to decrease hematoma formation. If this fails next line would be insulflon catheters.   2. HOLD LMWH if platelet count falls to <30,000 or patient experiences significant bleeding.   3. Repeat ferritin and other iron labs (orders in Epic- post COVID)      Video-Visit Details:    Type of service:  Video Visit    Video End Time (time video stopped): 10:50 AM    Total time: 37 min    Originating Location (pt. Location): Home    Distant Location (provider location):  [facility]     Mode of Communication:  Video Conference via [employer]      Patti Mcallister MD/PhD   of Medicine  Division of Hematology, Oncology and Transplantation  Pager [phone]    ---------------------------------------------------------------------------------------------------------------------  History of Present Illness:  Dr. Holland from either [facility] or [facility]  Obed Wiley is a 30 year old male with history of Yan-en-Y, ESLD secondary to alcoholic cirrhosis and chronic pancreatitis with complex  bleeding and clotting history. Evaluation today regarding management of anticoagulant in anticipation of transplant.     Obed was diagnosed with first blood clot at age 24 in his lower intestine. We do not have records from this event (occurred in [place]). Per patient, symptom was severe abdominal pain with dry heaves, etc. Presented to ED in [place]. Was on coumadin, but developed internal bleeding. Then tryed Xarelto and had internal bleeding. Had internal bleeding with LMWH. Was on heparin (three times a day) for long period of time. R. Leg had extensive clot in past. Patient had IVC filter placed (age 24). Was then put on aspirin. Patient stated his was fine until January until he had internal bleed. Had another clot in lower intestine and spleen. Patient is currently on 85 mg of LMWH daily. No issues with severe bruising. Abdomen is bruised secondary to paracentesis. He is currently on oral iron supplements. Has received IV iron and blood transfusion. He reports that he is not requiring as frequent paracentesis for his liver. He does endorse getting significant hematomas over his abdomen from LMWH shots. He does not have enough subcutaneous tissue on arms or legs to tolerate shots there. No current episodes of BRBPR. Has frequent bowel movements thanks to medications. No episodes of nose bleeds or gum bleeding.     Past Medical History  Active Ambulatory Problems     Diagnosis Date Noted     Alcoholic cirrhosis of liver with ascites (H) 12/23/2019     Diabetes (H)      Acute mesenteric ischemia (H) 01/08/2020     Constipation 01/10/2020     Leukopenia 01/11/2020     Resolved Ambulatory Problems     Diagnosis Date Noted     No Resolved Ambulatory Problems     Past Medical History:   Diagnosis Date     DVT (deep vein thrombosis) in pregnancy      H/O protein C deficiency      Hepatic encephalopathy (H)      Hepatitis      History of blood transfusion      SBP (spontaneous bacterial peritonitis) (H) 11/2019      bilateral PE 8/2016 s/p IVC filter placement, and DVT 7/2018        Mesenteric Ischemia with mesenteric vein thrombosis (4272-2027). Unclear if arterial or venous (can not find records). Was on coumadin for 4 months- then had GI bleed and changed to Xarelto    Provoked partially occlusive acute deep vein thrombosis left popliteal and posterior tibial veins. ddx 11-7-2015 Risk factors 1). Severe pancreatitis and cholelitiasis requiring surgery 2. Prior thrombosis. Was started on Xarelto    Hemopertineum 8/2016 complicated by bilateral pulmonary emboli. IVC filter placement due to hemopertinum    2017: baby aspirin only        Past Surgical History  Past Surgical History:   Procedure Laterality Date     ABDOMEN SURGERY      Gastric Bypass 2009. [facility]      CARDIAC SURGERY      IVC      COLONOSCOPY       ENT SURGERY      Tonsils and Adenoids Removed at 6-7 Years Old      GALLBLADDER SURGERY  2017     GI SURGERY      Upper GI      Medications  Current Outpatient Medications   Medication     blood glucose (NO BRAND SPECIFIED) lancets standard     Calcium Carbonate Antacid 400 MG CHEW     cholecalciferol (VITAMIN D3) 60854 units (1250 mcg) capsule     diclofenac (VOLTAREN) 1 % topical gel     ferrous sulfate (FEROSUL) 325 (65 Fe) MG tablet     folic acid (FOLVITE) 1 MG tablet     gabapentin (NEURONTIN) 400 MG capsule     insulin aspart (NOVOLOG FLEXPEN) 100 UNIT/ML pen     insulin glargine (LANTUS PEN) 100 UNIT/ML pen     lactulose (CHRONULAC) 10 GM/15ML solution     lactulose (CHRONULAC) 10 GM/15ML solution     magnesium oxide 400 MG CAPS     MULTIPLE VITAMIN-FOLIC ACID PO     Nutritional Supplements (ENSURE PO)     oxyCODONE-acetaminophen (PERCOCET)  MG per tablet     pantoprazole (PROTONIX) 40 MG EC tablet     polyethylene glycol (MIRALAX/GLYCOLAX) packet     promethazine (PHENERGAN) 12.5 MG tablet     QUEtiapine (SEROQUEL) 25 MG tablet     QUEtiapine (SEROQUEL) 25 MG tablet     rifaximin (XIFAXAN) 550  MG TABS tablet     senna (SENOKOT) 8.6 MG tablet     spironolactone (ALDACTONE) 100 MG tablet     traZODone (DESYREL) 50 MG tablet     tretinoin (RETIN-A) 0.025 % external cream     Vitamin D, Cholecalciferol, 25 MCG (1000 UT) CAPS     No current facility-administered medications for this visit.        Allergies  Allergies   Allergen Reactions     Bee Anaphylaxis     Adhesive Tape Rash     Sulfa Drugs Itching       Social History  History of alcohol abuse. Working on smoking cessation. No illicit drug use. Lives with mother    Family History  Mother has protein C deficiency. Has had clot found during CT scan. Mother was adopted  Had one brother- no blood clots  Father  from pancreatic cancer in .   No history of blood clots in paternal side      ROS:   ROS: 14 point ROS neg other than the symptoms noted above in the HPI.    Objective:  Pleasant in no acute distress.  Visual Examination via Video:  GENERAL: pale, fatigued and appears older than stated age  EYES: Eyes grossly normal to inspection, conjunctivae and sclerae normal  RESP: no audible wheeze, cough, or visible cyanosis.  No visible retractions or increased work of breathing.  Able to speak fully in complete sentences.  NEURO: Cranial nerves grossly intact, mentation intact and speech normal  PSYCH: mentation appears normal, affect normal/bright, judgement and insight intact, normal speech and appearance well-groomed    Labs:  CBC: 6.7>15.3/43.5<49  INR 1.3    2015: Reports that he is negative for Factor V Leiden, prothrombin gene, and antiphospholipid antibodies      Imagin2020: CT: Abdomen/pelvis: There are scattered foci of gas within the main portal vein, superior mesenteric vein, and smaller mesenteric venous branches. Additional scattered foci of gas within the intrahepatic portal venous system. There is diffuse bowel wall thickening, predominantly involving the terminal ileum, ascending colon, and the  hepatic flexure, with  subtle hypoenhancement of the bowel wall in these regions. There are a few tiny foci of gas inferior to the liver margin (series 11 image 64) which are suspicious for pneumatosis.  Additional questionable trace pneumatosis involving a loop of small  bowel in the right mid abdomen (for example series 10 image 295).  Additional branching foci of air in the region of the ascending colon  may represent pneumatosis versus air within decompressed segments of  colon. No dilated loops of small bowel. No pooling of contrast to  suggest active GI bleeding. Postoperative changes of Yan-en-Y gastric  bypass.     Cirrhotic configuration of the liver. Splenomegaly. Calcified splenic  granuloma. Cholecystectomy. Pancreatic calcifications and pancreatic  atrophy, likely secondary to chronic pancreatitis. Normal adrenal  glands and kidneys (with the exception of a 9 mm simple cyst arising  from the right kidney). No obstructing urinary tract calculi. Bladder  is partially distended and unremarkable. Normal prostate.     Normal caliber of the infrarenal aorta. No appreciable arterial  occlusion. IVC filter. No convincing free intraperitoneal air.  Portosystemic varices, including a splenorenal shunt. Small volume  ascites. Diffuse mesenteric edema. Numerous prominent mesenteric nodes  measuring up to 1.2 cm (series 10 image 247), nonspecific in the  setting of ascites and intrinsic hepatic parenchymal disease.     Lower chest: Dependent atelectasis. No consolidative airspace opacity.  No pleural effusion or pneumothorax. Small hiatal hernia.     Bones: Degenerative changes of the spine. No acute or suspicious  appearing bony abnormalities. Small umbilical hernia which contains  ascitic fluid.                                                                      Impression:   1. Portal venous gas, predominantly involving the superior mesenteric  vein and mesenteric venous branches, with bowel wall thickening,  hypoenhancement, and  possible pneumatosis involving the right colon  and ileum, as described. These findings are concerning for bowel  ischemia, however benign pneumatosis superimposed on  infectious/inflammatory colitis/enteritis or portal  colopathy/enteropathy remain on the differential.  2. No evidence of active GI bleed.  3. Cirrhosis with evidence of portal hypertension including  portosystemic varices, splenomegaly, and ascites.  4. Sequelae of chronic pancreatitis.    4/10/2018  D:   4/10/2018   0732   nz    PROCEDURE:   CT abdomen and pelvis routine with IV contrast, 4/8/2018.     COMPARISON:   4/22/2017    FINDINGS:   Oral and intravenous contrast administered.  2-mm collimation   sagittal, coronal, axial and multiplanar reconstructions were performed   and reviewed on an independent work station.    The atrophic pancreas has prominent fat-stranding around it.  The   previously described cyst at the junctions of the body and tail of the   pancreas is slightly increased in size.  The cyst at the tail of the   pancreas is decreased in size.  A small amount of peripancreatic free   fluid is noted.  No ductal obstruction is seen. Overall, findings are   slightly less severe compared to the previous study.      The gallbladder is surgically absent.  The liver is increasingly   hypodense, consistent with steatosis.  No intrahepatic mass is seen.  The   spleen remains enlarged.      The kidneys enhance symmetrically.  The adrenal glands and urinary bladder   are unremarkable.      There is no evidence of bowel obstruction.  The appendix is   normal-appearing.  An IVC filter is again noted in the inferior vena cava.    At the proximal aspect of the filter, there is a 5-mm diameter x 3-cm   long area of thrombus extending cephalad from the filter.     Slight loss of height of lower thoracic vertebral bodies and early   degenerative changes of the spine are noted. These findings are not   significantly changed.     The visualized lung  bases are clear.      IMPRESSION:   1. Pancreatitis as described above.  2. Hepatic steatosis.    3. A small area of thrombus in the inferior vena cava extending proximally   from the filter.    4. Bariatric surgical changes.    5. Splenomegaly.    Ultrasound  10/27/2019: Minimal chronic R popliteal thrombus  11/8/2015:   On the left, there is nonocclusive deep vein thrombosis in the popliteal   vein and posterior tibial vein to the mid calf.  The thrombus is   hypoechoic and vessels are only partially compressible.  All other deep   veins on the left are patent.      IMPRESSION:  Partially occlusive acute deep vein thrombosis left popliteal   and posterior tibial veins.  Negative examination for deep venous   thrombosis in the right leg.

## 2020-04-22 NOTE — LETTER
2020       RE: Obed Wiley  [address] [place] [id number]     Dear Colleague,    Thank you for referring your patient, Obed Wiley, to the [facility]. Please see a copy of my visit note below.    Patient was contacted to complete the pre-visit call prior to their video visit with the provider.  The following statement was read:       Because of Coronavirus we are instituting video visits when possible to keep everyone safe. This video visit will be conducted between you and the provider.  This service lets us provide the care you need with a video conversation.  If a prescription is necessary, we can send it directly to your pharmacy.If lab work or other testing is needed, we can help arrange a place/time for that to be done at a later date.If during the course of the call the provider feels a video visit is not appropriate, then your insurance company will not be billed.       Allergies and medications were reviewed and travel screening complete.     I thanked them for their time to cover this information     Mor[facility] S[facility]          [facility]  [address] [place] [address]  Main: [phone], Fax: [phone]    Patient seen at: [facility] at [address] [place] [address]    Video Virtual Visit Note:    Patient: Obed Wiley  MRN: [id number]  : 1990  [place]: 2020    Due to the ongoing COVID-19 outbreak, this visit was conducted by video, with the patient's approval.    Physician has received verbal consent for a Video Visit from the patient? Yes    Patient would like the video invitation sent by: Text to cell phone: [phone]    Video Start Time: 10:12 am    Reason of today's visit:  Pre-transplant evaluation for patient with multiple VTE.    Assessment:  1. Complex history of pulmonary embolism, VTE  2. Heterozygous protein C deficiency  3. Liver-disease  associated coagulopathy  4. 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 History of Yan-en-Y gastric bypass  6. Chronic anemia, multifactorial  7. History of leukopenia  8. Chronic indwelling IVC filter in need for chronic anticoagulation    Obed has complex history. He has ESLD with liver-related hemostatic enzyme pattern. His prior protein C values are slightly lower than would be expected for liver disease alone, and likely reflect his heterozygosity for protein C deficiency. Obed has had Yan-en-Y bypass surgery, which limits ability to use direct oral anticoagulants (DOACs) due to concern for absorption. Furthermore, his history of variceal and GI bleeding also makes use of DOACs less appealing. He has been tolerating LMWH, therefore would not change dose. Will change prepartaion.     If Obed were to be listed for liver transplant, would recommend he remain on either LMWH or change back to subcutaneous heparin. These agents have test to monitor levels and can be reversed with protamine. Post-operatively, Obed would need to be initiated on treatment-dose heparin (IV) within 24-48 h. Given his permanent IVC filter he should be on heparin or warfarin.     Obed has history pancytopenia. Last CBC (via [facility]) with persistent thrombocytopenia. Prior etiology of his pancytopenia is multifactorial - with contributions of chronic illness, nutritional deficiencies and liver disease with synthetic dysfunction. Given history of Yan-en-Y anatomy and GI bleeding, he is on iron supplement. Would recommend repeating ferritin, iron saturation, B12, folate and replacing aggressively. Would also check other micronutrients (copper, zinc, vitamin D, B6, A). Thrombocytopenia due to liver disease. Would not hold anticoagulation unless he were to experience bleeding and/or have platelets fall to <30,000.     Recommendations:  1. Continue with LMWH 80 mg subcutaneous daily. Discussion with pharmacy and patient- will  try using insulin needs and multi-dose vials to decrease hematoma formation. If this fails next line would be insulflon catheters.   2. HOLD LMWH if platelet count falls to <30,000 or patient experiences significant bleeding.   3. Repeat ferritin and other iron labs (orders in Epic- post COVID)      Video-Visit Details:    Type of service:  Video Visit    Video End Time (time video stopped): 10:50 AM    Total time: 37 min    Originating Location (pt. Location): Home    Distant Location (provider location):  [facility]     Mode of Communication:  Video Conference via [employer]      Patti Mcallister MD/PhD   of Medicine  Division of Hematology, Oncology and Transplantation  Pager [phone]    ---------------------------------------------------------------------------------------------------------------------  History of Present Illness:  Dr. Holland from either [facility] or [facility]  Obed Wiley is a 30 year old male with history of Yan-en-Y, ESLD secondary to alcoholic cirrhosis and chronic pancreatitis with complex bleeding and clotting history. Evaluation today regarding management of anticoagulant in anticipation of transplant.     Obed was diagnosed with first blood clot at age 24 in his lower intestine. We do not have records from this event (occurred in [place]). Per patient, symptom was severe abdominal pain with dry heaves, etc. Presented to ED in [place]. Was on coumadin, but developed internal bleeding. Then tryed Xarelto and had internal bleeding. Had internal bleeding with LMWH. Was on heparin (three times a day) for long period of time. R. Leg had extensive clot in past. Patient had IVC filter placed (age 24). Was then put on aspirin. Patient stated his was fine until January until he had internal bleed. Had another clot in lower intestine and spleen. Patient is currently on 85 mg of LMWH daily. No issues with severe bruising. Abdomen is  bruised secondary to paracentesis. He is currently on oral iron supplements. Has received IV iron and blood transfusion. He reports that he is not requiring as frequent paracentesis for his liver. He does endorse getting significant hematomas over his abdomen from LMWH shots. He does not have enough subcutaneous tissue on arms or legs to tolerate shots there. No current episodes of BRBPR. Has frequent bowel movements thanks to medications. No episodes of nose bleeds or gum bleeding.     Past Medical History  Active Ambulatory Problems     Diagnosis Date Noted     Alcoholic cirrhosis of liver with ascites (H) 12/23/2019     Diabetes (H)      Acute mesenteric ischemia (H) 01/08/2020     Constipation 01/10/2020     Leukopenia 01/11/2020     Resolved Ambulatory Problems     Diagnosis Date Noted     No Resolved Ambulatory Problems     Past Medical History:   Diagnosis Date     DVT (deep vein thrombosis) in pregnancy      H/O protein C deficiency      Hepatic encephalopathy (H)      Hepatitis      History of blood transfusion      SBP (spontaneous bacterial peritonitis) (H) 11/2019     bilateral PE 8/2016 s/p IVC filter placement, and DVT 7/2018        Mesenteric Ischemia with mesenteric vein thrombosis (7014-4822). Unclear if arterial or venous (can not find records). Was on coumadin for 4 months- then had GI bleed and changed to Xarelto    Provoked partially occlusive acute deep vein thrombosis left popliteal and posterior tibial veins. ddx 11-7-2015 Risk factors 1). Severe pancreatitis and cholelitiasis requiring surgery 2. Prior thrombosis. Was started on Xarelto    Hemopertineum 8/2016 complicated by bilateral pulmonary emboli. IVC filter placement due to hemopertinum    2017: baby aspirin only        Past Surgical History  Past Surgical History:   Procedure Laterality Date     ABDOMEN SURGERY      Gastric Bypass 2009. [facility]      CARDIAC SURGERY      IVC      COLONOSCOPY       ENT SURGERY      Tonsils and  Adenoids Removed at 6-7 Years Old      GALLBLADDER SURGERY  2017     GI SURGERY      Upper GI      Medications  Current Outpatient Medications   Medication     blood glucose (NO BRAND SPECIFIED) lancets standard     Calcium Carbonate Antacid 400 MG CHEW     cholecalciferol (VITAMIN D3) 62664 units (1250 mcg) capsule     diclofenac (VOLTAREN) 1 % topical gel     ferrous sulfate (FEROSUL) 325 (65 Fe) MG tablet     folic acid (FOLVITE) 1 MG tablet     gabapentin (NEURONTIN) 400 MG capsule     insulin aspart (NOVOLOG FLEXPEN) 100 UNIT/ML pen     insulin glargine (LANTUS PEN) 100 UNIT/ML pen     lactulose (CHRONULAC) 10 GM/15ML solution     lactulose (CHRONULAC) 10 GM/15ML solution     magnesium oxide 400 MG CAPS     MULTIPLE VITAMIN-FOLIC ACID PO     Nutritional Supplements (ENSURE PO)     oxyCODONE-acetaminophen (PERCOCET)  MG per tablet     pantoprazole (PROTONIX) 40 MG EC tablet     polyethylene glycol (MIRALAX/GLYCOLAX) packet     promethazine (PHENERGAN) 12.5 MG tablet     QUEtiapine (SEROQUEL) 25 MG tablet     QUEtiapine (SEROQUEL) 25 MG tablet     rifaximin (XIFAXAN) 550 MG TABS tablet     senna (SENOKOT) 8.6 MG tablet     spironolactone (ALDACTONE) 100 MG tablet     traZODone (DESYREL) 50 MG tablet     tretinoin (RETIN-A) 0.025 % external cream     Vitamin D, Cholecalciferol, 25 MCG (1000 UT) CAPS     No current facility-administered medications for this visit.        Allergies  Allergies   Allergen Reactions     Bee Anaphylaxis     Adhesive Tape Rash     Sulfa Drugs Itching       Social History  History of alcohol abuse. Working on smoking cessation. No illicit drug use. Lives with mother    Family History  Mother has protein C deficiency. Has had clot found during CT scan. Mother was adopted  Had one brother- no blood clots  Father  from pancreatic cancer in .   No history of blood clots in paternal side      ROS:  ***    Objective:  Pleasant in no acute distress.  Visual Examination via  Video:  GENERAL: pale, fatigued and appears older than stated age  EYES: Eyes grossly normal to inspection, conjunctivae and sclerae normal  RESP: no audible wheeze, cough, or visible cyanosis.  No visible retractions or increased work of breathing.  Able to speak fully in complete sentences.  NEURO: Cranial nerves grossly intact, mentation intact and speech normal  PSYCH: mentation appears normal, affect normal/bright, judgement and insight intact, normal speech and appearance well-groomed    Labs:  CBC: 6.7>15.3/43.5<49  INR 1.3    2015: Reports that he is negative for Factor V Leiden, prothrombin gene, and antiphospholipid antibodies      Imagin2020: CT: Abdomen/pelvis: There are scattered foci of gas within the main portal vein, superior mesenteric vein, and smaller mesenteric venous branches. Additional scattered foci of gas within the intrahepatic portal venous system. There is diffuse bowel wall thickening, predominantly involving the terminal ileum, ascending colon, and the  hepatic flexure, with subtle hypoenhancement of the bowel wall in these regions. There are a few tiny foci of gas inferior to the liver margin (series 11 image 64) which are suspicious for pneumatosis.  Additional questionable trace pneumatosis involving a loop of small  bowel in the right mid abdomen (for example series 10 image 295).  Additional branching foci of air in the region of the ascending colon  may represent pneumatosis versus air within decompressed segments of  colon. No dilated loops of small bowel. No pooling of contrast to  suggest active GI bleeding. Postoperative changes of Yan-en-Y gastric  bypass.     Cirrhotic configuration of the liver. Splenomegaly. Calcified splenic  granuloma. Cholecystectomy. Pancreatic calcifications and pancreatic  atrophy, likely secondary to chronic pancreatitis. Normal adrenal  glands and kidneys (with the exception of a 9 mm simple cyst arising  from the right kidney). No  obstructing urinary tract calculi. Bladder  is partially distended and unremarkable. Normal prostate.     Normal caliber of the infrarenal aorta. No appreciable arterial  occlusion. IVC filter. No convincing free intraperitoneal air.  Portosystemic varices, including a splenorenal shunt. Small volume  ascites. Diffuse mesenteric edema. Numerous prominent mesenteric nodes  measuring up to 1.2 cm (series 10 image 247), nonspecific in the  setting of ascites and intrinsic hepatic parenchymal disease.     Lower chest: Dependent atelectasis. No consolidative airspace opacity.  No pleural effusion or pneumothorax. Small hiatal hernia.     Bones: Degenerative changes of the spine. No acute or suspicious  appearing bony abnormalities. Small umbilical hernia which contains  ascitic fluid.                                                                      Impression:   1. Portal venous gas, predominantly involving the superior mesenteric  vein and mesenteric venous branches, with bowel wall thickening,  hypoenhancement, and possible pneumatosis involving the right colon  and ileum, as described. These findings are concerning for bowel  ischemia, however benign pneumatosis superimposed on  infectious/inflammatory colitis/enteritis or portal  colopathy/enteropathy remain on the differential.  2. No evidence of active GI bleed.  3. Cirrhosis with evidence of portal hypertension including  portosystemic varices, splenomegaly, and ascites.  4. Sequelae of chronic pancreatitis.    4/10/2018  D:   4/10/2018   0732   nz    PROCEDURE:   CT abdomen and pelvis routine with IV contrast, 4/8/2018.     COMPARISON:   4/22/2017    FINDINGS:   Oral and intravenous contrast administered.  2-mm collimation   sagittal, coronal, axial and multiplanar reconstructions were performed   and reviewed on an independent work station.    The atrophic pancreas has prominent fat-stranding around it.  The   previously described cyst at the junctions of  the body and tail of the   pancreas is slightly increased in size.  The cyst at the tail of the   pancreas is decreased in size.  A small amount of peripancreatic free   fluid is noted.  No ductal obstruction is seen. Overall, findings are   slightly less severe compared to the previous study.      The gallbladder is surgically absent.  The liver is increasingly   hypodense, consistent with steatosis.  No intrahepatic mass is seen.  The   spleen remains enlarged.      The kidneys enhance symmetrically.  The adrenal glands and urinary bladder   are unremarkable.      There is no evidence of bowel obstruction.  The appendix is   normal-appearing.  An IVC filter is again noted in the inferior vena cava.    At the proximal aspect of the filter, there is a 5-mm diameter x 3-cm   long area of thrombus extending cephalad from the filter.     Slight loss of height of lower thoracic vertebral bodies and early   degenerative changes of the spine are noted. These findings are not   significantly changed.     The visualized lung bases are clear.      IMPRESSION:   1. Pancreatitis as described above.  2. Hepatic steatosis.    3. A small area of thrombus in the inferior vena cava extending proximally   from the filter.    4. Bariatric surgical changes.    5. Splenomegaly.    Ultrasound  10/27/2019: Minimal chronic R popliteal thrombus  11/8/2015:   On the left, there is nonocclusive deep vein thrombosis in the popliteal   vein and posterior tibial vein to the mid calf.  The thrombus is   hypoechoic and vessels are only partially compressible.  All other deep   veins on the left are patent.      IMPRESSION:  Partially occlusive acute deep vein thrombosis left popliteal   and posterior tibial veins.  Negative examination for deep venous   thrombosis in the right leg.            Again, thank you for allowing me to participate in the care of your patient.      Sincerely,    Patti Mcallister MD/PhD

## 2020-04-22 NOTE — TELEPHONE ENCOUNTER
Pt referred for OP CD Tx by [facility].    CD eval scheduled 4.27.2020    Bens sent.

## 2020-04-22 NOTE — LETTER
2020       RE: Obed Wiley  [address] [place] [id number]     Dear Colleague,    Thank you for referring your patient, Obed Wiley, to the [facility]. Please see a copy of my visit note below.    Patient was contacted to complete the pre-visit call prior to their video visit with the provider.  The following statement was read:       Because of Coronavirus we are instituting video visits when possible to keep everyone safe. This video visit will be conducted between you and the provider.  This service lets us provide the care you need with a video conversation.  If a prescription is necessary, we can send it directly to your pharmacy.If lab work or other testing is needed, we can help arrange a place/time for that to be done at a later date.If during the course of the call the provider feels a video visit is not appropriate, then your insurance company will not be billed.       Allergies and medications were reviewed and travel screening complete.     I thanked them for their time to cover this information     [facility]          [facility]  [address] [place] [address]  Main: [phone], Fax: [phone]    Patient seen at: [facility] at [address] [place] [address]    Video Virtual Visit Note:    Patient: Obed Wiley  MRN: [id number]  : 1990  CURL[place]: 2020    Due to the ongoing COVID-19 outbreak, this visit was conducted by video, with the patient's approval.    Physician has received verbal consent for a Video Visit from the patient? Yes    Patient would like the video invitation sent by: Text to cell phone: [phone]    Video Start Time: 10:12 am    Reason of today's visit:  Pre-transplant evaluation for patient with multiple VTE.    Assessment:  1. Complex history of pulmonary embolism, VTE  2. Heterozygous protein C deficiency  3. Liver-disease  associated coagulopathy  4. 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 History of Yan-en-Y gastric bypass  6. Chronic anemia, multifactorial  7. History of leukopenia  8. Chronic indwelling IVC filter in need for chronic anticoagulation    Obed has complex history. He has ESLD with liver-related hemostatic enzyme pattern. His prior protein C values are slightly lower than would be expected for liver disease alone, and likely reflect his heterozygosity for protein C deficiency. Obed has had Yan-en-Y bypass surgery, which limits ability to use direct oral anticoagulants (DOACs) due to concern for absorption. Furthermore, his history of variceal and GI bleeding also makes use of DOACs less appealing. He has been tolerating LMWH, therefore would not change dose. Will change prepartaion.     If Obed were to be listed for liver transplant, would recommend he remain on either LMWH or change back to subcutaneous heparin. These agents have test to monitor levels and can be reversed with protamine. Post-operatively, Obed would need to be initiated on treatment-dose heparin (IV) within 24-48 h. Given his permanent IVC filter he should be on heparin or warfarin.     Obed has history pancytopenia. Last CBC (via [facility]) with persistent thrombocytopenia. Prior etiology of his pancytopenia is multifactorial - with contributions of chronic illness, nutritional deficiencies and liver disease with synthetic dysfunction. Given history of Yan-en-Y anatomy and GI bleeding, he is on iron supplement. Would recommend repeating ferritin, iron saturation, B12, folate and replacing aggressively. Would also check other micronutrients (copper, zinc, vitamin D, B6, A). Thrombocytopenia due to liver disease. Would not hold anticoagulation unless he were to experience bleeding and/or have platelets fall to <30,000.     Recommendations:  1. Continue with LMWH 80 mg subcutaneous daily. Discussion with pharmacy and patient- will  try using insulin needs and multi-dose vials to decrease hematoma formation. If this fails next line would be insulflon catheters.   2. HOLD LMWH if platelet count falls to <30,000 or patient experiences significant bleeding.   3. Repeat ferritin and other iron labs (orders in Epic- post COVID)      Video-Visit Details:    Type of service:  Video Visit    Video End Time (time video stopped): 10:50 AM    Total time: 37 min    Originating Location (pt. Location): Home    Distant Location (provider location):  [facility]     Mode of Communication:  Video Conference via [employer]      Patti Mcallister MD/PhD   of Medicine  Division of Hematology, Oncology and Transplantation  Pager [phone]    ---------------------------------------------------------------------------------------------------------------------  History of Present Illness:  Dr. Holland from either [employer] or [facility]  Obed Wiley is a 30 year old male with history of Yan-en-Y, ESLD secondary to alcoholic cirrhosis and chronic pancreatitis with complex bleeding and clotting history. Evaluation today regarding management of anticoagulant in anticipation of transplant.     Obed was diagnosed with first blood clot at age 24 in his lower intestine. We do not have records from this event (occurred in [place]). Per patient, symptom was severe abdominal pain with dry heaves, etc. Presented to ED in [place]. Was on coumadin, but developed internal bleeding. Then tryed Xarelto and had internal bleeding. Had internal bleeding with LMWH. Was on heparin (three times a day) for long period of time. R. Leg had extensive clot in past. Patient had IVC filter placed (age 24). Was then put on aspirin. Patient stated his was fine until January until he had internal bleed. Had another clot in lower intestine and spleen. Patient is currently on 85 mg of LMWH daily. No issues with severe bruising. Abdomen is  bruised secondary to paracentesis. He is currently on oral iron supplements. Has received IV iron and blood transfusion. He reports that he is not requiring as frequent paracentesis for his liver. He does endorse getting significant hematomas over his abdomen from LMWH shots. He does not have enough subcutaneous tissue on arms or legs to tolerate shots there. No current episodes of BRBPR. Has frequent bowel movements thanks to medications. No episodes of nose bleeds or gum bleeding.     Past Medical History  Active Ambulatory Problems     Diagnosis Date Noted     Alcoholic cirrhosis of liver with ascites (H) 12/23/2019     Diabetes (H)      Acute mesenteric ischemia (H) 01/08/2020     Constipation 01/10/2020     Leukopenia 01/11/2020     Resolved Ambulatory Problems     Diagnosis Date Noted     No Resolved Ambulatory Problems     Past Medical History:   Diagnosis Date     DVT (deep vein thrombosis) in pregnancy      H/O protein C deficiency      Hepatic encephalopathy (H)      Hepatitis      History of blood transfusion      SBP (spontaneous bacterial peritonitis) (H) 11/2019     bilateral PE 8/2016 s/p IVC filter placement, and DVT 7/2018        Mesenteric Ischemia with mesenteric vein thrombosis (8331-1508). Unclear if arterial or venous (can not find records). Was on coumadin for 4 months- then had GI bleed and changed to Xarelto    Provoked partially occlusive acute deep vein thrombosis left popliteal and posterior tibial veins. ddx 11-7-2015 Risk factors 1). Severe pancreatitis and cholelitiasis requiring surgery 2. Prior thrombosis. Was started on Xarelto    Hemopertineum 8/2016 complicated by bilateral pulmonary emboli. IVC filter placement due to hemopertinum    2017: baby aspirin only        Past Surgical History  Past Surgical History:   Procedure Laterality Date     ABDOMEN SURGERY      [facility]astric Bypass 2009. [facility]      CARDIAC SURGERY      IVC      COLONOSCOPY       ENT SURGERY      Tonsils and  Adenoids Removed at 6-7 Years Old      GALLBLADDER SURGERY  2017     GI SURGERY      Upper GI      Medications  Current Outpatient Medications   Medication     blood glucose (NO BRAND SPECIFIED) lancets standard     Calcium Carbonate Antacid 400 MG CHEW     cholecalciferol (VITAMIN D3) 11655 units (1250 mcg) capsule     diclofenac (VOLTAREN) 1 % topical gel     ferrous sulfate (FEROSUL) 325 (65 Fe) MG tablet     folic acid (FOLVITE) 1 MG tablet     gabapentin (NEURONTIN) 400 MG capsule     insulin aspart (NOVOLOG FLEXPEN) 100 UNIT/ML pen     insulin glargine (LANTUS PEN) 100 UNIT/ML pen     lactulose (CHRONULAC) 10 GM/15ML solution     lactulose (CHRONULAC) 10 GM/15ML solution     magnesium oxide 400 MG CAPS     MULTIPLE VITAMIN-FOLIC ACID PO     Nutritional Supplements (ENSURE PO)     oxyCODONE-acetaminophen (PERCOCET)  MG per tablet     pantoprazole (PROTONIX) 40 MG EC tablet     polyethylene glycol (MIRALAX/GLYCOLAX) packet     promethazine (PHENERGAN) 12.5 MG tablet     QUEtiapine (SEROQUEL) 25 MG tablet     QUEtiapine (SEROQUEL) 25 MG tablet     rifaximin (XIFAXAN) 550 MG TABS tablet     senna (SENOKOT) 8.6 MG tablet     spironolactone (ALDACTONE) 100 MG tablet     traZODone (DESYREL) 50 MG tablet     tretinoin (RETIN-A) 0.025 % external cream     Vitamin D, Cholecalciferol, 25 MCG (1000 UT) CAPS     No current facility-administered medications for this visit.        Allergies  Allergies   Allergen Reactions     Bee Anaphylaxis     Adhesive Tape Rash     Sulfa Drugs Itching       Social History  History of alcohol abuse. Working on smoking cessation. No illicit drug use. Lives with mother    Family History  Mother has protein C deficiency. Has had clot found during CT scan. Mother was adopted  Had one brother- no blood clots  Father  from pancreatic cancer in .   No history of blood clots in paternal side      ROS:   ROS: 14 point ROS neg other than the symptoms noted above in the  HPI.    Objective:  Pleasant in no acute distress.  Visual Examination via Video:  GENERAL: pale, fatigued and appears older than stated age  EYES: Eyes grossly normal to inspection, conjunctivae and sclerae normal  RESP: no audible wheeze, cough, or visible cyanosis.  No visible retractions or increased work of breathing.  Able to speak fully in complete sentences.  NEURO: Cranial nerves grossly intact, mentation intact and speech normal  PSYCH: mentation appears normal, affect normal/bright, judgement and insight intact, normal speech and appearance well-groomed    Labs:  CBC: 6.7>15.3/43.5<49  INR 1.3    2015: Reports that he is negative for Factor V Leiden, prothrombin gene, and antiphospholipid antibodies      Imagin2020: CT: Abdomen/pelvis: There are scattered foci of gas within the main portal vein, superior mesenteric vein, and smaller mesenteric venous branches. Additional scattered foci of gas within the intrahepatic portal venous system. There is diffuse bowel wall thickening, predominantly involving the terminal ileum, ascending colon, and the  hepatic flexure, with subtle hypoenhancement of the bowel wall in these regions. There are a few tiny foci of gas inferior to the liver margin (series 11 image 64) which are suspicious for pneumatosis.  Additional questionable trace pneumatosis involving a loop of small  bowel in the right mid abdomen (for example series 10 image 295).  Additional branching foci of air in the region of the ascending colon  may represent pneumatosis versus air within decompressed segments of  colon. No dilated loops of small bowel. No pooling of contrast to  suggest active GI bleeding. Postoperative changes of Yan-en-Y gastric  bypass.     Cirrhotic configuration of the liver. Splenomegaly. Calcified splenic  granuloma. Cholecystectomy. Pancreatic calcifications and pancreatic  atrophy, likely secondary to chronic pancreatitis. Normal adrenal  glands and kidneys (with  the exception of a 9 mm simple cyst arising  from the right kidney). No obstructing urinary tract calculi. Bladder  is partially distended and unremarkable. Normal prostate.     Normal caliber of the infrarenal aorta. No appreciable arterial  occlusion. IVC filter. No convincing free intraperitoneal air.  Portosystemic varices, including a splenorenal shunt. Small volume  ascites. Diffuse mesenteric edema. Numerous prominent mesenteric nodes  measuring up to 1.2 cm (series 10 image 247), nonspecific in the  setting of ascites and intrinsic hepatic parenchymal disease.     Lower chest: Dependent atelectasis. No consolidative airspace opacity.  No pleural effusion or pneumothorax. Small hiatal hernia.     Bones: Degenerative changes of the spine. No acute or suspicious  appearing bony abnormalities. Small umbilical hernia which contains  ascitic fluid.                                                                      Impression:   1. Portal venous gas, predominantly involving the superior mesenteric  vein and mesenteric venous branches, with bowel wall thickening,  hypoenhancement, and possible pneumatosis involving the right colon  and ileum, as described. These findings are concerning for bowel  ischemia, however benign pneumatosis superimposed on  infectious/inflammatory colitis/enteritis or portal  colopathy/enteropathy remain on the differential.  2. No evidence of active GI bleed.  3. Cirrhosis with evidence of portal hypertension including  portosystemic varices, splenomegaly, and ascites.  4. Sequelae of chronic pancreatitis.    4/10/2018  D:   4/10/2018   0732   nz    PROCEDURE:   CT abdomen and pelvis routine with IV contrast, 4/8/2018.     COMPARISON:   4/22/2017    FINDINGS:   Oral and intravenous contrast administered.  2-mm collimation   sagittal, coronal, axial and multiplanar reconstructions were performed   and reviewed on an independent work station.    The atrophic pancreas has prominent  fat-stranding around it.  The   previously described cyst at the junctions of the body and tail of the   pancreas is slightly increased in size.  The cyst at the tail of the   pancreas is decreased in size.  A small amount of peripancreatic free   fluid is noted.  No ductal obstruction is seen. Overall, findings are   slightly less severe compared to the previous study.      The gallbladder is surgically absent.  The liver is increasingly   hypodense, consistent with steatosis.  No intrahepatic mass is seen.  The   spleen remains enlarged.      The kidneys enhance symmetrically.  The adrenal glands and urinary bladder   are unremarkable.      There is no evidence of bowel obstruction.  The appendix is   normal-appearing.  An IVC filter is again noted in the inferior vena cava.    At the proximal aspect of the filter, there is a 5-mm diameter x 3-cm   long area of thrombus extending cephalad from the filter.     Slight loss of height of lower thoracic vertebral bodies and early   degenerative changes of the spine are noted. These findings are not   significantly changed.     The visualized lung bases are clear.      IMPRESSION:   1. Pancreatitis as described above.  2. Hepatic steatosis.    3. A small area of thrombus in the inferior vena cava extending proximally   from the filter.    4. Bariatric surgical changes.    5. Splenomegaly.    Ultrasound  10/27/2019: Minimal chronic R popliteal thrombus  11/8/2015:   On the left, there is nonocclusive deep vein thrombosis in the popliteal   vein and posterior tibial vein to the mid calf.  The thrombus is   hypoechoic and vessels are only partially compressible.  All other deep   veins on the left are patent.      IMPRESSION:  Partially occlusive acute deep vein thrombosis left popliteal   and posterior tibial veins.  Negative examination for deep venous   thrombosis in the right leg.            Again, thank you for allowing me to participate in the care of your patient.       Sincerely,    Patti Mcallister MD/PhD

## 2020-04-22 NOTE — PROGRESS NOTES
"Transplant Social Work Services Phone Call      Data: Obed is being evaluated for a Liver Transplant.  Intervention: I called patient for psychosocial follow up.    Assessment: Obed reports he has not completed a Rule 25 assessment at [facility] in [place], [place] because he said he was told he is \"not a candidate\" due to his lengthy period of sobriety (since June of 2018).  Obed now has [employer] Medical Assistance and I have advised patient to contact [facility] to determine if he have a virtual assessment.  Education provided by : Importance of Relapse Prevention-[facility] (consent form mailed to patient), [place] Health Care Directive (patient could not find his at home, will complete a new one and fax back to me)  Plan: Obed will contact [facility] to schedule a substance use assessment.        BRANDY Alvarado, [facility]  Liver Transplant   Phone [phone]  Pager [phone]     "

## 2020-04-23 ENCOUNTER — DOCUMENTATION ONLY (OUTPATIENT)
Dept: TRANSPLANT | Facility: CLINIC | Age: 30
End: 2020-04-23

## 2020-04-23 DIAGNOSIS — K74.60 CIRRHOSIS OF LIVER (H): Primary | ICD-10-CM

## 2020-04-23 NOTE — PROGRESS NOTES
April 23, 2020 1:24 PM -  [id number]:   - GI final ok (7/2020)  - SW final ok  - CD eval/recs  - Lexi / roscoe sx final ok

## 2020-04-23 NOTE — LETTER
PHYSICIAN ORDERS    DATE & TIME ISSUED: :26 PM  PATIENT NAME: Obed Wiley   : 1990     [facility] MR# : [id number]     DIAGNOSIS:  cirrhosis  ICD-9 CODE: K70.31   Orders  1 year after issue.  Please have drawn every 3 months and PRN per transplant coordinator  These labs must be drawn all together on the same day when done:     INR     BMP     Hepatic panel     CBC w/ plts     Phosphatidylethanol (PEth)     PLEASE FAX RESULTS AS SOON AS POSSIBLE TO [phone], ATTN:Lab DE    .

## 2020-04-24 ENCOUNTER — TRANSFERRED RECORDS (OUTPATIENT)
Dept: HEALTH INFORMATION MANAGEMENT | Facility: CLINIC | Age: 30
End: 2020-04-24

## 2020-04-25 NOTE — PATIENT INSTRUCTIONS
Broderick Clay,  It was great to have a video visit with you.     I know Olga and Mason from our team have reached out. Please let us know if the change in HOW Lovenox is injected helps with your hematomas.     I would like to have your iron and other labs checked. It will help us determine your needs for IV iron and other help before transplant.       I will have you follow up with us at our Hematology clinic in the future when you come to [facility] for other appointments. You will either see myself or one of our PA--- Edward Hansen or Hyun Hayes.     Patti Mcallister MD/PhD   of Medicine  Division of Hematology, Oncology and Transplantation    [facility]  [address] [place] [address], [place] [address]  Main: [phone], Fax: [phone]

## 2020-04-27 ENCOUNTER — HOSPITAL ENCOUNTER (OUTPATIENT)
Dept: BEHAVIORAL HEALTH | Facility: CLINIC | Age: 30
Discharge: HOME | End: 2020-04-27
Attending: FAMILY MEDICINE | Admitting: FAMILY MEDICINE
Payer: COMMERCIAL

## 2020-04-27 VITALS — BODY MASS INDEX: 25.67 KG/M2 | HEIGHT: 74 IN | WEIGHT: 200 LBS

## 2020-04-27 PROCEDURE — H0001 ALCOHOL AND/OR DRUG ASSESS: HCPCS | Mod: HF,TEL | Performed by: COUNSELOR

## 2020-04-27 ASSESSMENT — PATIENT HEALTH QUESTIONNAIRE - PHQ9
5. POOR APPETITE OR OVEREATING: SEVERAL DAYS
SUM OF ALL RESPONSES TO PHQ QUESTIONS 1-9: 14

## 2020-04-27 ASSESSMENT — ANXIETY QUESTIONNAIRES
5. BEING SO RESTLESS THAT IT IS HARD TO SIT STILL: NOT AT ALL
1. FEELING NERVOUS, ANXIOUS, OR ON EDGE: MORE THAN HALF THE DAYS
2. NOT BEING ABLE TO STOP OR CONTROL WORRYING: SEVERAL DAYS
6. BECOMING EASILY ANNOYED OR IRRITABLE: NOT AT ALL
IF YOU CHECKED OFF ANY PROBLEMS ON THIS QUESTIONNAIRE, HOW DIFFICULT HAVE THESE PROBLEMS MADE IT FOR YOU TO DO YOUR WORK, TAKE CARE OF THINGS AT HOME, OR GET ALONG WITH OTHER PEOPLE: SOMEWHAT DIFFICULT
3. WORRYING TOO MUCH ABOUT DIFFERENT THINGS: SEVERAL DAYS
GAD7 TOTAL SCORE: 6
7. FEELING AFRAID AS IF SOMETHING AWFUL MIGHT HAPPEN: SEVERAL DAYS

## 2020-04-27 ASSESSMENT — MIFFLIN-ST. JEOR: SCORE: 1931.94

## 2020-04-27 ASSESSMENT — PAIN SCALES - GENERAL: PAINLEVEL: SEVERE PAIN (7)

## 2020-04-27 NOTE — PROGRESS NOTES
[facility]   [address] [place] [id number]        ADULT CD ASSESSMENT ADDENDUM      Patient Name: Obed Wiley  Cell Phone:   Home: [phone] (home)    Mobile:   Telephone Information:   Mobile [phone]       Email:  [email or web address]  Emergency Contact: Eric Wiley   Tel: [phone]    The patient reported being:  Single, no serious involvement    With which race do you identify? White    Initial Screening Questions     1. Are you currently having severe withdrawal symptoms that are putting yourself or others in danger?  No    2. Are you currently having severe medical problems that require immediate attention?  No    3. Are you currently having severe emotional or behavioral problems that are putting yourself or others at risk of harm?  No    4. Do you have sufficient reading skills that will enable you to understand written materials, including the program rules and client rights materials?  Yes     Family History and other additional information     Who raised you? (parents, grandparents, adoptive parents, step-parents, etc.)    Both Parents    Please tell me what it was like growing up in your family. (please include any history of substance abuse, mental health issues, emotional/physical/sexual abuse, forms of discipline, and support)     Per patient:  Dad passed away in 2000 of Pancreatic Cancer, mother is still living. Mother is caregiver at home since they do not think it is safe for me to live by myself. Older brother, living. My paternal grandmother is and my maternal step grandfather is. My mom was adopted. We moved a lot dad worked for [employer]. After we passed away we stayed in mother's hometown and grandparents lived. Then we moved when mother got engaged and she broke up with him and we stayed there because we were both in high school. When about 23 or 24 moved up back home to help mother, brother and step dad were on the road for  work. At 24 first got first blood clot and told me I had a bleeding and blood clot disorder. Then moved to [place] with GF. Then moved again and the moved in [place]. On disability and quarantine due to lowered immunized system. No abuse within the home. Strict  life. Very polite and listening to elders. My mother claims she is not an alcoholic, she used to be, but now she has it under control. My brother just got out of treatment again for alcohol and drug abuse. He has been sober for over six months no meth use or needles for over 10 years but did do opiates. My mother's biological father liver disease from alcohol but never knew him. My mother said he  just after she was born.    Do you have any children or Stepchildren? No    Are you being investigated by Child Protection Services? No    Do you have a child protection worker, probation office or ?  Yes, explain: [facility]     How would you describe your current finances?  Some money problems    If you are having problems, (unpaid bills, bankruptcy, IRS problems) please explain:  Yes, explain: money problems because I am still trying to get grants or increase in benefits, special diets I need for liver and to put on weight. Because I am about 40lb underweight. I have had two diabetes and speciality diet for that too. Trying to stay on specific diets it is really hard to afford it. Go to grocery store and buy canned veggies, on reduces sodium diet due to liver. Normal things for me cost 2-3 times what other people can buy canned or generic.     If working or a student are you able to function appropriately in that setting? No, explain: due to medical conditions     Describe your preferred learning style:  by hands-on practice    What are your some of your personal strengths?  put my mind to it then I am successful with it.     Do you currently participate in community esther activities, such as attending Jewish,  temple, Mandaen or Jewish services?  No    How does your spirituality impact your recovery?  Pt denied being spiritual    Do you currently self-administer your medications?  Yes    Have you ever had to lie to people important to you about how much you huggins?   No   Have you ever felt the need to bet more and more money?   No   Have you ever attempted treatment for a gambling problem?   No   Have you ever touched or fondled someone else inappropriately or forced them to have sex with you against their will?   No   Are you or have you ever been a registered sex offender?   No   Is there any history of sexual abuse in your family? No   Have you ever felt obsessed by your sexual behavior, such as having sex with many partners, masturbating often, using pornography often?   No     Have you ever received therapy or stayed in the hospital for mental health problems?   Yes, explain: pt reported past therapy     Have you ever hurt yourself, such as cutting, burning or hitting yourself?   No     Have you ever purged, binged or restricted yourself as a way to control your weight?   No     Are you on a special diet?   No     Do you have any concerns regarding your nutritional status?   No     Have you had any appetite changes in the last 3 months?   No   Have you had weight loss or weight gain of more than 10 lbs in the last 3 months?   If patient gained or lost more than 10 lbs, then refer to program RN / attending Physician for assessment.   Yes, explain: pt reported being 40lb underweight   Was the patient informed of BMI?       No   Have you engaged in any risk-taking behavior that would put you at risk for exposure to blood-borne or sexually transmitted diseases?   No   Do you have any dental problems?   Yes, Patient referred to go to their dentist. Appointment with dentist on 5/4/20. All my teeth surgically extracted and dentures in the past, due to being malnourished and body does not take in all the nutrients from  gastric bypass. They normally double the vitamins I take.    Have you ever lived through any trauma or stressful life events?   Yes, explain: pt reported his medical conditions, his father death.    In the past month, have you had any of the following symptoms related to the trauma listed above? (dreams, intense memories, flashbacks, physical reactions, etc.)   No   Have you ever believed people were spying on you, or that someone was plotting against you or trying to hurt you?   No   Have you ever believed someone was reading your mind or could hear your thoughts or that you could actually read someone's mind or hear what another person was thinking?   No   Have you ever believed that someone of some force outside of yourself was putting thoughts into your mind or made you act in a way that was not your usual self?  Have you ever though you were possessed?   No   Have you ever believed you were being sent special messages through the TV, radio or newspaper?   No   Have you ever heard things other people couldn't hear, such as voices or other noises?   No   Have you ever had visions when you were awake?  Or have you ever seen things other people couldn't see?   No   Do you have a valid 's license?    Yes-but do not drive anymore just in case I have an attack due to medical conditions.     PHQ-9, AKANKSHA-7 and Suicide Risk Assessment   PHQ-9 on 4/27/2020 AKANKSHA-7 on 4/27/2020   The patient's PHQ-9 score was 14 out of 27, indicating moderate depression.   The patient's AKANKSHA-7 score was 6 out of 21, indicating mild anxiety.       Scobey-Suicide Severity Rating Scale   Suicide Ideation   1.) Have you ever wished you were dead or that you could go to sleep and not wake up?     Lifetime:  No   Past Month:  No     2.) Have you actually had any thoughts of killing yourself?   Lifetime:  No   Past Month:  No     3.) Have you been thinking about how you might do this?     Lifetime:  No   Past Month:  No     4.) Have you had  these thoughts and had some intention of acting on them?     Lifetime:  No   Past Month:  No     5.) Have you started to work out the details of how to kill yourself?   Lifetime:  No   Past Month:  No     6.) Do you intend to carry out this plan?      Lifetime:  No   Past Month:  No     Intensity of Ideation   Intensity of ideation (1 being least severe, 5 being most severe):     Lifetime:  The patient denied ever having any suicidal thoughts in life.   Past Month:  The patient denied ever having any suicidal thoughts in life.     How often do you have these thoughts?  The patient denied ever having any suicidal thoughts in life.     When you have the thoughts how long do they last?  The patient denied ever having any suicidal thoughts in life.     Can you stop thinking about killing yourself or wanting to die if you want to?  The patient denied ever having any suicidal thoughts in life.     Are there things - anyone or anything (i.e. family, Restorationist, pain of death) that stopped you from wanting to die or acting on thoughts of suicide?  Does not apply     What sort of reasons did you have for thinking about wanting to die or killing yourself (ie end pain, stop how you were feeling, get attention or reaction, revenge)?  Does not apply     Suicidal Behavior   (Suicide Attempt) - Have you made a suicide attempt?     Lifetime:  The patient had never made a suicide attempt.   Past Month:  The patient had never made a suicide attempt.     Have you engaged in self-harm (non-suicidal self-injury)?  The patient denied having any history of engaging in self-harm (non-suicidal self-injury).     (Interrupted Attempt) - Has there been a time when you started to do something to end your life but someone or something stopped you before you actually did anything?  No     (Aborted or Self-Interrupted Attempt) - Has there been a time when you started to do something to try to end your life but you stopped yourself before you  "actually did anything?  No     (Preparatory Acts of Behavior) - Have you taken any steps towards making suicide attempt or preparing to kill yourself (such as collecting pills, getting a gun, giving valuables away or writing a suicide note)?  No     Actual Lethality/Medical Damage:  The patient denied ever making a suicidal attempt.       2008  [employer] [facility]  Used with permission by Crissy Brand, PhD.       Guide to C-SSRS Risk Ratings   NO IDEATION:  with no active thoughts IDEATION: with a wish to die. IDEATION: with active thoughts. Risk Ratings   If Yes No No 0 - Very Low Risk   If NA Yes No 1 - Low Risk   If NA Yes Yes 2 - Low/moderate risk   IDEATION: associated thoughts of methods without intent or plan INTENT: Intent to follow through on suicide PLAN: Plan to follow through on suicide Risk Ratings cont...   If Yes No No 3 - Moderate Risk   If Yes Yes No 4 - High Risk   If Yes Yes Yes 5 - High Risk   The patient's ADDITIONAL RISK FACTORS and lack of PROTECTIVE FACTORS may increase their overall suicide risk ratings.     Additional Risk Factors:    Significant history of physical illness or chronic medical problems     Significant history of untreated or poorly treated chronic pain issues   Protective Factors:    Having people in his/her life that would prevent the patient from considering a suicide attempt (i.e. young children, spouse, parents, etc.)     A positive relationship with his/her clinical medical and/or mental health providers     Having easy access to supportive family members     Risk Status   Past month: 0. - Very Low Risk:  Evaluation Counselors:  Document in Epic / SBAR to counselor \"Very Low Risk\".      Treatment Counselors:  Reassess upon admission as applicable, assess weekly in progress notes under Dimension 3 and summarize in Discharge / Treatment summary under Dimension 3.    Past 24 hours: 0. - Very Low Risk:  Evaluation Counselors:  Document in Epic / " "SBAR to counselor \"Very Low Risk\".      Treatment Counselors:  Reassess upon admission as applicable, assess weekly in progress notes under Dimension 3 and summarize in Discharge / Treatment summary under Dimension 3.   Additional information to support suicide risk rating: There was no additional information to provide at this time.     Mental Health Status   Physical Appearance/Attire: Comment: Unable to assess due to covid-19.   Hygiene: Comment: Unable to assess due to covid-19.   Eye Contact: comment: Unable to assess due to covid-19.   Speech Rate:  regular   Speech Volume: regular   Speech Quality: fluid   Cognitive/Perceptual:  reality based   Cognition: memory intact    Judgment: able to concentrate   Insight: able to concentrate   Orientation:  time, place, person and situation   Thought: concrete   Hallucinations:  none   General Behavioral Tone: cooperative   Psychomotor Activity: Unable to assess due to covid-19.   Gait:  Unable to assess due to covid-19.   Mood: appropriate   Affect: congruence/appropriate   Counselor Notes: NA     Criteria for Diagnosis: DSM-5 Criteria for Substance Use Disorders      The patient does not currently identify with a DSM-5 substance use disorder.    Level of Care   I.) Intoxication and Withdrawal: 0   II.) Biomedical:  1   III.) Emotional and Behavioral:  1   IV.) Readiness to Change:  0   V.) Relapse Potential: 1   VI.) Recovery Environmental: 1     Initial Problem List     The patient lacks a sober peer support network    Patient/Client is willing to follow treatment recommendations.  Yes    Counselor: MIKAYLA Ponce  "

## 2020-04-27 NOTE — PROGRESS NOTES
"The patient has been notified of the following:     \"We have found that certain health care needs can be provided without the need for a face to face visit.  This service lets us provide the care you need with a phone conversation.      I will have full access to your [facility] medical record during this entire phone call.   I will be taking notes for your medical record.     Since this is like an office visit, we will bill your insurance company for this service.  If your insurance denies the charge we will appeal and/or write off the cost of the service.  The Governor's executive order may result in expanded health insurance coverage for this service, which would be paid by your insurance.         There are potential benefits and risks of telephone visits (e.g. limits to patient confidentiality) that differ from in-person visits.?  Confidentiality still applies for telephone services, and nobody will record the visit.  It is important to be in a quiet, private space that is free of distractions (including cell phone or other devices) during the visit.??     If during the course of the call I believe a telephone visit is not appropriate, you will not be charged for this service\"    Consent has been obtained for this service by care team member: Yes    Phone visit start time:  1:00pm  Phone visit end time:  2:25pm      Rule 25 Assessment  Background Information   1. Date of Assessment Request  2. Date of Assessment  4/27/2020 3. Date Service Authorized     4.   MIKAYLA Ponce   5.  Phone Number   [phone] 6. Referent  [facility]plant [facility] 7. Assessment Site  [facility]     8. Client Name   Obed Wiley 9. Date of Birth  1990 Age  30 year old 10. Gender  male  11. PMI/ Insurance No.  [id number]   12. Client's Primary Language:  English 13. Do you require special accommodations, such as an  or assistance with written material? " No   14. Current Address: [address] [place] [id number]   15. Client Phone Numbers: [phone] (home)      16. Tell me what has happened to bring you here today.    Per EHR intake:  Pt referred for OP CD Tx by [facility].    Per patient: Had the assessment done before, done to see if I can get into a treatment center. Last assessment was a while ago, year to year and half ago, treatment center over where I lived at the time. Six to nine months to get into their OP program. My old assessment .     17. Have you had other rule 25 assessments?     Yes. When, Where, and What circumstances: Per patient:Had one before that, that would have been three to four years ago.     DIMENSION I - Acute Intoxication /Withdrawal Potential   1. Chemical use most recent 12 months outside a facility and other significant use history (client self-report)              X = Primary Drug Used   Age of First Use Most Recent Pattern of Use and Duration   Need enough information to show pattern (both frequency and amounts) and to show tolerance for each chemical that has a diagnosis   Date of last use and time, if needed   Withdrawal Potential? Requiring special care Method of use  (oral, smoked, snort, IV, etc)     X Alcohol     15 Per patient: denied use since last use date. Prior to last use date-everyday, consume in a day 5-10 drinks per day. That pattern went on for a while-before moving to [place] I was  and  at a bar. Get a free drink when I got off work. 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 9 out of 10 everyone got off would drink and shoot pool and stuff like that. Couple years probably.    Beginning of 2018 no oral      Marijuana/  Hashish   No use          Cocaine/Crack     No use          Meth/  Amphetamines   No use          Heroin     No use          Other Opiates/  Synthetics   No use          Inhalants     No use           Benzodiazepines     No use          Hallucinogens     No use          Barbiturates/  Sedatives/  Hypnotics No use          Over-the-Counter Drugs   No use          Other     No use          Nicotine     14 Per patient:quarter to half pack per day. Quit beginning of 2019 I believe no smoke     2. Do you use greater amounts of alcohol/other drugs to feel intoxicated or achieve the desired effect?  No.  Or use the same amount and get less of an effect?  No.  Example: The patient denied having any tolerance with alcohol and/or drugs over this past year.    3A. Have you ever been to detox?     Yes    3B. When was the first time?     I think probably summer of 2017    3C. How many times since then?     0    3D. Date of most recent detox:     See above    4.  Withdrawal symptoms: Have you had any of the following withdrawal symptoms?  Past 12 months Recent (past 30 days)   None None     's Visual Observations and Symptoms: No visible withdrawal symptoms at this time    Based on the above information, is withdrawal likely to require attention as part of treatment participation?  No    Dimension I Ratings   Acute intoxication/Withdrawal potential - The placing authority must use the criteria in Dimension I to determine a client s acute intoxication and withdrawal potential.    RISK DESCRIPTIONS - Severity ratin Client displays full functioning with good ability to tolerate and cope with withdrawal discomfort. No signs or symptoms of intoxication or withdrawal or resolving signs or symptoms.    REASONS SEVERITY WAS ASSIGNED (What about the amount of the person s use and date of most recent use and history of withdrawal problems suggests the potential of withdrawal symptoms requiring professional assistance? )     No current concerns.          DIMENSION II - Biomedical Complications and Conditions   1a. Do you have any current health/medical conditions?(Include any infectious diseases, allergies, or  chronic or acute pain, history of chronic conditions)       Yes.   Illnesses/Medical Conditions you are receiving care for:    per EHR collateral medical hx:  Assessment:  1. Complex history of pulmonary embolism, VTE  2. Heterozygous protein C deficiency  3. Liver-disease associated coagulopathy  4. 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 History of Yan-en-Y gastric bypass  6. Chronic anemia, multifactorial  7. History of leukopenia  8. Chronic indwelling IVC filter in need for chronic anticoagulation   Obed has complex history. He has ESLD with liver-related hemostatic enzyme pattern. His prior protein C values are slightly lower than would be expected for liver disease alone, and likely reflect his heterozygosity for protein C deficiency. Obed has had Yan-en-Y bypass surgery, which limits ability to use direct oral anticoagulants (DOACs) due to concern for absorption. Furthermore, his history of variceal and GI bleeding also makes use of DOACs less appealing. He has been tolerating LMWH, therefore would not change dose. Will change prepartaion.     Past Medical History:   Diagnosis Date     Alcoholic cirrhosis of liver with ascites (H) 12/23/2019     Diabetes (H)      DVT (deep vein thrombosis) in pregnancy      H/O protein C deficiency      Hepatic encephalopathy (H)      Hepatitis      History of blood transfusion      Leukopenia 1/11/2020     SBP (spontaneous bacterial peritonitis) (H) 11/2019       1b. On a scale of mild, moderate to severe please specify the severity of the patient's diabetes and/or neuropathy.    The patient rated the severity of his hepatic encephalopathy as severe.    2. Do you have a health care provider? When was your most recent appointment? What concerns were identified?     The patient's PCP is Anjelica Reyes. Last appointment was on 4/22/20 for Hepatology follow up.     3. If indicated by answers to items 1 or 2: How do you deal with these concerns? Is that working for  you? If you are not receiving care for this problem, why not?      The patient reported taking prescription medications as prescribed for the above medical issues.    4A. List current medication(s) including over-the-counter or herbal supplements--including pain management:     Per patient:trazodone doctor increased it to 100 switched my viles to 85ml and diabetic needles.    Per EHR:  Current Outpatient Medications   Medication     blood glucose (NO BRAND SPECIFIED) lancets standard     Calcium Carbonate Antacid 400 MG CHEW     cholecalciferol (VITAMIN D3) 41035 units (1250 mcg) capsule     diclofenac (VOLTAREN) 1 % topical gel     ferrous sulfate (FEROSUL) 325 (65 Fe) MG tablet     folic acid (FOLVITE) 1 MG tablet     furosemide (LASIX) 40 MG tablet     gabapentin (NEURONTIN) 400 MG capsule     insulin aspart (NOVOLOG FLEXPEN) 100 UNIT/ML pen     insulin glargine (LANTUS PEN) 100 UNIT/ML pen     lactulose (CHRONULAC) 10 GM/15ML solution     lactulose (CHRONULAC) 10 GM/15ML solution     magnesium oxide 400 MG CAPS     MULTIPLE VITAMIN-FOLIC ACID PO     Nutritional Supplements (ENSURE PO)     oxyCODONE-acetaminophen (PERCOCET)  MG per tablet     pantoprazole (PROTONIX) 40 MG EC tablet     polyethylene glycol (MIRALAX/GLYCOLAX) packet     promethazine (PHENERGAN) 12.5 MG tablet     QUEtiapine (SEROQUEL) 25 MG tablet     QUEtiapine (SEROQUEL) 25 MG tablet     rifaximin (XIFAXAN) 550 MG TABS tablet     senna (SENOKOT) 8.6 MG tablet     spironolactone (ALDACTONE) 100 MG tablet     traZODone (DESYREL) 50 MG tablet     tretinoin (RETIN-A) 0.025 % external cream     Vitamin D, Cholecalciferol, 25 MCG (1000 UT) CAPS     No current facility-administered medications for this encounter.        4B. Do you follow current medical recommendations/take medications as prescribed?     Yes    4C. When did you last take your medication?     This morning    4D. Do you need a referral to have a follow up with a primary care  physician?    No.    5. Has a health care provider/healer ever recommended that you reduce or quit alcohol/drug use?     Yes    6. Are you pregnant?     No    7. Have you had any injuries, assaults/violence towards you, accidents, health related issues, overdose(s) or hospitalizations related to your use of alcohol or other drugs:     No    8. Do you have any specific physical needs/accommodations? No    Dimension II Ratings   Biomedical Conditions and Complications - The placing authority must use the criteria in Dimension II to determine a client s biomedical conditions and complications.   RISK DESCRIPTIONS - Severity ratin Client tolerates and portia with physical discomfort and is able to get the services that the client needs.    REASONS SEVERITY WAS ASSIGNED (What physical/medical problems does this person have that would inhibit his or her ability to participate in treatment? What issues does he or she have that require assistance to address?)    Pt reported medical conditions. Pt reported he has a primary care provider and is able to get the services he needs. Pt reported he takes his medications as prescribed and directed.       DIMENSION III - Emotional, Behavioral, Cognitive Conditions and Complications   1. (Optional) Tell me what it was like growing up in your family. (substance use, mental health, discipline, abuse, support)     Per patient:  Dad passed away in  of Pancreatic Cancer, mother is still living. Mother is caregiver at home since they do not think it is safe for me to live by myself. Older brother, living. My paternal grandmother is and my maternal step grandfather is. My mom was adopted. We moved a lot dad worked for [employer]. After we passed away we stayed in mother's hometown and grandparents lived. Then we moved when mother got engaged and she broke up with him and we stayed there because we were both in high school. When about 23 or 24 moved up back home to help mother, brother  and step dad were on the road for work. At 24 first got first blood clot and told me I had a bleeding and blood clot disorder. Then moved to [place] with GF. Then moved again and the moved in [place]. On disability and quarantine due to lowered immunized system. No abuse within the home. Strict  life. Very polite and listening to elders. My mother claims she is not an alcoholic, she used to be, but now she has it under control. My brother just got out of treatment again for alcohol and drug abuse. He has been sober for over six months no meth use or needles for over 10 years but did do opiates. My mother's biological father liver disease from alcohol but never knew him. My mother said he  just after she was born.    2. When was the last time that you had significant problems...  A. with feeling very trapped, lonely, sad, blue, depressed or hopeless  about the future? 2 - 12 months ago-per patient:better now that I am at the [facility]. When at [employer] it was all depressing. There for two and half years and did not do anything 2 and half days of classes and doctors appointments learned more at the [facility] than [facility] in two and half years. Hudson is transplant coordinator and [facility]ierra all I have to do shoot them a message and they get it all taken care of for me and so we are only there for 3-4 days. With all my visits with [facility] it was week to 10 days. Did not have place to stay in [place] but in [place] my cousin lives by [address].     B. with sleep trouble, such as bad dreams, sleeping restlessly, or falling  asleep during the day? 1+ years ago-per patient:once I get to sleep I am usually good. Usually have to wake me up completely to not fall back asleep. Hard time falling asleep used to work so sleep schedule is messed up.    C. with feeling very anxious, nervous, tense, scared, panicked, or like  something bad was going to happen? Past Month-per patient:better now that I am at the [facility]. When at [employer] it was  all depressing. There for two and half years and did not do anything 2 and half days of classes and doctors appointments learned more at the [facility] than [facility] in two and half years. Hudson is transplant coordinator and Cierra all I have to do shoot them a message and they get it all taken care of for me and so we are only there for 3-4 days. With all my visits with [facility] it was week to 10 days. Did not have place to stay in [place] but in [place] my cousin lives by [address].     D. with becoming very distressed and upset when something reminded  you of the past? Never    E. with thinking about ending your life or committing suicide? Never    3. When was the last time that you did the following things two or more times?  A. Lied or conned to get things you wanted or to avoid having to do  something? Never    B. Had a hard time paying attention at school, work, or home? Past Month-per patient:maybe mostly because of quarantine. Mind is racing back and forth, [place] and nothing to do. Everywhere is quarantined too.     C. Had a hard time listening to instructions at school, work, or home? Past Month    D. Were a bully or threatened other people? Never    E. Started physical fights with other people? Never    Note: These questions are from the Global Appraisal of Individual Needs--Short Screener. Any item marked  past month  or  2 to 12 months ago  will be scored with a severity rating of at least 2.     For each item that has occurred in the past month or past year ask follow up questions to determine how often the person has felt this way or has the behavior occurred? How recently? How has it affected their daily living? And, whether they were using or in withdrawal at the time?    See above    4A. If the person has answered item 2E with  in the past year  or  the past month , ask about frequency and history of suicide in the family or someone close and whether they were under the influence.     Pt  denied past or current SI.     Any history of suicide in your family? Or someone close to you?     The patient denied any family member or someone close to the patient had ever completed suicide.    4B. If the person answered item 2E  in the past month  ask about  intent, plan, means and access and any other follow-up information  to determine imminent risk. Document any actions taken to intervene  on any identified imminent risk.      The patient denied having any suicide ideation within the past month.    5A. Have you ever been diagnosed with a mental health problem?     Yes, explain: per patient:per patient: Social anxiety.       5B. Are you receiving care for any mental health issues? If yes, what is the focus of that care or treatment?  Are you satisfied with the service? Most recent appointment?  How has it been helpful?     Yes, per patient: prescribed medication, on Seroquel and Gabapentin. Used to have a counselor, saw her for over a year. That was before I had relapsed. Sober for nine months, stress got to me and relapsed, and got myself a counselor and worked on things. Couple years since working with therapist. Closer to three years.     6. Have you been prescribed medications for emotional/psychological problems?     Yes, see above.     7. Does your MH provider know about your use?     Yes.  7B. What does he or she have to say about it?(DSM) sobriety and to get a liver transplant..    8A. Have you ever been verbally, emotionally, physically or sexually abused?      No     Follow up questions to learn current risk, continuing emotional impact.      The patient denied having any history of being verbally, emotionally, physically or sexually abused.    8B. Have you received counseling for abuse?      No    9. Have you ever experienced or been part of a group that experienced community violence, historical trauma, rape or assault?     No    10A. [place]:    No    11. Do you have problems with any of the  following things in your daily life?    Concentration and Remembering      Note: If the person has any of the above problems, follow up with items 12, 13, and 14. If none of the issues in item 11 are a problem for the person, skip to item 15.    Pt reported related to medical conditions and quarantine.    12. Have you been diagnosed with traumatic brain injury or Alzheimer s?  No    13. If the answer to #12 is no, ask the following questions:    Have you ever hit your head or been hit on the head? Yes-per patient:had a concussion before but never been to hospital for one. Have horses and have fallen off.     Were you ever seen in the Emergency Room, hospital or by a doctor because of an injury to your head? No    Have you had any significant illness that affected your brain (brain tumor, meningitis, West Nile Virus, stroke or seizure, heart attack, near drowning or near suffocation)? No-no at one point thought I was having a stroke but was Hepatic Encephalopathy. Last two days was able to speak again and was in the hospital for a week. Noise would come out but not words until the last two days I was there.     14. If the answer to #12 is yes, ask if any of the problems identified in #11 occurred since the head injury or loss of oxygen. No    15A. Highest grade of school completed:     Some college, but no degree    15B. Do you have a learning disability? No    15C. Did you ever have tutoring in Math or English? No    15D. Have you ever been diagnosed with Fetal Alcohol Effects or Fetal Alcohol Syndrome? No    16. If yes to item 15 B, C, or D: How has this affected your use or been affected by your use?     No    Dimension III Ratings   Emotional/Behavioral/Cognitive - The placing authority must use the criteria in Dimension III to determine a client s emotional, behavioral, and cognitive conditions and complications.   RISK DESCRIPTIONS - Severity ratin Client has impulse control and coping skills. Client  presents a mild to moderate risk of harm to self or others or displays symptoms of emotional, behavioral or cognitive problems. Client has a mental health diagnosis and is stable. Client functions adequately in significant life areas.    REASONS SEVERITY WAS ASSIGNED - What current issues might with thinking, feelings or behavior pose barriers to participation in a treatment program? What coping skills or other assets does the person have to offset those issues? Are these problems that can be initially accommodated by a treatment provider? If not, what specialized skills or attributes must a provider have?    Pt reported mental health diagnosis. Pt reported he takes his medications as prescribed and directed for mental health symptoms by his pcp. Pt reported past therapy but denied current. Pt denied past trauma/abuse issues or current issues. Pt denied past or current SI. Pt denied past suicide attempts.        DIMENSION IV - Readiness for Change   1. You ve told me what brought you here today. (first section) What do you think the problem really is?     Pt reported he wants a liver transplant and is following all recommendations of liver transplant team at [facility].     2. Tell me how things are going. Ask enough questions to determine whether the person has use related problems or assets that can be built upon in the following areas: Family/friends/relationships; Legal; Financial; Emotional; Educational; Recreational/ leisure; Vocational/employment; Living arrangements (DSM)      Per patient:  Relationships-Good. Other than my ex-step dad have a good relationship. It is not terrible. Just bicker about what he thinks he owns or deserves  Legal-common nuance-housing an underage drinking party, was not 20 yet, had someone living with us that was not on lease yet, she threw herself a birthday party, I got home and went to bed from work and woke up to  because she was not on the lease.Denied any other past legals or  current.   Financial-Right now, money problems because I am still trying to get grants or increase in benefits, special diets I need for liver and to put on weight. Because I am about 40lb underweight. I have had two diabetes and speciality diet for that too. Trying to stay on specific diets it is really hard to afford it. Go to grocery store and buy canned veggies, on reduces sodium diet due to liver. Normal things for me cost 2-3 times what other people can buy canned or generic.   Hobbies-learning how to do calligraphy and pen lettering. Once in a blue moon mother loves to color, every once in a while I will pick out something to color if in hospital. We play cards quit a bit and board games and stuff like that.  We have four dogs and [place] country because I cannot walk dog on leash, have a weight limit.   Employment-on disability due to medical conditions.  Living arrangements-mother and brother in the home. 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     3. What activities have you engaged in when using alcohol/other drugs that could be hazardous to you or others (i.e. driving a car/motorcycle/boat, operating machinery, unsafe sex, sharing needles for drugs or tattoos, etc     The patient denied engaging in any of the above dangerous activities when using alcohol and/or drugs.    Per patient:Prior to his last use in roughly June of 2018-No    4. How much time do you spend getting, using or getting over using alcohol or drugs? (DSM)     Pt denied any use of any substance in the last year besides nicotine. Pt reported his last use of alcohol was roughly June of 2018.    5. Reasons for drinking/drug use (Use the space below to record answers. It may not be necessary to ask each item.)  Like the feeling No   Trying to forget problems No   To cope with stress No   To relieve physical pain No   To cope with anxiety  No   To cope with depression No   To relax or unwind No   Makes it easier to talk with people No   Partner encourages use No   Most friends drink or use No   To cope with family problems No   Afraid of withdrawal symptoms/to feel better No   Other (specify)  No     A. What concerns other people about your alcohol or drug use/Has anyone told you that you use too much? What did they say? (DSM)     Pt denied any use of any substance in the last year besides nicotine. Pt reported his last use of alcohol was roughly June of 2018.    B. What did you think about that/ do you think you have a problem with alcohol or drug use?     Pt reported when he was told by his doctors about his medical conditions and stopped using and requested his pcp put him on alcohol testing to verify he stopped using.     6. What changes are you willing to make? What substance are you willing to stop using? How are you going to do that? Have you tried that before? What interfered with your success with that goal?      Per patient:Just decided to stop. I decided to stop asked Dr. Reyes to send in UAs for me, so I can get alcohol tested every week. Right now not doing the UAs they only want me in the hospital 2-4 weeks for blood work due to covid-19. I really did just decide to stop. Never had any detox besides the one time in 2017. At the beginning did take all the alcohol out of the house, now my mom has wine and being around it does not bother me. I cook with wine. When I go back down to visit friends I always play the D&D and drink water and soda. When cook with wine boil all the alcohol out and make mom try it first to make sure it does not taste like alcohol. On my phone have for how much used, have an serjio and it tells me how long it takes for all the alcohol to cook out but I do not taste it until I know the alcohol is cooked out. I usually use wine for making own pasta sauce, cup or two to six cups of pasta sauce. So do not use very much.      7. What would be helpful to you in making this change?     Per patient:I mean it is not a challenge for me to stay sober. I just decided I was done and that was it. Did not have cravings or anything like that.     Dimension IV Ratings   Readiness for Change - The placing authority must use the criteria in Dimension IV to determine a client s readiness for change.   RISK DESCRIPTIONS - Severity ratin Client is cooperative, motivated, ready to change, admits problems, committed to change, and engaged in treatment as a responsible participant.    REASONS SEVERITY WAS ASSIGNED - (What information did the person provide that supports your assessment of his or her readiness to change? How aware is the person of problems caused by continued use? How willing is she or he to make changes? What does the person feel would be helpful? What has the person been able to do without help?)      Pt reported he plans to continue with sobriety. Pt reported he wants a new liver and is willing to do anything necessary to get one. Pt reported he quit using back in 2018.        DIMENSION V - Relapse, Continued Use, and Continued Problem Potential   1A. In what ways have you tried to control, cut-down or quit your use? If you have had periods of sobriety, how did you accomplish that? What was helpful? What happened to prevent you from continuing your sobriety? (DSM)     Pt denied any use of any substance in the last year besides nicotine. Pt reported his last use of alcohol was roughly 2018.    Per patient:Just decided to stop. I decided to stop asked Dr. Reyes to send in UAs for me, so I can get alcohol tested every week. Right now not doing the UAs they only want me in the hospital 2-4 weeks for blood work due to covid-19. I really did just decide to stop. Never had any detox besides the one time in 2017. At the beginning did take all the alcohol out of the house, now my mom has wine and being around it does not  bother me. I cook with wine. When I go back down to visit friends I always play the D&D and drink water and soda. When cook with wine boil all the alcohol out and make mom try it first to make sure it does not taste like alcohol. On my phone have for how much used, have an serjio and it tells me how long it takes for all the alcohol to cook out but I do not taste it until I know the alcohol is cooked out. I usually use wine for making own pasta sauce, cup or two to six cups of pasta sauce. So do not use very much.     Prior to this period of sobriety I had been sober for 9 months as well in the past-when I fell off the wagon, I was told my liver specialist at [place] I was told that my liver was way better than it was and that I did not have to worry anymore, and then couple months down the road there it was again with problems. Sober for nine months-similar for helpful-just stopped using. 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     Plan to do Relapse prevention after I get a new transplant and it is once or twice a week or more. It can all be done over the phone. [place] [facility].     1B. What were the circumstances of your most recent relapse with mood altering chemicals?    Pt denied any relapses and reported last use was June of 2018.     2. Have you experienced cravings? If yes, ask follow up questions to determine if the person recognizes triggers and if the person has had any success in dealing with them.     The patient denied having any current cravings to use mood altering chemicals.    3. Have you been treated for alcohol/other drug abuse/dependence? No    4. Support group participation: Have you/do you attend support group meetings to reduce/stop your alcohol/drug use? How recently? What was your experience? Are you willing to restart? If the person has not  participated, is he or she willing?     Denied sober support group meetings. Do not like the [employer] aspect and have really bad social anxiety. Hoping to do [facility] get really bad social anxiety.     5. What would assist you in staying sober/straight?     Per patient:I mean it is not a challenge for me to stay sober. I just decided I was done and that was it. Did not have cravings or anything like that. Plan to do Relapse prevention after I get a new transplant and it is once or twice a week or more. It can all be done over the phone. [facility].     Dimension V Ratings   Relapse/Continued Use/Continued problem potential - The placing authority must use the criteria in Dimension V to determine a client s relapse, continued use, and continued problem potential.   RISK DESCRIPTIONS - Severity ratin Client recognizes relapse issues and prevention strategies, but displays some vulnerability for further substance use or mental health problems.    REASONS SEVERITY WAS ASSIGNED - (What information did the person provide that indicates his or her understanding of relapse issues? What about the person s experience indicates how prone he or she is to relapse? What coping skills does the person have that decrease relapse potential?)      Pt reported he has been sober since 2018. Pt reported he quit on his own. Pt reported prior to his current sobriety he had a period of sobriety of 9 months. Pt reported in the past a trigger was stress. Pt denied current cravings. Pt denied past treatment attendance and denied past sober support group meeting attendance. Pt reported he is willing to work with sober  through [facility] and anything else that would be recommended to continue with his sobriety.        DIMENSION VI - Recovery Environment   1. Are you employed/attending school? Tell me about that.     Pt reported he obtains disability. Pt reported  he is not working or attending school currently.     2A. Describe a typical day; evening for you. Work, school, social, leisure, volunteer, spiritual practices. Include time spent obtaining, using, recovering from drugs or alcohol. (DSM)     per patient: usually we get up, eat breakfast, do some housework, eat lunch, housework, do some hobbies, dinner, and then go to bed. Take the dogs out and go on walks. Talk with medical providers quite a bit.     Please describe what leisure activities have been associated with your substance abuse:     The patient denied having any leisure activities which had been associated with his substance abuse.    2B. How often do you spend more time than you planned using or use more than you planned? (DSM)     Pt denied use since June of 2018.    Per patient:Quite often prior to last use date.     3. How important is using to your social connections? Do many of your family or friends use?     Per patient:Still talk to all my friends, really has not effected any of that. Only see friends like twice a year.     4A. Are you currently in a significant relationship?     No    4C. Sexual Orientation:     Heterosexual    5A. Who do you live with?      Pt reported he lives with his mother and his brother.     5B. Tell me about their alcohol/drug use and mental health issues.     Per patient:Brother is sober. Mother drinks every once in a while glass of wine with dinner. Maybe two glasses.     5C. Are you concerned for your safety there? No    5D. Are you concerned about the safety of anyone else who lives with you? No    6A. Do you have children who live with you?     No    6B. Do you have children who do not live with you?     No    7A. Who supports you in making changes in your alcohol or drug use? What are they willing to do to support you? Who is upset or angry about you making changes in your alcohol or drug use? How big a problem is this for you?      My mother and all my friends. My one  friend said he would drive 7 hours and come talk to me if I started drinking.    7B. This table is provided to record information about the person s relationships and available support It is not necessary to ask each item; only to get a comprehensive picture of their support system.  How often can you count on the following people when you need someone?   Partner / Spouse The patient does not have a current partner or spouse.   Parent(s)/Aunt(s)/Uncle(s)/Grandparents Always supportive   Sibling(s)/Cousin(s) Rarely supportive   Child(max) The patient doesn't have any children.   Other relative(s) The patient doesn't have any current contact with other relatives.   Friend(s)/neighbor(s) Always supportive   Child(max) s father(s)/mother(s) The patient doesn't have any children.   Support group member(s) The patient denied having any current involvement with 12-step or other support group meetings.   Community of est[place] members The patient denied having any current involvement with community esther members.   /counselor/therapist/healer The patient denied having any current involvement with a , counselor, therapist or healer.   Other (specify) No     8A. What is your current living situation?     Pt reported with his mother and brother.     8B. What is your long term plan for where you will be living?     Pt reported:For a while.     8C. Tell me about your living environment/neighborhood? Ask enough follow up questions to determine safety, criminal activity, availability of alcohol and drugs, supportive or antagonistic to the person making changes.      No concerns reported    9. Criminal justice history: Gather current/recent history and any significant history related to substance use--Arrests? Convictions? Circumstances? Alcohol or drug involvement? Sentences? Still on probation or parole? Expectations of the court? Current court order? Any sex offenses - lifetime? What level? (DSM)    Denied  "any current legal issues. Per patient:common nuance-housing an underage drinking party, was not 20 yet, had someone living with us that was not on lease yet, she threw herself a birthday party, I got home and went to bed from work and woke up to  because she was not on the lease.    10. What obstacles exist to participating in treatment? (Time off work, childcare, funding, transportation, pending skilled nursing time, living situation)     The patient denied having any obstacles for participating in substance abuse treatment.    Dimension VI Ratings   Recovery environment - The placing authority must use the criteria in Dimension VI to determine a client s recovery environment.   RISK DESCRIPTIONS - Severity ratin Client has passive social  or family and significant other are not interested in the client's recovery. The client is engaged in structured meaningful activity.    REASONS SEVERITY WAS ASSIGNED - (What support does the person have for making changes? What structure/stability does the person have in his or her daily life that will increase the likelihood that changes can be sustained? What problems exist in the person s environment that will jeopardize getting/staying clean and sober?)     Pt reported he is on disability due to his medical conditions. Pt reported he is not working or attending school currently. Pt reported he lives with his mother who consume alcohol \"once in a while\" and his sober brother. Pt reported he still associates with his social connection that he had prior to being sober. Pt reported his support is his mother and all his friends. Pt reported past legal issue but denied current. Pt would appear to benefit from gaining additional sober support.        Client Choice/Exceptions   Would you like services specific to language, age, gender, culture, Catholic preference, race, ethnicity, sexual orientation or disability?  No    What particular treatment choices and options " would you like to have? None    Do you have a preference for a particular treatment program? None    Criteria for Diagnosis     Criteria for Diagnosis  DSM-5 Criteria for Substance Use Disorder  Instructions: Determine whether the client currently meets the criteria for Substance Use Disorder using the diagnostic criteria in the DSM-V pp.481-589. Current means during the most recent 12 months outside a facility that controls access to substances    Category of Substance Severity (ICD-10 Code / DSM 5 Code)     Alcohol Use Disorder The patient does not meet the criteria for an Alcohol use disorder.   Cannabis Use Disorder The patient does not meet the criteria for a Cannabis use disorder.   Hallucinogen Use Disorder The patient does not meet the criteria for a Hallucinogen use disorder.   Inhalant Use Disorder The patient does not meet the criteria for an Inhalant use disorder.   Opioid Use Disorder The patient does not meet the criteria for an Opioid use disorder.   Sedative, Hypnotic, or Anxiolytic Use Disorder The patient does not meet the criteria for a Sedative/Hypnotic use disorder.   Stimulant Related Disorder The patient does not meet the criteria for a Stimulant use disorder.   Tobacco Use Disorder The patient does not meet the criteria for a Tobacco use disorder.   Other (or unknown) Substance Use Disorder The patient does not meet the criteria for a Other (or unknown) Substance use disorder.       Collateral Contact Summary   Number of contacts made: 2    Contact with referring person:  Yes    If court related records were reviewed, summarize here: No court records had been reviewed at the time of this documentation.    Information from collateral contacts supported/largely agreed with information from the client and associated risk ratings.      Rule 25 Assessment Summary and Plan   's Recommendation    1. Abstain from using all non-prescribed mood altering chemicals and substances. Take all  medication as prescribed and directed by licensed physicians.  2. On [facility] Sign up for weekly telephone recovery support call.   3. On [facility] schedule a phone or Zoom meeting for 1:1 coaching with a peer . Duration and frequency determined with peer .  4. On [facility] participate in a group All Recovery Meeting once per week.  5. Follow all recommendations and referral made by other licensed providers such as pcp and [facility] liver transplant committee. Submit random drug testing/breathalyzers to your licensed medical providers when requested. If positive alcohol/drug screening for any non-prescribed substance then recommendation would be an updated assessment at that time.     Collateral Contacts     Name:    Electronic Health Records (EHR)/BRANDY Alvarado LICSW   Relationship:    Electronic Health Records/ Liver Transplant    Phone Number:      [phone] Releases:    Yes     Left VM for collateral contact of BRANDY Alvarado LICSW, Liver Transplant  on 4/28/20. Spoke with collateral contact on 4/28/20 who reported: We require six months sobriety minium. If recommended tx or counseling follow through on that. When I met with him back in January of 2020 he reported he had been sober since June of 2018. Believe he was diagnosed three years ago. He reported in the past he was working as a . He was tested for alcohol use back in in November and January but not since.       Pt medical record was reviewed and utilized for collateral purposes of this assessment and largely agreed with the information from the client    Per EHR telephone encounter note on 4/22/20:  Pt referred for OP CD Tx by [facility].    Per EHR progress note on 4/22/20:  Transplant Social Work Services Phone Call     Data: Obed is being evaluated for a Liver Transplant.  Intervention: I called patient for psychosocial follow  "up.    Assessment: Obed reports he has not completed a Rule 25 assessment at [facility] in [place], [place] because he said he was told he is \"not a candidate\" due to his lengthy period of sobriety (since June of 2018).  Obed now has [employer] Assistance and I have advised patient to contact [facility] to determine if he have a virtual assessment.  Education provided by : Importance of Relapse Prevention-[place] [facility] (consent form mailed to patient), [place] [facility] Care Directive (patient could not find his at home, will complete a new one and fax back to me)    Collateral Contacts     Name:    Eric Wiley Relationship:    Mother   Phone Number:    [phone]   Releases:    Yes-The writer spoke with the patient regarding releases of information. Pt gave verbal authorization/consent to speak via phone with collateral contact for collateral and emergency contact purposes. Verbal authorization/consent was necessary due to Covid-19 restrictions and policies and procedure changes due to the pandemic.     Per collateral contact on 4/28/20 who reported: No worries about Obed he said he was done and he was done and just quit. He has serious determination and will power and he is a person of his word. If he says he is going to quit he is going to quit. That is with anything. He says he is not going to do it. He wont. He would not be a person to hide it even if he did, he is not a person to hide things or anything like that. Sober since June of 2018. Lived with him that whole time. Collateral information obtained largely agreed with the information from the patient.    Collateral Contacts     Name:    Nurys Jung   Relationship:    Friend   Phone Number:    [phone]   Releases:    Yes-The writer spoke with the patient regarding releases of information. Pt gave verbal authorization/consent to speak via phone with collateral contact for collateral purposes. Verbal " authorization/consent was necessary due to Covid-19 restrictions and policies and procedure changes due to the pandemic.     Left  for collateral contact on 4/28/20. Collateral contact called back and reported on 4/28/20:I am from his hometown. I know he has been sober for a very very long time. He used to drink a lot, drank everyday, worked at a bar, I live in [place], so do not seem him that often, but we do go camping and I am friends with his mother too, and that is a pretty big time to drink and he had none. I am not a big drinker but my  was drinking. Obed had nothing to do with it. Did not drink at all. Gone camping the last two years and neither time did he partake in any of it. Collateral information obtained largely agreed with the information from the patient.   ollateral Contacts      A problematic pattern of alcohol/drug use leading to clinically significant impairment or distress, as manifested by at least two of the following, occurring within a 12-month period:    The patient does not currently identity positively with any of the 11 DSM-5 criteria for a diagnostic impression of having a substance use disorder.      Specify if: In early remission:  After full criteria for alcohol/drug use disorder were previously met, none of the criteria for alcohol/drug use disorder have been met for at least 3 months but for less than 12 months (with the exception that Criterion A4,  Craving or a strong desire or urge to use alcohol/drug  may be met).     In sustained remission:   After full criteria for alcohol use disorder were previously met, non of the criteria for alcohol/drug use disorder have been met at any time during a period of 12 months or longer (with the exception that Criterion A4,  Craving or strong desire or urge to use alcohol/drug  may be met).   Specify if:   This additional specifier is used if the individual is in an environment where access to alcohol is restricted.    Mild: Presence of  2-3 symptoms  Moderate: Presence of 4-5 symptoms  Severe: Presence of 6 or more symptoms

## 2020-04-28 ASSESSMENT — ANXIETY QUESTIONNAIRES: GAD7 TOTAL SCORE: 6

## 2020-04-28 NOTE — TELEPHONE ENCOUNTER
4/28/20  The writer spoke with the patient about the recommendations of CD assessment/appointment. Pt requested he receive a copy of his assessment and recommendations via mail. The writer confirmed patient home address with the patient and will send a copy via mail of the summary of his assessment and recommendations to that address. The writer asked the patient if he had any questions which patient reported he did not. The writer told the patient to call her if he had follow up questions. Pt verbalized understanding of the recommendations and stated he would call if he had questions.    Writer sent a copy of patient assessment summary and recommendations in the mail.     ARIES Silva, [facility]

## 2020-04-28 NOTE — PROGRESS NOTES
COMPREHENSIVE ASSESSMENT SUMMARY    PATIENT NAME: Obed Wiley  MEDICAL RECORD NUMBER: [id number]  PATIENT ADDRESS: [address] [place] [address]  HOME TELEPHONE NUMBER: [phone] (home)   MOBILE TELEPHONE NUMBER:   Telephone Information:   Mobile [phone]     STATISTICS: YOB: 1990     Age: 30 year old     Gender: male    RELATIONSHIP STATUS:  Single, in no serious relationship    DATE OF ASSESSMENT: 20  EVALUATION COUNSELOR: MIKAYLA Ponce    REFERRAL SOURCE: [facility]    REASON FOR EVALUATION:     Per EHR intake:  Pt referred for OP CD Tx by [facility]PAT[facility]ULT [facility].     Per patient: Had the assessment done before, done to see if I can get into a treatment center. Last assessment was a while ago, year to year and half ago, treatment center over where I lived at the time. Six to nine months to get into their OP program. My old assessment .     HEALTH HISTORY AND MEDICATIONS:     Per EHR collateral medical hx and prescribed medications:  Assessment:  1. Complex history of pulmonary embolism, VTE  2. Heterozygous protein C deficiency  3. Liver-disease associated coagulopathy  4. 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 History of Yan-en-Y gastric bypass  6. Chronic anemia, multifactorial  7. History of leukopenia  8. Chronic indwelling IVC filter in need for chronic anticoagulation   Obed has complex history. He has ESLD with liver-related hemostatic enzyme pattern. His prior protein C values are slightly lower than would be expected for liver disease alone, and likely reflect his heterozygosity for protein C deficiency. Obed has had Yan-en-Y bypass surgery, which limits ability to use direct oral anticoagulants (DOACs) due to concern for absorption. Furthermore, his history of variceal and GI bleeding also makes use of DOACs less appealing. He has been tolerating LMWH, therefore would not change dose. Will change prepartaion.            Past Medical History:   Diagnosis Date     Alcoholic cirrhosis of liver with ascites (H) 12/23/2019     Diabetes (H)       DVT (deep vein thrombosis) in pregnancy       H/O protein C deficiency       Hepatic encephalopathy (H)       Hepatitis       History of blood transfusion       Leukopenia 1/11/2020     SBP (spontaneous bacterial peritonitis) (H) 11/2019     Current Outpatient Medications   Medication     blood glucose (NO BRAND SPECIFIED) lancets standard     Calcium Carbonate Antacid 400 MG CHEW     cholecalciferol (VITAMIN D3) 66496 units (1250 mcg) capsule     diclofenac (VOLTAREN) 1 % topical gel     ferrous sulfate (FEROSUL) 325 (65 Fe) MG tablet     folic acid (FOLVITE) 1 MG tablet     furosemide (LASIX) 40 MG tablet     gabapentin (NEURONTIN) 400 MG capsule     insulin aspart (NOVOLOG FLEXPEN) 100 UNIT/ML pen     insulin glargine (LANTUS PEN) 100 UNIT/ML pen     lactulose (CHRONULAC) 10 GM/15ML solution     magnesium oxide 400 MG CAPS     MULTIPLE VITAMIN-FOLIC ACID PO     Nutritional Supplements (ENSURE PO)     pantoprazole (PROTONIX) 40 MG EC tablet     polyethylene glycol (MIRALAX/GLYCOLAX) packet     promethazine (PHENERGAN) 12.5 MG tablet     QUEtiapine (SEROQUEL) 25 MG tablet     QUEtiapine (SEROQUEL) 25 MG tablet     rifaximin (XIFAXAN) 550 MG TABS tablet     senna (SENOKOT) 8.6 MG tablet     spironolactone (ALDACTONE) 100 MG tablet     traZODone (DESYREL) 50 MG tablet     tretinoin (RETIN-A) 0.025 % external cream     Vitamin D, Cholecalciferol, 25 MCG (1000 UT) CAPS     lactulose (CHRONULAC) 10 GM/15ML solution     oxyCODONE-acetaminophen (PERCOCET)  MG per tablet     No current facility-administered medications for this encounter.        HISTORY OF PREVIOUS TREATMENT AND COUNSELING:     Pt denied past treatment attendance. Pt denied current counseling/therapy services.    HISTORY OF ALCOHOL AND DRUG USE:                      X = Primary Drug Used    Age of First Use Most Recent Pattern  of Use and Duration   Need enough information to show pattern (both frequency and amounts) and to show tolerance for each chemical that has a diagnosis    Date of last use and time, if needed    Withdrawal Potential? Requiring special care Method of use  (oral, smoked, snort, IV, etc)      X Alcohol       15 Per patient: denied use since last use date. Prior to last use date-everyday, consume in a day 5-10 drinks per day. That pattern went on for a while-before moving to [place] I was  and  at a bar. Get a free drink when I got off work. 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 9 out of 10 everyone got off would drink and shoot pool and stuff like that. Couple years probably.     Beginning of June of 2018 no oral        Marijuana/  Hashish    No use                Cocaine/Crack       No use                Meth/  Amphetamines    No use                Heroin       No use                Other Opiates/  Synthetics    No use                Inhalants       No use                Benzodiazepines       No use                Hallucinogens       No use                Barbiturates/  Sedatives/  Hypnotics No use                Over-the-Counter Drugs    No use                Other       No use                Nicotine       14 Per patient:quarter to half pack per day. Quit beginning of December of 2019 I believe no smoke          SUMMARY OF SUBSTANCE USE DISORDER SYMPTOMS ACKNOWLEDGED BY THE PATIENT: The patient identified positively with None of the 11 DSM-5 criteria for a primary diagnostic impression of no current diagnosis of a substance use disorder.     SUMMARY OF COLLATERAL DATA:    Mother-Per collateral contact on 4/28/20 who reported: No worries about Obed he said he was done and he was done and just quit. He has serious determination and will power and he is a person of his word. If he says he is going to quit he is going to quit. That is with anything. He says he  "is not going to do it. He wont. He would not be a person to hide it even if he did, he is not a person to hide things or anything like that. Sober since June of 2018. Lived with him that whole time. Collateral information obtained largely agreed with the information from the patient.     Electronic Health Records (EHR)/BRANDY Alvarado, OLGA-   Left VM for collateral contact of BRANDY Alvarado LICSW, Liver Transplant  on 4/28/20. Left VM for collateral contact of BRANDY Alvarado LICSW, Liver Transplant  on 4/28/20. Spoke with collateral contact on 4/28/20 who reported: We require six months sobriety minium. If recommended tx or counseling follow through on that. When I met with him back in January of 2020 he reported he had been sober since June of 2018. Believe he was diagnosed three years ago. He reported in the past he was working as a . He was tested for alcohol use back in in November and January but not since.      Pt medical record was reviewed and utilized for collateral purposes of this assessment and largely agreed with the information from the client     Per EHR telephone encounter note on 4/22/20:  Pt referred for OP CD Tx by [facility]PATOLOG[facility]ULT [facility].     Per EHR progress note on 4/22/20:  Transplant Social Work Services Phone Call     Data: Obed is being evaluated for a Liver Transplant.  Intervention: I called patient for psychosocial follow up.    Assessment: Obed reports he has not completed a Rule 25 assessment at [facility] in [place], [place] because he said he was told he is \"not a candidate\" due to his lengthy period of sobriety (since June of 2018).  Obed now has [employer] Medical Assistance and I have advised patient to contact [facility] to determine if he have a virtual assessment.  Education provided by : Importance of Relapse Prevention-[facility] (consent form mailed to patient), [place] Health Care Directive " (patient could not find his at home, will complete a new one and fax back to me)    Friend- Left  for collateral contact on 4/28/20. Collateral contact called back and reported on 4/28/20:I am from his hometown. I know he has been sober for a very very long time. He used to drink a lot, drank everyday, worked at a bar, I live in [place], so do not seem him that often, but we do go camping and I am friends with his mother too, and that is a pretty big time to drink and he had none. I am not a big drinker but my  was drinking. Obed had nothing to do with it. Did not drink at all. Gone camping the last two years and neither time did he partake in any of it. Collateral information obtained largely agreed with the information from the patient.     IMPRESSION:    NA    ASA PLACEMENT CRITERIA:    DIMENSION 1: Intoxication and Withdrawal:  The patient scored a 0.    No current concerns.     DIMENSION 2: Biomedical Conditions:  The patient scored a 1.    Pt reported medical conditions. Pt reported he has a primary care provider and is able to get the services he needs. Pt reported he takes his medications as prescribed and directed.    DIMENSION 3: Emotional and Behavioral:  The patient scored a 1.    Pt reported mental health diagnosis. Pt reported he takes his medications as prescribed and directed for mental health symptoms by his pcp. Pt reported past therapy but denied current. Pt denied past trauma/abuse issues or current issues. Pt denied past or current SI. Pt denied past suicide attempts.     DIMENSION 4: Readiness to Change:  The patient scored a 0.    Pt reported he plans to continue with sobriety. Pt reported he wants a new liver and is willing to do anything necessary to get one. Pt reported he quit using back in June of 2018.     DIMENSION 5: Relapse Potential:  The patient scored a 1.    Pt reported he has been sober since June of 2018. Pt reported he quit on his own. Pt reported prior to his current  "sobriety he had a period of sobriety of 9 months. Pt reported in the past a trigger was stress. Pt denied current cravings. Pt denied past treatment attendance and denied past sober support group meeting attendance. Pt reported he is willing to work with sober  through [place] [facility] and anything else that would be recommended to continue with his sobriety.     DIMENSION 6: Recovery Environment:  The patient scored a 1.    Pt reported he is on disability due to his medical conditions. Pt reported he is not working or attending school currently. Pt reported he lives with his mother who consume alcohol \"once in a while\" and his sober brother. Pt reported he still associates with his social connection that he had prior to being sober. Pt reported his support is his mother and all his friends. Pt reported past legal issue but denied current. Pt would appear to benefit from gaining additional sober support.     RECOMMENDATIONS:    1. Abstain from using all non-prescribed mood altering chemicals and substances. Take all medication as prescribed and directed by licensed physicians.  2. On [place] [facility] Sign up for weekly telephone recovery support call.   3. On [place] [facility] schedule a phone or Zoom meeting for 1:1 coaching with a peer . Duration and frequency determined with peer .  4. On [place] [facility] participate in a group All Recovery Meeting once per week.  5. Follow all recommendations and referral made by other licensed providers such as pcp and [facility] liver transplant committee. Submit random drug testing/breathalyzers to your licensed medical providers when requested.      This information has been disclosed to you from records protected by Federal confidentiality rules (42 CFR part 2). The Federal rules prohibit you from making any further disclosure of this information unless further disclosure is expressly permitted by the " written consent of the person to whom it pertains or as otherwise permitted by 42 CFR part 2. A general authorization for the release of medical or other information is NOT sufficient for this purpose. The Federal rules restrict any use of the information to criminally investigate or prosecute any alcohol or drug abuse patient.

## 2020-04-28 NOTE — TELEPHONE ENCOUNTER
4/27/20  The writer spoke with the patient regarding releases of information. Pt gave verbal authorization/consent for the writer to speak via telephone with his mother Eric Wiley as a collateral contact and emergency contact. Pt gave verbal authorization/consent for the writer to speak via telephone with his friend Nurys REAL for collateral contact purpose. Pt gave verbal authorization/consent for the writer to speak via telephone with his [facility] liver transplant  BRANDY Alvarado, LICSW for collateral contact purposes and also for the writer to be able to release his assessment via fax or e-mail to her as well. Verbal authorization/consent was necessary due to Covid-19 restrictions and policies and procedure changes due to the pandemic.    ARIES Silva, LADC

## 2020-04-30 ENCOUNTER — MEDICAL CORRESPONDENCE (OUTPATIENT)
Dept: HEALTH INFORMATION MANAGEMENT | Facility: CLINIC | Age: 30
End: 2020-04-30

## 2020-05-08 ENCOUNTER — TELEPHONE (OUTPATIENT)
Dept: PHARMACY | Facility: CLINIC | Age: 30
End: 2020-05-08

## 2020-05-08 ENCOUNTER — TELEPHONE (OUTPATIENT)
Dept: ENDOCRINOLOGY | Facility: CLINIC | Age: 30
End: 2020-05-08

## 2020-05-08 NOTE — TELEPHONE ENCOUNTER
Med Rec:                                                    Chart audited for medication reconciliation regarding the following medications:     furosemide (LASIX) 40 MG tablet      Thorough documentation in chart and care everywhere    Pharmacy contacted: No  Patient contacted: No  Discrepancies found: No  Action taken: none    Wood AgeeD  PGY1 Medication Therapy Management Resident  Voicemail: [phone]      Time spent: 2 minutes

## 2020-05-08 NOTE — TELEPHONE ENCOUNTER
Patient scheduled to establish care with Dr. Leon on 5/12. Due to complexity of patient, will forward to Dr. Leon to determine if Virtual Visit is appropriate.     [facility] records available through Care Everywhere.    Sabina Rutledge LPN  Diabetes Clinic Coordinator   Adult Endocrinology and Pediatric Specialty Clinics  [facility]

## 2020-05-11 ENCOUNTER — MYC MEDICAL ADVICE (OUTPATIENT)
Dept: ENDOCRINOLOGY | Facility: CLINIC | Age: 30
End: 2020-05-11

## 2020-05-11 NOTE — TELEPHONE ENCOUNTER
Qian Sweet, RN  Niaoe, Sabina KEYS LPN 2 hours ago (12:29 PM)       So it sounds like she will see him and it is scheduled for telephone visit. Is patient aware?     Thanks!   Cece    Message text        Waylon Leon MD Mathis, Jacqueline M, RN 5 hours ago (9:03 AM)       If it is for diabetes; we can try virtual visit and see how it goes. In one of the documentation; they stated that they wanted him to see CDE at the [facility].    Routing comment        Qian Sweet RN Zekarias, Kidmealem, MD 5 hours ago (8:54 AM)       Appointment note says Type 2 diabetes. Last A1C I see is from November 2019 (8.4%). Other notes in computer mention end stage liver disease. Possibly she meant complexity with ESLD?     Thanks!   Cece    Routing comment        Waylon Leon MD  [facility] Endocrinology Adult [place] 6 hours ago (8:31 AM)       Couldn't find reason for referral. Do you know reason for referral? Thanks.     Routing comment

## 2020-05-11 NOTE — TELEPHONE ENCOUNTER
Called patient, patient will send blood sugar readings in [employer] message. Patient agree's with the visit at 10am 5/12/20.    Jessica C[facility]  Adult Endocrinology  [facility]

## 2020-05-12 ENCOUNTER — VIRTUAL VISIT (OUTPATIENT)
Dept: ENDOCRINOLOGY | Facility: CLINIC | Age: 30
End: 2020-05-12
Payer: COMMERCIAL

## 2020-05-12 DIAGNOSIS — E08.69 DIABETES MELLITUS DUE TO UNDERLYING CONDITION WITH OTHER SPECIFIED COMPLICATION, WITH LONG-TERM CURRENT USE OF INSULIN (H): Primary | ICD-10-CM

## 2020-05-12 DIAGNOSIS — Z79.4 DIABETES MELLITUS DUE TO UNDERLYING CONDITION WITH OTHER SPECIFIED COMPLICATION, WITH LONG-TERM CURRENT USE OF INSULIN (H): Primary | ICD-10-CM

## 2020-05-12 PROCEDURE — 99443 ZZC PHYSICIAN TELEPHONE EVALUATION 21-30 MIN: CPT | Performed by: INTERNAL MEDICINE

## 2020-05-12 RX ORDER — CIPROFLOXACIN 500 MG/1
500 TABLET, FILM COATED ORAL DAILY
COMMUNITY
Start: 2019-01-27

## 2020-05-12 NOTE — PATIENT INSTRUCTIONS
Continue with Lantus 15 units daily, stop Lantus at bedtime 5 units daily.    Start mealtime insulin coverage with 1 unit of NovoLog for every 15 g of carbohydrates with meals 3 times per day.    Please follow this correction scale with meals 3 times a day  BG less than 151 - no additional insulin. Take only mealtime coverage.   -215 = additional 1 unit with mealtime insulin.   -280 = 2 units.  -345 = 3 units.  -410 = 4 units.  -475 = 5 units.  BG >475 = 6 units.    Please monitor your blood sugar at least 4 times per day and contact us if you have blood sugar below 70 on more than two occasions.       Please see the letter below (we will mail out this letter to you as well).     Patient:  Obed Wiley  :   1990  MRN:     [id number]        Mr.Aaron LUDMILA Wiley  [address] [place] [id number]      To whom it may concern    May 12, 2020      Mr. Wiley is under my care for diabetes management and has brittle diabetes which is currently being managed with multiple daily injections of insulin.  Due to wide fluctuations in his blood sugars, risk of hypoglycemia and the need to closely monitor his blood sugars multiple times throughout the day; I I have recommended a continuous glucose monitoring system for blood sugar monitoring.  To this effect; I highly appreciate your input in ordering the freestyle 14-day stefano device and sensors for Mr. Wiley.      Please do not hesitate to contact me if you need additional information.        Waylon Leon MD

## 2020-05-12 NOTE — LETTER
Patient:  Obed Wiley  :   1990  MRN:     [id number]        Mr.Aaron LUDMILA Wiley  [address] [facility] [address]      To whom it may concern    May 12, 2020      Mr. Wiley is under my care for diabetes management and has brittle diabetes which is currently being managed with multiple daily injections of insulin.  Due to wide fluctuations in his blood sugars, risk of hypoglycemia and the need to closely monitor his blood sugars multiple times throughout the day; I I have recommended a continuous glucose monitoring system for blood sugar monitoring.  To this effect; I highly appreciate your input in ordering the freestyle 14-day stefano device and sensors for Mr. Wiley.      Please do not hesitate to contact me if you need additional information.        Waylon Leon MD

## 2020-06-01 ENCOUNTER — TELEPHONE (OUTPATIENT)
Dept: TRANSPLANT | Facility: CLINIC | Age: 30
End: 2020-06-01

## 2020-06-01 NOTE — TELEPHONE ENCOUNTER
Spoke with the patient and confirmed Hepatology Appointment appointments on 6/23/20. Pt requesting Lab Order so he can have labs drawn near his home. Staff message sent to Hudson Gaitan regarding pt request.  Informed patient an itinerary can be accessed on [employer], and will be sent via mail.

## 2020-06-16 ENCOUNTER — MYC MEDICAL ADVICE (OUTPATIENT)
Dept: ENDOCRINOLOGY | Facility: CLINIC | Age: 30
End: 2020-06-16

## 2020-06-16 NOTE — TELEPHONE ENCOUNTER
Patient rescheduled to 8/4/2020.    Sabina Rutledge LPN  Diabetes Clinic Coordinator   Adult Endocrinology and Pediatric Specialty Clinics  [facility]

## 2020-06-16 NOTE — TELEPHONE ENCOUNTER
It might be reasonable to re-schedule his appointment with BG readings as long as he doesn't have any concerns at this time.

## 2020-06-22 ENCOUNTER — TRANSFERRED RECORDS (OUTPATIENT)
Dept: HEALTH INFORMATION MANAGEMENT | Facility: CLINIC | Age: 30
End: 2020-06-22

## 2020-06-22 LAB — TSH SERPL-ACNC: 0.69 UIU/ML (ref 0.3–5)

## 2020-06-23 ENCOUNTER — VIRTUAL VISIT (OUTPATIENT)
Dept: GASTROENTEROLOGY | Facility: CLINIC | Age: 30
End: 2020-06-23
Attending: INTERNAL MEDICINE
Payer: COMMERCIAL

## 2020-06-23 DIAGNOSIS — K70.31 ALCOHOLIC CIRRHOSIS OF LIVER WITH ASCITES (H): Primary | ICD-10-CM

## 2020-06-23 NOTE — PROGRESS NOTES
"Obed Wiley is a 30 year old male who is being evaluated via a billable video visit.      The patient has been notified of following:     \"This video visit will be conducted via a call between you and your physician/provider. We have found that certain health care needs can be provided without the need for an in-person physical exam.  This service lets us provide the care you need with a video conversation.  If a prescription is necessary we can send it directly to your pharmacy.  If lab work is needed we can place an order for that and you can then stop by our lab to have the test done at a later time.    Video visits are billed at different rates depending on your insurance coverage.  Please reach out to your insurance provider with any questions.    If during the course of the call the physician/provider feels a video visit is not appropriate, you will not be charged for this service.\"    Patient has given verbal consent for Video visit? Yes    Will anyone else be joining your video visit? No        [facility]    Hepatology follow-up    Hepatology follow-up     Subjective:  30 year old male with a history of decompensated cirrhosis secondary to alcohol with history of hepatic encephalopathy, ascites, SBP. He also has history of relapsing acute pancreatitis, Yan-en-Y for obesity in 2007, diabetes on insulin, hypercoagulable state due to protein C deficiency (DVT in bilateral lower extremities, history of PE, history of IVC filter). We saw him for initiation of transplant evaluation. He was seen by  Dr. Marsh who felt that patient was a good overall transplant candidate.     For his  acute relapsing pancreatitis as well as history of pancreatic adenocarcinoma in his father at age 36, patient was referred for pancreas evaluation on 3/9/2020 by Dr. Aleksandar Acosta.  Per Dr. Acosta, history of pancreatic cyst does not require further evaluation and can be cleared for transplant. "     Overall, patient feels very well.  He denies any cough or shortness of breath. He occasionally has lower extremity edema if he is standing for too long.  He has not required a paracentesis for over 5 months now.  Patient denies jaundice, lower extremity edema, abdominal distension, lethargy or confusion. Patient denies melena, hematemesis or hematochezia. Patient denies fevers, sweats or chills.  Patient states that he has been losing some weight.       He continues to remain sober.  He states that he quit smoking in December 2019.  Before that smoked for about 15 years about half a pack a day.  He states that his last alcoholic drink was 6/2018.  He denies any other recreational drug use including marijuana, cocaine, methamphetamines.  He  Uses oxycodone.Patient denies fevers, sweats or chills.     Medical hx Surgical hx   Past Medical History:   Diagnosis Date     Alcoholic cirrhosis of liver with ascites (H) 12/23/2019     Diabetes (H)     Type 2 DM, Uses Insulin      DVT (deep vein thrombosis) in pregnancy      H/O protein C deficiency      Hepatic encephalopathy (H)      Hepatitis     Hep A when an infant      History of blood transfusion     2019 at [facility]      Leukopenia 1/11/2020     SBP (spontaneous bacterial peritonitis) (H) 11/2019      Past Surgical History:   Procedure Laterality Date     ABDOMEN SURGERY      Gastric Bypass 2009. [facility]      CARDIAC SURGERY      IVC      COLONOSCOPY       ENT SURGERY      Tonsils and Adenoids Removed at 6-7 Years Old      GALLBLADDER SURGERY  2017     GI SURGERY      Upper GI           Medications  Prior to Admission medications    Medication Sig Start Date End Date Taking? Authorizing Provider   blood glucose (NO BRAND SPECIFIED) lancets standard Use to test blood sugar 6 times daily or as directed.    Reported, Patient   Calcium Carbonate Antacid 400 MG CHEW Take 400 mg by mouth 2 times daily     Reported, Patient   cholecalciferol (VITAMIN D3) 97510  units (1250 mcg) capsule Take 50,000 Units by mouth every 7 days Saturdays    Reported, Patient   ciprofloxacin (CIPRO) 500 MG tablet Take 500 mg by mouth daily 1/27/19   Reported, Patient   diclofenac (VOLTAREN) 1 % topical gel Place onto the skin 4 times daily as needed for moderate pain    Unknown, Entered By History   enoxaparin ANTICOAGULANT (LOVENOX) 100 MG/ML syringe Inject 0.88 mLs (88 mg) Subcutaneous every 24 hours for 4 doses 1/11/20 5/12/20  Jesusita Oneal MD   ferrous sulfate (FEROSUL) 325 (65 Fe) MG tablet Take 325 mg by mouth daily (with breakfast)    Reported, Patient   folic acid (FOLVITE) 1 MG tablet Take 1 mg by mouth 2 times daily    Reported, Patient   furosemide (LASIX) 40 MG tablet Take 40 mg by mouth 3 times daily 4/20/20   Reported, Patient   gabapentin (NEURONTIN) 400 MG capsule Take 400 mg by mouth 3 times daily    Reported, Patient   insulin aspart (NOVOLOG FLEXPEN) 100 UNIT/ML pen Inject Subcutaneous 3 times daily (with meals) Sliding scale as directed    Reported, Patient   insulin glargine (LANTUS PEN) 100 UNIT/ML pen Inject 26 Units Subcutaneous 2 times daily 1/10/20   Tahir Acosta MD   lactulose (CHRONULAC) 10 GM/15ML solution Take 45 mLs (30 g) by mouth 3 times daily 20mg / 30 ml tid daily/ prn  Patient not taking: Reported on 4/27/2020 1/10/20   Jesusita Oneal MD   lactulose (CHRONULAC) 10 GM/15ML solution Take 15 mLs (10 g) by mouth every hour as needed for constipation 1/10/20   Jesusita Oneal MD   magnesium oxide 400 MG CAPS Take 400 mg by mouth 3 times daily     Reported, Patient   MULTIPLE VITAMIN-FOLIC ACID PO Take 1 tablet by mouth 1 daily    Reported, Patient   Nutritional Supplements (ENSURE PO) Ensure/ boost - 237 ml bid daily    Reported, Patient   oxyCODONE-acetaminophen (PERCOCET)  MG per tablet Take 1 tablet by mouth every 4 hours as needed for moderate to severe pain     Reported, Patient   pantoprazole (PROTONIX) 40 MG EC tablet Take 40 mg by  mouth daily    Reported, Patient   polyethylene glycol (MIRALAX/GLYCOLAX) packet Take 1 packet by mouth daily     Reported, Patient   promethazine (PHENERGAN) 12.5 MG tablet Take 25 mg by mouth every 6 hours as needed for nausea    Reported, Patient   QUEtiapine (SEROQUEL) 25 MG tablet Take 50 mg by mouth At Bedtime    Unknown, Entered By History   QUEtiapine (SEROQUEL) 25 MG tablet Take 25 mg by mouth daily as needed     Reported, Patient   rifaximin (XIFAXAN) 550 MG TABS tablet Take 550 mg by mouth 2 times daily     Reported, Patient   senna (SENOKOT) 8.6 MG tablet Take 1 tablet by mouth daily as needed for constipation 1/10/20   Jesusita Oneal MD   spironolactone (ALDACTONE) 100 MG tablet Take 100 mg by mouth 3 times daily    Reported, Patient   traZODone (DESYREL) 50 MG tablet Take 100 mg by mouth At Bedtime Prn     Reported, Patient   tretinoin (RETIN-A) 0.025 % external cream Apply topically At Bedtime    Reported, Patient   Vitamin D, Cholecalciferol, 25 MCG (1000 UT) CAPS Take 2,000 Units by mouth daily     Reported, Patient       Allergies  Allergies   Allergen Reactions     Bee Anaphylaxis     Adhesive Tape Rash     Sulfa Drugs Itching     Morphine        Review of systems  A 10-point review of systems was negative    Examination  There were no vitals taken for this visit.  There is no height or weight on file to calculate BMI.    Gen- well, NAD, A+Ox3, normal color  Extr- hands normal, no BETH  Skin- no rash or jaundice  Psych- normal mood    Laboratory  Lab Results   Component Value Date     01/11/2020    POTASSIUM 3.5 01/11/2020    CHLORIDE 110 01/11/2020    CO2 21 01/11/2020    BUN 11 01/11/2020    CR 0.65 01/11/2020       Lab Results   Component Value Date    BILITOTAL 0.5 01/11/2020    ALT 27 01/11/2020    AST 24 01/11/2020    ALKPHOS 81 01/11/2020       Lab Results   Component Value Date    ALBUMIN 2.4 01/11/2020    PROTTOTAL 5.2 01/11/2020        Lab Results   Component Value Date    WBC 1.7  2020    HGB 8.6 2020    MCV 87 2020    PLT 31 2020       Lab Results   Component Value Date    INR 1.31 2020       Radiology    ASSESSMENT AND PLAN:  Alcoholic liver disease.        Mr. Wiley has alcoholic liver disease.  He had drunk for quite a long time and a significant amount of this.  He did progress to cirrhosis and decompensated.  His main issues are ascites and hepatic encephalopathy.  His ascites initially required therapeutic paracentesis twice a week and then once a week and now in the last 5 month none.  He had a couple of  episodes of spontaneous bacterial peritonitis.  As far as the hepatic encephalopathy is concerned, his MELD score is 14, so we thought that there could be a precipitant, and in this case, my feeling is that it could be narcotics, which the patient takes.  Besides that, he has thrombophilia.  He has inherited from his mom protein C deficiency.  He had historically bilateral DVTs of his lower extremities, and he did have, also, pulmonary emboli.  We told him that he will need followup with his hematologist, and in the meantime, he will come also here and see one of our hematologists if at all we decide to proceed forward with the evaluation.   He has almost done with evaluation. Await input from .  Patient had 2 episodes of pancreatitis.  He is 30 years old, and his dad  of pancreatic cancer at age 36, so a correlation between these two is not very clear.  We did talk with our pancreas group.  Dr. Acosta cleared him for transplant.   Otherwise, he will go on surveillance for HCC with imaging.  We will continue doing EGD surveillance for esophageal varices, and he will be seen here in 3-6 months for followup.  We did explain that to him and his mom.     His blood type AB jono Santos MD  Hepatology  [facility]      Video-Visit Details    Type of service:  Video Visit    Video Start Time:  10:21 AM.  Video End Time: 10:35 AM.    Originating Location (pt. Location): Home    Distant Location (provider location):  [facility] HEPATOLOGY     Platform used for Video Visit: James Santos MD

## 2020-06-23 NOTE — LETTER
"    6/23/2020         RE: Obed Wiley  [address] [facility] [address]        Dear Colleague,    Thank you for referring your patient, Obed Wiley, to the [facility] HEPATOLOGY. Please see a copy of my visit note below.    Obed Wiley is a 30 year old male who is being evaluated via a billable video visit.      The patient has been notified of following:     \"This video visit will be conducted via a call between you and your physician/provider. We have found that certain health care needs can be provided without the need for an in-person physical exam.  This service lets us provide the care you need with a video conversation.  If a prescription is necessary we can send it directly to your pharmacy.  If lab work is needed we can place an order for that and you can then stop by our lab to have the test done at a later time.    Video visits are billed at different rates depending on your insurance coverage.  Please reach out to your insurance provider with any questions.    If during the course of the call the physician/provider feels a video visit is not appropriate, you will not be charged for this service.\"    Patient has given verbal consent for Video visit? Yes    Will anyone else be joining your video visit? No        [facility]    Hepatology follow-up    Hepatology follow-up     Subjective:  30 year old male with a history of decompensated cirrhosis secondary to alcohol with history of hepatic encephalopathy, ascites, SBP. He also has history of relapsing acute pancreatitis, Yan-en-Y for obesity in 2007, diabetes on insulin, hypercoagulable state due to protein C deficiency (DVT in bilateral lower extremities, history of PE, history of IVC filter). We saw him for initiation of transplant evaluation. He was seen by  Dr. Marsh who felt that patient was a good overall transplant candidate.     For his  acute relapsing pancreatitis as well as history of pancreatic " adenocarcinoma in his father at age 36, patient was referred for pancreas evaluation on 3/9/2020 by Dr. Aleksandar Acosta.  Per Dr. Acosta, history of pancreatic cyst does not require further evaluation and can be cleared for transplant.     Overall, patient feels very well.  He denies any cough or shortness of breath. He occasionally has lower extremity edema if he is standing for too long.  He has not required a paracentesis for over 5 months now.  Patient denies jaundice, lower extremity edema, abdominal distension, lethargy or confusion. Patient denies melena, hematemesis or hematochezia. Patient denies fevers, sweats or chills.  Patient states that he has been losing some weight.       He continues to remain sober.  He states that he quit smoking in December 2019.  Before that smoked for about 15 years about half a pack a day.  He states that his last alcoholic drink was 6/2018.  He denies any other recreational drug use including marijuana, cocaine, methamphetamines.  He  Uses oxycodone.Patient denies fevers, sweats or chills.     Medical hx Surgical hx   Past Medical History:   Diagnosis Date     Alcoholic cirrhosis of liver with ascites (H) 12/23/2019     Diabetes (H)     Type 2 DM, Uses Insulin      DVT (deep vein thrombosis) in pregnancy      H/O protein C deficiency      Hepatic encephalopathy (H)      Hepatitis     Hep A when an infant      History of blood transfusion     2019 at [facility]      Leukopenia 1/11/2020     SBP (spontaneous bacterial peritonitis) (H) 11/2019      Past Surgical History:   Procedure Laterality Date     ABDOMEN SURGERY      Gastric Bypass 2009. [facility]      CARDIAC SURGERY      IVC      COLONOSCOPY       ENT SURGERY      Tonsils and Adenoids Removed at 6-7 Years Old      GALLBLADDER SURGERY  2017     GI SURGERY      Upper GI           Medications  Prior to Admission medications    Medication Sig Start Date End Date Taking? Authorizing Provider   blood glucose (NO BRAND  SPECIFIED) lancets standard Use to test blood sugar 6 times daily or as directed.    Reported, Patient   Calcium Carbonate Antacid 400 MG CHEW Take 400 mg by mouth 2 times daily     Reported, Patient   cholecalciferol (VITAMIN D3) 99061 units (1250 mcg) capsule Take 50,000 Units by mouth every 7 days Saturdays    Reported, Patient   ciprofloxacin (CIPRO) 500 MG tablet Take 500 mg by mouth daily 1/27/19   Reported, Patient   diclofenac (VOLTAREN) 1 % topical gel Place onto the skin 4 times daily as needed for moderate pain    Unknown, Entered By History   enoxaparin ANTICOAGULANT (LOVENOX) 100 MG/ML syringe Inject 0.88 mLs (88 mg) Subcutaneous every 24 hours for 4 doses 1/11/20 5/12/20  Jesusita Oneal MD   ferrous sulfate (FEROSUL) 325 (65 Fe) MG tablet Take 325 mg by mouth daily (with breakfast)    Reported, Patient   folic acid (FOLVITE) 1 MG tablet Take 1 mg by mouth 2 times daily    Reported, Patient   furosemide (LASIX) 40 MG tablet Take 40 mg by mouth 3 times daily 4/20/20   Reported, Patient   gabapentin (NEURONTIN) 400 MG capsule Take 400 mg by mouth 3 times daily    Reported, Patient   insulin aspart (NOVOLOG FLEXPEN) 100 UNIT/ML pen Inject Subcutaneous 3 times daily (with meals) Sliding scale as directed    Reported, Patient   insulin glargine (LANTUS PEN) 100 UNIT/ML pen Inject 26 Units Subcutaneous 2 times daily 1/10/20   Tahir Acosta MD   lactulose (CHRONULAC) 10 GM/15ML solution Take 45 mLs (30 g) by mouth 3 times daily 20mg / 30 ml tid daily/ prn  Patient not taking: Reported on 4/27/2020 1/10/20   Jesusita Oneal MD   lactulose (CHRONULAC) 10 GM/15ML solution Take 15 mLs (10 g) by mouth every hour as needed for constipation 1/10/20   Jesusita Oneal MD   magnesium oxide 400 MG CAPS Take 400 mg by mouth 3 times daily     Reported, Patient   MULTIPLE VITAMIN-FOLIC ACID PO Take 1 tablet by mouth 1 daily    Reported, Patient   Nutritional Supplements (ENSURE PO) Ensure/ boost - 237 ml bid daily     Reported, Patient   oxyCODONE-acetaminophen (PERCOCET)  MG per tablet Take 1 tablet by mouth every 4 hours as needed for moderate to severe pain     Reported, Patient   pantoprazole (PROTONIX) 40 MG EC tablet Take 40 mg by mouth daily    Reported, Patient   polyethylene glycol (MIRALAX/GLYCOLAX) packet Take 1 packet by mouth daily     Reported, Patient   promethazine (PHENERGAN) 12.5 MG tablet Take 25 mg by mouth every 6 hours as needed for nausea    Reported, Patient   QUEtiapine (SEROQUEL) 25 MG tablet Take 50 mg by mouth At Bedtime    Unknown, Entered By History   QUEtiapine (SEROQUEL) 25 MG tablet Take 25 mg by mouth daily as needed     Reported, Patient   rifaximin (XIFAXAN) 550 MG TABS tablet Take 550 mg by mouth 2 times daily     Reported, Patient   senna (SENOKOT) 8.6 MG tablet Take 1 tablet by mouth daily as needed for constipation 1/10/20   Jesusita Oneal MD   spironolactone (ALDACTONE) 100 MG tablet Take 100 mg by mouth 3 times daily    Reported, Patient   traZODone (DESYREL) 50 MG tablet Take 100 mg by mouth At Bedtime Prn     Reported, Patient   tretinoin (RETIN-A) 0.025 % external cream Apply topically At Bedtime    Reported, Patient   Vitamin D, Cholecalciferol, 25 MCG (1000 UT) CAPS Take 2,000 Units by mouth daily     Reported, Patient       Allergies  Allergies   Allergen Reactions     Bee Anaphylaxis     Adhesive Tape Rash     Sulfa Drugs Itching     Morphine        Review of systems  A 10-point review of systems was negative    Examination  There were no vitals taken for this visit.  There is no height or weight on file to calculate BMI.    Gen- well, NAD, A+Ox3, normal color  Extr- hands normal, no BETH  Skin- no rash or jaundice  Psych- normal mood    Laboratory  Lab Results   Component Value Date     01/11/2020    POTASSIUM 3.5 01/11/2020    CHLORIDE 110 01/11/2020    CO2 21 01/11/2020    BUN 11 01/11/2020    CR 0.65 01/11/2020       Lab Results   Component Value Date    BILITOTAL  0.5 2020    ALT 27 2020    AST 24 2020    ALKPHOS 81 2020       Lab Results   Component Value Date    ALBUMIN 2.4 2020    PROTTOTAL 5.2 2020        Lab Results   Component Value Date    WBC 1.7 2020    HGB 8.6 2020    MCV 87 2020    PLT 31 2020       Lab Results   Component Value Date    INR 1.31 2020       Radiology    ASSESSMENT AND PLAN:  Alcoholic liver disease.        Mr. Wiley has alcoholic liver disease.  He had drunk for quite a long time and a significant amount of this.  He did progress to cirrhosis and decompensated.  His main issues are ascites and hepatic encephalopathy.  His ascites initially required therapeutic paracentesis twice a week and then once a week and now in the last 5 month none.  He had a couple of  episodes of spontaneous bacterial peritonitis.  As far as the hepatic encephalopathy is concerned, his MELD score is 14, so we thought that there could be a precipitant, and in this case, my feeling is that it could be narcotics, which the patient takes.  Besides that, he has thrombophilia.  He has inherited from his mom protein C deficiency.  He had historically bilateral DVTs of his lower extremities, and he did have, also, pulmonary emboli.  We told him that he will need followup with his hematologist, and in the meantime, he will come also here and see one of our hematologists if at all we decide to proceed forward with the evaluation.   He has almost done with evaluation. Await input from .  Patient had 2 episodes of pancreatitis.  He is 30 years old, and his dad  of pancreatic cancer at age 36, so a correlation between these two is not very clear.  We did talk with our pancreas group.  Dr. Acosta cleared him for transplant.   Otherwise, he will go on surveillance for HCC with imaging.  We will continue doing EGD surveillance for esophageal varices, and he will be seen here in 3-6 months for followup.  We  did explain that to him and his mom.     His blood type AB m      Viola Santos MD  Hepatology  [facility]      Video-Visit Details    Type of service:  Video Visit    Video Start Time: 10:21 AM.  Video End Time: 10:35 AM.    Originating Location (pt. Location): Home    Distant Location (provider location):  [facility] HEPATOLOGY     Platform used for Video Visit: [facility]    Viola Santos MD          Again, thank you for allowing me to participate in the care of your patient.        Sincerely,        Viola Santos MD

## 2020-06-25 ENCOUNTER — TRANSFERRED RECORDS (OUTPATIENT)
Dept: HEALTH INFORMATION MANAGEMENT | Facility: CLINIC | Age: 30
End: 2020-06-25

## 2020-06-29 ENCOUNTER — TELEPHONE (OUTPATIENT)
Dept: TRANSPLANT | Facility: CLINIC | Age: 30
End: 2020-06-29

## 2020-06-29 NOTE — TELEPHONE ENCOUNTER
"Transplant Social Work Services Phone Call      Data: Obed is being evaluated for liver transplantation. He completed a substance use assessment at [facility] the end of April 2020.  He called to me to discuss his recommendations because he could not recall what \"classes\" he was supposed to begin.  The recommendations from [facility] are as follows:   RECOMMENDATIONS:  1. Abstain from using all non-prescribed mood altering chemicals and substances. Take all medication as prescribed and directed by licensed physicians.  2. On [place] [facility] Sign up for weekly telephone recovery support call.   3. On [place] [facility] schedule a phone or Zoom meeting for 1:1 coaching with a peer . Duration and frequency determined with peer .  4. On [place] [facility] participate in a group All Recovery Meeting once per week.  5. Follow all recommendations and referral made by other licensed providers such as pcp and [facility] liver transplant committee. Submit random drug testing/breathalyzers to your licensed medical providers when requested.   10/17/19-negative EtG  10/27/19-negative alcohol   11/4/19-negative EtG  1/6/20-negative PEth and EtG  6/22/20-negative alcohol    Intervention/education:  I discussed recommendations (as above) with patient and the importance of relapse prevention.  I provided instruction on how to use virtual liver transplant support group.  Obed requested a referral to a new hepatologist and I have discussed this with Hudson Gaitan, Liver Transplant Coordinator.  Assessment: Obed has achieved over two years of sobriety.  Ongoing alcohol testing is recommended per our guidelines.  Obed agrees to engage in relapse prevention through [facility].  Obed reports his anxiety is \"all social,\" and he feels it is well controlled with seroquel at night.  He declines any need for a referral(s) to mental heatl " provider(s).  Plan: I will remain available to patient throughout his liver transplant evaluation.  I will follow patient's progress with relapse prevention.         BRANDY Alvarado, [facility]  Liver Transplant   Phone [phone]  Pager [phone]

## 2020-06-30 ENCOUNTER — TRANSFERRED RECORDS (OUTPATIENT)
Dept: HEALTH INFORMATION MANAGEMENT | Facility: CLINIC | Age: 30
End: 2020-06-30

## 2020-06-30 LAB — HBA1C MFR BLD: 6.4 % (ref 0–5.7)

## 2020-07-02 ENCOUNTER — TELEPHONE (OUTPATIENT)
Dept: TRANSPLANT | Facility: CLINIC | Age: 30
End: 2020-07-02

## 2020-07-02 DIAGNOSIS — I81 PVT (PORTAL VEIN THROMBOSIS): ICD-10-CM

## 2020-07-02 NOTE — TELEPHONE ENCOUNTER
Patient Call: Voicemail  Date/Time: 7/2/2020  8934    Reason for call: Pt calling  Said they did a US locally  found a blood clot on his liver  Wonders if Dr Santos wants an MRI or CT scan done   Call pt

## 2020-07-02 NOTE — TELEPHONE ENCOUNTER
Request imaging to be pushed and will discuss at selection on 7/7- partial portal vein occlusion. Communicated to Dr. Santos, Dr. Marsh and Dr. Mcallister.  Spoke with Obed and confirmed he remains on Lovenox, alos explaining PVT do occur with cirrhosis. Will review with care team.

## 2020-07-06 LAB
CHOLEST SERPL-MCNC: 89 MG/DL (ref 0–199)
HDLC SERPL-MCNC: 56 MG/DL
LDLC SERPL CALC-MCNC: 20 MG/DL
TRIGL SERPL-MCNC: 63 MG/DL (ref 0–150)

## 2020-07-07 ENCOUNTER — COMMITTEE REVIEW (OUTPATIENT)
Dept: TRANSPLANT | Facility: CLINIC | Age: 30
End: 2020-07-07

## 2020-07-07 NOTE — Clinical Note
J - please set up with Dr. Leventhal in September. Needs to be a new patient appt as patient wants new hepatologist other than Tom. Thank you, tk

## 2020-07-07 NOTE — COMMITTEE REVIEW
Abdominal Patient Discussion Note Transplant Coordinator: Konrad Gaitan Jr.  Transplant Surgeon:   Terrance Marsh    Referring Physician: Anjelica Reyes    Committee Review Members:  Nurse Practitioner Arabella Small, NP   Nutrition Anabel Gustafson, RD   Pharmacy Artie Samayoa, [facility]    - Clinical Wale Trimble, BRANDY, Caridad Sosa, [facility]   Transplant Crystal Shawna Maurice RN, John Campbell MD, Celso Najera MD, Jr Konrad Gaitan RN, Mulu Garcia MD, Airam Rodríguez, APRN CNP, Kat Coffman, RN, Viola Santos MD, Chet Elder MD, Terrance Marsh MD, Thomas M. Leventhal, MD, Levi Galarza MD, Heri Villanueva MD       Additional Discussion Notes and Findings: re-discuss

## 2020-07-10 ENCOUNTER — TRANSFERRED RECORDS (OUTPATIENT)
Dept: HEALTH INFORMATION MANAGEMENT | Facility: CLINIC | Age: 30
End: 2020-07-10

## 2020-07-15 ENCOUNTER — TELEPHONE (OUTPATIENT)
Dept: TRANSPLANT | Facility: CLINIC | Age: 30
End: 2020-07-15

## 2020-07-15 NOTE — TELEPHONE ENCOUNTER
Spoke with the patient and confirmed Hepatology appointments on 9/14/20.  Informed patient an itinerary can be accessed on [employer].

## 2020-07-15 NOTE — TELEPHONE ENCOUNTER
Left message for patient to schedule Hepatology appointments.  Asked patient to call back to schedule.

## 2020-07-16 ENCOUNTER — TRANSFERRED RECORDS (OUTPATIENT)
Dept: HEALTH INFORMATION MANAGEMENT | Facility: CLINIC | Age: 30
End: 2020-07-16

## 2020-07-16 LAB
ALT SERPL-CCNC: 33 U/L (ref 30–65)
AST SERPL-CCNC: 40 U/L (ref 15–37)
CREAT SERPL-MCNC: 0.7 MG/DL (ref 0.7–1.2)
GFR SERPL CREATININE-BSD FRML MDRD: 125 ML/MIN/1.73M2
GLUCOSE SERPL-MCNC: 119 MG/DL (ref 70–110)
INR PPP: 1.4 (ref 1–2.5)
POTASSIUM SERPL-SCNC: 3.9 MEQ/L (ref 3.5–5)

## 2020-07-17 ENCOUNTER — APPOINTMENT (OUTPATIENT)
Dept: ULTRASOUND IMAGING | Facility: CLINIC | Age: 30
End: 2020-07-17
Attending: INTERNAL MEDICINE
Payer: COMMERCIAL

## 2020-07-17 ENCOUNTER — HOSPITAL ENCOUNTER (INPATIENT)
Facility: CLINIC | Age: 30
LOS: 10 days | Discharge: HOME | End: 2020-07-27
Attending: INTERNAL MEDICINE | Admitting: INTERNAL MEDICINE
Payer: COMMERCIAL

## 2020-07-17 ENCOUNTER — APPOINTMENT (OUTPATIENT)
Dept: MRI IMAGING | Facility: CLINIC | Age: 30
End: 2020-07-17
Attending: STUDENT IN AN ORGANIZED HEALTH CARE EDUCATION/TRAINING PROGRAM
Payer: COMMERCIAL

## 2020-07-17 DIAGNOSIS — Z79.4 DIABETES MELLITUS DUE TO UNDERLYING CONDITION WITH HYPEROSMOLARITY WITHOUT COMA, WITH LONG-TERM CURRENT USE OF INSULIN (H): ICD-10-CM

## 2020-07-17 DIAGNOSIS — I81 PORTAL VEIN THROMBOSIS: Primary | ICD-10-CM

## 2020-07-17 DIAGNOSIS — E08.00 DIABETES MELLITUS DUE TO UNDERLYING CONDITION WITH HYPEROSMOLARITY WITHOUT COMA, WITH LONG-TERM CURRENT USE OF INSULIN (H): ICD-10-CM

## 2020-07-17 DIAGNOSIS — K70.31 ALCOHOLIC CIRRHOSIS OF LIVER WITH ASCITES (H): ICD-10-CM

## 2020-07-17 DIAGNOSIS — G89.4 CHRONIC PAIN SYNDROME: ICD-10-CM

## 2020-07-17 DIAGNOSIS — I81 PVT (PORTAL VEIN THROMBOSIS): ICD-10-CM

## 2020-07-17 LAB
ALBUMIN SERPL-MCNC: 2.9 G/DL (ref 3.4–5)
ALBUMIN SERPL-MCNC: 3.7 G/DL (ref 3.4–5)
ALP SERPL-CCNC: 120 U/L (ref 40–150)
ALP SERPL-CCNC: 93 U/L (ref 40–150)
ALT SERPL W P-5'-P-CCNC: 30 U/L (ref 0–70)
ALT SERPL W P-5'-P-CCNC: 39 U/L (ref 0–70)
ANION GAP SERPL CALCULATED.3IONS-SCNC: 2 MMOL/L (ref 3–14)
ANION GAP SERPL CALCULATED.3IONS-SCNC: 7 MMOL/L (ref 3–14)
AST SERPL W P-5'-P-CCNC: 36 U/L (ref 0–45)
AST SERPL W P-5'-P-CCNC: 52 U/L (ref 0–45)
BILIRUB SERPL-MCNC: 0.9 MG/DL (ref 0.2–1.3)
BILIRUB SERPL-MCNC: 1.4 MG/DL (ref 0.2–1.3)
BUN SERPL-MCNC: 10 MG/DL (ref 7–30)
BUN SERPL-MCNC: 12 MG/DL (ref 7–30)
CALCIUM SERPL-MCNC: 8.3 MG/DL (ref 8.5–10.1)
CALCIUM SERPL-MCNC: 9 MG/DL (ref 8.5–10.1)
CHLORIDE SERPL-SCNC: 105 MMOL/L (ref 94–109)
CHLORIDE SERPL-SCNC: 109 MMOL/L (ref 94–109)
CO2 SERPL-SCNC: 26 MMOL/L (ref 20–32)
CO2 SERPL-SCNC: 28 MMOL/L (ref 20–32)
CREAT SERPL-MCNC: 0.76 MG/DL (ref 0.66–1.25)
CREAT SERPL-MCNC: 0.79 MG/DL (ref 0.66–1.25)
ERYTHROCYTE [DISTWIDTH] IN BLOOD BY AUTOMATED COUNT: 14.1 % (ref 10–15)
GFR SERPL CREATININE-BSD FRML MDRD: >90 ML/MIN/{1.73_M2}
GFR SERPL CREATININE-BSD FRML MDRD: >90 ML/MIN/{1.73_M2}
GLUCOSE BLDC GLUCOMTR-MCNC: 104 MG/DL (ref 70–99)
GLUCOSE BLDC GLUCOMTR-MCNC: 138 MG/DL (ref 70–99)
GLUCOSE BLDC GLUCOMTR-MCNC: 143 MG/DL (ref 70–99)
GLUCOSE BLDC GLUCOMTR-MCNC: 89 MG/DL (ref 70–99)
GLUCOSE SERPL-MCNC: 131 MG/DL (ref 70–99)
GLUCOSE SERPL-MCNC: 232 MG/DL (ref 70–99)
HBA1C MFR BLD: 5.9 % (ref 0–5.6)
HCT VFR BLD AUTO: 40.2 % (ref 40–53)
HGB BLD-MCNC: 12.9 G/DL (ref 13.3–17.7)
INR PPP: 1.58 (ref 0.86–1.14)
LACTATE BLD-SCNC: 0.8 MMOL/L (ref 0.7–2)
LACTATE BLD-SCNC: 1.8 MMOL/L (ref 0.7–2)
LACTATE BLD-SCNC: 2.1 MMOL/L (ref 0.7–2)
LIPASE SERPL-CCNC: 29 U/L (ref 73–393)
LMWH PPP CHRO-ACNC: 0.34 IU/ML
MCH RBC QN AUTO: 31 PG (ref 26.5–33)
MCHC RBC AUTO-ENTMCNC: 32.1 G/DL (ref 31.5–36.5)
MCV RBC AUTO: 97 FL (ref 78–100)
PLATELET # BLD AUTO: 41 10E9/L (ref 150–450)
POTASSIUM SERPL-SCNC: 3.6 MMOL/L (ref 3.4–5.3)
POTASSIUM SERPL-SCNC: 4.2 MMOL/L (ref 3.4–5.3)
PROT SERPL-MCNC: 6.2 G/DL (ref 6.8–8.8)
PROT SERPL-MCNC: 7.8 G/DL (ref 6.8–8.8)
RBC # BLD AUTO: 4.16 10E12/L (ref 4.4–5.9)
SODIUM SERPL-SCNC: 137 MMOL/L (ref 133–144)
SODIUM SERPL-SCNC: 139 MMOL/L (ref 133–144)
WBC # BLD AUTO: 3.7 10E9/L (ref 4–11)

## 2020-07-17 PROCEDURE — 70551 MRI BRAIN STEM W/O DYE: CPT

## 2020-07-17 PROCEDURE — 12000001 ZZH R&B MED SURG/OB UMMC

## 2020-07-17 PROCEDURE — 76700 US EXAM ABDOM COMPLETE: CPT

## 2020-07-17 PROCEDURE — 25000132 ZZH RX MED GY IP 250 OP 250 PS 637: Performed by: STUDENT IN AN ORGANIZED HEALTH CARE EDUCATION/TRAINING PROGRAM

## 2020-07-17 PROCEDURE — 85520 HEPARIN ASSAY: CPT | Performed by: INTERNAL MEDICINE

## 2020-07-17 PROCEDURE — 00000146 ZZHCL STATISTIC GLUCOSE BY METER IP

## 2020-07-17 PROCEDURE — 40000141 ZZH STATISTIC PERIPHERAL IV START W/O US GUIDANCE

## 2020-07-17 PROCEDURE — 83690 ASSAY OF LIPASE: CPT | Performed by: STUDENT IN AN ORGANIZED HEALTH CARE EDUCATION/TRAINING PROGRAM

## 2020-07-17 PROCEDURE — 99223 1ST HOSP IP/OBS HIGH 75: CPT | Mod: AI | Performed by: INTERNAL MEDICINE

## 2020-07-17 PROCEDURE — 80053 COMPREHEN METABOLIC PANEL: CPT | Performed by: STUDENT IN AN ORGANIZED HEALTH CARE EDUCATION/TRAINING PROGRAM

## 2020-07-17 PROCEDURE — 36415 COLL VENOUS BLD VENIPUNCTURE: CPT | Performed by: INTERNAL MEDICINE

## 2020-07-17 PROCEDURE — 83036 HEMOGLOBIN GLYCOSYLATED A1C: CPT | Performed by: INTERNAL MEDICINE

## 2020-07-17 PROCEDURE — 36415 COLL VENOUS BLD VENIPUNCTURE: CPT | Performed by: STUDENT IN AN ORGANIZED HEALTH CARE EDUCATION/TRAINING PROGRAM

## 2020-07-17 PROCEDURE — 25000128 H RX IP 250 OP 636: Performed by: STUDENT IN AN ORGANIZED HEALTH CARE EDUCATION/TRAINING PROGRAM

## 2020-07-17 PROCEDURE — U0003 INFECTIOUS AGENT DETECTION BY NUCLEIC ACID (DNA OR RNA); SEVERE ACUTE RESPIRATORY SYNDROME CORONAVIRUS 2 (SARS-COV-2) (CORONAVIRUS DISEASE [COVID-19]), AMPLIFIED PROBE TECHNIQUE, MAKING USE OF HIGH THROUGHPUT TECHNOLOGIES AS DESCRIBED BY CMS-2020-01-R: HCPCS | Performed by: STUDENT IN AN ORGANIZED HEALTH CARE EDUCATION/TRAINING PROGRAM

## 2020-07-17 PROCEDURE — 25000131 ZZH RX MED GY IP 250 OP 636 PS 637: Performed by: STUDENT IN AN ORGANIZED HEALTH CARE EDUCATION/TRAINING PROGRAM

## 2020-07-17 PROCEDURE — 80053 COMPREHEN METABOLIC PANEL: CPT | Performed by: INTERNAL MEDICINE

## 2020-07-17 PROCEDURE — 85027 COMPLETE CBC AUTOMATED: CPT | Performed by: INTERNAL MEDICINE

## 2020-07-17 PROCEDURE — 99222 1ST HOSP IP/OBS MODERATE 55: CPT | Mod: GC | Performed by: INTERNAL MEDICINE

## 2020-07-17 PROCEDURE — 25800030 ZZH RX IP 258 OP 636: Performed by: STUDENT IN AN ORGANIZED HEALTH CARE EDUCATION/TRAINING PROGRAM

## 2020-07-17 PROCEDURE — 83605 ASSAY OF LACTIC ACID: CPT | Performed by: STUDENT IN AN ORGANIZED HEALTH CARE EDUCATION/TRAINING PROGRAM

## 2020-07-17 PROCEDURE — 85610 PROTHROMBIN TIME: CPT | Performed by: INTERNAL MEDICINE

## 2020-07-17 RX ORDER — FOLIC ACID 1 MG/1
1 TABLET ORAL 2 TIMES DAILY
Status: DISCONTINUED | OUTPATIENT
Start: 2020-07-17 | End: 2020-07-27 | Stop reason: HOSPADM

## 2020-07-17 RX ORDER — HYDROMORPHONE HYDROCHLORIDE 2 MG/1
2-4 TABLET ORAL EVERY 4 HOURS PRN
Status: DISCONTINUED | OUTPATIENT
Start: 2020-07-17 | End: 2020-07-19

## 2020-07-17 RX ORDER — ACETAMINOPHEN 325 MG/1
650 TABLET ORAL EVERY 8 HOURS
Status: DISCONTINUED | OUTPATIENT
Start: 2020-07-17 | End: 2020-07-27 | Stop reason: HOSPADM

## 2020-07-17 RX ORDER — HYDROXYZINE HYDROCHLORIDE 25 MG/1
25 TABLET, FILM COATED ORAL EVERY 6 HOURS PRN
Status: DISCONTINUED | OUTPATIENT
Start: 2020-07-17 | End: 2020-07-27 | Stop reason: HOSPADM

## 2020-07-17 RX ORDER — SPIRONOLACTONE 50 MG/1
100 TABLET, FILM COATED ORAL 3 TIMES DAILY
Status: DISCONTINUED | OUTPATIENT
Start: 2020-07-17 | End: 2020-07-27 | Stop reason: HOSPADM

## 2020-07-17 RX ORDER — OXYCODONE HYDROCHLORIDE 10 MG/1
10 TABLET ORAL EVERY 6 HOURS PRN
Status: DISCONTINUED | OUTPATIENT
Start: 2020-07-17 | End: 2020-07-17

## 2020-07-17 RX ORDER — QUETIAPINE FUMARATE 25 MG/1
25 TABLET, FILM COATED ORAL DAILY PRN
Status: DISCONTINUED | OUTPATIENT
Start: 2020-07-17 | End: 2020-07-27 | Stop reason: HOSPADM

## 2020-07-17 RX ORDER — ACETAMINOPHEN 325 MG/1
650 TABLET ORAL EVERY 8 HOURS PRN
Status: DISCONTINUED | OUTPATIENT
Start: 2020-07-17 | End: 2020-07-17

## 2020-07-17 RX ORDER — DEXTROSE MONOHYDRATE 25 G/50ML
25-50 INJECTION, SOLUTION INTRAVENOUS
Status: DISCONTINUED | OUTPATIENT
Start: 2020-07-17 | End: 2020-07-17

## 2020-07-17 RX ORDER — PROMETHAZINE HYDROCHLORIDE 25 MG/1
25 TABLET ORAL EVERY 6 HOURS PRN
Status: DISCONTINUED | OUTPATIENT
Start: 2020-07-17 | End: 2020-07-27 | Stop reason: HOSPADM

## 2020-07-17 RX ORDER — DEXTROSE MONOHYDRATE 25 G/50ML
25-50 INJECTION, SOLUTION INTRAVENOUS
Status: DISCONTINUED | OUTPATIENT
Start: 2020-07-17 | End: 2020-07-27 | Stop reason: HOSPADM

## 2020-07-17 RX ORDER — PANTOPRAZOLE SODIUM 40 MG/1
40 TABLET, DELAYED RELEASE ORAL DAILY
Status: DISCONTINUED | OUTPATIENT
Start: 2020-07-17 | End: 2020-07-27 | Stop reason: HOSPADM

## 2020-07-17 RX ORDER — LIDOCAINE 40 MG/G
CREAM TOPICAL
Status: DISCONTINUED | OUTPATIENT
Start: 2020-07-17 | End: 2020-07-27 | Stop reason: HOSPADM

## 2020-07-17 RX ORDER — GABAPENTIN 400 MG/1
400 CAPSULE ORAL 3 TIMES DAILY
Status: DISCONTINUED | OUTPATIENT
Start: 2020-07-17 | End: 2020-07-18

## 2020-07-17 RX ORDER — FERROUS SULFATE 325(65) MG
325 TABLET ORAL
Status: DISCONTINUED | OUTPATIENT
Start: 2020-07-17 | End: 2020-07-27 | Stop reason: HOSPADM

## 2020-07-17 RX ORDER — VITAMIN B COMPLEX
2000 TABLET ORAL DAILY
Status: DISCONTINUED | OUTPATIENT
Start: 2020-07-17 | End: 2020-07-27 | Stop reason: HOSPADM

## 2020-07-17 RX ORDER — FUROSEMIDE 40 MG
40 TABLET ORAL 3 TIMES DAILY
Status: DISCONTINUED | OUTPATIENT
Start: 2020-07-17 | End: 2020-07-27 | Stop reason: HOSPADM

## 2020-07-17 RX ORDER — HYDROMORPHONE HYDROCHLORIDE 2 MG/1
2 TABLET ORAL EVERY 4 HOURS PRN
Status: DISCONTINUED | OUTPATIENT
Start: 2020-07-17 | End: 2020-07-17

## 2020-07-17 RX ORDER — LIDOCAINE 4 G/G
1 PATCH TOPICAL
Status: DISCONTINUED | OUTPATIENT
Start: 2020-07-17 | End: 2020-07-27 | Stop reason: HOSPADM

## 2020-07-17 RX ORDER — CHOLECALCIFEROL (VITAMIN D3) 1250 MCG
1250 CAPSULE ORAL
Status: DISCONTINUED | OUTPATIENT
Start: 2020-07-17 | End: 2020-07-17

## 2020-07-17 RX ORDER — CHOLECALCIFEROL (VITAMIN D3) 1250 MCG
1250 CAPSULE ORAL
Status: DISCONTINUED | OUTPATIENT
Start: 2020-07-18 | End: 2020-07-27 | Stop reason: HOSPADM

## 2020-07-17 RX ORDER — HEPARIN SODIUM 10000 [USP'U]/100ML
0-3500 INJECTION, SOLUTION INTRAVENOUS CONTINUOUS
Status: DISCONTINUED | OUTPATIENT
Start: 2020-07-17 | End: 2020-07-19

## 2020-07-17 RX ORDER — NICOTINE POLACRILEX 4 MG
15-30 LOZENGE BUCCAL
Status: DISCONTINUED | OUTPATIENT
Start: 2020-07-17 | End: 2020-07-17

## 2020-07-17 RX ORDER — CIPROFLOXACIN 500 MG/1
500 TABLET, FILM COATED ORAL DAILY
Status: DISCONTINUED | OUTPATIENT
Start: 2020-07-17 | End: 2020-07-27 | Stop reason: HOSPADM

## 2020-07-17 RX ORDER — NICOTINE POLACRILEX 4 MG
15-30 LOZENGE BUCCAL
Status: DISCONTINUED | OUTPATIENT
Start: 2020-07-17 | End: 2020-07-27 | Stop reason: HOSPADM

## 2020-07-17 RX ORDER — NALOXONE HYDROCHLORIDE 0.4 MG/ML
.1-.4 INJECTION, SOLUTION INTRAMUSCULAR; INTRAVENOUS; SUBCUTANEOUS
Status: DISCONTINUED | OUTPATIENT
Start: 2020-07-17 | End: 2020-07-27 | Stop reason: HOSPADM

## 2020-07-17 RX ORDER — LACTULOSE 10 G/15ML
30 SOLUTION ORAL 3 TIMES DAILY
Status: DISCONTINUED | OUTPATIENT
Start: 2020-07-17 | End: 2020-07-19

## 2020-07-17 RX ORDER — HYDROMORPHONE HYDROCHLORIDE 1 MG/ML
0.3 INJECTION, SOLUTION INTRAMUSCULAR; INTRAVENOUS; SUBCUTANEOUS EVERY 4 HOURS PRN
Status: DISCONTINUED | OUTPATIENT
Start: 2020-07-17 | End: 2020-07-26

## 2020-07-17 RX ORDER — SPIRONOLACTONE 25 MG/1
100 TABLET ORAL 3 TIMES DAILY
Status: DISCONTINUED | OUTPATIENT
Start: 2020-07-17 | End: 2020-07-17

## 2020-07-17 RX ORDER — FUROSEMIDE 40 MG
40 TABLET ORAL 3 TIMES DAILY
Status: DISCONTINUED | OUTPATIENT
Start: 2020-07-17 | End: 2020-07-17

## 2020-07-17 RX ORDER — QUETIAPINE FUMARATE 25 MG/1
50 TABLET, FILM COATED ORAL AT BEDTIME
Status: DISCONTINUED | OUTPATIENT
Start: 2020-07-17 | End: 2020-07-27 | Stop reason: HOSPADM

## 2020-07-17 RX ADMIN — HYDROMORPHONE HYDROCHLORIDE 4 MG: 2 TABLET ORAL at 17:54

## 2020-07-17 RX ADMIN — FUROSEMIDE 40 MG: 40 TABLET ORAL at 10:21

## 2020-07-17 RX ADMIN — HYDROMORPHONE HYDROCHLORIDE 2 MG: 2 TABLET ORAL at 14:03

## 2020-07-17 RX ADMIN — INSULIN ASPART 2 UNITS: 100 INJECTION, SOLUTION INTRAVENOUS; SUBCUTANEOUS at 18:35

## 2020-07-17 RX ADMIN — LACTULOSE 30 G: 20 SOLUTION ORAL at 13:11

## 2020-07-17 RX ADMIN — FUROSEMIDE 40 MG: 40 TABLET ORAL at 13:11

## 2020-07-17 RX ADMIN — HYDROMORPHONE HYDROCHLORIDE 4 MG: 2 TABLET ORAL at 22:26

## 2020-07-17 RX ADMIN — HEPARIN SODIUM 1450 UNITS/HR: 10000 INJECTION, SOLUTION INTRAVENOUS at 06:15

## 2020-07-17 RX ADMIN — SPIRONOLACTONE 100 MG: 25 TABLET ORAL at 10:21

## 2020-07-17 RX ADMIN — FOLIC ACID 1 MG: 1 TABLET ORAL at 19:27

## 2020-07-17 RX ADMIN — SPIRONOLACTONE 100 MG: 25 TABLET ORAL at 13:11

## 2020-07-17 RX ADMIN — QUETIAPINE FUMARATE 50 MG: 50 TABLET ORAL at 22:26

## 2020-07-17 RX ADMIN — GABAPENTIN 400 MG: 400 CAPSULE ORAL at 19:27

## 2020-07-17 RX ADMIN — LIDOCAINE 1 PATCH: 560 PATCH PERCUTANEOUS; TOPICAL; TRANSDERMAL at 19:28

## 2020-07-17 RX ADMIN — MELATONIN 2000 UNITS: at 07:42

## 2020-07-17 RX ADMIN — DICLOFENAC SODIUM 2 G: 10 GEL TOPICAL at 19:32

## 2020-07-17 RX ADMIN — INSULIN ASPART 3 UNITS: 100 INJECTION, SOLUTION INTRAVENOUS; SUBCUTANEOUS at 12:10

## 2020-07-17 RX ADMIN — ACETAMINOPHEN 650 MG: 325 TABLET, FILM COATED ORAL at 17:54

## 2020-07-17 RX ADMIN — FERROUS SULFATE TAB 325 MG (65 MG ELEMENTAL FE) 325 MG: 325 (65 FE) TAB at 07:42

## 2020-07-17 RX ADMIN — DICLOFENAC SODIUM 2 G: 10 GEL TOPICAL at 18:26

## 2020-07-17 RX ADMIN — CIPROFLOXACIN HYDROCHLORIDE 500 MG: 500 TABLET, FILM COATED ORAL at 19:27

## 2020-07-17 RX ADMIN — FOLIC ACID 1 MG: 1 TABLET ORAL at 07:42

## 2020-07-17 RX ADMIN — HEPARIN SODIUM 1450 UNITS/HR: 10000 INJECTION, SOLUTION INTRAVENOUS at 23:58

## 2020-07-17 RX ADMIN — OXYCODONE HYDROCHLORIDE 10 MG: 10 TABLET ORAL at 07:43

## 2020-07-17 RX ADMIN — SODIUM CHLORIDE, POTASSIUM CHLORIDE, SODIUM LACTATE AND CALCIUM CHLORIDE 500 ML: 600; 310; 30; 20 INJECTION, SOLUTION INTRAVENOUS at 19:29

## 2020-07-17 RX ADMIN — INSULIN GLARGINE 10 UNITS: 100 INJECTION, SOLUTION SUBCUTANEOUS at 10:33

## 2020-07-17 RX ADMIN — RIFAXIMIN 550 MG: 550 TABLET ORAL at 19:27

## 2020-07-17 RX ADMIN — HYDROMORPHONE HYDROCHLORIDE 0.3 MG: 1 INJECTION, SOLUTION INTRAMUSCULAR; INTRAVENOUS; SUBCUTANEOUS at 19:30

## 2020-07-17 RX ADMIN — GABAPENTIN 400 MG: 400 CAPSULE ORAL at 07:42

## 2020-07-17 RX ADMIN — LACTULOSE 30 G: 20 SOLUTION ORAL at 19:27

## 2020-07-17 RX ADMIN — GABAPENTIN 400 MG: 400 CAPSULE ORAL at 13:11

## 2020-07-17 RX ADMIN — RIFAXIMIN 550 MG: 550 TABLET ORAL at 07:41

## 2020-07-17 RX ADMIN — HYDROMORPHONE HYDROCHLORIDE 2 MG: 2 TABLET ORAL at 10:21

## 2020-07-17 RX ADMIN — PROMETHAZINE HYDROCHLORIDE 25 MG: 25 TABLET ORAL at 20:51

## 2020-07-17 RX ADMIN — DIPHENHYDRAMINE HYDROCHLORIDE AND ZINC ACETATE: 10; 1 CREAM TOPICAL at 17:54

## 2020-07-17 ASSESSMENT — MIFFLIN-ST. JEOR: SCORE: 1841.75

## 2020-07-17 ASSESSMENT — ACTIVITIES OF DAILY LIVING (ADL)
ADLS_ACUITY_SCORE: 10

## 2020-07-17 NOTE — PROGRESS NOTES
[facility], [place]     Progress Note - Maroon 1 Service        Date of Admission:  7/17/2020        Assessment & Plan     Obed Wiley is a 30 year old male with decompensated alcoholic cirrhosis, protein C deficiency, insulin-dependent T2DM (insulin use), obesity s/p Yan-en-Y gastric bypass, chronic leukopenia and thrombocytopenia with a history of multiple VTEs with chronic indwelling IVC filter and GI bleeds who was transferred from OSH 7/17/2020 for abdominal pain 2/2 to acute portal vein thrombosis while on Lovenox 1 mg/kg anticoagulation.     # Acute portal vein thrombosis  # Protein C deficiency  Pt w/ hx hypercoagulability 2/2 to liver disease, protein C deficiency. 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 Recently admitted 1/2020 for mesenteric ischemia, discharged on 1 mg/kg Lovenox. Reports compliance and consistency with Lovenox dosing at home. Pt now with nonocclusive portal vein thrombosis identified on imaging at OSH, US abdomen w/ Doppler 7/17 w/ PVT w/o splenic vein or other mesenteric involvement.  - currently on therapeutic heparin gtt   - Hematology consulted, appreciate recs: rec restarting Lovenox 1.5 mg/kg then recheck anti-Xa in 4-6 hours. Given severity of abdominal pain, will hold off on starting this now in case a procedure is needed  - Images unable to be pushed from OSH; anticipate CD by mail  - Dilaudid 2 mg PO Q4H PRN with IV PRN for breakthrough pain, w/ Tylenol 650 mg Q8H for background pain control and lidocaine patches PRN for additional pain control  - Seroquel 50 mg at bedtime and 25mg PRN for anxiety 2/2 pain; hydroxyzine PRN for anxiety  - will consult Pain Medicine team in AM re improving pain control while IP    # Decompensated alcoholic cirrhosis c/b HE, SBP, EV  Currently sober and undergoing transplant evaluation, transplant  candidate per Dr. Marsh. Ascites initially required therapeutic paracenteses 2x/week, then 1x/week, but he has not required one in the last 5 month none. Has had SBP, on ciprofloxacin 500 mg daily for prophylaxis. His last MELD score is 14 . Requires continuous EGD surveillance for esophageal varices, and he will be seen by GI in 3-6 months for follow up.   - Hepatology consulted, appreciate recs  - Continue PTA ciprofloxacin 500mg once daily for SBP prophylaxis  - Continue PTA lactulose, titrate to 3-4 BM/day  - Continue rifaximin 550 mg BID  - Continue PTA spironolactone 100mg TID and lasix 40mg TID for now, hemodynamically stable w/o concern for bowel ischemia  MELD-Na score: 12 at 2020  5:42 AM  MELD score: 12 at 2020  5:42 AM  Calculated from:  Serum Creatinine: 0.76 mg/dL (Rounded to 1 mg/dL) at 2020  5:42 AM  Serum Sodium: 139 mmol/L (Rounded to 137 mmol/L) at 2020  5:42 AM  Total Bilirubin: 0.9 mg/dL (Rounded to 1 mg/dL) at 2020  5:42 AM  INR(ratio): 1.58 at 2020  5:42 AM  Age: 30 years 5 months    #Right ear, lateral facial paresthesia, r/o mass  Pt admitted to OSH with abdominal pain, right ear and right lateral facial paresthesias. OSH w/ negative head CT, neuro at OSH recommending MRI to evaluate further  - MRI brain w/o contrast     #Chronic pancreatitis  Pt w/ hx of chronic pancreatitis. His father  from pancreatic cancer at age of 36. He also has history of pancreatic cyst but does not require further evaluation and can be cleared for liver transplant per Dr. Acosta. Per pt, current abdominal pain worse than chronic pancreatitis pain but in similar distribution.  - lipase, lactate to be checked     #Diabetes Mellitus II  Last Hgb A1C 8.4% (2019), recheck  is 5.9%.  - Continue glargine at 10 units daily  - Sliding scale: medium sliding scale insulin  - Carb coverage: 1 U/15 g CHO  - Hypoglycemic protocol   - QID fingersticks AC HS     Diet: low sodium  "diet  Fluids: no  DVT Prophylaxis: Pt is currently on heparin IV for PVT  Glaser Catheter: not present  Code Status: full     Disposition Plan     Expected discharge: 2 - 3 days, recommended to prior living arrangement once appropriate management is done..  Entered: Kaiser Flores MD 07/17/2020, 5:07 AM     The patient's care was discussed with the Attending physician, Dr. Ana Ca, MS4  Maroon 1 Service  [facility], [place]  Pager: [phone]  Please see sticky note for cross cover information.    Resident/Fellow Attestation   I, Dalila Fu, was present with the medical student who participated in the service and in the documentation of the note.  I have verified the history and personally performed the physical exam and medical decision making.  I agree with the assessment and plan of care as documented in the note.      Dalila Fu MD  PGY1  Date of Service (when I saw the patient): 07/17/20      Interval History  Pt is seen at bedside with team. Pt is feeling \"pretty crappy\" d/t abdominal pain. Reports abdominal pain worst in LUQ relative to RUQ, has not been controlled well on home regimen of oxycodone 10 mg 3x/day. Has been having difficulty w/ pain control w/ oral dilaudid as well. Pt able to move well today. Pt reports nausea, though says he is frequently nauseous and this is helped with PTA promethazine. He denies vomiting, saying he has not been able to vomit since his Job procedure in 2007. However, today he has a good appetite and is eating well, though he mentions that he has lost about 20 lbs in the last 3 or so months and has a lot of excess skin as a result. He has been passing flatus and having bowel movements, 5-6/day 2/2 lactulose, reported w/o melena, hematochezia. Denies fevers, sweats, chills, SOB, chest pain, diarrhea, dysuria. No calf pain. Continues to endorse transient right ear numbness, right " "lateral facial paresthesias.    Data reviewed today: I reviewed all medications, new labs and imaging results over the last 24 hours.    Physical Exam  BP 97/49 (BP Location: Left arm)   Pulse 55   Temp 97.4  F (36.3  C) (Oral)   Resp 16   Ht 1.88 m (6' 2\")   Wt 81.7 kg (180 lb 1.9 oz)   SpO2 99%   BMI 23.13 kg/m     GA: A&O*3, coherent  HEENT: aninteric sclerae, conjunctival hemorrhage on lower eyelids both eyes, mucus membranes moist and anicteric, poor dentition with multiple missing teeth  Lungs: clear on auscultation both lungs  CVS: RRR, normal heart sounds  Abd: normal bowel sound, soft, generalized epigastric tenderness LUQ>RUQ, some focal tenderness in LLQ; no rebound or guarding; negative Yeager's; liver and spleen edges not easily palpable  Extremity: no edema seen  Neuro: grossly intact, no tremor, no asterixis    Data  Last Comprehensive Metabolic Panel:  Sodium   Date Value Ref Range Status   07/17/2020 139 133 - 144 mmol/L Final     Potassium   Date Value Ref Range Status   07/17/2020 3.6 3.4 - 5.3 mmol/L Final     Chloride   Date Value Ref Range Status   07/17/2020 109 94 - 109 mmol/L Final     Carbon Dioxide   Date Value Ref Range Status   07/17/2020 28 20 - 32 mmol/L Final     Anion Gap   Date Value Ref Range Status   07/17/2020 2 (L) 3 - 14 mmol/L Final     Glucose   Date Value Ref Range Status   07/17/2020 131 (H) 70 - 99 mg/dL Final     Urea Nitrogen   Date Value Ref Range Status   07/17/2020 12 7 - 30 mg/dL Final     Creatinine   Date Value Ref Range Status   07/17/2020 0.76 0.66 - 1.25 mg/dL Final     GFR Estimate   Date Value Ref Range Status   07/17/2020 >90 >60 mL/min/[1.73_m2] Final     Comment:     Non  GFR Calc  Starting 12/18/2018, serum creatinine based estimated GFR (eGFR) will be   calculated using the Chronic Kidney Disease Epidemiology Collaboration   (CKD-EPI) equation.       Calcium   Date Value Ref Range Status   07/17/2020 8.3 (L) 8.5 - 10.1 mg/dL Final "     Lab Results   Component Value Date    WBC 3.7 07/17/2020     Lab Results   Component Value Date    RBC 4.16 07/17/2020     Lab Results   Component Value Date    HGB 12.9 07/17/2020     Lab Results   Component Value Date    HCT 40.2 07/17/2020     Lab Results   Component Value Date    MCV 97 07/17/2020     Lab Results   Component Value Date    MCH 31.0 07/17/2020     Lab Results   Component Value Date    MCHC 32.1 07/17/2020     Lab Results   Component Value Date    RDW 14.1 07/17/2020     Lab Results   Component Value Date    PLT 41 07/17/2020     INR: 1.58    Heparin 10A level: 0.34    US abdomen with Doppler 7/17  EXAMINATION: Limited Abdominal Ultrasound, 7/17/2020 8:12 AM      COMPARISON: US 6/30/2020, 1/8/2020 and MRI 3/10/2020.     HISTORY: evaluate for ascites, PVT with mesenteric involvement     FINDINGS:   Fluid: Right pleural effusion. No ascites.     Liver: The liver demonstrates nodular contour of the liver with  coarsened echotexture, measuring 16 cm in craniocaudal dimension.  There is no focal mass.      Nonocclusive thrombus in the main portal vein and left portal vein.   Extrahepatic portal vein flow around the thrombus is antegrade at 32  cm/sec.  Right portal vein flow is antegrade, measuring 23 cm/sec.  Left portal vein flow around the thrombus is antegrade, measuring 20  cm/sec.     Flow in the hepatic artery is towards the liver and:  45 cm/s peak systolic  0.83 resistive index.      The splenic vein is patent and flow is towards the liver. The left,  middle, and right hepatic veins are patent with flow towards the IVC.  The IVC is patent with flow towards the heart. The visualized aorta  is not dilated.     Gallbladder: Cholecystectomy.     Bile Ducts: Both the intra- and extrahepatic biliary system are of  normal caliber.  The common bile duct measures 7 mm in diameter.     Kidney: The right kidney measures 11.7 cm long. The left kidney  measures 11.6 cm long. There is no hydronephrosis  or hydroureter, no  shadowing renal calculi, cystic lesion or mass.      Spleen: The spleen is enlarged, measuring 17.5 cm in craniocaudal  dimension.                                             IMPRESSION:    1. Cirrhotic configuration of the liver with splenomegaly, compatible  with portal hypertension. No ascites. No focal intrahepatic lesion.  2. Nonocclusive thrombus in the main portal vein extending into the  left portal vein. Otherwise, patent hepatic vasculature with  appropriate antegrade flow as above.  3. Small right pleural effusion.     I have personally reviewed the examination and initial interpretation  and I agree with the findings.     GURINDER ARITA, DO  Physician Attestation   I, Geovanna Perez MD, saw this patient with the resident and agree with the resident/fellow's findings and plan of care as documented in the note.      I personally reviewed vital signs, medications, labs and imaging.      Geovanna Perez MD  Date of Service (when I saw the patient): 07/17/20

## 2020-07-17 NOTE — PROGRESS NOTES
Arrived from:  [facility]   Belongings/meds:  No meds. Tablet, phone, colors, snacks, clothing, 2 pairs of shoes, suitcase, small raina  2 RN Skin Assessment Completed by:  HUY Cowan & Bravo RN  Non-intact findings documented (yes/no/NA): Yes. Some scabs & scars to shins. Moles to anterior chest

## 2020-07-17 NOTE — CONSULTS
HEPATOLOGY CONSULTATION      Date of Admission:  7/17/2020          ASSESSMENT AND RECOMMENDATIONS:     30 year old male with a medical history as documented below, but pertinent for Protein C deficiency with multiple clotting episodes (ischemic bowel in 2013, mesenteric vein thrombosis in 8/2014, DVT in 11/2015 and 7/2018, bilateral PE in 8/2016 s/p IVC filter placement); relapsing acute pancreatitis and resultant chronic pancreatitis, RNYGB in 2009 for weight loss, and decompensated alcoholic cirrhosis manifested by hepatic encephalopathy and ascites with SBP (now on indefinite antibiotic prophylaxis); admitted into the hospital for abdominal pain; and being seen by hepatology service for the same    #. Portal venous thrombosis (?extension into the mesenteric vein)      Protein C deficiency  Extensive history of clotting and bleeding with ischemic bowel in 2013 s/p Coumadin and then Xarelto complicated by GI bleed, mesenteric vein thrombosis in 8/2014, DVT in 11/2015 and 7/2018, bilateral PE in 8/2016 s/p IVC filter placement and history of hemoperitoneum. Followed by hematology as an outpatient.   Repeat ultrasound with non-occlusive thrombus in portal vein - We will review CT images. Additional imaging such as MRV may be obtained if needed.  Also planned for brain MRI given focal neurologic findings prior to presentation    #. Cirrhosis:  Decompensated disease, based on the presence of ascites with SBP hx, HE. Overall stable liver disease  Etiology: Alcohol  MELD-Na of  12   Hepatic encephalopathy: None, no episodes of HE in the last 7 months  Ascites: None noted on examination and ultrasound. On high dose diuretics, last paracentesis was 01/2020  SBP prophylaxis: On Ciprofloxacin  TIPS: None  Esophageal/Gastric varices: Last EGD was done 11/2018 with no varices seen. Will need a repeat soon.  Hepatocellular carcinoma: Last USS was done 7/17/2020 and notable for no mass lesions  Transplant: Evaluation  ongoing          Blood type AB+    RECOMMENDATIONS:  -- Continue home Rifaximin 550 mg BID and Lactulose PO, titrate to 3-4 BMs per day  -- Follow up CT abdomen read  -- Obtain MRI head to further evaluate focal neurologic symptoms  -- Hematology consultation  -- Monitor transaminases, bilirubin, INR  -- Ensure sodium restriction to 2000 mg per day  -- Continue Lasix at 120mg and spironolactone at 300mg  -- Adequate protein diet (1.2 - 1.5g/kg/day)   - Recommend multiple meals during the day, Snacks and shakes during the day/night   - Can use Ensure/Boost drinks TID      Thank you for involving us in this patient's care. Please do not hesitate to contact the GI service with any questions or concerns.     Patient care plan discussed with Dr. Leventhal, GI staff physician.    Jennifer Gaming MD  Transplant Hepatology Fellow  PGY 6 ([phone])            Chief Complaint:   We were asked by Dr. Flores of Medicine to evaluate this patient with PVT/MVT  History is obtained from the patient and the medical record.          History of Present Illness:   Obed Wiley is a 30 year old male with a medical history as documented below, but pertinent for Protein C deficiency with multiple clotting episodes (ischemic bowel in 2013, mesenteric vein thrombosis in 8/2014, DVT in 11/2015 and 7/2018, bilateral PE in 8/2016 s/p IVC filter placement; relapsing acute pancreatitis and resultant chronic pancreatitis, RNYGB in 2009 for weight loss, and decompensated alcoholic cirrhosis manifested by hepatic encephalopathy and ascites with SBP (now on indefinite antibiotic prophylaxis); admitted into the hospital for abdominal pain; and being seen by hepatology service for the same.   A few days ago, he developed significant LUQ and RUQ abdominal pain, associated with nausea but no vomiting. Last night, he had right ear numbness and so went into his local hospital and then transferred her based on CT findings. He has had no  fever or chills, no confusion, no leg swelling and no evidence of GI bleed. He is compliant with diuretics.            Past Medical History:   Reviewed and edited as appropriate  Past Medical History:   Diagnosis Date     Alcoholic cirrhosis of liver with ascites (H) 12/23/2019     Diabetes (H)     Type 2 DM, Uses Insulin      DVT (deep vein thrombosis) in pregnancy      H/O protein C deficiency      Hepatic encephalopathy (H)      Hepatitis     Hep A when an infant      History of blood transfusion     2019 at [facility]      Leukopenia 1/11/2020     SBP (spontaneous bacterial peritonitis) (H) 11/2019          Past Surgical History:   Reviewed and edited as appropriate   Past Surgical History:   Procedure Laterality Date     ABDOMEN SURGERY      Gastric Bypass 2009. [facility]      CARDIAC SURGERY      IVC      COLONOSCOPY       ENT SURGERY      Tonsils and Adenoids Removed at 6-7 Years Old      GALLBLADDER SURGERY  2017     GI SURGERY      Upper GI           Previous Endoscopy:   Last EGD 11/2018 noting eradicated esophageal varices  Total of 4 EGDs in 2018         Social History:   Reviewed and edited as appropriate  Lives in [place], [place] with mother. Completed High school, and some college. Worked as a  and has not been able to work since 06/2018. Receives SSDI benefits.   Not   Smoked half PPD for 15 years, quit >1 year ago  Had consumed 3-5 alcoholic beverages per day, last drink 06/2018         Family History:   Reviewed and edited as appropriate  No known history of gastrointestinal/liver disease or  gastrointestinal malignancies       Allergies:   Reviewed and edited as appropriate   Allergies   Allergen Reactions     Bee Anaphylaxis     Adhesive Tape Rash     Sulfa Drugs Itching     Morphine           Medications:     Medications Prior to Admission   Medication Sig Dispense Refill Last Dose     cholecalciferol (VITAMIN D3) 75508 units (1250 mcg) capsule Take 50,000 Units by mouth every 7  days  at Unknown time     ciprofloxacin (CIPRO) 500 MG tablet Take 500 mg by mouth daily   2020 at Unknown time     ferrous sulfate (FEROSUL) 325 (65 Fe) MG tablet Take 325 mg by mouth daily (with breakfast)   2020 at Unknown time     folic acid (FOLVITE) 1 MG tablet Take 1 mg by mouth 2 times daily   2020 at AM PM     furosemide (LASIX) 40 MG tablet Take 40 mg by mouth 3 times daily   2020 at AM Noon PM     gabapentin (NEURONTIN) 400 MG capsule Take 400 mg by mouth 3 times daily   2020 at Unknown time     lactulose (CHRONULAC) 10 GM/15ML solution Take 15 mLs (10 g) by mouth every hour as needed for constipation 946 mL 0 2020 at Unknown time     magnesium oxide 400 MG CAPS Take 400 mg by mouth 3 times daily    2020 at Unknown time     MULTIPLE VITAMIN-FOLIC ACID PO Take 1 tablet by mouth 1 daily   2020 at Unknown time     Nutritional Supplements (ENSURE PO) Ensure/ boost - 237 ml bid daily   2020 at Unknown time     oxyCODONE-acetaminophen (PERCOCET)  MG per tablet Take 1 tablet by mouth every 4 hours as needed for moderate to severe pain    2020 at Unknown time     pantoprazole (PROTONIX) 40 MG EC tablet Take 40 mg by mouth daily   2020 at Unknown time     promethazine (PHENERGAN) 12.5 MG tablet Take 25 mg by mouth every 6 hours as needed for nausea   2020 at Unknown time     QUEtiapine (SEROQUEL) 25 MG tablet Take 25 mg by mouth daily as needed    2020 at Unknown time     Vitamin D, Cholecalciferol, 25 MCG (1000 UT) CAPS Take 2,000 Units by mouth daily    2020 at Unknown time     blood glucose (NO BRAND SPECIFIED) lancets standard Use to test blood sugar 6 times daily or as directed.        [] enoxaparin ANTICOAGULANT (LOVENOX) 100 MG/ML syringe Inject 0.88 mLs (88 mg) Subcutaneous every 24 hours for 4 doses 3.52 mL 0      insulin aspart (NOVOLOG FLEXPEN) 100 UNIT/ML pen Inject Subcutaneous 3 times daily (with  "meals) Sliding scale as directed        insulin glargine (LANTUS PEN) 100 UNIT/ML pen Inject 26 Units Subcutaneous 2 times daily (Patient taking differently: Inject 10 Units Subcutaneous 2 times daily )        lactulose (CHRONULAC) 10 GM/15ML solution Take 45 mLs (30 g) by mouth 3 times daily 20mg / 30 ml tid daily/ prn (Patient not taking: Reported on 4/27/2020)        polyethylene glycol (MIRALAX/GLYCOLAX) packet Take 1 packet by mouth daily    Unknown at Unknown time     QUEtiapine (SEROQUEL) 25 MG tablet Take 50 mg by mouth At Bedtime        rifaximin (XIFAXAN) 550 MG TABS tablet Take 550 mg by mouth 2 times daily         senna (SENOKOT) 8.6 MG tablet Take 1 tablet by mouth daily as needed for constipation        spironolactone (ALDACTONE) 100 MG tablet Take 100 mg by mouth 3 times daily        traZODone (DESYREL) 50 MG tablet Take 100 mg by mouth At Bedtime Prn         tretinoin (RETIN-A) 0.025 % external cream Apply topically At Bedtime                Review of Systems:     A complete review of systems was performed and is negative except as noted in the HPI           Physical Exam:   BP 97/49 (BP Location: Left arm)   Pulse 55   Temp 97.4  F (36.3  C) (Oral)   Resp 16   Ht 1.88 m (6' 2\")   Wt 81.7 kg (180 lb 1.9 oz)   SpO2 99%   BMI 23.13 kg/m    Wt:   Wt Readings from Last 2 Encounters:   07/17/20 81.7 kg (180 lb 1.9 oz)   04/27/20 90.7 kg (200 lb)      Constitutional: cooperative, pleasant, not dyspneic/diaphoretic, no acute distress  Eyes: Sclera anicteric   Ears/nose/mouth/throat: Missing teeth in mouth, otherwise unremarkable  Neck: supple  CV: No edema  Respiratory: Unlabored breathing  Lymph: NAD  Abdomen: small old surgical scars noted, excessive abdominal skin, not distended, generalized tenderness, no palpable hepatosplenomegaly, no peritoneal signs  Skin: warm, perfused, no jaundice  Neuro: AAO x 3, No asterixis  Psych: Normal affect  MSK: In bed         Data:   Labs and imaging below were " independently reviewed and interpreted  MELD-Na score: 12 at 7/17/2020  5:42 AM  MELD score: 12 at 7/17/2020  5:42 AM  Calculated from:  Serum Creatinine: 0.76 mg/dL (Rounded to 1 mg/dL) at 7/17/2020  5:42 AM  Serum Sodium: 139 mmol/L (Rounded to 137 mmol/L) at 7/17/2020  5:42 AM  Total Bilirubin: 0.9 mg/dL (Rounded to 1 mg/dL) at 7/17/2020  5:42 AM  INR(ratio): 1.58 at 7/17/2020  5:42 AM  Age: 30 years 5 months     BMP  Recent Labs   Lab 07/17/20  0542      POTASSIUM 3.6   CHLORIDE 109   NATE 8.3*   CO2 28   BUN 12   CR 0.76   *     CBC  Recent Labs   Lab 07/17/20  0542   WBC 3.7*   RBC 4.16*   HGB 12.9*   HCT 40.2   MCV 97   MCH 31.0   MCHC 32.1   RDW 14.1   PLT 41*     INR  Recent Labs   Lab 07/17/20  0542   INR 1.58*     LFTs  Recent Labs   Lab 07/17/20  0542   ALKPHOS 93   AST 36   ALT 30   BILITOTAL 0.9   PROTTOTAL 6.2*   ALBUMIN 2.9*      PANCNo lab results found in last 7 days.    Imaging:  MRI ABDOMEN WITHOUT AND WITH CONTRAST 3/10/2020                                         IMPRESSION:  1. Suboptimal secretin MRCP. Pancreatic duct is nondilated and does  not significantly change in caliber after administration of secretin.  The duct does appear irregular and the pancreas is atrophic, in  keeping with history of chronic pancreatitis.  2. Cirrhotic configuration of the liver. No focal intrahepatic lesion.  3. Findings of portal hypertension, including trace perihepatic  ascites, marked splenomegaly, and left upper quadrant varices.  4. No choledocholithiasis. No intrahepatic or extrahepatic biliary  ductal dilatation.    US ABD RUQ  6/30/2020  IMPRESSION:  Cirrhotic liver without ascites.  No discrete hepatic mass.  Incidental note  made of partial occlusive portal vein thrombosis.  In light of the patient's  risk factors, could consider MRI liver to exclude any potential hepatic mass.

## 2020-07-17 NOTE — PROGRESS NOTES
"[facility]    Hepatology Follow-up    CC: Abdominal pain    Dx: Portal Vein Thrombosis                  Protein C deficiency                  Alcoholic cirrhosis    24 hour events:   Hematology evaluation  Subjective:  Stated that he has persistent left upper quadrant abdominal pain which is the same and no worsening    Medications  Current Facility-Administered Medications   Medication Dose Route Frequency     acetaminophen  650 mg Oral Q8H     [START ON 7/18/2020] cholecalciferol  1,250 mcg Oral Q7 Days     ciprofloxacin  500 mg Oral Daily     diclofenac  2 g Topical 4x Daily     ferrous sulfate  325 mg Oral Daily with breakfast     folic acid  1 mg Oral BID     furosemide  40 mg Oral TID     gabapentin  400 mg Oral TID     insulin aspart  1-7 Units Subcutaneous TID AC     insulin aspart  1-5 Units Subcutaneous At Bedtime     insulin aspart   Subcutaneous QAM AC     insulin glargine  10 Units Subcutaneous QAM AC     lactulose  30 g Oral TID     lidocaine  1 patch Transdermal Q24h    And     lidocaine   Transdermal Q8H     pantoprazole  40 mg Oral Daily     QUEtiapine  50 mg Oral At Bedtime     rifaximin  550 mg Oral BID     sodium chloride (PF)  3 mL Intracatheter Q8H     spironolactone  100 mg Oral TID     Vitamin D3  2,000 Units Oral Daily       Review of systems  A 10-point review of systems was negative.    Examination  BP 97/64 (BP Location: Left arm)   Pulse 69   Temp 97.8  F (36.6  C) (Oral)   Resp 16   Ht 1.88 m (6' 2\")   Wt 81.7 kg (180 lb 1.9 oz)   SpO2 95%   BMI 23.13 kg/m      Intake/Output Summary (Last 24 hours) at 7/17/2020 1815  Last data filed at 7/17/2020 1312  Gross per 24 hour   Intake 500 ml   Output --   Net 500 ml       Constitutional: cooperative, pleasant  Eyes: Sclera anicteric  Ears/nose/mouth/throat: mucus membranes moist  Neck: supple  CV: No edema  Respiratory: Unlabored breathing  Abd: Nondistended, +bs, LUQ tender  Skin: warm, perfused  Neuro: AAO " x 3  Psych: Normal affect  MSK: No gross deformities    Laboratory  Lab Results   Component Value Date     07/17/2020    POTASSIUM 3.6 07/17/2020    CHLORIDE 109 07/17/2020    CO2 28 07/17/2020    BUN 12 07/17/2020    CR 0.76 07/17/2020       Lab Results   Component Value Date    BILITOTAL 0.9 07/17/2020    ALT 30 07/17/2020    AST 36 07/17/2020    ALKPHOS 93 07/17/2020       Lab Results   Component Value Date    WBC 3.7 07/17/2020    HGB 12.9 07/17/2020    MCV 97 07/17/2020    PLT 41 07/17/2020       Lab Results   Component Value Date    INR 1.58 07/17/2020       MELD-Na score: 13 at 7/18/2020  8:16 AM  MELD score: 13 at 7/18/2020  8:16 AM  Calculated from:  Serum Creatinine: 0.76 mg/dL (Rounded to 1 mg/dL) at 7/18/2020  8:16 AM  Serum Sodium: 137 mmol/L at 7/18/2020  8:16 AM  Total Bilirubin: 1.4 mg/dL at 7/18/2020  8:16 AM  INR(ratio): 1.58 at 7/17/2020  5:42 AM  Age: 30 years 5 months    Assessment  30 year old with history of protein C deficiency with multiple clotting episodes, relapsing acute pancreatitis and resultant chronic pancreatitis, Yan-en-Y gastric bypass in 2009 for weight loss, decompensated alcoholic cirrhosis complicated by hepatic encephalopathy, ascites and SBP, admitted for abdominal pain.      Portal vein thrombosis with slight extension into mesenteric vein in light of protein C deficiency  Outside CT report was reviewed showing possibility of a slight extension into mesenteric vein.  Repeat ultrasound showed nonocclusive thrombus in the portal vein.  Hematology was involved and recommended patient to have increase in the dose of Lovenox.    #. Cirrhosis:  Decompensated disease, based on the presence of ascites with SBP hx, HE. Overall stable liver disease  Etiology: Alcohol  MELD-Na of  12   Hepatic encephalopathy: None, no episodes of HE in the last 7 months  Ascites: None noted on examination and ultrasound. On high dose diuretics, last paracentesis was 01/2020  SBP prophylaxis: On  Ciprofloxacin  TIPS: None  Esophageal/Gastric varices: Last EGD was done 11/2018 with no varices seen. Will need a repeat soon.  Hepatocellular carcinoma: Last USS was done 7/17/2020 and notable for no mass lesions  Transplant: Evaluation ongoing                         Blood type AB+      Recommendations  --Consider switching to Lovenox today as 1.5 mg/kg  as per hematology recommendation   --Check anti-Xa after fourth dose of Lovenox  --Recommend quad phase MRI or triple phase CT in 1 month to evaluate portal vein thrombosis and mesenteric vein  --Continue supportive measures as per primary team  --Continue daily meld labs  --Continue home rifaximin and lactulose  --Continue home diuretics    Thank you for involving us in this patient's care. Please do not hesitate to contact the GI service with any questions or concerns.      Pt care plan discussed with Dr. Leventhal, Hepatology staff physician.    This note was created with voice recognition software, and while reviewed for accuracy, typos may remain.    Ammy Lewis MD  GI Fellow  Pager: [phone]

## 2020-07-17 NOTE — PLAN OF CARE
Pt here for abdominal pain concerning for acute portal vein and mesenteric vein thrombosis, vss, neuros include: a/o x4, N/T BLE @ baseline, R ear numbness/decreased sensation. PIV running heparin gtt  @ 14.5units/hr, 2g Na diet, up ad red. Pt was reeducated about the restrictions with ambulation, he has since stayed on our unit and been compliant. PRN pain meds provided with relief per pt's report, BG checks and CHO counting, pt has good appetite. Continue to monitor per orders.

## 2020-07-17 NOTE — TELEPHONE ENCOUNTER
RECORDS RECEIVED FROM: Internal  - Alcoholic cirrhosis of liver with ascites    Appt Date: 09.14.2020   NOTES STATUS DETAILS   OFFICE NOTE from referring provider N/A    OFFICE NOTES from other specialists Internal      Care Everywhere 06.23.2020 03.09.2020 01.07.2020 06.25.2020 01.29.2019  More in Epic   DISCHARGE SUMMARY from hospital Internal  Care Everywhere 01.08.2020    10.29.2019  02.06.2019   MEDICATION LIST Internal / CE    LIVER BIOSPY (IF APPLICABLE)      PATHOLOGY REPORTS  N/A    IMAGING     ENDOSCOPY (IF AVAILABLE) N/A    COLONOSCOPY (IF AVAILABLE) N/A    ULTRASOUND LIVER Internal 01.08.2020 01.07.2020   CT OF ABDOMEN Internal 01.08.2020   MRI OF LIVER N/A    FIBROSCAN, US ELASTOGRAPHY, FIBROSIS SCAN, MR ELASTOGRAPHY N/A    LABS     HEPATIC PANEL (LIVER PANEL) Internal 04.21.2020   BASIC METABOLIC PANEL Internal 04.21.2020   COMPLETE METABOLIC PANEL Internal 07.17.2020   COMPLETE BLOOD COUNT (CBC) Internal 07.17.2020   INTERNATIONAL NORMALIZED RATIO (INR) Internal 07.17.2020   HEPATITIS C ANTIBODY Care Everywhere 10.24.2016   HEPATITIS C VIRAL LOAD/PCR N/A    HEPATITIS C GENOTYPE N/A    HEPATITIS B SURFACE ANTIGEN Care Everywhere 10.24.2016   HEPATITIS B SURFACE ANTIBODY N/A    HEPATITIS B DNA QUANT LEVEL N/A    HEPATITIS B CORE ANTIBODY Care Everywhere 10.24.2016

## 2020-07-17 NOTE — H&P
[facility], [place]    History and Physical - [facility] night Service        Date of Admission:  7/17/2020    Assessment & Plan   Obed Wiley is a 30 year old male admitted on 7/17/2020. He has a history of decompensated alcoholic cirrhosis with proten C deficiency, admitted for abdominal pain concerning for acute portal vein and mesenteric vein thrombosis.     # Acute portal vein thrombosis  # Mesenteric vein thrombosis  # Protein C deficiency  Patient with history hypercoagulability related to liver disease as well as possible thrombophilia. 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 Recently admitted in 1/2020 for mesenteric ischemia. Hematology consulted during last admission. High intensity heparin gtt was initially initiated and then switched to lovenox 1mg/kg once daily on discharge. Developed new acute PVT with mesenteric involvement despite being compliant with lovenox.   - Start heparin gtt   - Hematology consult, appreciate recs  - Push imagings from OSH,   - US complete w/ dopplers    # Decompensated alcoholic cirrhosis c/b HE, SBP, EV  Currently sober and undergoing transplant evaluation and is a transplant candidate per Dr. Marsh.  His main issues are ascites and hepatic encephalopathy. His ascites initially required therapeutic paracentesis twice a week and then once a week and now in the last 5 month none. Had a couple of episodes of spontaneous bacterial peritonitis. His last MELD score is 14 6/23. Requires continuous EGD surveillance for esophageal varices, and he will be seen by GI in 3-6 months for follow up.   - Hepatology consulted, appreciate recs  - Continue PTA ciprofloxacin 500mg once daily for SBP prophylaxis  - Continue PTA lactulose & rifaximin  - Hold PTA spironolactone and lasix for now, will resume if stable renal function and no further concerns  of ischemic bowel    #h/o pancreatitis  Patient had 2 episodes of pancreatitis. His father  from pancreatic cancer at age of 36. He also has history of pancreatic cyst but does not require further evaluation and can be cleared for liver transplant per Dr. Acosta.    #Diabetes Mellitus II  Last Hgb A1C 8.4% (2019).   - Continue glargine at 20 units BID for now (PTA 26 units BID)  - Medium dose sliding scale insulin  - Hypoglycemic protocol       Diet: low sodium diet  Fluids: no  DVT Prophylaxis: Pt is currently on heparin iv for PVT  Glaser Catheter: not present  Code Status: full       Disposition Plan   Expected discharge: 2 - 3 days, recommended to prior living arrangement once appropriate management is done..  Entered: Kaiser Flores MD 2020, 5:07 AM     The patient's care was discussed with the Attending physician, Danny Traore MD  [facility], [place]  Pager: [phone]  Please see sticky note for cross cover information  ______________________________________________________________________    Chief Complaint   Abdominal pain    History is obtained from the patient    History of Present Illness   Obed Wiley is a 30 year old male who has a history of decompensated cirrhosis secondary to alcohol with history of hepatic encephalopathy, ascites, SBP; hypercoagulable state due to inherited protein C deficiency (DVT in bilateral lower extremities, history of PE, history of IVC filter); relapsing acute pancreatitis; Yan-en-Y for obesity in ; diabetes on insulin.     Presents with acute onset abdominal pain 2 days PTA. He reports pain in left and right upper abdomen. Feels nauseous but can't vomit, however, he reports having normal appetite and requesting for something to eat on clinical encounter. Has 5-6 bowel movements per day. Denies black or bloody stool. Has been taking his meds regularly. Denies  fever, chest pain, or SOB. At [facility], he was started on therapeutic heparin infusion and transfer for subsequent management at [facility]. No signs of instability or lactic acidosis on transfer.     Does have a history of GI bleeds, last back in December. Platelets are also low in the 40s.  Has had a HIT panel in the past which was negative.    Review of Systems    The 10 point Review of Systems is negative other than noted in the HPI.    Past Medical History    I have reviewed this patient's medical history and updated it with pertinent information if needed.   Past Medical History:   Diagnosis Date     Alcoholic cirrhosis of liver with ascites (H) 12/23/2019     Diabetes (H)     Type 2 DM, Uses Insulin      DVT (deep vein thrombosis) in pregnancy      H/O protein C deficiency      Hepatic encephalopathy (H)      Hepatitis     Hep A when an infant      History of blood transfusion     2019 at \[facility]""      Leukopenia 1/11/2020     SBP (spontaneous bacterial peritonitis) (H) 11/2019       Past Surgical History   I have reviewed this patient's surgical history and updated it with pertinent information if needed.  Past Surgical History:   Procedure Laterality Date     ABDOMEN SURGERY      Gastric Bypass 2009. [facility]      CARDIAC SURGERY      IVC      COLONOSCOPY       ENT SURGERY      Tonsils and Adenoids Removed at 6-7 Years Old      GALLBLADDER SURGERY  2017     GI SURGERY      Upper GI         Social History   I have reviewed this patient's social history and updated it with pertinent information if needed. Obed KEYS Wiley stated that he quit smoking in December 2019.  Before that smoked for about 15 years about half a pack a day. Last alcoholic drink was 6/2018. Denies any other recreational drug use including marijuana, cocaine, methamphetamines. He uses Oxycodone.    Family History   I have reviewed this patient's family history and updated it with pertinent information if needed.   Family History    Problem Relation Age of Onset     Protein C deficiency Mother      Pancreatic Cancer Father      Deonte Parkinson White syndrome Father      Alcoholism Brother      Substance Abuse Brother      Heart Failure Maternal Grandmother      Heart Failure Maternal Grandfather        Prior to Admission Medications   Prior to Admission Medications   Prescriptions Last Dose Informant Patient Reported? Taking?   Calcium Carbonate Antacid 400 MG CHEW  Mother Yes No   Sig: Take 400 mg by mouth 2 times daily    MULTIPLE VITAMIN-FOLIC ACID PO  Mother Yes No   Sig: Take 1 tablet by mouth 1 daily   Nutritional Supplements (ENSURE PO)  Mother Yes No   Sig: Ensure/ boost - 237 ml bid daily   QUEtiapine (SEROQUEL) 25 MG tablet  Mother Yes No   Sig: Take 25 mg by mouth daily as needed    QUEtiapine (SEROQUEL) 25 MG tablet  Mother Yes No   Sig: Take 50 mg by mouth At Bedtime   Vitamin D, Cholecalciferol, 25 MCG (1000 UT) CAPS  Mother Yes No   Sig: Take 2,000 Units by mouth daily    blood glucose (NO BRAND SPECIFIED) lancets standard   Yes No   Sig: Use to test blood sugar 6 times daily or as directed.   cholecalciferol (VITAMIN D3) 20750 units (1250 mcg) capsule  Mother Yes No   Sig: Take 50,000 Units by mouth every 7 days Saturdays   ciprofloxacin (CIPRO) 500 MG tablet   Yes No   Sig: Take 500 mg by mouth daily   diclofenac (VOLTAREN) 1 % topical gel  Mother Yes No   Sig: Place onto the skin 4 times daily as needed for moderate pain   enoxaparin ANTICOAGULANT (LOVENOX) 100 MG/ML syringe   No No   Sig: Inject 0.88 mLs (88 mg) Subcutaneous every 24 hours for 4 doses   ferrous sulfate (FEROSUL) 325 (65 Fe) MG tablet  Mother Yes No   Sig: Take 325 mg by mouth daily (with breakfast)   folic acid (FOLVITE) 1 MG tablet  Mother Yes No   Sig: Take 1 mg by mouth 2 times daily   furosemide (LASIX) 40 MG tablet   Yes No   Sig: Take 40 mg by mouth 3 times daily   gabapentin (NEURONTIN) 400 MG capsule  Mother Yes No   Sig: Take 400 mg by mouth 3  times daily   insulin aspart (NOVOLOG FLEXPEN) 100 UNIT/ML pen  Mother Yes No   Sig: Inject Subcutaneous 3 times daily (with meals) Sliding scale as directed   insulin glargine (LANTUS PEN) 100 UNIT/ML pen   No No   Sig: Inject 26 Units Subcutaneous 2 times daily   lactulose (CHRONULAC) 10 GM/15ML solution   No No   Sig: Take 45 mLs (30 g) by mouth 3 times daily 20mg / 30 ml tid daily/ prn   Patient not taking: Reported on 4/27/2020   lactulose (CHRONULAC) 10 GM/15ML solution   No No   Sig: Take 15 mLs (10 g) by mouth every hour as needed for constipation   magnesium oxide 400 MG CAPS  Mother Yes No   Sig: Take 400 mg by mouth 3 times daily    oxyCODONE-acetaminophen (PERCOCET)  MG per tablet  Mother Yes No   Sig: Take 1 tablet by mouth every 4 hours as needed for moderate to severe pain    pantoprazole (PROTONIX) 40 MG EC tablet  Mother Yes No   Sig: Take 40 mg by mouth daily   polyethylene glycol (MIRALAX/GLYCOLAX) packet  Mother Yes No   Sig: Take 1 packet by mouth daily    promethazine (PHENERGAN) 12.5 MG tablet  Mother Yes No   Sig: Take 25 mg by mouth every 6 hours as needed for nausea   rifaximin (XIFAXAN) 550 MG TABS tablet  Mother Yes No   Sig: Take 550 mg by mouth 2 times daily    senna (SENOKOT) 8.6 MG tablet   No No   Sig: Take 1 tablet by mouth daily as needed for constipation   spironolactone (ALDACTONE) 100 MG tablet  Mother Yes No   Sig: Take 100 mg by mouth 3 times daily   traZODone (DESYREL) 50 MG tablet  Mother Yes No   Sig: Take 100 mg by mouth At Bedtime Prn    tretinoin (RETIN-A) 0.025 % external cream  Mother Yes No   Sig: Apply topically At Bedtime      Facility-Administered Medications: None     Allergies   Allergies   Allergen Reactions     Bee Anaphylaxis     Adhesive Tape Rash     Sulfa Drugs Itching     Morphine        Physical Exam   Vital Signs: Temp: 96.3  F (35.7  C) Temp src: Oral BP: 93/58 Pulse: 54     SpO2: 99 % O2 Device: None (Room air)    Weight: 180 lbs 1.85 oz   GA:  A&O*3, coherent  ENT: aninteric sclerae, conjunctival hemorrhage on lower eyelids both eyes  Lungs: clear on auscultation both lungs  CVS: RRR, normal heart sounds  Abd: normal bowel sound, soft, generalized tender without rebound or guarding  Extremity: no edema seen  Neuro: grossly intact, no tremor.    Data   Data reviewed today: I reviewed all medications, new labs and imaging results over the last 24 hours. I personally reviewed no images or EKG's today.    No lab results found in last 7 days.

## 2020-07-17 NOTE — PLAN OF CARE
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   PMhx: ETOH cirrhosis, Protein C deficiency with multiple episodes of venous thrombosis including an episode of mesenteric ischmeia in [place] on intermediate dosing of enoxaparin. Hx of GI bleeds last December.   Vitals: Soft BPs, other vitals stable on RA. Pt was receiving fluids at OSH for low BP.  Neuros: A&O x4. Numbness & tingling from buttocks down (related to sciatic nerve per pt),   IV: 2 PIVs, Heparin gtt initiated @ 1450 units/hr, other IV SL  Resp/trach: LS clear  Diet: NPO (requested by Ultrasound), then Low sodium diet  Bowel status: Multiple BMs per day, LBM 07/16    : VDSP. Pt refusing to use bathroom in room d/t to 2 pt room. Pt instead used the public restroom. On charge RN list for single room  Skin: intact. Full body skin assessment completed  Pain: c/o pain to abdomen, L flank, lower back, and sciatic nerve, awaiting MD orders   Activity: Up independent in room  Social: Pt stated he does not need family or family notified by RN  Labs: Labs drawn, look out for Heparin & platelet results. Per report from OSH, pt with low platelets in 40s  Plan: Keep NPO for Ultrasound of abdomen around/after 0800. Blood glucose checks, on sliding scale. Hematology & GI Hepatology consult in place. Monitor BPs

## 2020-07-18 ENCOUNTER — APPOINTMENT (OUTPATIENT)
Dept: ULTRASOUND IMAGING | Facility: CLINIC | Age: 30
End: 2020-07-18
Attending: STUDENT IN AN ORGANIZED HEALTH CARE EDUCATION/TRAINING PROGRAM
Payer: COMMERCIAL

## 2020-07-18 LAB
ALBUMIN SERPL-MCNC: 3.4 G/DL (ref 3.4–5)
ALP SERPL-CCNC: 109 U/L (ref 40–150)
ALT SERPL W P-5'-P-CCNC: 37 U/L (ref 0–70)
ANION GAP SERPL CALCULATED.3IONS-SCNC: 6 MMOL/L (ref 3–14)
AST SERPL W P-5'-P-CCNC: 45 U/L (ref 0–45)
AT III ACT/NOR PPP CHRO: 59 % (ref 85–135)
BILIRUB SERPL-MCNC: 1.4 MG/DL (ref 0.2–1.3)
BUN SERPL-MCNC: 8 MG/DL (ref 7–30)
CALCIUM SERPL-MCNC: 8.8 MG/DL (ref 8.5–10.1)
CHLORIDE SERPL-SCNC: 106 MMOL/L (ref 94–109)
CO2 SERPL-SCNC: 25 MMOL/L (ref 20–32)
CREAT SERPL-MCNC: 0.76 MG/DL (ref 0.66–1.25)
ERYTHROCYTE [DISTWIDTH] IN BLOOD BY AUTOMATED COUNT: 14.1 % (ref 10–15)
GFR SERPL CREATININE-BSD FRML MDRD: >90 ML/MIN/{1.73_M2}
GLUCOSE BLDC GLUCOMTR-MCNC: 128 MG/DL (ref 70–99)
GLUCOSE BLDC GLUCOMTR-MCNC: 130 MG/DL (ref 70–99)
GLUCOSE BLDC GLUCOMTR-MCNC: 151 MG/DL (ref 70–99)
GLUCOSE BLDC GLUCOMTR-MCNC: 169 MG/DL (ref 70–99)
GLUCOSE SERPL-MCNC: 118 MG/DL (ref 70–99)
HCT VFR BLD AUTO: 43 % (ref 40–53)
HGB BLD-MCNC: 14.3 G/DL (ref 13.3–17.7)
LACTATE BLD-SCNC: 1.8 MMOL/L (ref 0.7–2)
LMWH PPP CHRO-ACNC: 0.43 IU/ML
MCH RBC QN AUTO: 31.7 PG (ref 26.5–33)
MCHC RBC AUTO-ENTMCNC: 33.3 G/DL (ref 31.5–36.5)
MCV RBC AUTO: 95 FL (ref 78–100)
PLATELET # BLD AUTO: 47 10E9/L (ref 150–450)
POTASSIUM SERPL-SCNC: 3.7 MMOL/L (ref 3.4–5.3)
PROT SERPL-MCNC: 7.2 G/DL (ref 6.8–8.8)
RBC # BLD AUTO: 4.51 10E12/L (ref 4.4–5.9)
SARS-COV-2 RNA SPEC QL NAA+PROBE: NOT DETECTED
SODIUM SERPL-SCNC: 137 MMOL/L (ref 133–144)
SPECIMEN SOURCE: NORMAL
WBC # BLD AUTO: 3.4 10E9/L (ref 4–11)

## 2020-07-18 PROCEDURE — 25000128 H RX IP 250 OP 636: Performed by: STUDENT IN AN ORGANIZED HEALTH CARE EDUCATION/TRAINING PROGRAM

## 2020-07-18 PROCEDURE — 93971 EXTREMITY STUDY: CPT | Mod: RT

## 2020-07-18 PROCEDURE — 12000001 ZZH R&B MED SURG/OB UMMC

## 2020-07-18 PROCEDURE — 40000556 ZZH STATISTIC PERIPHERAL IV START W US GUIDANCE

## 2020-07-18 PROCEDURE — 83605 ASSAY OF LACTIC ACID: CPT | Performed by: INTERNAL MEDICINE

## 2020-07-18 PROCEDURE — 25000132 ZZH RX MED GY IP 250 OP 250 PS 637: Performed by: STUDENT IN AN ORGANIZED HEALTH CARE EDUCATION/TRAINING PROGRAM

## 2020-07-18 PROCEDURE — 85027 COMPLETE CBC AUTOMATED: CPT | Performed by: INTERNAL MEDICINE

## 2020-07-18 PROCEDURE — 80053 COMPREHEN METABOLIC PANEL: CPT | Performed by: INTERNAL MEDICINE

## 2020-07-18 PROCEDURE — 99233 SBSQ HOSP IP/OBS HIGH 50: CPT | Mod: GC | Performed by: INTERNAL MEDICINE

## 2020-07-18 PROCEDURE — 85520 HEPARIN ASSAY: CPT | Performed by: INTERNAL MEDICINE

## 2020-07-18 PROCEDURE — 85300 ANTITHROMBIN III ACTIVITY: CPT | Performed by: HOSPITALIST

## 2020-07-18 PROCEDURE — 36415 COLL VENOUS BLD VENIPUNCTURE: CPT | Performed by: INTERNAL MEDICINE

## 2020-07-18 PROCEDURE — 00000146 ZZHCL STATISTIC GLUCOSE BY METER IP

## 2020-07-18 RX ORDER — CYCLOBENZAPRINE HCL 10 MG
10 TABLET ORAL EVERY 8 HOURS PRN
Status: DISCONTINUED | OUTPATIENT
Start: 2020-07-18 | End: 2020-07-27 | Stop reason: HOSPADM

## 2020-07-18 RX ORDER — SENNOSIDES 8.6 MG
8.6 TABLET ORAL 2 TIMES DAILY PRN
Status: DISCONTINUED | OUTPATIENT
Start: 2020-07-18 | End: 2020-07-27 | Stop reason: HOSPADM

## 2020-07-18 RX ORDER — GABAPENTIN 300 MG/1
600 CAPSULE ORAL 3 TIMES DAILY
Status: DISCONTINUED | OUTPATIENT
Start: 2020-07-18 | End: 2020-07-27 | Stop reason: HOSPADM

## 2020-07-18 RX ORDER — POLYETHYLENE GLYCOL 3350 17 G/17G
17 POWDER, FOR SOLUTION ORAL DAILY PRN
Status: DISCONTINUED | OUTPATIENT
Start: 2020-07-18 | End: 2020-07-27 | Stop reason: HOSPADM

## 2020-07-18 RX ADMIN — DICLOFENAC SODIUM 2 G: 10 GEL TOPICAL at 08:58

## 2020-07-18 RX ADMIN — INSULIN ASPART 1 UNITS: 100 INJECTION, SOLUTION INTRAVENOUS; SUBCUTANEOUS at 12:34

## 2020-07-18 RX ADMIN — ACETAMINOPHEN 650 MG: 325 TABLET, FILM COATED ORAL at 08:55

## 2020-07-18 RX ADMIN — HYDROMORPHONE HYDROCHLORIDE 4 MG: 2 TABLET ORAL at 08:56

## 2020-07-18 RX ADMIN — CYCLOBENZAPRINE 10 MG: 10 TABLET, FILM COATED ORAL at 16:12

## 2020-07-18 RX ADMIN — INSULIN GLARGINE 10 UNITS: 100 INJECTION, SOLUTION SUBCUTANEOUS at 08:58

## 2020-07-18 RX ADMIN — FUROSEMIDE 40 MG: 40 TABLET ORAL at 14:06

## 2020-07-18 RX ADMIN — LIDOCAINE 1 PATCH: 560 PATCH PERCUTANEOUS; TOPICAL; TRANSDERMAL at 19:19

## 2020-07-18 RX ADMIN — QUETIAPINE FUMARATE 50 MG: 50 TABLET ORAL at 21:39

## 2020-07-18 RX ADMIN — LACTULOSE 30 G: 20 SOLUTION ORAL at 08:56

## 2020-07-18 RX ADMIN — HYDROMORPHONE HYDROCHLORIDE 4 MG: 2 TABLET ORAL at 02:35

## 2020-07-18 RX ADMIN — CHOLECALCIFEROL CAP 1.25 MG (50000 UNIT) 1250 MCG: 1.25 CAP at 10:24

## 2020-07-18 RX ADMIN — ACETAMINOPHEN 650 MG: 325 TABLET, FILM COATED ORAL at 01:31

## 2020-07-18 RX ADMIN — RIFAXIMIN 550 MG: 550 TABLET ORAL at 08:55

## 2020-07-18 RX ADMIN — HYDROMORPHONE HYDROCHLORIDE 0.3 MG: 1 INJECTION, SOLUTION INTRAMUSCULAR; INTRAVENOUS; SUBCUTANEOUS at 10:24

## 2020-07-18 RX ADMIN — PROMETHAZINE HYDROCHLORIDE 25 MG: 25 TABLET ORAL at 21:39

## 2020-07-18 RX ADMIN — SPIRONOLACTONE 100 MG: 25 TABLET ORAL at 14:06

## 2020-07-18 RX ADMIN — HEPARIN SODIUM 1450 UNITS/HR: 10000 INJECTION, SOLUTION INTRAVENOUS at 17:24

## 2020-07-18 RX ADMIN — DICLOFENAC SODIUM 2 G: 10 GEL TOPICAL at 12:34

## 2020-07-18 RX ADMIN — PROMETHAZINE HYDROCHLORIDE 25 MG: 25 TABLET ORAL at 15:28

## 2020-07-18 RX ADMIN — GABAPENTIN 400 MG: 400 CAPSULE ORAL at 14:06

## 2020-07-18 RX ADMIN — RIFAXIMIN 550 MG: 550 TABLET ORAL at 19:19

## 2020-07-18 RX ADMIN — HYDROMORPHONE HYDROCHLORIDE 0.3 MG: 1 INJECTION, SOLUTION INTRAMUSCULAR; INTRAVENOUS; SUBCUTANEOUS at 19:32

## 2020-07-18 RX ADMIN — GABAPENTIN 400 MG: 400 CAPSULE ORAL at 08:56

## 2020-07-18 RX ADMIN — SPIRONOLACTONE 100 MG: 25 TABLET ORAL at 08:57

## 2020-07-18 RX ADMIN — FUROSEMIDE 40 MG: 40 TABLET ORAL at 08:45

## 2020-07-18 RX ADMIN — MELATONIN 2000 UNITS: at 08:56

## 2020-07-18 RX ADMIN — HYDROXYZINE HYDROCHLORIDE 25 MG: 25 TABLET, FILM COATED ORAL at 10:24

## 2020-07-18 RX ADMIN — POLYETHYLENE GLYCOL 3350 17 G: 17 POWDER, FOR SOLUTION ORAL at 16:12

## 2020-07-18 RX ADMIN — ACETAMINOPHEN 650 MG: 325 TABLET, FILM COATED ORAL at 17:22

## 2020-07-18 RX ADMIN — HYDROMORPHONE HYDROCHLORIDE 4 MG: 2 TABLET ORAL at 17:22

## 2020-07-18 RX ADMIN — HYDROXYZINE HYDROCHLORIDE 25 MG: 25 TABLET, FILM COATED ORAL at 19:44

## 2020-07-18 RX ADMIN — LACTULOSE 30 G: 20 SOLUTION ORAL at 14:06

## 2020-07-18 RX ADMIN — PANTOPRAZOLE SODIUM 40 MG: 40 TABLET, DELAYED RELEASE ORAL at 08:56

## 2020-07-18 RX ADMIN — GABAPENTIN 600 MG: 300 CAPSULE ORAL at 19:19

## 2020-07-18 RX ADMIN — LACTULOSE 30 G: 20 SOLUTION ORAL at 19:18

## 2020-07-18 RX ADMIN — FOLIC ACID 1 MG: 1 TABLET ORAL at 08:56

## 2020-07-18 RX ADMIN — HYDROMORPHONE HYDROCHLORIDE 4 MG: 2 TABLET ORAL at 13:20

## 2020-07-18 RX ADMIN — SENNOSIDES 8.6 MG: 8.6 TABLET, FILM COATED ORAL at 16:12

## 2020-07-18 RX ADMIN — CIPROFLOXACIN HYDROCHLORIDE 500 MG: 500 TABLET, FILM COATED ORAL at 19:19

## 2020-07-18 RX ADMIN — HYDROMORPHONE HYDROCHLORIDE 4 MG: 2 TABLET ORAL at 21:39

## 2020-07-18 RX ADMIN — FERROUS SULFATE TAB 325 MG (65 MG ELEMENTAL FE) 325 MG: 325 (65 FE) TAB at 08:56

## 2020-07-18 RX ADMIN — DICLOFENAC SODIUM 2 G: 10 GEL TOPICAL at 19:19

## 2020-07-18 RX ADMIN — FOLIC ACID 1 MG: 1 TABLET ORAL at 19:19

## 2020-07-18 RX ADMIN — HYDROMORPHONE HYDROCHLORIDE 0.3 MG: 1 INJECTION, SOLUTION INTRAMUSCULAR; INTRAVENOUS; SUBCUTANEOUS at 15:16

## 2020-07-18 RX ADMIN — DICLOFENAC SODIUM 2 G: 10 GEL TOPICAL at 16:16

## 2020-07-18 ASSESSMENT — ACTIVITIES OF DAILY LIVING (ADL)
ADLS_ACUITY_SCORE: 10

## 2020-07-18 NOTE — PROGRESS NOTES
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     # Acute portal vein thrombosis  # Protein C deficiency  Pt w/ hypercoagulability 2/2 to liver disease, protein C deficiency. 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 Recently admitted 1/2020 for mesenteric ischemia, discharged on 1 mg/kg Lovenox. Reports compliance and consistency with Lovenox dosing at home. Pt now with nonocclusive portal vein thrombosis identified on imaging at OSH, US abdomen w/ Doppler 7/17 w/ PVT w/o splenic vein or other mesenteric involvement.  - currently on therapeutic heparin gtt   - Hematology consulted, appreciate recs: Low anti-thrombin levelWill plan to increase Lovenox to 1.5 mg/kg then recheck anti-Xa in 4-6 hours.   - Images from OSH will be arriving by mail    # Acute on chronic Abdominal pain  Chronic abdominal pain due to chronic pancreatitis. Current pain feels like previous pain experienced when he had a portal vein thrombosis and mesenteric ischemia. Patient recognizes that there is an anxiety component to his pain as well.  - Dilaudid 2 mg PO Q4H PRN with IV PRN for breakthrough pain, Tylenol 650 mg scheduled Q8H, and lidocaine patches PRN for additional pain control  - Seroquel 50 mg at bedtime and 25mg PRN for anxiety 2/2 pain; hydroxyzine PRN for anxiety  - Pain consulted, appreciate  assistance: increased gabapentin to 600mg TID. Added cyclobenzaprine for muscular pain/back pain  - Will contact PCP on discharge to coordinate outpatient pain management plan    # Decompensated alcoholic cirrhosis c/b HE, SBP, EV  Currently sober and undergoing transplant evaluation, transplant candidate per Dr. Marsh. Ascites initially required therapeutic paracenteses 2x/week, then 1x/week, but he has not required one in the last 5 month none. Has had SBP, on ciprofloxacin 500 mg daily for prophylaxis. His last MELD score is 14 6/23. Requires continuous EGD surveillance for esophageal varices, and he will be seen by GI in 3-6 months for follow up.   Etiology: Alcohol  MELD-Na 13  Hepatic encephalopathy: None, no episodes of HE in the last 7 months  Ascites: None noted on examination and ultrasound. On high dose diuretics, last paracentesis was 01/2020  SBP prophylaxis: On Ciprofloxacin  TIPS: None  Esophageal/Gastric varices: Last EGD was done 11/2018 with no varices seen. Will need a repeat soon.  Hepatocellular carcinoma: Last USS was done 7/17/2020 and notable for no mass lesions  Transplant: Evaluation ongoing                         Blood type AB+  - Hepatology consulted, appreciate recs  - Continue PTA ciprofloxacin 500mg once daily for SBP prophylaxis  - Continue PTA lactulose, titrate to 3-4 BM/day  - Continue rifaximin 550 mg BID  - Continue PTA spironolactone 100mg TID and lasix 40mg TID for now, hemodynamically stable w/o concern for bowel ischemia  - Will need repeat CT abd w/ contrast in 1 month as outpatient; Transplant service will order.  MELD-Na score: 13 at 7/18/2020  8:16 AM  MELD score: 13 at 7/18/2020  8:16 AM  Calculated from:  Serum Creatinine: 0.76 mg/dL (Rounded to 1 mg/dL) at 7/18/2020  8:16 AM  Serum Sodium: 137 mmol/L at 7/18/2020  8:16 AM  Total Bilirubin: 1.4 mg/dL at 7/18/2020  8:16 AM  INR(ratio): 1.58 at 7/17/2020  5:42 AM  Age: 30 years 5 months    #Right ear, lateral  "facial paresthesia  Pt admitted to OSH with abdominal pain, right ear and right lateral facial paresthesias. OSH w/ negative head CT, neuro at OSH recommending MRI to evaluate further. MRI brain w/o contrast without abnormality  - neurology consult, appreciate assistance     #Chronic pancreatitis  Pt w/ hx of chronic pancreatitis. History of pancreatic cyst but does not require further evaluation and can be cleared for liver transplant per Dr. Acosta. Per pt, current abdominal pain worse than chronic pancreatitis pain but in similar distribution. Lipase only wildly elevated, not indicative of acute pancreatitis     #Diabetes Mellitus II  Last Hgb A1C 8.4% (11/2019), recheck 7/17 is 5.9%.  - Continue glargine at 10 units daily  - Sliding scale: medium sliding scale insulin  - Carb coverage: 1 U/15 g CHO at each meal  - Hypoglycemic protocol   - QID fingersticks AC HS     Diet: low sodium diet  Fluids: no  DVT Prophylaxis: Pt is currently on heparin IV for PVT  Glaser Catheter: not present  Code Status: full     Disposition Plan     Expected discharge: 2 - 3 days, recommended to prior living arrangement once appropriate management is done..  Entered: Dalila Fu MD 07/20/20     The patient's care was discussed with the Attending physician, Dr. Ana Fu MD  [facility] 1 Service  [facility], [place]  Pager: [phone]  Please see sticky note for cross cover information.      Interval History  No acute events overnight. Reporting worsening LUQ abdominal pain, worsening since admission. Describes the pain as sharp and stabbing. Able to eat and drink well. Nausea is at baseline. No emesis.     Data reviewed today: I reviewed all medications, new labs and imaging results over the last 24 hours.    Physical Exam  /85 (BP Location: Left arm)   Pulse 102   Temp 99.1  F (37.3  C) (Oral)   Resp 18   Ht 1.88 m (6' 2\")   Wt 81.7 kg (180 lb 1.9 oz)   SpO2 97%   BMI 23.13 " kg/m     GA: A&Ox3, coherent  HEENT: anicteric sclerae, mucus membranes moist, poor dentition with multiple missing teeth  Lungs: clear on auscultation both lungs  CVS: RRR, normal heart sounds  Abd: normal bowel sound, soft, generalized epigastric tenderness LUQ>RUQ, some focal tenderness in LLQ; no rebound or guarding; liver and spleen edges not easily palpable  Extremity: no edema seen  Neuro: grossly intact, no tremor, no asterixis    Data  Last Comprehensive Metabolic Panel:  Sodium   Date Value Ref Range Status   07/18/2020 137 133 - 144 mmol/L Final     Potassium   Date Value Ref Range Status   07/18/2020 3.7 3.4 - 5.3 mmol/L Final     Chloride   Date Value Ref Range Status   07/18/2020 106 94 - 109 mmol/L Final     Carbon Dioxide   Date Value Ref Range Status   07/18/2020 25 20 - 32 mmol/L Final     Anion Gap   Date Value Ref Range Status   07/18/2020 6 3 - 14 mmol/L Final     Glucose   Date Value Ref Range Status   07/18/2020 118 (H) 70 - 99 mg/dL Final     Urea Nitrogen   Date Value Ref Range Status   07/18/2020 8 7 - 30 mg/dL Final     Creatinine   Date Value Ref Range Status   07/18/2020 0.76 0.66 - 1.25 mg/dL Final     GFR Estimate   Date Value Ref Range Status   07/18/2020 >90 >60 mL/min/[1.73_m2] Final     Comment:     Non  GFR Calc  Starting 12/18/2018, serum creatinine based estimated GFR (eGFR) will be   calculated using the Chronic Kidney Disease Epidemiology Collaboration   (CKD-EPI) equation.       Calcium   Date Value Ref Range Status   07/18/2020 8.8 8.5 - 10.1 mg/dL Final     Lab Results   Component Value Date    WBC 3.7 07/17/2020     Lab Results   Component Value Date    RBC 4.16 07/17/2020     Lab Results   Component Value Date    HGB 12.9 07/17/2020     Lab Results   Component Value Date    HCT 40.2 07/17/2020     Lab Results   Component Value Date    MCV 97 07/17/2020     Lab Results   Component Value Date    MCH 31.0 07/17/2020     Lab Results   Component Value Date     MCHC 32.1 07/17/2020     Lab Results   Component Value Date    RDW 14.1 07/17/2020     Lab Results   Component Value Date    PLT 41 07/17/2020     INR: 1.58  Antithrombin IIIc- 59    US abdomen with Doppler 7/17  EXAMINATION: Limited Abdominal Ultrasound, 7/17/2020 8:12 AM      COMPARISON: US 6/30/2020, 1/8/2020 and MRI 3/10/2020.     HISTORY: evaluate for ascites, PVT with mesenteric involvement     FINDINGS:   Fluid: Right pleural effusion. No ascites.     Liver: The liver demonstrates nodular contour of the liver with  coarsened echotexture, measuring 16 cm in craniocaudal dimension.  There is no focal mass.      Nonocclusive thrombus in the main portal vein and left portal vein.   Extrahepatic portal vein flow around the thrombus is antegrade at 32  cm/sec.  Right portal vein flow is antegrade, measuring 23 cm/sec.  Left portal vein flow around the thrombus is antegrade, measuring 20  cm/sec.     Flow in the hepatic artery is towards the liver and:  45 cm/s peak systolic  0.83 resistive index.      The splenic vein is patent and flow is towards the liver. The left,  middle, and right hepatic veins are patent with flow towards the IVC.  The IVC is patent with flow towards the heart. The visualized aorta  is not dilated.     Gallbladder: Cholecystectomy.     Bile Ducts: Both the intra- and extrahepatic biliary system are of  normal caliber.  The common bile duct measures 7 mm in diameter.     Kidney: The right kidney measures 11.7 cm long. The left kidney  measures 11.6 cm long. There is no hydronephrosis or hydroureter, no  shadowing renal calculi, cystic lesion or mass.      Spleen: The spleen is enlarged, measuring 17.5 cm in craniocaudal  dimension.                                             IMPRESSION:    1. Cirrhotic configuration of the liver with splenomegaly, compatible  with portal hypertension. No ascites. No focal intrahepatic lesion.  2. Nonocclusive thrombus in the main portal vein extending into  the  left portal vein. Otherwise, patent hepatic vasculature with  appropriate antegrade flow as above.  3. Small right pleural effusion.     I have personally reviewed the examination and initial interpretation  and I agree with the findings.     GURINDER ARITA, DO

## 2020-07-18 NOTE — CONSULTS
Inpatient Pain Management Service: Consultation    ADMIT DATE: 7/17/2020  DATE OF CONSULT: 07/18/20    REASON FOR PAIN CONSULTATION:  Obed Wiley is a 30 year old male I am seeing in consultation at the request of Dr. Dalila Fu (pager [phone]) for evaluation and recommendations for his acute on chronic abdominal pain.     CHIEF PAIN COMPLAINT: Acute acute on chronic abdominal pain in the setting of newly diagnosed portal venous thrombosis.    ASSESSMENT: The patient is a 30-year-old male with past medical history of chronic pancreatitis, chronic abdominal pain, and protein C deficiency who presented to the hospital on 7/16/2020 for acute abdominal pain and was found to have newly diagnosed portal vein thrombosis.  Patient has a history of chronic abdominal pain secondary to chronic pancreatitis and chronic musculoskeletal back pain both treated by his PCP Dr. Reyes in [facility].  His pain is currently well controlled with a medication regimen of Tylenol, gabapentin 400 3 times daily, hydromorphone 2 to 4 mg p.o. every 4 hours as needed and 0.3 mg IV every 4 hours as needed.  His chronic back pain is currently managed with topical medications including lidocaine and diclofenac.  Discussing prior treatments with him, he is experienced benefit previously from higher doses of gabapentin, cyclobenzaprine, and possibly TCA.  At this time, we would recommend additional multimodal analgesic medications to facilitate cessation of IV opioid.  To facilitate discharge and return to his PCP, would also consider true true hydromorphone to patient's home regimen of oxycodone/APAP 10/325.  By review of , the patient's home regimen of oxycodone 10/325 has been stable since March.  His most recent prescription was filled on 7/13/2020.    -- Outpatient opioid requirements prior to admission: OME = 45   --Percoet 10/325 #84 for 28 days, filled 7/13/20    Primary Care Provider: Anjelica Reyes  Chronic Pain Provider:  "Anjelica Reyes    TREATMENT RECOMMENDATIONS/PLAN:   1. Continue acetaminophen 650 mg every 8 hours.  Do not recommend higher doses in the setting of impaired liver function  2. Consider increase of gabapentin to 600 3 times daily  3. Consider addition of cyclobenzaprine 10 mg 3 times daily as needed for myofascial pain  4. Consider transition from hydromorphone 2 to 4 mg as needed every 4 hours to oxycodone 10 mg every 4 hours as needed  1. If transitioning to patient's home regimen of Percocet, acetaminophen will have to be discontinued  5. Following the above changes, recommend tapering of IV hydromorphone to every 6 hours x1 day, then every 8 hours x1 day, then off  6. Recommend contacting patient's PCP Dr. kwan to coordinate post discharge medication management    Pain Service will Continue to follow peripherally, but will not put notes in every day.     Thank you for consulting the Inpatient Pain Management Service.   The above recommendations are to be acted upon at the primary team s discretion.    To reach us:  Mon - Friday 8 AM - 3 PM: Pager [phone] (Text Page)  After hours, weekends and holidays: Primary service should call [phone] for the on-call pain specialist    HISTORY OF PRESENT ILLNESS: Obed Wiley is a 30 year old male with history of alcoholic hepatitis, alcoholic pancreatitis, chronic pancreatitis, protein C deficiency, and chronic abdominal pain, admitted 716 for acute on chronic abdominal pain found to have portal venous thrombosis.    According to H&P:  \"Obed Wiley is a 30 year old male who has a history of decompensated cirrhosis secondary to alcohol with history of hepatic encephalopathy, ascites, SBP; hypercoagulable state due to inherited protein C deficiency (DVT in bilateral lower extremities, history of PE, history of IVC filter); relapsing acute pancreatitis; Yan-en-Y for obesity in 2007; diabetes on insulin.      Presents with acute onset abdominal pain 2 " "days PTA. He reports pain in left and right upper abdomen. Feels nauseous but can't vomit, however, he reports having normal appetite and requesting for something to eat on clinical encounter. Has 5-6 bowel movements per day. Denies black or bloody stool. Has been taking his meds regularly. Denies fever, chest pain, or SOB. At [facility], he was started on therapeutic heparin infusion and transfer for subsequent management at [facility]. No signs of instability or lactic acidosis on transfer.      Does have a history of GI bleeds, last back in December. Platelets are also low in the 40s.  Has had a HIT panel in the past which was negative.\"    PAIN HISTORY:   Location: Right upper quadrant acute pain, left upper quadrant/epigastric chronic pain, bilateral low back chronic pain  Duration: Acute pain: Several days; chronic pain multiple years  Pain intensity: 8/10 improves to 4/10 - 5/10 with medication  Quality of the pain: sharp, aching  Aggravating factors: movement  Relieving factors : medications    CAPA (Clinically Aligned Pain Assessment)  Comfort (How is your pain?): Tolerable with discomfort  Change in Pain (Since your last medication/intervention?): About the same  Pain Control (How are your pain treatments working?): Partially effective pain control  Functioning (Are you able to do activities to get better?) : Can do most things, but pain gets in the way of some   Sleep (Does your pain management allow you to sleep or rest?): Awake with occasional pain       REVIEW OF 10 BODY SYSTEMS: 10 point ROS of systems including Constitutional, Eyes, Respiratory, Cardiovascular, Gastroenterology, Genitourinary, Integumentary, Musculoskeletal, Psychiatric were all negative except for pertinent positives noted in my HPI.     INPATIENT MEDICATIONS PERTINENT TO PAIN CONSULT:   Medications related to Pain Management (From now, onward)    Start     Dose/Rate Route Frequency Ordered Stop    07/17/20 2000  Lidocaine " (LIDOCARE) 4 % Patch 1 patch      1 patch  over 12 Hours Transdermal EVERY 24 HOURS 2000 07/17/20 1743      07/17/20 2000  lidocaine patch in PLACE       Transdermal EVERY 8 HOURS 07/17/20 1743      07/17/20 1745  acetaminophen (TYLENOL) tablet 650 mg      650 mg Oral EVERY 8 HOURS 07/17/20 1732      07/17/20 1745  diclofenac (VOLTAREN) 1 % topical gel 2 g      2 g Topical 4 TIMES DAILY 07/17/20 1743      07/17/20 1735  hydrOXYzine (ATARAX) tablet 25 mg      25 mg Oral EVERY 6 HOURS PRN 07/17/20 1743      07/17/20 1733  HYDROmorphone (PF) (DILAUDID) injection 0.3 mg      0.3 mg Intravenous EVERY 4 HOURS PRN 07/17/20 1743      07/17/20 1732  HYDROmorphone (DILAUDID) tablet 2-4 mg      2-4 mg Oral EVERY 4 HOURS PRN 07/17/20 1743      07/17/20 0800  gabapentin (NEURONTIN) capsule 400 mg      400 mg Oral 3 TIMES DAILY 07/17/20 0538      07/17/20 0800  lactulose (CHRONULAC) solution 30 g      30 g Oral 3 TIMES DAILY 07/17/20 0538      07/17/20 0508  lidocaine 1 % 0.1-1 mL      0.1-1 mL Other EVERY 1 HOUR PRN 07/17/20 0519      07/17/20 0508  lidocaine (LMX4) cream       Topical EVERY 1 HOUR PRN 07/17/20 0519          CURRENT MEDICATIONS:   Current Facility-Administered Medications Ordered in Epic   Medication Dose Route Frequency Provider Last Rate Last Dose     acetaminophen (TYLENOL) tablet 650 mg  650 mg Oral Q8H Dalila Fu MD   650 mg at 07/18/20 0855     cholecalciferol (VITAMIN D3) 1250 mcg (03117 units) capsule 1,250 mcg  1,250 mcg Oral Q7 Days Dalila Fu MD   1,250 mcg at 07/18/20 1024     ciprofloxacin (CIPRO) tablet 500 mg  500 mg Oral Daily Andres Elizabeth MD   500 mg at 07/17/20 1927     glucose gel 15-30 g  15-30 g Oral Q15 Min PRN Andres Elizabeth MD        Or     dextrose 50 % injection 25-50 mL  25-50 mL Intravenous Q15 Min PRAndres Gonzalez MD        Or     glucagon injection 1 mg  1 mg Subcutaneous Q15 Min PRAndres Gonzalez MD          diclofenac (VOLTAREN) 1 % topical gel 2 g  2 g Topical 4x Daily Dalila Fu MD   2 g at 07/18/20 0858     diphenhydrAMINE-zinc acetate (BENADRYL) 1-0.1 % cream   Topical BID PRN Dalila Fu MD         ferrous sulfate (FEROSUL) tablet 325 mg  325 mg Oral Daily with breakfast Andres Elizabeth MD   325 mg at 07/18/20 0856     folic acid (FOLVITE) tablet 1 mg  1 mg Oral BID Andres Elizabeth MD   1 mg at 07/18/20 0856     furosemide (LASIX) tablet 40 mg  40 mg Oral TID Dalila Fu MD   40 mg at 07/18/20 0845     gabapentin (NEURONTIN) capsule 400 mg  400 mg Oral TID Andres Elizabeth MD   400 mg at 07/18/20 0856     heparin  drip 25,000 units in 0.45% NaCl 250 mL  ANTICOAGULANT  (see additional administration details for dose)  0-3,500 Units/hr Intravenous Continuous Kiaser Flores MD 14.5 mL/hr at 07/18/20 0939 1,450 Units/hr at 07/18/20 0939     heparin bolus from infusion pump   Intravenous Q6H PRN Kaiser Flores MD         HYDROmorphone (DILAUDID) tablet 2-4 mg  2-4 mg Oral Q4H PRN Dalila Fu MD   4 mg at 07/18/20 0856     HYDROmorphone (PF) (DILAUDID) injection 0.3 mg  0.3 mg Intravenous Q4H PRN Dalila Fu MD   0.3 mg at 07/18/20 1024     hydrOXYzine (ATARAX) tablet 25 mg  25 mg Oral Q6H PRN Dalila Fu MD   25 mg at 07/18/20 1024     insulin aspart (NovoLOG) injection (RAPID ACTING)  1-7 Units Subcutaneous TID AC Andres Elizabeth MD   2 Units at 07/17/20 1835     insulin aspart (NovoLOG) injection (RAPID ACTING)  1-5 Units Subcutaneous At Bedtime Andres Elizabeth MD         insulin aspart (NovoLOG) injection (RAPID ACTING)   Subcutaneous QAM AC Emanuel Houston MD   5 Units at 07/18/20 0900     insulin glargine (LANTUS PEN) injection 10 Units  10 Units Subcutaneous QAM AC Dalila Fu MD   10 Units at 07/18/20 0858     lactulose (CHRONULAC) solution 30 g  30  g Oral TID Andres Elizabeth MD   30 g at 07/18/20 0856     Lidocaine (LIDOCARE) 4 % Patch 1 patch  1 patch Transdermal Q24h Dalila Fu MD   1 patch at 07/17/20 1928    And     lidocaine patch in PLACE   Transdermal Q8H Dalila Fu MD         lidocaine (LMX4) cream   Topical Q1H PRN Kaiser Flores MD         lidocaine 1 % 0.1-1 mL  0.1-1 mL Other Q1H PRN Kaiser Flores MD         melatonin tablet 1 mg  1 mg Oral At Bedtime PRN Kaiser Flores MD         naloxone (NARCAN) injection 0.1-0.4 mg  0.1-0.4 mg Intravenous Q2 Min PRN Kaiser Flores MD         pantoprazole (PROTONIX) EC tablet 40 mg  40 mg Oral Daily Andres Elizabeth MD   40 mg at 07/18/20 0856     Patient is already receiving anticoagulation with heparin, enoxaparin (LOVENOX), warfarin (COUMADIN)  or other anticoagulant medication   Does not apply Continuous PRN Kaiser Flores MD         promethazine (PHENERGAN) tablet 25 mg  25 mg Oral Q6H PRN Dalila Fu MD   25 mg at 07/17/20 2051     QUEtiapine (SEROquel) tablet 25 mg  25 mg Oral Daily PRN Dalila Fu MD         QUEtiapine (SEROquel) tablet 50 mg  50 mg Oral At Bedtime Andres Elizabeth MD   50 mg at 07/17/20 2226     rifaximin (XIFAXAN) tablet 550 mg  550 mg Oral BID Andres Elizabeth MD   550 mg at 07/18/20 0855     sodium chloride (PF) 0.9% PF flush 3 mL  3 mL Intracatheter q1 min prn Kaiser Flores MD         sodium chloride (PF) 0.9% PF flush 3 mL  3 mL Intracatheter Q8H Kaiser Flores MD   3 mL at 07/18/20 0235     spironolactone (ALDACTONE) tablet 100 mg  100 mg Oral TID Dalila Fu MD   100 mg at 07/18/20 0857     Vitamin D3 (CHOLECALCIFEROL) tablet 2,000 Units  2,000 Units Oral Daily Andres Elizabeth MD   2,000 Units at 07/18/20 0856     No current [facility]-ordered outpatient medications on file.      HOME/PREVIOUS MEDICATIONS:   Prior to Admission medications    Medication Sig Start Date End Date Taking? Authorizing Provider   cholecalciferol (VITAMIN D3) 39170 units (1250 mcg) capsule Take 50,000 Units by mouth every 7 days Saturdays   Yes Reported, Patient   ciprofloxacin (CIPRO) 500 MG tablet Take 500 mg by mouth daily 1/27/19  Yes Reported, Patient   ferrous sulfate (FEROSUL) 325 (65 Fe) MG tablet Take 325 mg by mouth daily (with breakfast)   Yes Reported, Patient   folic acid (FOLVITE) 1 MG tablet Take 1 mg by mouth 2 times daily   Yes Reported, Patient   furosemide (LASIX) 40 MG tablet Take 40 mg by mouth 3 times daily 4/20/20  Yes Reported, Patient   gabapentin (NEURONTIN) 400 MG capsule Take 400 mg by mouth 3 times daily   Yes Reported, Patient   lactulose (CHRONULAC) 10 GM/15ML solution Take 15 mLs (10 g) by mouth every hour as needed for constipation 1/10/20  Yes Jesusita Oneal MD   magnesium oxide 400 MG CAPS Take 400 mg by mouth 3 times daily    Yes Reported, Patient   MULTIPLE VITAMIN-FOLIC ACID PO Take 1 tablet by mouth 1 daily   Yes Reported, Patient   Nutritional Supplements (ENSURE PO) Ensure/ boost - 237 ml bid daily   Yes Reported, Patient   oxyCODONE-acetaminophen (PERCOCET)  MG per tablet Take 1 tablet by mouth every 4 hours as needed for moderate to severe pain    Yes Reported, Patient   pantoprazole (PROTONIX) 40 MG EC tablet Take 40 mg by mouth daily   Yes Reported, Patient   promethazine (PHENERGAN) 12.5 MG tablet Take 25 mg by mouth every 6 hours as needed for nausea   Yes Reported, Patient   QUEtiapine (SEROQUEL) 25 MG tablet Take 25 mg by mouth daily as needed    Yes Reported, Patient   Vitamin D, Cholecalciferol, 25 MCG (1000 UT) CAPS Take 2,000 Units by mouth daily    Yes Reported, Patient   blood glucose (NO BRAND SPECIFIED) lancets standard Use to test blood sugar 6 times daily or as directed.    Reported, Patient   enoxaparin ANTICOAGULANT (LOVENOX) 100 MG/ML syringe  Inject 0.88 mLs (88 mg) Subcutaneous every 24 hours for 4 doses 1/11/20 5/12/20  Jesusita Oneal MD   insulin aspart (NOVOLOG FLEXPEN) 100 UNIT/ML pen Inject Subcutaneous 3 times daily (with meals) Sliding scale as directed    Reported, Patient   insulin glargine (LANTUS PEN) 100 UNIT/ML pen Inject 26 Units Subcutaneous 2 times daily  Patient taking differently: Inject 10 Units Subcutaneous 2 times daily  1/10/20   Tahir Acosta MD   lactulose (CHRONULAC) 10 GM/15ML solution Take 45 mLs (30 g) by mouth 3 times daily 20mg / 30 ml tid daily/ prn  Patient not taking: Reported on 4/27/2020 1/10/20   Jesusita Oneal MD   polyethylene glycol (MIRALAX/GLYCOLAX) packet Take 1 packet by mouth daily     Reported, Patient   QUEtiapine (SEROQUEL) 25 MG tablet Take 50 mg by mouth At Bedtime    Unknown, Entered By History   rifaximin (XIFAXAN) 550 MG TABS tablet Take 550 mg by mouth 2 times daily     Reported, Patient   senna (SENOKOT) 8.6 MG tablet Take 1 tablet by mouth daily as needed for constipation 1/10/20   Jesusita Oneal MD   spironolactone (ALDACTONE) 100 MG tablet Take 100 mg by mouth 3 times daily    Reported, Patient   traZODone (DESYREL) 50 MG tablet Take 100 mg by mouth At Bedtime Prn     Reported, Patient   tretinoin (RETIN-A) 0.025 % external cream Apply topically At Bedtime    Reported, Patient     PAST PAIN TREATMENT: As per HPI    ALLERGIES:    Allergies   Allergen Reactions     Bee Anaphylaxis     Adhesive Tape Rash     Sulfa Drugs Itching     Morphine       PAST MEDICAL AND PSYCHIATRIC HISTORY:    Past Medical History:   Diagnosis Date     Alcoholic cirrhosis of liver with ascites (H) 12/23/2019     Diabetes (H)     Type 2 DM, Uses Insulin      DVT (deep vein thrombosis) in pregnancy      H/O protein C deficiency      Hepatic encephalopathy (H)      Hepatitis     Hep A when an infant      History of blood transfusion     2019 at [facility]      Leukopenia 1/11/2020     SBP (spontaneous bacterial  peritonitis) (H) 11/2019     PAST SURGICAL HISTORY:   Past Surgical History:   Procedure Laterality Date     ABDOMEN SURGERY      Gastric Bypass 2009. [facility]      CARDIAC SURGERY      IVC      COLONOSCOPY       ENT SURGERY      Tonsils and Adenoids Removed at 6-7 Years Old      GALLBLADDER SURGERY  2017     GI SURGERY      Upper GI      FAMILY HISTORY: family history includes Alcoholism in his brother; Heart Failure in his maternal grandfather and maternal grandmother; Pancreatic Cancer in his father; Protein C deficiency in his mother; Substance Abuse in his brother; Deonte Parkinson White syndrome in his father.    HEALTH & LIFESTYLE PRACTICES:   Tobacco:  reports that he has quit smoking. He has never used smokeless tobacco.  Alcohol:  reports previous alcohol use.  Illicit drugs:  reports previous drug use.    LABORATORY VALUES:   Last Basic Metabolic Panel:  Lab Results   Component Value Date     07/18/2020      Lab Results   Component Value Date    POTASSIUM 3.7 07/18/2020     Lab Results   Component Value Date    CHLORIDE 106 07/18/2020     Lab Results   Component Value Date    NATE 8.8 07/18/2020     Lab Results   Component Value Date    CO2 25 07/18/2020     Lab Results   Component Value Date    BUN 8 07/18/2020     Lab Results   Component Value Date    CR 0.76 07/18/2020     Lab Results   Component Value Date     07/18/2020       CBC results:  Lab Results   Component Value Date    WBC 3.4 07/18/2020     Lab Results   Component Value Date    HGB 14.3 07/18/2020     Lab Results   Component Value Date    HCT 43.0 07/18/2020     Lab Results   Component Value Date    PLT 47 07/18/2020     DIAGNOSTIC TESTS:   Labs above reviewed as well as additional relevant diagnostic studies from the EPIC record.     PHYSICAL EXAMINATION:  VITAL SIGNS:  B/P: 111/65, T: 98, P: 69, R: 16    Exam:  Constitutional: healthy, alert and no distress  Head: NCAT  Neck: Supple, FROM  ENT: No drainage from  nares  Cardiovascular: RRR  Respiratory: NWOB, ARSLAN  Gastrointestinal: TTP in bilateral upper quadrants  : Deferred  Musculoskeletal: extremities normal- no gross deformities noted and gait normal  Skin: no suspicious lesions or rashes  Neurologic: Grossly intact throughout  Psychiatric: mentation appears normal and affect normal/bright    TIME SPENT: 25 minutes including 15 minutes face-to-face time counseling his  about his diagnosis and treatment options, and coordinating care with the primary team.  DECISION-MAKING: The level of decision-making in this case is high/complex due to the complexity of medical problems, acute/chronic pain, opioid analgesia issues, and behavioral factors.      Owen Woodson MD  July 18, 2020, 11:23 AM  Inpatient Pain Management Service

## 2020-07-18 NOTE — PROVIDER NOTIFICATION
Provider notified regarding insulin orders. No carb coverage ordered for lunch and dinner. No new interventions ordered.

## 2020-07-18 NOTE — PLAN OF CARE
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   Vitals: VSS  Neuros: A/O x4. N/t to BLE at baseline. R ear n/ and decreased sensation   IV: PIV running Heparin @ 14.5 units/hr. Needs new PIV for  ml bolus.   Diet: 2g Na  Bowel status: No BM     : Voiding spontaneously  Pain: c/o pain to abdomen, lidocaine patch applied to right of abd. Tylenol scheduled. IV and PO dilaudid given   Activity: Up ad red  Plan: MRI done tonight. Continue to follow POC

## 2020-07-18 NOTE — PLAN OF CARE
Status: Admitted for management of acute onset abdominal pain and acute portal vein and mesenteric vein thrombosis.   Vitals: VSS on RA  Neuros: A&O x4. N/T to BLE at baseline. Right ear and right lateral facial paresthesias  IV: PIV infusing w/ Heparin @ 1450 units/hr. 2nd PIV saline locked after 500mL LR bolus was completed this shift  Diet: 2g Na  Bowel status: Last BM 07/17  : Voiding spontaneously  Pain: c/o constant pain to abdomen managed with scheduled Tylenol & PO dilaudid   Activity: Up ad red. Educated pt to always wear mask when out of the room.  Lab: COVID test order in place but there was one sample sent yesterday. Main Lab staff verified COVID sample was received yesterday 07/17 & result pending.  Plan: MRI completed last night. Heparin level recheck this AM. Continue to follow POC

## 2020-07-18 NOTE — PLAN OF CARE
07  Status: Admitted for ABD pain, found acute portal vein thrombosis.   Vitals: VSS on RA.  Neuros: AO x 4. Baseline BLE N/T. Diminished sensation/numbness to R ear and R side of face, MD aware.   IV: PIV infusing w/ Heparin @ 1450 units/hr. 2nd PIV SL.   Diet: 2g sodium diet.   Bowel status: No BM this shift, BS present, pt states passing gas. On charge list to order BM meds, per pt request.   : Voiding spontaneously via toilet.   Pain: Pain in abdomen fairly controlled w/ scheduled Tylenol, PRN PO & IV dilaudid.   Activity: Up ad red.   Lab: COVID results pending.   Plan: Continue to monitor and follow POC.

## 2020-07-19 LAB
ALBUMIN SERPL-MCNC: 3.8 G/DL (ref 3.4–5)
ALBUMIN SERPL-MCNC: 4.1 G/DL (ref 3.4–5)
ALBUMIN UR-MCNC: NEGATIVE MG/DL
ALP SERPL-CCNC: 108 U/L (ref 40–150)
ALP SERPL-CCNC: 127 U/L (ref 40–150)
ALT SERPL W P-5'-P-CCNC: 36 U/L (ref 0–70)
ALT SERPL W P-5'-P-CCNC: 41 U/L (ref 0–70)
AMMONIA PLAS-SCNC: 110 UMOL/L (ref 10–50)
ANION GAP SERPL CALCULATED.3IONS-SCNC: 8 MMOL/L (ref 3–14)
APPEARANCE UR: CLEAR
AST SERPL W P-5'-P-CCNC: 40 U/L (ref 0–45)
AST SERPL W P-5'-P-CCNC: 44 U/L (ref 0–45)
BILIRUB DIRECT SERPL-MCNC: 0.8 MG/DL (ref 0–0.2)
BILIRUB SERPL-MCNC: 2 MG/DL (ref 0.2–1.3)
BILIRUB SERPL-MCNC: 2 MG/DL (ref 0.2–1.3)
BILIRUB UR QL STRIP: NEGATIVE
BUN SERPL-MCNC: 8 MG/DL (ref 7–30)
CALCIUM SERPL-MCNC: 9.4 MG/DL (ref 8.5–10.1)
CHLORIDE SERPL-SCNC: 101 MMOL/L (ref 94–109)
CO2 SERPL-SCNC: 26 MMOL/L (ref 20–32)
COLOR UR AUTO: YELLOW
CREAT SERPL-MCNC: 0.88 MG/DL (ref 0.66–1.25)
ERYTHROCYTE [DISTWIDTH] IN BLOOD BY AUTOMATED COUNT: 14.1 % (ref 10–15)
GFR SERPL CREATININE-BSD FRML MDRD: >90 ML/MIN/{1.73_M2}
GLUCOSE BLDC GLUCOMTR-MCNC: 106 MG/DL (ref 70–99)
GLUCOSE BLDC GLUCOMTR-MCNC: 146 MG/DL (ref 70–99)
GLUCOSE BLDC GLUCOMTR-MCNC: 151 MG/DL (ref 70–99)
GLUCOSE BLDC GLUCOMTR-MCNC: 157 MG/DL (ref 70–99)
GLUCOSE BLDC GLUCOMTR-MCNC: 168 MG/DL (ref 70–99)
GLUCOSE BLDC GLUCOMTR-MCNC: 271 MG/DL (ref 70–99)
GLUCOSE SERPL-MCNC: 157 MG/DL (ref 70–99)
GLUCOSE UR STRIP-MCNC: NEGATIVE MG/DL
HCT VFR BLD AUTO: 45.9 % (ref 40–53)
HGB BLD-MCNC: 15.3 G/DL (ref 13.3–17.7)
HGB UR QL STRIP: NEGATIVE
HYALINE CASTS #/AREA URNS LPF: 16 /LPF (ref 0–2)
KETONES UR STRIP-MCNC: NEGATIVE MG/DL
LEUKOCYTE ESTERASE UR QL STRIP: NEGATIVE
LMWH PPP CHRO-ACNC: 0.39 IU/ML
MCH RBC QN AUTO: 31.3 PG (ref 26.5–33)
MCHC RBC AUTO-ENTMCNC: 33.3 G/DL (ref 31.5–36.5)
MCV RBC AUTO: 94 FL (ref 78–100)
MUCOUS THREADS #/AREA URNS LPF: PRESENT /LPF
NITRATE UR QL: NEGATIVE
PH UR STRIP: 5.5 PH (ref 5–7)
PLATELET # BLD AUTO: 55 10E9/L (ref 150–450)
POTASSIUM SERPL-SCNC: 3.9 MMOL/L (ref 3.4–5.3)
PROT SERPL-MCNC: 7.8 G/DL (ref 6.8–8.8)
PROT SERPL-MCNC: 8.5 G/DL (ref 6.8–8.8)
RBC # BLD AUTO: 4.89 10E12/L (ref 4.4–5.9)
RBC #/AREA URNS AUTO: 2 /HPF (ref 0–2)
SODIUM SERPL-SCNC: 135 MMOL/L (ref 133–144)
SOURCE: ABNORMAL
SP GR UR STRIP: 1.01 (ref 1–1.03)
UROBILINOGEN UR STRIP-MCNC: NORMAL MG/DL (ref 0–2)
WBC # BLD AUTO: 4.5 10E9/L (ref 4–11)
WBC #/AREA URNS AUTO: 1 /HPF (ref 0–5)

## 2020-07-19 PROCEDURE — 25000132 ZZH RX MED GY IP 250 OP 250 PS 637: Performed by: STUDENT IN AN ORGANIZED HEALTH CARE EDUCATION/TRAINING PROGRAM

## 2020-07-19 PROCEDURE — 81001 URINALYSIS AUTO W/SCOPE: CPT | Performed by: STUDENT IN AN ORGANIZED HEALTH CARE EDUCATION/TRAINING PROGRAM

## 2020-07-19 PROCEDURE — 36415 COLL VENOUS BLD VENIPUNCTURE: CPT | Performed by: STUDENT IN AN ORGANIZED HEALTH CARE EDUCATION/TRAINING PROGRAM

## 2020-07-19 PROCEDURE — 85027 COMPLETE CBC AUTOMATED: CPT | Performed by: INTERNAL MEDICINE

## 2020-07-19 PROCEDURE — 25000128 H RX IP 250 OP 636: Performed by: STUDENT IN AN ORGANIZED HEALTH CARE EDUCATION/TRAINING PROGRAM

## 2020-07-19 PROCEDURE — 36415 COLL VENOUS BLD VENIPUNCTURE: CPT | Performed by: INTERNAL MEDICINE

## 2020-07-19 PROCEDURE — 87040 BLOOD CULTURE FOR BACTERIA: CPT | Performed by: STUDENT IN AN ORGANIZED HEALTH CARE EDUCATION/TRAINING PROGRAM

## 2020-07-19 PROCEDURE — 87086 URINE CULTURE/COLONY COUNT: CPT | Performed by: STUDENT IN AN ORGANIZED HEALTH CARE EDUCATION/TRAINING PROGRAM

## 2020-07-19 PROCEDURE — 25000131 ZZH RX MED GY IP 250 OP 636 PS 637: Performed by: STUDENT IN AN ORGANIZED HEALTH CARE EDUCATION/TRAINING PROGRAM

## 2020-07-19 PROCEDURE — 80076 HEPATIC FUNCTION PANEL: CPT | Performed by: STUDENT IN AN ORGANIZED HEALTH CARE EDUCATION/TRAINING PROGRAM

## 2020-07-19 PROCEDURE — 82140 ASSAY OF AMMONIA: CPT | Performed by: STUDENT IN AN ORGANIZED HEALTH CARE EDUCATION/TRAINING PROGRAM

## 2020-07-19 PROCEDURE — 00000146 ZZHCL STATISTIC GLUCOSE BY METER IP

## 2020-07-19 PROCEDURE — 85520 HEPARIN ASSAY: CPT | Performed by: INTERNAL MEDICINE

## 2020-07-19 PROCEDURE — 12000001 ZZH R&B MED SURG/OB UMMC

## 2020-07-19 PROCEDURE — 25000125 ZZHC RX 250: Performed by: STUDENT IN AN ORGANIZED HEALTH CARE EDUCATION/TRAINING PROGRAM

## 2020-07-19 PROCEDURE — 99233 SBSQ HOSP IP/OBS HIGH 50: CPT | Mod: GC | Performed by: INTERNAL MEDICINE

## 2020-07-19 PROCEDURE — 80053 COMPREHEN METABOLIC PANEL: CPT | Performed by: INTERNAL MEDICINE

## 2020-07-19 RX ORDER — OFLOXACIN 3 MG/ML
1 SOLUTION/ DROPS OPHTHALMIC 4 TIMES DAILY
Status: DISCONTINUED | OUTPATIENT
Start: 2020-07-19 | End: 2020-07-25

## 2020-07-19 RX ORDER — CARBOXYMETHYLCELLULOSE SODIUM 5 MG/ML
1 SOLUTION/ DROPS OPHTHALMIC 4 TIMES DAILY PRN
Status: DISCONTINUED | OUTPATIENT
Start: 2020-07-19 | End: 2020-07-27 | Stop reason: HOSPADM

## 2020-07-19 RX ORDER — LACTULOSE 10 G/15ML
100 SOLUTION ORAL
Status: DISCONTINUED | OUTPATIENT
Start: 2020-07-19 | End: 2020-07-19

## 2020-07-19 RX ORDER — LACTULOSE 10 G/15ML
45 SOLUTION ORAL 3 TIMES DAILY
Status: DISCONTINUED | OUTPATIENT
Start: 2020-07-19 | End: 2020-07-27 | Stop reason: HOSPADM

## 2020-07-19 RX ORDER — ERYTHROMYCIN 5 MG/G
OINTMENT OPHTHALMIC AT BEDTIME
Status: DISCONTINUED | OUTPATIENT
Start: 2020-07-19 | End: 2020-07-25

## 2020-07-19 RX ORDER — CARBOXYMETHYLCELLULOSE SODIUM 5 MG/ML
1 SOLUTION/ DROPS OPHTHALMIC
Status: DISCONTINUED | OUTPATIENT
Start: 2020-07-19 | End: 2020-07-19

## 2020-07-19 RX ORDER — LACTULOSE 10 G/15ML
20 SOLUTION ORAL
Status: DISCONTINUED | OUTPATIENT
Start: 2020-07-19 | End: 2020-07-19

## 2020-07-19 RX ORDER — PROCHLORPERAZINE MALEATE 5 MG
5 TABLET ORAL ONCE
Status: COMPLETED | OUTPATIENT
Start: 2020-07-19 | End: 2020-07-19

## 2020-07-19 RX ORDER — LACTULOSE 10 G/15ML
100 SOLUTION ORAL
Status: DISCONTINUED | OUTPATIENT
Start: 2020-07-19 | End: 2020-07-26

## 2020-07-19 RX ORDER — LACTULOSE 10 G/15ML
40 SOLUTION ORAL
Status: DISCONTINUED | OUTPATIENT
Start: 2020-07-19 | End: 2020-07-26

## 2020-07-19 RX ADMIN — CARBOXYMETHYLCELLULOSE SODIUM 1 DROP: 5 SOLUTION/ DROPS OPHTHALMIC at 12:01

## 2020-07-19 RX ADMIN — HYDROMORPHONE HYDROCHLORIDE 0.3 MG: 1 INJECTION, SOLUTION INTRAMUSCULAR; INTRAVENOUS; SUBCUTANEOUS at 02:49

## 2020-07-19 RX ADMIN — FUROSEMIDE 40 MG: 40 TABLET ORAL at 07:52

## 2020-07-19 RX ADMIN — HYDROMORPHONE HYDROCHLORIDE 0.3 MG: 1 INJECTION, SOLUTION INTRAMUSCULAR; INTRAVENOUS; SUBCUTANEOUS at 14:56

## 2020-07-19 RX ADMIN — PROMETHAZINE HYDROCHLORIDE 25 MG: 25 TABLET ORAL at 13:33

## 2020-07-19 RX ADMIN — ENOXAPARIN SODIUM 120 MG: 120 INJECTION SUBCUTANEOUS at 15:35

## 2020-07-19 RX ADMIN — MELATONIN 2000 UNITS: at 07:53

## 2020-07-19 RX ADMIN — ACETAMINOPHEN 650 MG: 325 TABLET, FILM COATED ORAL at 08:50

## 2020-07-19 RX ADMIN — LIDOCAINE 1 PATCH: 560 PATCH PERCUTANEOUS; TOPICAL; TRANSDERMAL at 19:09

## 2020-07-19 RX ADMIN — INSULIN ASPART 2 UNITS: 100 INJECTION, SOLUTION INTRAVENOUS; SUBCUTANEOUS at 22:13

## 2020-07-19 RX ADMIN — CARBOXYMETHYLCELLULOSE SODIUM 1 DROP: 5 SOLUTION/ DROPS OPHTHALMIC at 15:34

## 2020-07-19 RX ADMIN — HYDROXYZINE HYDROCHLORIDE 25 MG: 25 TABLET, FILM COATED ORAL at 20:44

## 2020-07-19 RX ADMIN — LACTULOSE 30 G: 20 SOLUTION ORAL at 07:54

## 2020-07-19 RX ADMIN — DICLOFENAC SODIUM 2 G: 10 GEL TOPICAL at 12:01

## 2020-07-19 RX ADMIN — GABAPENTIN 600 MG: 300 CAPSULE ORAL at 13:33

## 2020-07-19 RX ADMIN — RIFAXIMIN 550 MG: 550 TABLET ORAL at 19:10

## 2020-07-19 RX ADMIN — SPIRONOLACTONE 100 MG: 25 TABLET ORAL at 07:53

## 2020-07-19 RX ADMIN — HYDROXYZINE HYDROCHLORIDE 25 MG: 25 TABLET, FILM COATED ORAL at 10:55

## 2020-07-19 RX ADMIN — SENNOSIDES 8.6 MG: 8.6 TABLET, FILM COATED ORAL at 09:28

## 2020-07-19 RX ADMIN — SPIRONOLACTONE 100 MG: 25 TABLET ORAL at 17:08

## 2020-07-19 RX ADMIN — OXYCODONE HYDROCHLORIDE 15 MG: 5 TABLET ORAL at 21:29

## 2020-07-19 RX ADMIN — FUROSEMIDE 40 MG: 40 TABLET ORAL at 17:08

## 2020-07-19 RX ADMIN — GABAPENTIN 600 MG: 300 CAPSULE ORAL at 19:09

## 2020-07-19 RX ADMIN — INSULIN ASPART 1 UNITS: 100 INJECTION, SOLUTION INTRAVENOUS; SUBCUTANEOUS at 08:06

## 2020-07-19 RX ADMIN — PROCHLORPERAZINE MALEATE 5 MG: 5 TABLET ORAL at 19:10

## 2020-07-19 RX ADMIN — INSULIN ASPART 1 UNITS: 100 INJECTION, SOLUTION INTRAVENOUS; SUBCUTANEOUS at 13:34

## 2020-07-19 RX ADMIN — LACTULOSE 40 G: 20 SOLUTION ORAL at 23:33

## 2020-07-19 RX ADMIN — ACETAMINOPHEN 650 MG: 325 TABLET, FILM COATED ORAL at 01:51

## 2020-07-19 RX ADMIN — SPIRONOLACTONE 100 MG: 25 TABLET ORAL at 13:33

## 2020-07-19 RX ADMIN — QUETIAPINE FUMARATE 50 MG: 50 TABLET ORAL at 21:29

## 2020-07-19 RX ADMIN — RIFAXIMIN 550 MG: 550 TABLET ORAL at 07:52

## 2020-07-19 RX ADMIN — HYDROMORPHONE HYDROCHLORIDE 4 MG: 2 TABLET ORAL at 12:01

## 2020-07-19 RX ADMIN — FOLIC ACID 1 MG: 1 TABLET ORAL at 19:10

## 2020-07-19 RX ADMIN — LACTULOSE 45 G: 20 SOLUTION ORAL at 17:08

## 2020-07-19 RX ADMIN — FERROUS SULFATE TAB 325 MG (65 MG ELEMENTAL FE) 325 MG: 325 (65 FE) TAB at 07:53

## 2020-07-19 RX ADMIN — HYDROMORPHONE HYDROCHLORIDE 0.3 MG: 1 INJECTION, SOLUTION INTRAMUSCULAR; INTRAVENOUS; SUBCUTANEOUS at 10:03

## 2020-07-19 RX ADMIN — HYDROMORPHONE HYDROCHLORIDE 4 MG: 2 TABLET ORAL at 07:53

## 2020-07-19 RX ADMIN — ACETAMINOPHEN 650 MG: 325 TABLET, FILM COATED ORAL at 17:08

## 2020-07-19 RX ADMIN — POLYETHYLENE GLYCOL 3350 17 G: 17 POWDER, FOR SOLUTION ORAL at 09:28

## 2020-07-19 RX ADMIN — INSULIN GLARGINE 10 UNITS: 100 INJECTION, SOLUTION SUBCUTANEOUS at 08:06

## 2020-07-19 RX ADMIN — HEPARIN SODIUM 1450 UNITS/HR: 10000 INJECTION, SOLUTION INTRAVENOUS at 09:27

## 2020-07-19 RX ADMIN — DICLOFENAC SODIUM 2 G: 10 GEL TOPICAL at 19:09

## 2020-07-19 RX ADMIN — LACTULOSE 40 G: 20 SOLUTION ORAL at 19:27

## 2020-07-19 RX ADMIN — ERYTHROMYCIN 1 G: 5 OINTMENT OPHTHALMIC at 21:29

## 2020-07-19 RX ADMIN — FUROSEMIDE 40 MG: 40 TABLET ORAL at 13:33

## 2020-07-19 RX ADMIN — GABAPENTIN 600 MG: 300 CAPSULE ORAL at 07:53

## 2020-07-19 RX ADMIN — CYCLOBENZAPRINE 10 MG: 10 TABLET, FILM COATED ORAL at 08:50

## 2020-07-19 RX ADMIN — DICLOFENAC SODIUM 2 G: 10 GEL TOPICAL at 15:35

## 2020-07-19 RX ADMIN — PANTOPRAZOLE SODIUM 40 MG: 40 TABLET, DELAYED RELEASE ORAL at 07:53

## 2020-07-19 RX ADMIN — FOLIC ACID 1 MG: 1 TABLET ORAL at 07:53

## 2020-07-19 RX ADMIN — DICLOFENAC SODIUM 2 G: 10 GEL TOPICAL at 07:52

## 2020-07-19 RX ADMIN — PROMETHAZINE HYDROCHLORIDE 25 MG: 25 TABLET ORAL at 22:24

## 2020-07-19 RX ADMIN — CIPROFLOXACIN HYDROCHLORIDE 500 MG: 500 TABLET, FILM COATED ORAL at 19:09

## 2020-07-19 RX ADMIN — LACTULOSE 30 G: 20 SOLUTION ORAL at 13:33

## 2020-07-19 RX ADMIN — LACTULOSE 40 G: 20 SOLUTION ORAL at 21:29

## 2020-07-19 RX ADMIN — OFLOXACIN 1 DROP: 3 SOLUTION/ DROPS OPHTHALMIC at 19:10

## 2020-07-19 ASSESSMENT — ACTIVITIES OF DAILY LIVING (ADL)
ADLS_ACUITY_SCORE: 10
ADLS_ACUITY_SCORE: 11
ADLS_ACUITY_SCORE: 10

## 2020-07-19 NOTE — PROGRESS NOTES
Brief Neurology Progress Note:    Mr Wiley is a 30-year-old gentleman with a history of decompensated alcoholic cirrhosis, DMII, gastric bypass, and multiple thromboembolic events 2/2 protein C deficiency who the neurology service was consulted for to evaluate for sudden onset right ear numbness.  Patient reports that he had sudden onset right ear numbness with no inciting event or trigger.  This morning he reports that his numbness is unchanged and has not improved.  On examination, cranial nerves appear intact with the exception of decreased pinprick throughout the entirety of the right ear externally as well as in the surrounding periauricular area.  He has normal hearing and there are no skin changes surrounding the right ear.  Remainder of his neurological examination is intact.  He underwent an MRI of his brain which was negative for any abnormalities.  Unclear etiology of his presentation.  The distribution of numbness does not fit a unknown nerve distribution. His presentation is certainly atypical however given no significant deficits we do not think that any additional work-up is needed at this time.  If he continues to have persistent right ear numbness then outpatient follow-up with neurology is reasonable.     Thank you for involving neurology in the care of Obed Wiley.  The Neurology Service will sign off at this time. Please let us know if Mr Wiley develops new onset weakness or numbness during his admission. Please do not hesitate to call with questions/concerns (consult pager [phone]).      Patient was seen and discussed with Dr Devries.     Laure Jeffers  Neurology Resident, PGY3  Pager: [phone]

## 2020-07-19 NOTE — CONSULTS
"[facility]  Neurology H&P    Patient Name:  Obed Wiley  MRN:  [id number]    :  1990  Date of Service:  2020  Primary care provider:  Anjelica Reyes      Date of Admission:  2020            Chief Complaint:   No chief complaint on file.    \"Right ear numbness\"     History is obtained from the patient, as well as his mother over the phone (patient requested to call his mother and put her on speaker during interview as he has memory difficulty occasionally).           History of Present Illness:   Mr. Obed Wiley is a 30 year old man with decompensated alcoholic cirrhosis, , insulin-dependent T2DM, hx Yan-en-Y gastric bypass, protein C deficiency c/b f multiple VTEs (s/p chronic indwelling IVC filter) and GI bleeds who was transferred to [facility] from OSH 2020 for acute portal vein thrombosis while on Lovenox 1 mg/kg anticoagulation.    Neurology consulted  for right ear numbness.     Per patient this occurred suddenly while watching TV with his mother, prior to admission. Mother confirms this over the phone. No inciting event or trigger he can recall. Numbness was sudden onset and dense. No parasthesias, no hearing changes, no facial weakness, no eye or vision changes. NO autonomic symptoms or changes in speech or swallowing. No other symptoms that patient can think of. This has never happened to him before. Distribution is entire right ear, including pinna and lobe, as well as some surrounding skin lurdes-auricularly. No rash, no vesicles, no ear pain.     Pt had MRI brain done at OSH on , read with no acute abnormalities. He is on a heparin drip currently.     ROS otherwise negative.               Past Medical History:     Past Medical History:   Diagnosis Date     Alcoholic cirrhosis of liver with ascites (H) 2019     Diabetes (H)     Type 2 DM, Uses Insulin      DVT (deep vein thrombosis) in pregnancy      H/O protein C deficiency  "     Hepatic encephalopathy (H)      Hepatitis     Hep A when an infant      History of blood transfusion     2019 at [facility]      Leukopenia 1/11/2020     SBP (spontaneous bacterial peritonitis) (H) 11/2019             Past Surgical History:     Past Surgical History:   Procedure Laterality Date     ABDOMEN SURGERY      Gastric Bypass 2009. [facility]      CARDIAC SURGERY      IVC      COLONOSCOPY       ENT SURGERY      Tonsils and Adenoids Removed at 6-7 Years Old      GALLBLADDER SURGERY  2017     GI SURGERY      Upper GI              Social History:     Social History     Tobacco Use     Smoking status: Former Smoker     Smokeless tobacco: Never Used     Tobacco comment: Quit 12/2019   Substance Use Topics     Alcohol use: Not Currently     Comment: last drink June 2018             Family History:     Family History   Problem Relation Age of Onset     Protein C deficiency Mother      Pancreatic Cancer Father      Deonte Parkinson White syndrome Father      Alcoholism Brother      Substance Abuse Brother      Heart Failure Maternal Grandmother      Heart Failure Maternal Grandfather                 Allergies:     Allergies   Allergen Reactions     Bee Anaphylaxis     Adhesive Tape Rash     Sulfa Drugs Itching     Morphine              Medications:     Current Facility-Administered Medications   Medication     acetaminophen (TYLENOL) tablet 650 mg     cholecalciferol (VITAMIN D3) 1250 mcg (18491 units) capsule 1,250 mcg     ciprofloxacin (CIPRO) tablet 500 mg     cyclobenzaprine (FLEXERIL) tablet 10 mg     glucose gel 15-30 g    Or     dextrose 50 % injection 25-50 mL    Or     glucagon injection 1 mg     diclofenac (VOLTAREN) 1 % topical gel 2 g     diphenhydrAMINE-zinc acetate (BENADRYL) 1-0.1 % cream     ferrous sulfate (FEROSUL) tablet 325 mg     folic acid (FOLVITE) tablet 1 mg     furosemide (LASIX) tablet 40 mg     gabapentin (NEURONTIN) capsule 600 mg     heparin  drip 25,000 units in 0.45% NaCl 250 mL   ANTICOAGULANT  (see additional administration details for dose)     heparin bolus from infusion pump     HYDROmorphone (DILAUDID) tablet 2-4 mg     HYDROmorphone (PF) (DILAUDID) injection 0.3 mg     hydrOXYzine (ATARAX) tablet 25 mg     insulin aspart (NovoLOG) injection (RAPID ACTING)     insulin aspart (NovoLOG) injection (RAPID ACTING)     insulin aspart (NovoLOG) injection (RAPID ACTING)     insulin glargine (LANTUS PEN) injection 10 Units     lactulose (CHRONULAC) solution 30 g     Lidocaine (LIDOCARE) 4 % Patch 1 patch    And     lidocaine patch in PLACE     lidocaine (LMX4) cream     lidocaine 1 % 0.1-1 mL     melatonin tablet 1 mg     naloxone (NARCAN) injection 0.1-0.4 mg     pantoprazole (PROTONIX) EC tablet 40 mg     Patient is already receiving anticoagulation with heparin, enoxaparin (LOVENOX), warfarin (COUMADIN)  or other anticoagulant medication     polyethylene glycol (MIRALAX) Packet 17 g     promethazine (PHENERGAN) tablet 25 mg     QUEtiapine (SEROquel) tablet 25 mg     QUEtiapine (SEROquel) tablet 50 mg     rifaximin (XIFAXAN) tablet 550 mg     sennosides (SENOKOT) tablet 8.6 mg     sodium chloride (PF) 0.9% PF flush 3 mL     sodium chloride (PF) 0.9% PF flush 3 mL     spironolactone (ALDACTONE) tablet 100 mg     Vitamin D3 (CHOLECALCIFEROL) tablet 2,000 Units               Physical Exam:   Vitals:   Patient Vitals for the past 8 hrs:   BP Temp Temp src Pulse Resp SpO2   07/18/20 1504 116/85 99.1  F (37.3  C) Oral 102 18 97 %       Physical Exam:  Constitutional: Alert. Sitting on bed comfortably. No acute distress.   Head: Atraumatic, normocephalic.  ENT: Moist mucous membranes. No sinus drainage. Ears intact to palpation without lesion. No drainage from ear canals. No erythema or skin changes.   Cardiovascular: Appears warm, well-perfused, and non-toxic.  Respiratory: No increased work of breathing, no accessory muscle use.   Musculoskeletal: Moving all four limbs spontaneously. Grossly  intact range of motion.   Skin: No rashes or lesions appreciated on visualized skin.     Neurologic Exam:   Mental Status: Alert and oriented to place, date, self and reasons of hospitalization.  Following commands. Interacting appropriately.  Cranial Nerves: PERRL, EOMI without nystagmus, VFF, face symmetric, facial sensation intact aside from dense numbness of right ear and surrounding periauricular skin. nondysarthric, shoulder shrug strong, tongue/uvula midline.  Motor: Strength 5/5 proximally and distally. Tone normal aside from hypertonic trapezius and posterior neck muscles (symmetric and chronic per pt)  Reflexes: 1+' throughout at biceps, brachioradialis, triceps, patellars and achilles. No ankle clonus, toes down going.   Sensory: right ear totally numb to light touch and pinprick, normal on left ear   Coordination: FNF & HTS intact without dysmetria  Gait: Normal gait with good arm swing and turning. Able to stand and bear weight with intact posture            Data:     Results for orders placed or performed during the hospital encounter of 07/17/20 (from the past 24 hour(s))   Glucose by meter   Result Value Ref Range    Glucose 169 (H) 70 - 99 mg/dL   Heparin Xa (10a) Level   Result Value Ref Range    Heparin 10A Level 0.43 IU/mL   Comprehensive metabolic panel   Result Value Ref Range    Sodium 137 133 - 144 mmol/L    Potassium 3.7 3.4 - 5.3 mmol/L    Chloride 106 94 - 109 mmol/L    Carbon Dioxide 25 20 - 32 mmol/L    Anion Gap 6 3 - 14 mmol/L    Glucose 118 (H) 70 - 99 mg/dL    Urea Nitrogen 8 7 - 30 mg/dL    Creatinine 0.76 0.66 - 1.25 mg/dL    GFR Estimate >90 >60 mL/min/[1.73_m2]    GFR Estimate If Black >90 >60 mL/min/[1.73_m2]    Calcium 8.8 8.5 - 10.1 mg/dL    Bilirubin Total 1.4 (H) 0.2 - 1.3 mg/dL    Albumin 3.4 3.4 - 5.0 g/dL    Protein Total 7.2 6.8 - 8.8 g/dL    Alkaline Phosphatase 109 40 - 150 U/L    ALT 37 0 - 70 U/L    AST 45 0 - 45 U/L   CBC with platelets   Result Value Ref Range     WBC 3.4 (L) 4.0 - 11.0 10e9/L    RBC Count 4.51 4.4 - 5.9 10e12/L    Hemoglobin 14.3 13.3 - 17.7 g/dL    Hematocrit 43.0 40.0 - 53.0 %    MCV 95 78 - 100 fl    MCH 31.7 26.5 - 33.0 pg    MCHC 33.3 31.5 - 36.5 g/dL    RDW 14.1 10.0 - 15.0 %    Platelet Count 47 (LL) 150 - 450 10e9/L   Antithrombin III   Result Value Ref Range    Antithrombin III Chromogenic 59 (L) 85 - 135 %   Glucose by meter   Result Value Ref Range    Glucose 130 (H) 70 - 99 mg/dL   Glucose by meter   Result Value Ref Range    Glucose 151 (H) 70 - 99 mg/dL   Lactic acid level STAT   Result Value Ref Range    Lactate for Sepsis Protocol 1.8 0.7 - 2.0 mmol/L   Glucose by meter   Result Value Ref Range    Glucose 128 (H) 70 - 99 mg/dL   US Lower Extremity Venous Duplex Right    Narrative    EXAMINATION: DOPPLER VENOUS ULTRASOUND OF THE RIGHT LOWER EXTREMITY,  7/18/2020 5:39 PM     COMPARISON: None.    HISTORY: Swelling and tenderness in patient with hypercoagulability.    TECHNIQUE:  Gray-scale evaluation with compression, spectral flow, and  color Doppler assessment of the deep venous system of the right leg  from groin to knee, and then at the ankle.    FINDINGS:  In the right lower extremity, the common femoral, femoral, popliteal  and posterior tibial veins demonstrate normal compressibility and  blood flow.    Patent left common femoral vein for comparison.      Impression    IMPRESSION:  No evidence of right lower extremity deep venous  thrombosis.    ALYSIA BULLOCK MD              Assessment and Recs:   Mr. Obed Wiley is a 30 year old man with decompensated alcoholic cirrhosis, , insulin-dependent T2DM, hx Yan-en-Y gastric bypass, protein C deficiency c/b f multiple VTEs (s/p chronic indwelling IVC filter) and GI bleeds who was transferred to [facility] from OSH 7/17/2020 for acute portal vein thrombosis while on anticoagulation.    Neurology consulted 7/18 for several days of a sudden onset, persistent, dense right ear numbness. The sensory  nerves innervating the ear include CN5 V3 branch, the facial nerve, as well as spinal nerves branches.     Sudden onset and loss of function makes vascular etiology possible, however it is an unusual distribution given that the entire ear is numb (? Posterior auricular vs trigeminal branches). No pain, no parathesias. No weakness. He has already had an MRI, and is on anticoagulation (optimized from a stroke perspective). Also possible is a palsy due to diabetes, however again the distribution is odd.     Per Mr. Wiley the numbness does not trouble him greatly. If symptoms fail to resolve and he prefers additional workup it would be reasonable to follow up in outpatient neuromuscular clinic at some point in the future. He declined this today.     Patient discussed with Dr. Devries. Staff and team to see tomorrow, final recs pending.     Julia Martin MD  PGY2 Neurology  [phone]

## 2020-07-19 NOTE — PLAN OF CARE
Status: Admitted for management of acute onset abdominal pain and acute portal vein and mesenteric vein thrombosis.   Vitals: VSS on RA.  Neuros: A&O x4. At times forgetful & irritable. N/T to BLE at baseline. Numbness & decreased sensation to Right ear and lateral face, MDs aware  IV: PIV infusing w/ Heparin @ 1450 units/hr. Other PIV SL  Diet: 2g Sodium diet  Bowel status: Last BM 07/18 per pt  : Voiding spontaneously  Pain: c/o constant pain to abdomen, managed with scheduled Tylenol & prn dilaudid   Activity: Up ad red. Reminded pt to always wear mask when out of the room.  Plan: Heparin level check this AM. Continue to follow POC

## 2020-07-19 NOTE — PLAN OF CARE
Status: Admitted for management of acute onset abdominal pain and acute portal vein and mesenteric vein thrombosis.   Vitals: VSS on RA.   Neuros: A&O x4. At times forgetful & irritable. N/T to BLE at baseline. Numbness & decreased sensation to Right ear and lateral face, MD is aware. Pt appears more foggy this afternoon, falling asleep. Eyes are dilated after optho saw.   IV: 2 PIV SL.   Diet: 2g Sodium diet, tolerating it well.   Bowel status: Last BM this morning per pt.   : Voiding spontaneously  Pain: c/o constant pain to abdomen, pt frustrated with pain medication administration this morning (writer educated on importance of spacing out pain medications to avoid CNS depression and better control pain throughout the day), pt frustrated and irritated, charge RN saw and agreed with writer on plan. Medical team and pain team agreed to spacing out medications so pt is more comfortable (but not too lethargic) throughout the day.   Activity: Up ad erd. Reminded pt to always wear mask when out of the room.  Plan: Heparin gtt discontinued this shift, started on Lovenox. Ophthalmology consulted and saw pt as pt had irritated L eye (saw a minor scratch, still to put in recommendations). Pt is on Refresh eyedrops PRN (leave in room per MD and pt). Neurology saw and cleared for numbness to face. Pain team consulted and following (to put in recommendations). Continue to follow POC.

## 2020-07-19 NOTE — PROGRESS NOTES
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     Changes:  - discontinued Dilaudid, started 15 mg oxycodone Q4H; if requiring IV for breakthrough, increase to 20 mg oxycodone Q4H and wean off IV  - discontinued continuous heparin gtt and started Lovenox 1.5 mg/kg (120 mg), administered 60 mg BID  - Ophthalmology consulted re new left eye pain and foreign body sensation; started ofloxacin QID left eye, erythromycin at night left eye, artificial tears PRN  - Pt more lethargic in PM with incorrect responses to orientation questions x2; checking ammonia levels, holding IV pain medications until ammonia levels result    # Acute portal vein thrombosis  # Protein C deficiency  Pt w/ hypercoagulability 2/2 to liver disease, protein C deficiency. 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 Recently admitted 1/2020 for mesenteric ischemia, discharged on 1 mg/kg Lovenox. Reports compliance and consistency with Lovenox dosing at home. Pt now with nonocclusive portal vein thrombosis identified on imaging at OSH, US abdomen w/ Doppler 7/17 w/ PVT w/o splenic vein or other mesenteric involvement.  - transition from continuous heparin gtt to Lovenox 1.5 mg/kg (120 mg rounded from 122.55 mg), administered 60 mg Q12H   - will monitor for evidence of GI bleed   - will require follow up in OP  setting for anti-Xa level w/in 4 days of beginning Lovenox  - Hematology consulted, appreciate recs: Low anti-thrombin level  - Images from OSH will be arriving by mail    # Acute on chronic Abdominal pain  Chronic abdominal pain due to chronic pancreatitis. Current pain feels like previous pain experienced when he had a portal vein thrombosis and mesenteric ischemia. Patient recognizes that there is an anxiety component to his pain as well.  - discontinued Dilaudid 2-4 mg PO Q4H PRN, plan to wean off IV PRN for breakthrough pain   - pt more lethargic in PM w/ incorrect responses to orientation Q's; checking ammonia levels and holding IV dilaudid until ammonia levels result  - start 15 mg oxycodone Q4H PRN; if requiring IV for breakthrough pain, may increase to 20 mg oxycodone Q4H PRN  - Tylenol 650 mg scheduled Q8H, and lidocaine patches PRN for additional pain control  - Seroquel 50 mg at bedtime and 25mg PRN for anxiety 2/2 pain; hydroxyzine PRN for anxiety  - Pain consulted, appreciate assistance: increased gabapentin to 600mg TID (may increase to 900 mg TID in OP setting). Added cyclobenzaprine for muscular pain/back pain  - Will contact PCP on discharge to coordinate outpatient pain management plan    # Decompensated alcoholic cirrhosis c/b HE, SBP, EV  Currently sober and undergoing transplant evaluation, transplant candidate per Dr. Marsh. Ascites initially required therapeutic paracenteses 2x/week, then 1x/week, but he has not required one in the last 5 month none. Has had SBP, on ciprofloxacin 500 mg daily for prophylaxis. His last MELD score is 14 6/23. Requires continuous EGD surveillance for esophageal varices, and he will be seen by GI in 3-6 months for follow up.   Etiology: Alcohol  MELD-Na 13  Hepatic encephalopathy: None, no episodes of HE in the last 7 months  Ascites: None noted on examination and ultrasound. On high dose diuretics, last paracentesis was 01/2020  SBP prophylaxis: On  Ciprofloxacin  TIPS: None  Esophageal/Gastric varices: Last EGD was done 11/2018 with no varices seen. Will need a repeat soon.  Hepatocellular carcinoma: Last USS was done 7/17/2020 and notable for no mass lesions  Transplant: Evaluation ongoing                         Blood type AB+  - Hepatology consulted, appreciate recs  - Continue PTA ciprofloxacin 500mg once daily for SBP prophylaxis  - Continue PTA lactulose, titrate to 3-4 BM/day  - Continue rifaximin 550 mg BID  - Continue PTA spironolactone 100mg TID and lasix 40mg TID for now, hemodynamically stable w/o concern for bowel ischemia  - Will need repeat CT abd w/ contrast in 1 month as outpatient; Transplant service will order.  MELD-Na score: 16 at 7/19/2020  7:44 AM  MELD score: 14 at 7/19/2020  7:44 AM  Calculated from:  Serum Creatinine: 0.88 mg/dL (Rounded to 1 mg/dL) at 7/19/2020  7:44 AM  Serum Sodium: 135 mmol/L at 7/19/2020  7:44 AM  Total Bilirubin: 2.0 mg/dL at 7/19/2020  7:44 AM  INR(ratio): 1.58 at 7/17/2020  5:42 AM  Age: 30 years 5 months    #Left eye epithelial defect, possible episcleritis  Pt w/ foreign body sensation in left eye 7/19 AM, with conjunctival injection, irritation.  - Ophthalmology consulted, appreciate recs  - Ofloxacin QID left eye  - Erythromycin at night left eye  - Artificial tears ou PRN  - Please do not patch the eye    #Right ear, lateral facial paresthesia  Pt admitted to OSH with abdominal pain, right ear and right lateral facial paresthesias. OSH w/ negative head CT, neuro at OSH recommending MRI to evaluate further. MRI brain w/o contrast without abnormality. Seen by neurology, not felt to be emergent but willing to follow up outpatient. Pt declined further workup at this time.  - Neurology consulted and signed off, appreciated assistance     #Chronic pancreatitis  Pt w/ hx of chronic pancreatitis. History of pancreatic cyst but does not require further evaluation and can be cleared for liver transplant per   Dave. Per pt, current abdominal pain worse than chronic pancreatitis pain but in similar distribution. Lipase only mildly elevated, not indicative of acute pancreatitis     #Diabetes Mellitus II  Last Hgb A1C 8.4% (11/2019), recheck 7/17 is 5.9%.  - Continue glargine at 10 units daily  - Sliding scale: medium sliding scale insulin  - Carb coverage: 1 U/15 g CHO at each meal  - Hypoglycemic protocol   - QID fingersticks AC HS     Diet: low sodium diet  Fluids: no  DVT Prophylaxis: Pt is currently on heparin IV for PVT  Glaser Catheter: not present  Code Status: full     Disposition Plan     Expected discharge: 2 - 3 days, recommended to prior living arrangement once appropriate management is done..  Entered: Dalila Fu MD 07/20/20     The patient's care was discussed with the Attending physician, Dr. Ana Ca, MS4  Maroon 1 Service  [facility], [place]  Pager: [phone]  Please see sticky note for cross cover information.    Resident/Fellow Attestation   I, Dalila Fu, was present with the medical student who participated in the service and in the documentation of the note.  I have verified the history and personally performed the physical exam and medical decision making.  I agree with the assessment and plan of care as documented in the note.      Dalila Fu MD  PGY3  Date of Service (when I saw the patient): 7/19/20    Interval History  No acute events overnight. Reporting worsening LUQ abdominal pain, worsening since admission. Describes the pain as sharp and stabbing. Able to eat and drink well. Nausea is at baseline. Having some scattered episodes of scant emesis w/o hematochezia. Pt also reports new left eye pain this AM, reporting left eye irritation and foreign body sensation. Pt denies seeing or feeling something entering his eye, denies scratching his eye prior to onset of pain.    Of note, pt reports a history of  "taking gabapentin 900 mg TID about 1 year ago, but this dose was reduced by his PCP d/t concerns re liver disease. Pt reports that this dose helped, but his reduced dose of 300-400 mg gabapentin TID does not address his pain adequately.    Data reviewed today: I reviewed all medications, new labs and imaging results over the last 24 hours.    Physical Exam  /83 (BP Location: Left arm)   Pulse 114   Temp 96.1  F (35.6  C) (Oral)   Resp 16   Ht 1.88 m (6' 2\")   Wt 81.7 kg (180 lb 1.9 oz)   SpO2 97%   BMI 23.13 kg/m     GA: A&Ox3, coherent  HEENT: anicteric sclerae, left eye with inferior medial quadrant with conjunctival injection, no obvious sclera or corneal injury, no obvious foreign body, mucus membranes moist, poor dentition with multiple missing teeth  Lungs: CTAB  CVS: RRR, normal heart sounds  Abd: normal bowel sound, soft, generalized epigastric tenderness LUQ>RUQ, some focal tenderness in LLQ; no rebound or guarding; liver and spleen edges not easily palpable  Extremity: no edema seen  Neuro: grossly intact, no tremor, no asterixis    Data  Last Comprehensive Metabolic Panel:  Sodium   Date Value Ref Range Status   07/19/2020 135 133 - 144 mmol/L Final     Potassium   Date Value Ref Range Status   07/19/2020 3.9 3.4 - 5.3 mmol/L Final     Chloride   Date Value Ref Range Status   07/19/2020 101 94 - 109 mmol/L Final     Carbon Dioxide   Date Value Ref Range Status   07/19/2020 26 20 - 32 mmol/L Final     Anion Gap   Date Value Ref Range Status   07/19/2020 8 3 - 14 mmol/L Final     Glucose   Date Value Ref Range Status   07/19/2020 157 (H) 70 - 99 mg/dL Final     Urea Nitrogen   Date Value Ref Range Status   07/19/2020 8 7 - 30 mg/dL Final     Creatinine   Date Value Ref Range Status   07/19/2020 0.88 0.66 - 1.25 mg/dL Final     GFR Estimate   Date Value Ref Range Status   07/19/2020 >90 >60 mL/min/[1.73_m2] Final     Comment:     Non  GFR Calc  Starting 12/18/2018, serum " creatinine based estimated GFR (eGFR) will be   calculated using the Chronic Kidney Disease Epidemiology Collaboration   (CKD-EPI) equation.       Calcium   Date Value Ref Range Status   07/19/2020 9.4 8.5 - 10.1 mg/dL Final     CBC RESULTS:   Recent Labs   Lab Test 07/19/20  0744   WBC 4.5   RBC 4.89   HGB 15.3   HCT 45.9   MCV 94   MCH 31.3   MCHC 33.3   RDW 14.1   PLT 55*       INR: 1.58  Antithrombin IIIc: 59  Hep Xa level 7/19: 0.39 (high intensity range 0.30-0.70)     US abdomen with Doppler 7/17  EXAMINATION: Limited Abdominal Ultrasound, 7/17/2020 8:12 AM      COMPARISON: US 6/30/2020, 1/8/2020 and MRI 3/10/2020.     HISTORY: evaluate for ascites, PVT with mesenteric involvement     FINDINGS:   Fluid: Right pleural effusion. No ascites.     Liver: The liver demonstrates nodular contour of the liver with  coarsened echotexture, measuring 16 cm in craniocaudal dimension.  There is no focal mass.      Nonocclusive thrombus in the main portal vein and left portal vein.   Extrahepatic portal vein flow around the thrombus is antegrade at 32  cm/sec.  Right portal vein flow is antegrade, measuring 23 cm/sec.  Left portal vein flow around the thrombus is antegrade, measuring 20  cm/sec.     Flow in the hepatic artery is towards the liver and:  45 cm/s peak systolic  0.83 resistive index.      The splenic vein is patent and flow is towards the liver. The left,  middle, and right hepatic veins are patent with flow towards the IVC.  The IVC is patent with flow towards the heart. The visualized aorta  is not dilated.     Gallbladder: Cholecystectomy.     Bile Ducts: Both the intra- and extrahepatic biliary system are of  normal caliber.  The common bile duct measures 7 mm in diameter.     Kidney: The right kidney measures 11.7 cm long. The left kidney  measures 11.6 cm long. There is no hydronephrosis or hydroureter, no  shadowing renal calculi, cystic lesion or mass.      Spleen: The spleen is enlarged, measuring 17.5  cm in craniocaudal  dimension.                                             IMPRESSION:    1. Cirrhotic configuration of the liver with splenomegaly, compatible  with portal hypertension. No ascites. No focal intrahepatic lesion.  2. Nonocclusive thrombus in the main portal vein extending into the  left portal vein. Otherwise, patent hepatic vasculature with  appropriate antegrade flow as above.  3. Small right pleural effusion.     I have personally reviewed the examination and initial interpretation  and I agree with the findings.     GURINDER ARITA, DO

## 2020-07-19 NOTE — PROGRESS NOTES
CLINICAL NUTRITION SERVICES    Reason for Assessment:  MD consult - education on low salt diet and diabetic diet. If there is a menu with salt and carb content for each item, that would be helpful     Diet History:  Pt reports that he has been following a low sodium, consistent carbohydrate diet at home and has been following with an RD OP.  Denies need for formal/inital education regarding carbs/sodium.  He has been doing well with glucose control at home with recent transition to continuous glucose monitor at home. He doesn't have a specific carb goal he aims for but has been doing consistent intakes/monitoring blood sugar levels. Recent Hgb A1C 5.9% indicating good control. Blood sugars <180 mg/dL over admission.   He is concerned about carbohydrate content about current oral nutrition supplements (on boost shakes) - adjusted to boost plus per his request - he is aware of boost glucose control availability. Typically he is drinking protein shakes TID at home with each meal with goal to optimize nutrition. He has had some concerns about food in house - either food is cold and no palatable or there are missing/incorrect items and he wants the supplements for both nutrition and to have carbohydrate allotment in case he doesn't eat full tray of food that he has ordered. He tries to eat a more organic diet at home and cooks a lot of food - limits sodium intake and familiar with sodium content of foods.  He has difficulty ordering off of menu without Nutritionals available on menu. Of note, pt with history of RYGB in 2009. He has been taking micronutrients at home (MVI, calcium, fe, vitamin D). Recent B12 level check 1/2020 which was WNL. All of pt questions/concerns addressed - denies any further questions/concerns regarding low sodium/consistent carbohydrate diet.     Nutrition Diagnosis:  Food- and nutrition-related knowledge deficit r/t difficulty ordering off of menu AEB pt report.     Nutrition  Prescription/Recs:  Continue low-sodium diet.      Interventions:  Nutrition Education  1. Provided verbal instruction - reiterated role of consistent carbohydrate and low sodium diet.  Pt goal to optimize nutrition - will adjust supplement order to boost plus on each tray (vanilla). Discussed options for ordering off menu in inpatient setting.  2. RN to Provide the following handouts: How to Read Nutrition Labels, Low-Sodium Foods and Drinks, Tips for a Low-Sodium Diet, Seasoning Your Foods Without Adding Salt, consistent carbohydrate diet, Nutritional information for menu (Carbohydrates and sodium).  3. Addition of priority tray and manager check on trays.  to call pt today regarding concerns of cold food/missing items.    Goals:    Pt will verbalize role of low sodium diet and consistent carbohydrate diet for blood sugar management.     Follow-up:   Patient to ask any further nutrition-related questions before discharge. In addition, pt may request outpatient RD appointment.    Cherri Fu MS, RD, , LD.  Weekend Pager: [phone]

## 2020-07-19 NOTE — CONSULTS
OPHTHALMOLOGY CONSULT NOTE  07/19/20    Patient: Obed Wiley      HISTORY OF PRESENTING ILLNESS:     Obed Wiley is a 30 year old male with decompensated alcoholic cirrhosis, insulin-dependent Type II DM, protein c deficiency, history of multiple VTEs, admitted on 7/17 with acute portal vein thrombosis while on lovenox.     Ophthalmology was consulted by the primary team out of concern for left eye irritation, and slight inflammation. Patient reports he has been been experiencing left sided foreign body sensation, irritation, burning, itching, and slight eyelid swelling since this morning. Prior to this his mom reports both eyes looked blood shot.  Patient reports that his vision is stable. Denies viral symptoms or being around sick contacts or anyone with pink eye.  Right eye feels well, no concerns. Patient denies history of seasonal allergies.      10+ review of systems were otherwise negative except for that which has been stated above.      OCULAR/MEDICAL/SURGICAL HISTORIES:     Past Ocular History:  No hx of eye surgeries or eye drops    Family History:  Glaucoma on father's side  Mom adopted - uncertain of family  History    Allergies: sulfa, morphine    Past Medical History:   Diagnosis Date     Alcoholic cirrhosis of liver with ascites (H) 12/23/2019     Diabetes (H)     Type 2 DM, Uses Insulin      DVT (deep vein thrombosis) in pregnancy      H/O protein C deficiency      Hepatic encephalopathy (H)      Hepatitis     Hep A when an infant      History of blood transfusion     2019 at [facility]      Leukopenia 1/11/2020     SBP (spontaneous bacterial peritonitis) (H) 11/2019       Past Surgical History:   Procedure Laterality Date     ABDOMEN SURGERY      Gastric Bypass 2009. [facility]      CARDIAC SURGERY      IVC      COLONOSCOPY       ENT SURGERY      Tonsils and Adenoids Removed at 6-7 Years Old      GALLBLADDER SURGERY  2017     GI SURGERY      Upper GI        EXAMINATION:     Base Eye Exam      Visual Acuity       Right Left    Near sc 20/20 20/40    Near cc  PH 20/20-1          Tonometry (Tonopen, 12:49 PM)       Right Left    Pressure 10 8          Pupils       Dark Shape React APD    Right 3 Round Minimally reactive None    Left 3 Round Minimally reactive None          Visual Fields       Left Right     Full Full          Extraocular Movement       Right Left     Full Full          Neuro/Psych     Oriented x3:  Yes    Mood/Affect:  Normal          Dilation     Both eyes:  1.0% Mydriacyl @ 12:51 PM            Slit Lamp and Fundus Exam     External Exam       Right Left    External Normal Normal          Slit Lamp Exam       Right Left    Lids/Lashes Normal Mild JILLIAN erythema and trace edema    Conjunctiva/Sclera White and quiet, palpebral conjunctival pallor, with slight subconjunctival hemorrhage in inferior fornix 1+ injection improving/blanching with phenylephrine instillation, palpebral conjunctival pallor with slight subconjunctival hemorrhage in inferior fornix    Cornea Clear ~3 mm horizontal epithelial defect in center of cornea    Anterior Chamber Deep and quiet Deep and quiet    Iris Round and reactive Round and reactive    Lens  Clear    Vitreous Normal Normal          Fundus Exam       Right Left    Disc Normal Normal    Macula Normal Normal    Vessels Normal Normal    Periphery Normal Normal                Labs/Studies/Imaging Performed  NA     ASSESSMENT/PLAN:     Obed Wiley is a 30 year old male with left sided foreign body sensation, irritation, redness, found to have ~3mm central epithelial defect with mild conjunctival injection and left upper eyelid redness.  Unknown mechanism of injury. No foreign body found on exam. It is in his central vision despite that best corrected visual acuity is 20/25. The right eye exam is wnl.     #Small central epithelial defect, left eye  #Mild conjunctival injection blanchable with phenylephrine, left eye  - Ofloxacin QID left eye (ordered for  you)  - Erythromycin at night left eye  (ordered for you)  - artificial tears ou prn  (ordered for you)  - Please do not patch the eye  - Ophthalmology will continue to follow patient while inpatient    It is our pleasure to participate in this patient's care and treatment. Please contact us with any further questions or concerns.    Discussed with Dr. Mateus Gomez MD  Ophthalmology PGY2  [facility]  Pager: [phone]    Teaching Statement  I did not examine the patient, but was available to see the patient if needed. I have discussed the case with the resident and the assessment and plan as documented seems reasonable to me.      Radha Joyce MD  Comprehensive Ophthalmology & Ocular Pathology  Department of Ophthalmology and Visual Neurosciences  [email or web address]  Pager [phone]

## 2020-07-20 ENCOUNTER — APPOINTMENT (OUTPATIENT)
Dept: PHYSICAL THERAPY | Facility: CLINIC | Age: 30
End: 2020-07-20
Attending: INTERNAL MEDICINE
Payer: COMMERCIAL

## 2020-07-20 LAB
ALBUMIN SERPL-MCNC: 3.9 G/DL (ref 3.4–5)
ALP SERPL-CCNC: 117 U/L (ref 40–150)
ALT SERPL W P-5'-P-CCNC: 38 U/L (ref 0–70)
ANION GAP SERPL CALCULATED.3IONS-SCNC: 10 MMOL/L (ref 3–14)
ANION GAP SERPL CALCULATED.3IONS-SCNC: 5 MMOL/L (ref 3–14)
APTT PPP: 37 SEC (ref 22–37)
AST SERPL W P-5'-P-CCNC: 38 U/L (ref 0–45)
BACTERIA SPEC CULT: NO GROWTH
BILIRUB DIRECT SERPL-MCNC: 0.7 MG/DL (ref 0–0.2)
BILIRUB SERPL-MCNC: 2.2 MG/DL (ref 0.2–1.3)
BUN SERPL-MCNC: 11 MG/DL (ref 7–30)
BUN SERPL-MCNC: 12 MG/DL (ref 7–30)
CALCIUM SERPL-MCNC: 10.2 MG/DL (ref 8.5–10.1)
CALCIUM SERPL-MCNC: 9.7 MG/DL (ref 8.5–10.1)
CHLORIDE SERPL-SCNC: 102 MMOL/L (ref 94–109)
CHLORIDE SERPL-SCNC: 104 MMOL/L (ref 94–109)
CO2 SERPL-SCNC: 22 MMOL/L (ref 20–32)
CO2 SERPL-SCNC: 24 MMOL/L (ref 20–32)
CREAT SERPL-MCNC: 1.36 MG/DL (ref 0.66–1.25)
CREAT SERPL-MCNC: 1.4 MG/DL (ref 0.66–1.25)
ERYTHROCYTE [DISTWIDTH] IN BLOOD BY AUTOMATED COUNT: 14.3 % (ref 10–15)
GFR SERPL CREATININE-BSD FRML MDRD: 66 ML/MIN/{1.73_M2}
GFR SERPL CREATININE-BSD FRML MDRD: 69 ML/MIN/{1.73_M2}
GLUCOSE BLDC GLUCOMTR-MCNC: 134 MG/DL (ref 70–99)
GLUCOSE BLDC GLUCOMTR-MCNC: 160 MG/DL (ref 70–99)
GLUCOSE BLDC GLUCOMTR-MCNC: 167 MG/DL (ref 70–99)
GLUCOSE BLDC GLUCOMTR-MCNC: 169 MG/DL (ref 70–99)
GLUCOSE BLDC GLUCOMTR-MCNC: 191 MG/DL (ref 70–99)
GLUCOSE SERPL-MCNC: 188 MG/DL (ref 70–99)
GLUCOSE SERPL-MCNC: 194 MG/DL (ref 70–99)
HCT VFR BLD AUTO: 50.8 % (ref 40–53)
HGB BLD-MCNC: 16.6 G/DL (ref 13.3–17.7)
INR PPP: 1.48 (ref 0.86–1.14)
Lab: NORMAL
MCH RBC QN AUTO: 30.9 PG (ref 26.5–33)
MCHC RBC AUTO-ENTMCNC: 32.7 G/DL (ref 31.5–36.5)
MCV RBC AUTO: 95 FL (ref 78–100)
PLATELET # BLD AUTO: 82 10E9/L (ref 150–450)
POTASSIUM SERPL-SCNC: 4 MMOL/L (ref 3.4–5.3)
POTASSIUM SERPL-SCNC: 4.2 MMOL/L (ref 3.4–5.3)
PROT SERPL-MCNC: 8.4 G/DL (ref 6.8–8.8)
RBC # BLD AUTO: 5.37 10E12/L (ref 4.4–5.9)
SODIUM SERPL-SCNC: 133 MMOL/L (ref 133–144)
SODIUM SERPL-SCNC: 134 MMOL/L (ref 133–144)
SPECIMEN SOURCE: NORMAL
WBC # BLD AUTO: 6.5 10E9/L (ref 4–11)

## 2020-07-20 PROCEDURE — 25000132 ZZH RX MED GY IP 250 OP 250 PS 637: Performed by: STUDENT IN AN ORGANIZED HEALTH CARE EDUCATION/TRAINING PROGRAM

## 2020-07-20 PROCEDURE — 25000128 H RX IP 250 OP 636: Performed by: STUDENT IN AN ORGANIZED HEALTH CARE EDUCATION/TRAINING PROGRAM

## 2020-07-20 PROCEDURE — 85027 COMPLETE CBC AUTOMATED: CPT | Performed by: STUDENT IN AN ORGANIZED HEALTH CARE EDUCATION/TRAINING PROGRAM

## 2020-07-20 PROCEDURE — 85610 PROTHROMBIN TIME: CPT | Performed by: STUDENT IN AN ORGANIZED HEALTH CARE EDUCATION/TRAINING PROGRAM

## 2020-07-20 PROCEDURE — 25800030 ZZH RX IP 258 OP 636: Performed by: STUDENT IN AN ORGANIZED HEALTH CARE EDUCATION/TRAINING PROGRAM

## 2020-07-20 PROCEDURE — 99233 SBSQ HOSP IP/OBS HIGH 50: CPT | Mod: GC | Performed by: INTERNAL MEDICINE

## 2020-07-20 PROCEDURE — P9047 ALBUMIN (HUMAN), 25%, 50ML: HCPCS | Performed by: STUDENT IN AN ORGANIZED HEALTH CARE EDUCATION/TRAINING PROGRAM

## 2020-07-20 PROCEDURE — 36415 COLL VENOUS BLD VENIPUNCTURE: CPT | Performed by: STUDENT IN AN ORGANIZED HEALTH CARE EDUCATION/TRAINING PROGRAM

## 2020-07-20 PROCEDURE — 85730 THROMBOPLASTIN TIME PARTIAL: CPT | Performed by: STUDENT IN AN ORGANIZED HEALTH CARE EDUCATION/TRAINING PROGRAM

## 2020-07-20 PROCEDURE — 40000894 ZZH STATISTIC OT IP EVAL DEFER

## 2020-07-20 PROCEDURE — 82248 BILIRUBIN DIRECT: CPT | Performed by: STUDENT IN AN ORGANIZED HEALTH CARE EDUCATION/TRAINING PROGRAM

## 2020-07-20 PROCEDURE — 12000001 ZZH R&B MED SURG/OB UMMC

## 2020-07-20 PROCEDURE — 00000146 ZZHCL STATISTIC GLUCOSE BY METER IP

## 2020-07-20 PROCEDURE — 36415 COLL VENOUS BLD VENIPUNCTURE: CPT | Performed by: INTERNAL MEDICINE

## 2020-07-20 PROCEDURE — 80053 COMPREHEN METABOLIC PANEL: CPT | Performed by: STUDENT IN AN ORGANIZED HEALTH CARE EDUCATION/TRAINING PROGRAM

## 2020-07-20 PROCEDURE — 80048 BASIC METABOLIC PNL TOTAL CA: CPT | Performed by: INTERNAL MEDICINE

## 2020-07-20 PROCEDURE — 97116 GAIT TRAINING THERAPY: CPT | Mod: GP | Performed by: PHYSICAL THERAPIST

## 2020-07-20 PROCEDURE — 25000125 ZZHC RX 250: Performed by: STUDENT IN AN ORGANIZED HEALTH CARE EDUCATION/TRAINING PROGRAM

## 2020-07-20 PROCEDURE — 97161 PT EVAL LOW COMPLEX 20 MIN: CPT | Mod: GP | Performed by: PHYSICAL THERAPIST

## 2020-07-20 RX ORDER — ALBUMIN (HUMAN) 12.5 G/50ML
75 SOLUTION INTRAVENOUS ONCE
Status: COMPLETED | OUTPATIENT
Start: 2020-07-20 | End: 2020-07-20

## 2020-07-20 RX ADMIN — OXYCODONE HYDROCHLORIDE 15 MG: 5 TABLET ORAL at 14:27

## 2020-07-20 RX ADMIN — ACETAMINOPHEN 650 MG: 325 TABLET, FILM COATED ORAL at 09:25

## 2020-07-20 RX ADMIN — GABAPENTIN 600 MG: 300 CAPSULE ORAL at 14:27

## 2020-07-20 RX ADMIN — FOLIC ACID 1 MG: 1 TABLET ORAL at 07:56

## 2020-07-20 RX ADMIN — SPIRONOLACTONE 100 MG: 25 TABLET ORAL at 07:56

## 2020-07-20 RX ADMIN — ACETAMINOPHEN 650 MG: 325 TABLET, FILM COATED ORAL at 17:14

## 2020-07-20 RX ADMIN — PROMETHAZINE HYDROCHLORIDE 25 MG: 25 TABLET ORAL at 06:46

## 2020-07-20 RX ADMIN — OXYCODONE HYDROCHLORIDE 15 MG: 5 TABLET ORAL at 23:19

## 2020-07-20 RX ADMIN — DICLOFENAC SODIUM 2 G: 10 GEL TOPICAL at 11:55

## 2020-07-20 RX ADMIN — GABAPENTIN 600 MG: 300 CAPSULE ORAL at 19:00

## 2020-07-20 RX ADMIN — OFLOXACIN 1 DROP: 3 SOLUTION/ DROPS OPHTHALMIC at 19:00

## 2020-07-20 RX ADMIN — RIFAXIMIN 550 MG: 550 TABLET ORAL at 07:56

## 2020-07-20 RX ADMIN — FOLIC ACID 1 MG: 1 TABLET ORAL at 19:00

## 2020-07-20 RX ADMIN — OFLOXACIN 1 DROP: 3 SOLUTION/ DROPS OPHTHALMIC at 07:56

## 2020-07-20 RX ADMIN — QUETIAPINE FUMARATE 50 MG: 50 TABLET ORAL at 21:05

## 2020-07-20 RX ADMIN — LACTULOSE 40 G: 20 SOLUTION ORAL at 04:13

## 2020-07-20 RX ADMIN — ENOXAPARIN SODIUM 60 MG: 60 INJECTION SUBCUTANEOUS at 14:27

## 2020-07-20 RX ADMIN — ALBUMIN HUMAN 75 G: 0.25 SOLUTION INTRAVENOUS at 11:55

## 2020-07-20 RX ADMIN — CARBOXYMETHYLCELLULOSE SODIUM 1 DROP: 5 SOLUTION/ DROPS OPHTHALMIC at 19:15

## 2020-07-20 RX ADMIN — LACTULOSE 40 G: 20 SOLUTION ORAL at 06:21

## 2020-07-20 RX ADMIN — LACTULOSE 40 G: 20 SOLUTION ORAL at 01:56

## 2020-07-20 RX ADMIN — OXYCODONE HYDROCHLORIDE 15 MG: 5 TABLET ORAL at 09:22

## 2020-07-20 RX ADMIN — GABAPENTIN 600 MG: 300 CAPSULE ORAL at 07:56

## 2020-07-20 RX ADMIN — ACETAMINOPHEN 650 MG: 325 TABLET, FILM COATED ORAL at 01:56

## 2020-07-20 RX ADMIN — FUROSEMIDE 40 MG: 40 TABLET ORAL at 07:56

## 2020-07-20 RX ADMIN — DICLOFENAC SODIUM 2 G: 10 GEL TOPICAL at 15:29

## 2020-07-20 RX ADMIN — CYCLOBENZAPRINE 10 MG: 10 TABLET, FILM COATED ORAL at 06:46

## 2020-07-20 RX ADMIN — RIFAXIMIN 550 MG: 550 TABLET ORAL at 19:00

## 2020-07-20 RX ADMIN — INSULIN ASPART 2 UNITS: 100 INJECTION, SOLUTION INTRAVENOUS; SUBCUTANEOUS at 08:08

## 2020-07-20 RX ADMIN — PANTOPRAZOLE SODIUM 40 MG: 40 TABLET, DELAYED RELEASE ORAL at 07:56

## 2020-07-20 RX ADMIN — LIDOCAINE 1 PATCH: 560 PATCH PERCUTANEOUS; TOPICAL; TRANSDERMAL at 19:00

## 2020-07-20 RX ADMIN — DICLOFENAC SODIUM 2 G: 10 GEL TOPICAL at 07:55

## 2020-07-20 RX ADMIN — OFLOXACIN 1 DROP: 3 SOLUTION/ DROPS OPHTHALMIC at 11:55

## 2020-07-20 RX ADMIN — MELATONIN 2000 UNITS: at 07:56

## 2020-07-20 RX ADMIN — DICLOFENAC SODIUM 2 G: 10 GEL TOPICAL at 19:00

## 2020-07-20 RX ADMIN — LACTULOSE 45 G: 20 SOLUTION ORAL at 09:23

## 2020-07-20 RX ADMIN — CYCLOBENZAPRINE 10 MG: 10 TABLET, FILM COATED ORAL at 15:28

## 2020-07-20 RX ADMIN — INSULIN ASPART 1 UNITS: 100 INJECTION, SOLUTION INTRAVENOUS; SUBCUTANEOUS at 17:37

## 2020-07-20 RX ADMIN — CIPROFLOXACIN HYDROCHLORIDE 500 MG: 500 TABLET, FILM COATED ORAL at 19:00

## 2020-07-20 RX ADMIN — SODIUM CHLORIDE 1000 ML: 9 INJECTION, SOLUTION INTRAVENOUS at 15:09

## 2020-07-20 RX ADMIN — PROMETHAZINE HYDROCHLORIDE 25 MG: 25 TABLET ORAL at 17:36

## 2020-07-20 RX ADMIN — ERYTHROMYCIN 1 G: 5 OINTMENT OPHTHALMIC at 21:05

## 2020-07-20 RX ADMIN — INSULIN GLARGINE 10 UNITS: 100 INJECTION, SOLUTION SUBCUTANEOUS at 08:08

## 2020-07-20 RX ADMIN — FERROUS SULFATE TAB 325 MG (65 MG ELEMENTAL FE) 325 MG: 325 (65 FE) TAB at 07:55

## 2020-07-20 RX ADMIN — OFLOXACIN 1 DROP: 3 SOLUTION/ DROPS OPHTHALMIC at 15:29

## 2020-07-20 RX ADMIN — OXYCODONE HYDROCHLORIDE 15 MG: 5 TABLET ORAL at 18:58

## 2020-07-20 ASSESSMENT — ACTIVITIES OF DAILY LIVING (ADL)
ADLS_ACUITY_SCORE: 10
ADLS_ACUITY_SCORE: 11

## 2020-07-20 NOTE — PLAN OF CARE
OT/6A: Defer. Met with patient and mother. Reports IND with basic and ADL and mobility. Feel cognition is starting to improve and will continue to improve with continued medical management. Patient's mother provides assist with ADL/IADLs as needed and reports familiarity with HE. Both patient/mother deny need for skilled IP OT services at this time. OT to sign off. Please re-consult if new needs arise.     Discharge Planner OT   Patient plan for discharge: Home with assist from mother.   Current status: No skilled IP OT needs identified. See above.   Barriers to return to prior living situation: Medical Status  Recommendations for discharge: Home with assist from mother.   Rationale for recommendations: See above.        Entered by: Sara Bunch 07/20/2020 3:04 PM

## 2020-07-20 NOTE — PLAN OF CARE
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   Vitals: VSS  Neuros: A/O x4. N/t to BLE at baseline. R ear n/ and decreased sensation. Increased lethargy at beginning of shift, started lactulose protocol per MAR  IV: PIV s SL   Diet: 2g Na  Bowel status: Ind w/ BMs  : Voiding spontaneously  Pain: c/o pain to abdomen, lidocaine patch applied to right of abd. Tylenol scheduled. Oxy given, need to page team before giving next dose   Activity: Up ad red  Social: Mom okayed to stay the night w/ pt   Plan: Lactulose given 40 mg x3 doses. Continue to follow POC

## 2020-07-20 NOTE — PROGRESS NOTES
07/20/20 1136   Quick Adds   Type of Visit Initial PT Evaluation   Living Environment   Lives With parent(s)   Living Arrangements house   Home Accessibility stairs to enter home;stairs within home   Number of Stairs, Main Entrance 3   Number of Stairs, Within Home, Primary other (see comments)  (20 steps)   Stair Railings, Within Home, Primary none   Transportation Anticipated family or friend will provide   Living Environment Comment stairs are steep and of reduced depth so pt usually notes stepping with foot partially off step   Self-Care   Usual Activity Tolerance good   Current Activity Tolerance good   Regular Exercise No   Equipment Currently Used at Home none   Functional Level Prior   Ambulation 0-->independent   Transferring 0-->independent   Toileting 0-->independent   Bathing 0-->independent   Communication 0-->understands/communicates without difficulty   Swallowing 0-->swallows foods/liquids without difficulty   Cognition 0 - no cognition issues reported   Fall history within last six months no   Which of the above functional risks had a recent onset or change? none   General Information   Onset of Illness/Injury or Date of Surgery - Date 07/17/20   Referring Physician Emanuel Houston MD    Patient/Family Goals Statement Navigate stairs safely.   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   Precautions/Limitations no known precautions/limitations   General Info Comments activity orders: up ad red   Cognitive Status  Examination   Level of Consciousness alert   Follows Commands and Answers Questions 100% of the time   Personal Safety and Judgment intact   Integumentary/Edema   Integumentary/Edema Comments WNL   Posture    Posture Comments Forward head, rounded shoulders   Range of Motion (ROM)   ROM Comment Not formally tested. See functional activities below   Strength   Strength Comments Not formally tested. See functional activities below.   Bed Mobility   Bed Mobility Comments Patient completely IND with bed mobility   Transfer Skills   Transfer Comments Patient completely IND with transfers   Gait   Gait Comments IND with gait, presented with widened DEANN and slight LE ER; shortened step length noted. Pt required SBA with stairs, less stable with alternating pattern.    Stairs   Physical/Nonphysical Assist: Stairs Set-up or supervision only   Indicate number of stairs 3   Balance   Balance Comments Patient displayed no cause for concern with functional activities. Steady with picking up boots and applying for dynamic balance. Steady seated balance (static).    Sensory Examination   Sensory Perception Comments NT   Coordination   Coordination Comments intact   General Therapy Interventions   Planned Therapy Interventions home program guidelines;risk factor education;progressive activity/exercise;strengthening;ROM;stretching;transfer training;orthotic fitting/training;neuromuscular re-education;gait training;balance training;bed mobility training   Clinical Impression   Criteria for Skilled Therapeutic Intervention yes, treatment indicated   PT Diagnosis impaired mobility   Influenced by the following impairments sciatica related pain, reduced activity tolerance   Functional limitations due to impairments Impaired stair navigation   Clinical Presentation Stable/Uncomplicated   Clinical Presentation Rationale Clinical judgement, [facility]   Clinical Decision Making (Complexity) Low complexity   Therapy Frequency 1x/week   Predicted  "Duration of Therapy Intervention (days/wks) 1 day   Anticipated Equipment Needs at Discharge standard cane   Anticipated Discharge Disposition Home   Risk & Benefits of therapy have been explained Yes   Patient, Family & other staff in agreement with plan of care Yes   [facility] TM \"6 Clicks\"   2016, Trustees of [facility], under license to [employer].  All rights reserved.   6 Clicks Short Forms Basic Mobility Inpatient Short Form   [facility]  \"6 Clicks\" V.2 Basic Mobility Inpatient Short Form   1. Turning from your back to your side while in a flat bed without using bedrails? 4 - None   2. Moving from lying on your back to sitting on the side of a flat bed without using bedrails? 4 - None   3. Moving to and from a bed to a chair (including a wheelchair)? 4 - None   4. Standing up from a chair using your arms (e.g., wheelchair, or bedside chair)? 4 - None   5. To walk in hospital room? 4 - None   6. Climbing 3-5 steps with a railing? 3 - A Little   Basic Mobility Raw Score (Score out of 24.Lower scores equate to lower levels of function) 23   Total Evaluation Time   Total Evaluation Time (Minutes) 8     "

## 2020-07-20 NOTE — PLAN OF CARE
07  Status: Admitted w/ acute onset abdominal pain and acute portal vein and mesenteric vein thrombosis; has extensive MHx.   Vitals: VSS on RA.   Neuros: AO x 4. R ear diminished sensation. BLE N/T. 5/5 strengths.   IV: PIV SL infusing NS bolus.    Diet: 2g Na; strict I and O's. Mother & patient assisting in tracking, provided reminders.   Bowel status: 5 loose stools today, MD aware. Held afternoon lactulose.   : Voiding spontaneously via toilet.   Pain: Pain fairly controlled w/ scheduled Tylenol, PRN Flexeril and Oxy. Please call MD to unhold Oxy prior to administration.    Activity: Up ad red  Social: Mom at bedside, stayed overnight, approved by ANS, per report.   Plan: Continue to monitor mental status and assess with [place]. Continue to follow with POC.

## 2020-07-20 NOTE — PROVIDER NOTIFICATION
Pt scored Stage 2 on [place]- D/O to situation and date and lethargic. Medicine cross cover MD updated and given the ok to do another round of lactulose.

## 2020-07-20 NOTE — PLAN OF CARE
Status: Here with acute onset abdominal pain and acute portal vein and mesenteric vein thrombosis; has extensive MHx.   Vitals: AVSS  Neuros: Disorientated to situation and date at times. Numbness and tingling to BLE at baseline. Right ear with decreased sensation. Lethargic - scored [place] of 2 at 0200; will have last dose of lactulose at 0600 and then will need to be assessed again. MDs are aware  IV: PIV SL   Diet: 2g Na; strict I and O's - his mom is helping track  Bowel status: per pt mom had bowel some movements yesterday evening shift; none reported overnight   : Voiding spontaneously in BR  Pain: Lidocaine patch intact to left abdomen for pain. Tylenol given as scheduled. Pt did not complain of pain overnight- if he needs more oxycodone be sure to page the team   Activity: Up ad red  Social: Mom at bedside, was approved from ANS    Plan: Continue to monitor mental status and assess with [place]. Will continue to follow with POC.

## 2020-07-20 NOTE — PLAN OF CARE
Discharge Planner PT   Patient plan for discharge: Home  Current status: Patient able to ambulate IND, reports that he takes 10-15 walks a day in the hospital. Patient trained on SPC use for safety while ambulating stairs. Patient received education on non-pharmacological interventions for treating Sciatica.  Barriers to return to prior living situation: Medical status  Recommendations for discharge: Home  Rationale for recommendations: Patient IND upon evaluation of his functional mobility.        Entered by: Milton Powell 07/20/2020 1:13 PM       Physical Therapy Discharge Summary    Reason for therapy discharge:    All goals and outcomes met, no further needs identified.    Progress towards therapy goal(s). See goals on Care Plan in [facility] electronic health record for goal details.  Goals met    Therapy recommendation(s):    Discussed how pt may benefit from OP PT, however pt and mother reported plan to decline due to feeling services wont benefit pt.

## 2020-07-20 NOTE — PROGRESS NOTES
"[facility]    Hepatology Follow-up    CC: Abdominal pain    Dx: Portal venous thrombosis   Protein C deficiency   Decompensated alcoholic cirrhosis    24 hour events:   No new acute events    Subjective:  Continues to complain of persistent left upper quadrant pain, however pain has not worsened today.  No nausea or vomiting.  Patient denies fevers, sweats or chills.    Medications  Current Facility-Administered Medications   Medication Dose Route Frequency     sodium chloride 0.9%  1,000 mL Intravenous Once     acetaminophen  650 mg Oral Q8H     cholecalciferol  1,250 mcg Oral Q7 Days     ciprofloxacin  500 mg Oral Daily     diclofenac  2 g Topical 4x Daily     enoxaparin ANTICOAGULANT  60 mg Subcutaneous Q12H     erythromycin   Left Eye At Bedtime     ferrous sulfate  325 mg Oral Daily with breakfast     folic acid  1 mg Oral BID     [Held by provider] furosemide  40 mg Oral TID     gabapentin  600 mg Oral TID     insulin aspart   Subcutaneous TID w/meals     insulin aspart  1-7 Units Subcutaneous TID AC     insulin aspart  1-5 Units Subcutaneous At Bedtime     insulin glargine  10 Units Subcutaneous QAM AC     lactulose  45 g Oral TID     lidocaine  1 patch Transdermal Q24h    And     lidocaine   Transdermal Q8H     ofloxacin  1 drop Left Eye 4x Daily     pantoprazole  40 mg Oral Daily     QUEtiapine  50 mg Oral At Bedtime     rifaximin  550 mg Oral BID     sodium chloride (PF)  3 mL Intracatheter Q8H     [Held by provider] spironolactone  100 mg Oral TID     Vitamin D3  2,000 Units Oral Daily       Review of systems  A 10-point review of systems was negative, unless stated above    Examination  /72 (BP Location: Left arm)   Pulse 111   Temp 99.6  F (37.6  C) (Oral)   Resp 18   Ht 1.88 m (6' 2\")   Wt 81.7 kg (180 lb 1.9 oz)   SpO2 97%   BMI 23.13 kg/m      Intake/Output Summary (Last 24 hours) at 7/20/2020 1306  Last data filed at 7/20/2020 1304  Gross per 24 hour   Intake " 2200 ml   Output --   Net 2200 ml       General: Not in respiratory or painful distress, afebrile  CVS: Tachycardic  Resp: Unlabored breathing  Abdomen: Full, soft, mildly tender in left upper quadrant  Extremities: Mild bilateral pedal edema  Neuro: Nonfocal neurological exam    Laboratory  Lab Results   Component Value Date     07/20/2020    POTASSIUM 4.0 07/20/2020    CHLORIDE 102 07/20/2020    CO2 22 07/20/2020    BUN 11 07/20/2020    CR 1.36 07/20/2020       Lab Results   Component Value Date    BILITOTAL 2.2 07/20/2020    ALT 38 07/20/2020    AST 38 07/20/2020    ALKPHOS 117 07/20/2020       Lab Results   Component Value Date    WBC 6.5 07/20/2020    HGB 16.6 07/20/2020    MCV 95 07/20/2020    PLT 82 07/20/2020       Lab Results   Component Value Date    INR 1.48 07/20/2020       MELD-Na score: 19 at 7/20/2020  7:28 AM  MELD score: 17 at 7/20/2020  7:28 AM  Calculated from:  Serum Creatinine: 1.36 mg/dL at 7/20/2020  7:28 AM  Serum Sodium: 134 mmol/L at 7/20/2020  7:28 AM  Total Bilirubin: 2.2 mg/dL at 7/20/2020  7:28 AM  INR(ratio): 1.48 at 7/20/2020  7:28 AM  Age: 30 years 5 months        Assessment  30 year old with a history of protein C deficiency, Yan-en-Y gastric bypass in 2009, and decompensated alcoholic cirrhosis manifested by hepatic encephalopathy and ascites; admitted in the hospital with abdominal pain and found to have portal vein thrombosis with possible extension into the mesenteric vein.  Hematology is following, patient's Lovenox has now been increased.  He continues to have abdominal pain, located in the left upper quadrant which is most likely related to splenic congestion, however will look for any evidence of splenic venous clots.  Of note, he has cirrhosis, which has remained stable without any evidence of acute decompensation.  He is up-to-date with hepatocellular carcinoma surveillance, however he will need a repeat upper endoscopy soon.  We will continue to follow this  closely.    Recommendations  -- MRI abdomen today  -- Continue Lovenox per Hematology recommendations  -- Continue diuretics as you are  -- Dispo per primary    Patient seen and discussed with attending physician, Dr. Leventhal Chimaobi Anugwom, MD  PGY 6  Transplant Hepatology Fellow  [phone]

## 2020-07-20 NOTE — PROGRESS NOTES
[facility], [place]     Progress Note - [place] 1 Service        Date of Admission:  7/17/2020        Assessment & Plan     Obed Wiley is a 30 year old male with decompensated alcoholic cirrhosis, protein C deficiency, insulin-dependent T2DM (insulin use), obesity s/p Yan-en-Y gastric bypass, chronic leukopenia and thrombocytopenia with a history of multiple VTEs with chronic indwelling IVC filter and GI bleeds who was transferred from OSH 7/17/2020 for abdominal pain 2/2 to acute portal vein thrombosis while on Lovenox 1 mg/kg anticoagulation.     Changes:  - MRI w/ and w/o contrast to evaluate LUQ pain further  - Albumin + NS bolus   - recheck BMP this pm  - confirm lovenox dose with pharmacy in the setting of JUAN    # Acute kidney Injury  New JUAN on 7/20. UA with hyaline casts, all cell lines rising, and hypercalcemia all suggest pre-renal etiology, particularly in the setting of poor PO intake and increased stool output due to the increased lactulose for HE treatment.  - 75g Albumin  - NS bolus over 10 hours  - recheck BMP this afternoon    # Acute portal vein thrombosis  # Protein C deficiency  Pt w/ hypercoagulability 2/2 to liver disease, protein C deficiency. 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 Recently admitted 1/2020 for mesenteric ischemia, discharged on 1 mg/kg Lovenox. Reports compliance and consistency with Lovenox dosing at home. Pt now with nonocclusive portal vein thrombosis identified on imaging at OSH, US abdomen w/ Doppler 7/17 w/ PVT w/o splenic vein or other mesenteric involvement. Started on heparin gtt and transitioned to lovenox 7/19  - Lovenox 1.5 mg/kg (120 mg rounded from 122.55 mg) in two divided doses, administered 60 mg Q12H   - will monitor for evidence of GI bleed   - will require anti-Xa level w/in 4-6 hours after 4th dose of  Lovenox  - Hematology consulted, appreciate recs: Low anti-thrombin level  - Images from OSH will be arriving by mail  - MRI abdomen w/ and w/o contrast to further evaluate LUQ pain    # Acute on chronic Abdominal pain  Chronic abdominal pain due to chronic pancreatitis. Current pain feels like previous pain experienced when he had a portal vein thrombosis and mesenteric ischemia. Patient recognizes that there is an anxiety component to his pain as well.  - 15 mg oxycodone Q4H PRN  - IV hydromorphone 0.3mg q4h PRN. Currently IV pain meds on hold due to encephalopathy. Once mentation improvesIf requiring IV for breakthrough pain, may increase to 20 mg oxycodone Q4H PRN  - Tylenol 650 mg scheduled Q8H, and lidocaine patches PRN for additional pain control  - Seroquel 50 mg at bedtime and 25mg PRN for anxiety 2/2 pain; hydroxyzine PRN for anxiety  - increased gabapentin to 600mg TID (may increase to 900 mg TID in OP setting).   - cyclobenzaprine for muscular pain/back pain  - Will contact PCP on discharge to coordinate outpatient pain management plan  - Pain consulted, appreciate assistance    # Decompensated alcoholic cirrhosis c/b HE, SBP,   # Hepatic encephalopathy  Currently sober and undergoing transplant evaluation, transplant candidate per Dr. Marsh. Ascites initially required therapeutic paracenteses 2x/week, then 1x/week, but he has not required one in the last 5 month none. Has had SBP, on ciprofloxacin 500 mg daily for prophylaxis. His last MELD score is 14 6/23. Requires continuous EGD surveillance for esophageal varices, and he will be seen by GI in 3-6 months for follow up. Worsening confusion on 7/19 concerning for hepatic encephalopathy. Infectious work-up   Etiology: Alcohol  MELD-Na 13  Hepatic encephalopathy: None, no episodes of HE in the last 7 months  Ascites: None noted on examination and ultrasound. On high dose diuretics, last paracentesis was 01/2020  SBP prophylaxis: On  Ciprofloxacin  TIPS: None  Esophageal/Gastric varices: Last EGD was done 11/2018 with no varices seen. Will need a repeat soon.  Hepatocellular carcinoma: Last USS was done 7/17/2020 and notable for no mass lesions  Transplant: Evaluation ongoing                         Blood type AB+  - Hepatology consulted, appreciate recs  - Continue PTA ciprofloxacin 500mg once daily for SBP prophylaxis  - Continue PTA lactulose, titrate to 3-4 BM/day  - [place] protocol for lactulose until mentation clears  - Continue rifaximin 550 mg BID  - Holding PTA spironolactone 100mg TID and lasix 40mg TID for now given JUAN  - Will need repeat CT abd w/ contrast in 1 month as outpatient; Transplant service will order.  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  - Ophthalmology consulted, appreciate recs  - Ofloxacin QID left eye  - Erythromycin at night left eye  - Artificial tears ou PRN  - Please do not patch the eye    #Right ear, lateral facial paresthesia  Pt admitted to OSH with abdominal pain, right ear and right lateral facial paresthesias. OSH w/ negative head CT, neuro at OSH recommending MRI to evaluate further. MRI brain w/o contrast without abnormality. Seen by neurology, not felt to be emergent but willing to follow up outpatient. Pt declined further workup at this time.  - Neurology consulted and signed off, appreciated assistance     #Chronic pancreatitis  Pt w/ hx of chronic pancreatitis. History of pancreatic cyst but does not require further evaluation and can be cleared for liver  "transplant per Dr. Acosta. Per pt, current abdominal pain worse than chronic pancreatitis pain but in similar distribution. Lipase only mildly elevated, not indicative of acute pancreatitis     #Diabetes Mellitus II  Last Hgb A1C 8.4% (11/2019), recheck 7/17 is 5.9%.  - Continue glargine at 10 units daily  - Sliding scale: medium sliding scale insulin  - Carb coverage: 1 U/15 g CHO at each meal  - Hypoglycemic protocol   - QID fingersticks AC HS     Diet: low sodium diet  Fluids: albumin followed by NS bolus  DVT Prophylaxis: Therapeutic lovenox  Glaser Catheter: not present  Code Status: full     Disposition Plan     Expected discharge: 2 - 3 days, recommended to prior living arrangement once appropriate pain management and anticoagulation is done.  Entered: Dalila Fu MD 07/20/20     The patient's care was discussed with the Attending physician, Dr. Ana Fu MD  PGY3  P: [phone]    Interval History  No acute events overnight. Received 6 doses of lactulose between yesterday evening and this morning. Mentation seemed improved today. Patient's mom reports mentation is much improved, but not quite to baseline yet. LUQ abdominal pain feels like it is worsening and now his abdomen feels more firm to him. Nausea feels a bit worse than his baseline. No vomiting.    Data reviewed today: I reviewed all medications, new labs and imaging results over the last 24 hours.    Physical Exam  /77 (BP Location: Left arm)   Pulse 103   Temp 98.8  F (37.1  C) (Axillary)   Resp 16   Ht 1.88 m (6' 2\")   Wt 81.7 kg (180 lb 1.9 oz)   SpO2 97%   BMI 23.13 kg/m     GA: A&Ox3, coherent  HEENT: anicteric sclerae, mucus membranes moist, poor dentition with multiple missing teeth  Lungs: CTAB  CVS: RRR, normal heart sounds  Abd: normal bowel sound, soft, generalized epigastric tenderness LUQ>RUQ, some focal tenderness in LLQ; no rebound or guarding; liver and spleen edges not easily " palpable  Extremity: no edema seen  Neuro: grossly intact, AO x 4, speech and though process are linear.    Data  Last Comprehensive Metabolic Panel:  Sodium   Date Value Ref Range Status   07/19/2020 135 133 - 144 mmol/L Final     Potassium   Date Value Ref Range Status   07/19/2020 3.9 3.4 - 5.3 mmol/L Final     Chloride   Date Value Ref Range Status   07/19/2020 101 94 - 109 mmol/L Final     Carbon Dioxide   Date Value Ref Range Status   07/19/2020 26 20 - 32 mmol/L Final     Anion Gap   Date Value Ref Range Status   07/19/2020 8 3 - 14 mmol/L Final     Glucose   Date Value Ref Range Status   07/19/2020 157 (H) 70 - 99 mg/dL Final     Urea Nitrogen   Date Value Ref Range Status   07/19/2020 8 7 - 30 mg/dL Final     Creatinine   Date Value Ref Range Status   07/19/2020 0.88 0.66 - 1.25 mg/dL Final     GFR Estimate   Date Value Ref Range Status   07/19/2020 >90 >60 mL/min/[1.73_m2] Final     Comment:     Non  GFR Calc  Starting 12/18/2018, serum creatinine based estimated GFR (eGFR) will be   calculated using the Chronic Kidney Disease Epidemiology Collaboration   (CKD-EPI) equation.       Calcium   Date Value Ref Range Status   07/19/2020 9.4 8.5 - 10.1 mg/dL Final     CBC RESULTS:   Recent Labs   Lab Test 07/19/20  0744   WBC 4.5   RBC 4.89   HGB 15.3   HCT 45.9   MCV 94   MCH 31.3   MCHC 33.3   RDW 14.1   PLT 55*       INR: 1.58  Antithrombin IIIc: 59  Hep Xa level 7/19: 0.39 (high intensity range 0.30-0.70)     US abdomen with Doppler 7/17  EXAMINATION: Limited Abdominal Ultrasound, 7/17/2020 8:12 AM      COMPARISON: US 6/30/2020, 1/8/2020 and MRI 3/10/2020.     HISTORY: evaluate for ascites, PVT with mesenteric involvement     FINDINGS:   Fluid: Right pleural effusion. No ascites.     Liver: The liver demonstrates nodular contour of the liver with  coarsened echotexture, measuring 16 cm in craniocaudal dimension.  There is no focal mass.      Nonocclusive thrombus in the main portal vein and  left portal vein.   Extrahepatic portal vein flow around the thrombus is antegrade at 32  cm/sec.  Right portal vein flow is antegrade, measuring 23 cm/sec.  Left portal vein flow around the thrombus is antegrade, measuring 20  cm/sec.     Flow in the hepatic artery is towards the liver and:  45 cm/s peak systolic  0.83 resistive index.      The splenic vein is patent and flow is towards the liver. The left,  middle, and right hepatic veins are patent with flow towards the IVC.  The IVC is patent with flow towards the heart. The visualized aorta  is not dilated.     Gallbladder: Cholecystectomy.     Bile Ducts: Both the intra- and extrahepatic biliary system are of  normal caliber.  The common bile duct measures 7 mm in diameter.     Kidney: The right kidney measures 11.7 cm long. The left kidney  measures 11.6 cm long. There is no hydronephrosis or hydroureter, no  shadowing renal calculi, cystic lesion or mass.      Spleen: The spleen is enlarged, measuring 17.5 cm in craniocaudal  dimension.                                             IMPRESSION:    1. Cirrhotic configuration of the liver with splenomegaly, compatible  with portal hypertension. No ascites. No focal intrahepatic lesion.  2. Nonocclusive thrombus in the main portal vein extending into the  left portal vein. Otherwise, patent hepatic vasculature with  appropriate antegrade flow as above.  3. Small right pleural effusion.     I have personally reviewed the examination and initial interpretation  and I agree with the findings.     GURINDER ARITA, DO

## 2020-07-21 ENCOUNTER — APPOINTMENT (OUTPATIENT)
Dept: MRI IMAGING | Facility: CLINIC | Age: 30
End: 2020-07-21
Attending: STUDENT IN AN ORGANIZED HEALTH CARE EDUCATION/TRAINING PROGRAM
Payer: COMMERCIAL

## 2020-07-21 ENCOUNTER — COMMITTEE REVIEW (OUTPATIENT)
Dept: TRANSPLANT | Facility: CLINIC | Age: 30
End: 2020-07-21

## 2020-07-21 PROBLEM — N17.8 OTHER ACUTE KIDNEY FAILURE (H): Status: ACTIVE | Noted: 2020-07-21

## 2020-07-21 LAB
ALBUMIN SERPL-MCNC: 4.1 G/DL (ref 3.4–5)
ALP SERPL-CCNC: 91 U/L (ref 40–150)
ALT SERPL W P-5'-P-CCNC: 26 U/L (ref 0–70)
ANION GAP SERPL CALCULATED.3IONS-SCNC: 4 MMOL/L (ref 3–14)
AST SERPL W P-5'-P-CCNC: 26 U/L (ref 0–45)
BILIRUB SERPL-MCNC: 1.6 MG/DL (ref 0.2–1.3)
BUN SERPL-MCNC: 17 MG/DL (ref 7–30)
CALCIUM SERPL-MCNC: 9.4 MG/DL (ref 8.5–10.1)
CHLORIDE SERPL-SCNC: 104 MMOL/L (ref 94–109)
CO2 SERPL-SCNC: 27 MMOL/L (ref 20–32)
CREAT SERPL-MCNC: 1.19 MG/DL (ref 0.66–1.25)
ERYTHROCYTE [DISTWIDTH] IN BLOOD BY AUTOMATED COUNT: 14 % (ref 10–15)
ERYTHROCYTE [DISTWIDTH] IN BLOOD BY AUTOMATED COUNT: 14.1 % (ref 10–15)
GFR SERPL CREATININE-BSD FRML MDRD: 81 ML/MIN/{1.73_M2}
GLUCOSE BLDC GLUCOMTR-MCNC: 101 MG/DL (ref 70–99)
GLUCOSE BLDC GLUCOMTR-MCNC: 114 MG/DL (ref 70–99)
GLUCOSE BLDC GLUCOMTR-MCNC: 163 MG/DL (ref 70–99)
GLUCOSE BLDC GLUCOMTR-MCNC: 222 MG/DL (ref 70–99)
GLUCOSE BLDC GLUCOMTR-MCNC: 97 MG/DL (ref 70–99)
GLUCOSE SERPL-MCNC: 127 MG/DL (ref 70–99)
HCT VFR BLD AUTO: 36.8 % (ref 40–53)
HCT VFR BLD AUTO: 38.1 % (ref 40–53)
HGB BLD-MCNC: 12.3 G/DL (ref 13.3–17.7)
HGB BLD-MCNC: 12.5 G/DL (ref 13.3–17.7)
INR PPP: 1.58 (ref 0.86–1.14)
MCH RBC QN AUTO: 31.5 PG (ref 26.5–33)
MCH RBC QN AUTO: 31.9 PG (ref 26.5–33)
MCHC RBC AUTO-ENTMCNC: 32.8 G/DL (ref 31.5–36.5)
MCHC RBC AUTO-ENTMCNC: 33.4 G/DL (ref 31.5–36.5)
MCV RBC AUTO: 96 FL (ref 78–100)
MCV RBC AUTO: 96 FL (ref 78–100)
PLATELET # BLD AUTO: 40 10E9/L (ref 150–450)
PLATELET # BLD AUTO: 45 10E9/L (ref 150–450)
POTASSIUM SERPL-SCNC: 4.1 MMOL/L (ref 3.4–5.3)
PROT SERPL-MCNC: 7.2 G/DL (ref 6.8–8.8)
RBC # BLD AUTO: 3.85 10E12/L (ref 4.4–5.9)
RBC # BLD AUTO: 3.97 10E12/L (ref 4.4–5.9)
SODIUM SERPL-SCNC: 134 MMOL/L (ref 133–144)
WBC # BLD AUTO: 2.9 10E9/L (ref 4–11)
WBC # BLD AUTO: 3.3 10E9/L (ref 4–11)

## 2020-07-21 PROCEDURE — 85027 COMPLETE CBC AUTOMATED: CPT | Performed by: INTERNAL MEDICINE

## 2020-07-21 PROCEDURE — 12000001 ZZH R&B MED SURG/OB UMMC

## 2020-07-21 PROCEDURE — 99233 SBSQ HOSP IP/OBS HIGH 50: CPT | Mod: GC | Performed by: INTERNAL MEDICINE

## 2020-07-21 PROCEDURE — A9585 GADOBUTROL INJECTION: HCPCS | Performed by: INTERNAL MEDICINE

## 2020-07-21 PROCEDURE — 25500064 ZZH RX 255 OP 636: Performed by: INTERNAL MEDICINE

## 2020-07-21 PROCEDURE — 00000146 ZZHCL STATISTIC GLUCOSE BY METER IP

## 2020-07-21 PROCEDURE — 25000132 ZZH RX MED GY IP 250 OP 250 PS 637: Performed by: STUDENT IN AN ORGANIZED HEALTH CARE EDUCATION/TRAINING PROGRAM

## 2020-07-21 PROCEDURE — 85610 PROTHROMBIN TIME: CPT | Performed by: STUDENT IN AN ORGANIZED HEALTH CARE EDUCATION/TRAINING PROGRAM

## 2020-07-21 PROCEDURE — 85027 COMPLETE CBC AUTOMATED: CPT | Performed by: STUDENT IN AN ORGANIZED HEALTH CARE EDUCATION/TRAINING PROGRAM

## 2020-07-21 PROCEDURE — 36415 COLL VENOUS BLD VENIPUNCTURE: CPT | Performed by: INTERNAL MEDICINE

## 2020-07-21 PROCEDURE — 80053 COMPREHEN METABOLIC PANEL: CPT | Performed by: STUDENT IN AN ORGANIZED HEALTH CARE EDUCATION/TRAINING PROGRAM

## 2020-07-21 PROCEDURE — 36415 COLL VENOUS BLD VENIPUNCTURE: CPT | Performed by: STUDENT IN AN ORGANIZED HEALTH CARE EDUCATION/TRAINING PROGRAM

## 2020-07-21 PROCEDURE — 25000128 H RX IP 250 OP 636: Performed by: STUDENT IN AN ORGANIZED HEALTH CARE EDUCATION/TRAINING PROGRAM

## 2020-07-21 PROCEDURE — 74183 MRI ABD W/O CNTR FLWD CNTR: CPT

## 2020-07-21 PROCEDURE — 99207 ZZC CDG-CUT & PASTE-POTENTIAL IMPACT ON LEVEL: CPT | Performed by: INTERNAL MEDICINE

## 2020-07-21 PROCEDURE — 25000125 ZZHC RX 250: Performed by: STUDENT IN AN ORGANIZED HEALTH CARE EDUCATION/TRAINING PROGRAM

## 2020-07-21 RX ORDER — GADOBUTROL 604.72 MG/ML
10 INJECTION INTRAVENOUS ONCE
Status: COMPLETED | OUTPATIENT
Start: 2020-07-21 | End: 2020-07-21

## 2020-07-21 RX ADMIN — CYCLOBENZAPRINE 10 MG: 10 TABLET, FILM COATED ORAL at 22:01

## 2020-07-21 RX ADMIN — LIDOCAINE 1 PATCH: 560 PATCH PERCUTANEOUS; TOPICAL; TRANSDERMAL at 22:02

## 2020-07-21 RX ADMIN — DICLOFENAC SODIUM 2 G: 10 GEL TOPICAL at 16:03

## 2020-07-21 RX ADMIN — OXYCODONE HYDROCHLORIDE 15 MG: 5 TABLET ORAL at 18:34

## 2020-07-21 RX ADMIN — ENOXAPARIN SODIUM 60 MG: 60 INJECTION SUBCUTANEOUS at 00:37

## 2020-07-21 RX ADMIN — ERYTHROMYCIN 1 G: 5 OINTMENT OPHTHALMIC at 22:01

## 2020-07-21 RX ADMIN — LACTULOSE 45 G: 20 SOLUTION ORAL at 13:53

## 2020-07-21 RX ADMIN — ENOXAPARIN SODIUM 60 MG: 60 INJECTION SUBCUTANEOUS at 15:07

## 2020-07-21 RX ADMIN — OXYCODONE HYDROCHLORIDE 15 MG: 5 TABLET ORAL at 08:43

## 2020-07-21 RX ADMIN — FOLIC ACID 1 MG: 1 TABLET ORAL at 08:39

## 2020-07-21 RX ADMIN — MELATONIN 2000 UNITS: at 08:39

## 2020-07-21 RX ADMIN — ACETAMINOPHEN 650 MG: 325 TABLET, FILM COATED ORAL at 09:55

## 2020-07-21 RX ADMIN — ACETAMINOPHEN 650 MG: 325 TABLET, FILM COATED ORAL at 17:11

## 2020-07-21 RX ADMIN — GADOBUTROL 8 ML: 604.72 INJECTION INTRAVENOUS at 12:48

## 2020-07-21 RX ADMIN — GABAPENTIN 600 MG: 300 CAPSULE ORAL at 08:38

## 2020-07-21 RX ADMIN — QUETIAPINE FUMARATE 50 MG: 50 TABLET ORAL at 22:01

## 2020-07-21 RX ADMIN — GABAPENTIN 600 MG: 300 CAPSULE ORAL at 22:02

## 2020-07-21 RX ADMIN — HYDROXYZINE HYDROCHLORIDE 25 MG: 25 TABLET, FILM COATED ORAL at 11:57

## 2020-07-21 RX ADMIN — OXYCODONE HYDROCHLORIDE 15 MG: 5 TABLET ORAL at 13:53

## 2020-07-21 RX ADMIN — DICLOFENAC SODIUM 2 G: 10 GEL TOPICAL at 11:47

## 2020-07-21 RX ADMIN — DICLOFENAC SODIUM 2 G: 10 GEL TOPICAL at 08:38

## 2020-07-21 RX ADMIN — OFLOXACIN 1 DROP: 3 SOLUTION/ DROPS OPHTHALMIC at 16:03

## 2020-07-21 RX ADMIN — OFLOXACIN 1 DROP: 3 SOLUTION/ DROPS OPHTHALMIC at 11:47

## 2020-07-21 RX ADMIN — LACTULOSE 45 G: 20 SOLUTION ORAL at 17:11

## 2020-07-21 RX ADMIN — HYDROXYZINE HYDROCHLORIDE 25 MG: 25 TABLET, FILM COATED ORAL at 03:08

## 2020-07-21 RX ADMIN — OXYCODONE HYDROCHLORIDE 15 MG: 5 TABLET ORAL at 03:09

## 2020-07-21 RX ADMIN — RIFAXIMIN 550 MG: 550 TABLET ORAL at 08:38

## 2020-07-21 RX ADMIN — CYCLOBENZAPRINE 10 MG: 10 TABLET, FILM COATED ORAL at 00:36

## 2020-07-21 RX ADMIN — INSULIN GLARGINE 10 UNITS: 100 INJECTION, SOLUTION SUBCUTANEOUS at 08:37

## 2020-07-21 RX ADMIN — CYCLOBENZAPRINE 10 MG: 10 TABLET, FILM COATED ORAL at 10:17

## 2020-07-21 RX ADMIN — FOLIC ACID 1 MG: 1 TABLET ORAL at 22:01

## 2020-07-21 RX ADMIN — RIFAXIMIN 550 MG: 550 TABLET ORAL at 22:01

## 2020-07-21 RX ADMIN — INSULIN ASPART 2 UNITS: 100 INJECTION, SOLUTION INTRAVENOUS; SUBCUTANEOUS at 11:48

## 2020-07-21 RX ADMIN — PANTOPRAZOLE SODIUM 40 MG: 40 TABLET, DELAYED RELEASE ORAL at 08:39

## 2020-07-21 RX ADMIN — OFLOXACIN 1 DROP: 3 SOLUTION/ DROPS OPHTHALMIC at 08:39

## 2020-07-21 RX ADMIN — PROMETHAZINE HYDROCHLORIDE 25 MG: 25 TABLET ORAL at 16:04

## 2020-07-21 RX ADMIN — OFLOXACIN 1 DROP: 3 SOLUTION/ DROPS OPHTHALMIC at 22:01

## 2020-07-21 RX ADMIN — CIPROFLOXACIN HYDROCHLORIDE 500 MG: 500 TABLET, FILM COATED ORAL at 22:02

## 2020-07-21 RX ADMIN — GABAPENTIN 600 MG: 300 CAPSULE ORAL at 13:53

## 2020-07-21 RX ADMIN — FERROUS SULFATE TAB 325 MG (65 MG ELEMENTAL FE) 325 MG: 325 (65 FE) TAB at 08:39

## 2020-07-21 RX ADMIN — ACETAMINOPHEN 650 MG: 325 TABLET, FILM COATED ORAL at 03:08

## 2020-07-21 RX ADMIN — LACTULOSE 45 G: 20 SOLUTION ORAL at 08:43

## 2020-07-21 ASSESSMENT — ACTIVITIES OF DAILY LIVING (ADL)
ADLS_ACUITY_SCORE: 10

## 2020-07-21 NOTE — PLAN OF CARE
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   Vitals: VSS  Neuros: A/O x4. N/t to BLE at baseline. R ear n/ and decreased sensation. Scored 0 x2 per [place] flowsheet   IV: PIV SL   Diet: 2g Na  Bowel status: BM x1 loose. No lactulose given tonight   : Voiding spontaneously. Strict I &O's  Pain: c/o pain to abdomen, lidocaine patch applied to right of abd. Tylenol scheduled. Oxy and flexeril for pain control   Activity: Up ad red  Social: Mom okayed to stay the night w/ pt   Plan:  Continue to follow POC

## 2020-07-21 NOTE — PLAN OF CARE
07  Status: Admitted w/ acute onset abdominal pain and acute portal vein and mesenteric vein thrombosis; has extensive MHx.   Vitals: VSS on RA.   Neuros: AO x 4. R ear diminished sensation. BLE N/T. 5/5 strengths.   IV: PIV SL   Diet: 2g Na; strict I and O's. Mother & patient assisting in tracking, provided reminders.   Bowel status: No BM this shift, pt states passing gas.   : Voiding spontaneously via toilet.   Pain: Pain fairly controlled w/ scheduled Tylenol, PRN Flexeril and Oxy.   Activity: Up ad red  Social: Mom at bedside, stayed overnight, approved by ANS, per report.   Plan: abdominal MRI completed today. Continue to monitor mental status and assess with [place]. Continue to follow with POC.

## 2020-07-21 NOTE — PROGRESS NOTES
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     Changes:  - MRI to evaluate LUQ pain further    # Acute kidney Injury  New JUAN on 7/20. UA with hyaline casts, all cell lines rising, and hypercalcemia all suggest pre-renal etiology, particularly in the setting of poor PO intake and increased stool output due to the increased lactulose for HE treatment.  - Recovered today  - Continue to monitor daily CMP    # Acute portal vein thrombosis  # Protein C deficiency  Pt w/ hypercoagulability 2/2 to liver disease, protein C deficiency. 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 Recently admitted 1/2020 for mesenteric ischemia, discharged on 1 mg/kg Lovenox. Reports compliance and consistency with Lovenox dosing at home. Pt now with nonocclusive portal vein thrombosis identified on imaging at OSH, US abdomen w/ Doppler 7/17 w/ PVT w/o splenic vein or other mesenteric involvement. Started on heparin gtt and transitioned to lovenox 7/19  - Lovenox 1.5 mg/kg (120 mg rounded from 122.55 mg) in two divided doses, administered 60 mg Q12H   - will monitor for evidence of GI bleed   - will require anti-Xa level w/in 4-6 hours after 4th dose of Lovenox  - Hematology consulted, appreciate recs: Low anti-thrombin level  - Images from OSH will be arriving by mail  - MRI abdomen to further evaluate  LUQ pain    # Acute on chronic Abdominal pain  Chronic abdominal pain due to chronic pancreatitis. Current pain feels like previous pain experienced when he had a portal vein thrombosis and mesenteric ischemia. Patient recognizes that there is an anxiety component to his pain as well.  - 15 mg oxycodone Q4H PRN  - IV hydromorphone 0.3mg q4h PRN. Currently IV pain meds on hold due to encephalopathy. Once mentation improvesIf requiring IV for breakthrough pain, may increase to 20 mg oxycodone Q4H PRN  - Tylenol 650 mg scheduled Q8H, and lidocaine patches PRN for additional pain control  - Seroquel 50 mg at bedtime and 25mg PRN for anxiety 2/2 pain; hydroxyzine PRN for anxiety  - increased gabapentin to 600mg TID (may increase to 900 mg TID in OP setting).   - cyclobenzaprine for muscular pain/back pain  - Will contact PCP on discharge to coordinate outpatient pain management plan  - Pain consulted, appreciate assistance    # Decompensated alcoholic cirrhosis c/b HE, SBP,   # Hepatic encephalopathy  Currently sober and undergoing transplant evaluation, transplant candidate per Dr. Marsh. Ascites initially required therapeutic paracenteses 2x/week, then 1x/week, but he has not required one in the last 5 month none. Has had SBP, on ciprofloxacin 500 mg daily for prophylaxis. His last MELD score is 14 6/23. Requires continuous EGD surveillance for esophageal varices, and he will be seen by GI in 3-6 months for follow up. Worsening confusion on 7/19 concerning for hepatic encephalopathy. Infectious work-up   Etiology: Alcohol  MELD-Na 13  Hepatic encephalopathy: None, no episodes of HE in the last 7 months  Ascites: None noted on examination and ultrasound. On high dose diuretics, last paracentesis was 01/2020  SBP prophylaxis: On Ciprofloxacin  TIPS: None  Esophageal/Gastric varices: Last EGD was done 11/2018 with no varices seen. Will need a repeat soon.  Hepatocellular carcinoma: Last USS was done 7/17/2020 and  notable for no mass lesions  Transplant: Evaluation ongoing                         Blood type AB+  - Hepatology consulted, appreciate recs  - Continue PTA ciprofloxacin 500mg once daily for SBP prophylaxis  - Continue PTA lactulose, titrate to 3-4 BM/day  - [place] protocol for lactulose until mentation clears  - Continue rifaximin 550 mg BID  - Holding PTA spironolactone 100mg TID and lasix 40mg TID for now given JUAN  - Will need repeat CT abd w/ contrast in 1 month as outpatient; Transplant service will order.  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  - Ophthalmology consulted, appreciate recs  - Ofloxacin QID left eye  - Erythromycin at night left eye  - Artificial tears ou PRN  - Please do not patch the eye    #Right ear, lateral facial paresthesia  Pt admitted to OSH with abdominal pain, right ear and right lateral facial paresthesias. OSH w/ negative head CT, neuro at OSH recommending MRI to evaluate further. MRI brain w/o contrast without abnormality. Seen by neurology, not felt to be emergent but willing to follow up outpatient. Pt declined further workup at this time.  - Neurology consulted and signed off, appreciated assistance     #Chronic pancreatitis  Pt w/ hx of chronic pancreatitis. History of pancreatic cyst but does not require further evaluation and can be cleared for liver transplant per Dr. Acosta. Per pt, current abdominal pain worse than chronic pancreatitis pain but in similar distribution. Lipase only mildly elevated, not indicative of acute pancreatitis     #Diabetes Mellitus  "II  Last Hgb A1C 8.4% (11/2019), recheck 7/17 is 5.9%.  - Continue glargine at 10 units daily  - Sliding scale: medium sliding scale insulin  - Carb coverage: 1 U/15 g CHO at each meal  - Hypoglycemic protocol   - QID fingersticks AC HS     Diet: low sodium diet  Fluids: albumin followed by NS bolus  DVT Prophylaxis: Therapeutic lovenox  Glaser Catheter: not present  Code Status: full     Disposition Plan     Expected discharge: 2 - 3 days, recommended to prior living arrangement once appropriate pain management and anticoagulation is done.  Entered: Emanuel Houston MD 07/21/20     The patient's care was discussed with the Attending physician, Dr. Ronit Houston MD  PGY1  P: [phone]    Interval History  No acute events overnight.  Patient's mom reports mentation is much improved and close to baseline. LUQ abdominal pain is stable today. No vomiting.     Data reviewed today: I reviewed all medications, new labs and imaging results over the last 24 hours.    Physical Exam  /70 (BP Location: Right arm)   Pulse 75   Temp 95.3  F (35.2  C) (Axillary)   Resp 16   Ht 1.88 m (6' 2\")   Wt 81.7 kg (180 lb 1.9 oz)   SpO2 95%   BMI 23.13 kg/m     GA: A&Ox3, coherent  HEENT: anicteric sclerae, mucus membranes moist, poor dentition with multiple missing teeth  Lungs: CTAB  CVS: RRR, normal heart sounds  Abd: normal bowel sound, soft, generalized epigastric tenderness LUQ>RUQ, some focal tenderness in LLQ; no rebound or guarding; liver and spleen edges not easily palpable  Extremity: no edema seen  Neuro: grossly intact, AO x 4, speech and though process are linear.    Data  Last Comprehensive Metabolic Panel:  Sodium   Date Value Ref Range Status   07/21/2020 134 133 - 144 mmol/L Final     Potassium   Date Value Ref Range Status   07/21/2020 4.1 3.4 - 5.3 mmol/L Final     Chloride   Date Value Ref Range Status   07/21/2020 104 94 - 109 mmol/L Final     Carbon Dioxide   Date Value Ref Range Status   07/21/2020 " 27 20 - 32 mmol/L Final     Anion Gap   Date Value Ref Range Status   07/21/2020 4 3 - 14 mmol/L Final     Glucose   Date Value Ref Range Status   07/21/2020 127 (H) 70 - 99 mg/dL Final     Urea Nitrogen   Date Value Ref Range Status   07/21/2020 17 7 - 30 mg/dL Final     Creatinine   Date Value Ref Range Status   07/21/2020 1.19 0.66 - 1.25 mg/dL Final     GFR Estimate   Date Value Ref Range Status   07/21/2020 81 >60 mL/min/[1.73_m2] Final     Comment:     Non  GFR Calc  Starting 12/18/2018, serum creatinine based estimated GFR (eGFR) will be   calculated using the Chronic Kidney Disease Epidemiology Collaboration   (CKD-EPI) equation.       Calcium   Date Value Ref Range Status   07/21/2020 9.4 8.5 - 10.1 mg/dL Final     CBC RESULTS:   Recent Labs   Lab Test 07/19/20  0744   WBC 4.5   RBC 4.89   HGB 15.3   HCT 45.9   MCV 94   MCH 31.3   MCHC 33.3   RDW 14.1   PLT 55*       INR: 1.58  Antithrombin IIIc: 59  Hep Xa level 7/19: 0.39 (high intensity range 0.30-0.70)     US abdomen with Doppler 7/17  EXAMINATION: Limited Abdominal Ultrasound, 7/17/2020 8:12 AM      COMPARISON: US 6/30/2020, 1/8/2020 and MRI 3/10/2020.     HISTORY: evaluate for ascites, PVT with mesenteric involvement     FINDINGS:   Fluid: Right pleural effusion. No ascites.     Liver: The liver demonstrates nodular contour of the liver with  coarsened echotexture, measuring 16 cm in craniocaudal dimension.  There is no focal mass.      Nonocclusive thrombus in the main portal vein and left portal vein.   Extrahepatic portal vein flow around the thrombus is antegrade at 32  cm/sec.  Right portal vein flow is antegrade, measuring 23 cm/sec.  Left portal vein flow around the thrombus is antegrade, measuring 20  cm/sec.     Flow in the hepatic artery is towards the liver and:  45 cm/s peak systolic  0.83 resistive index.      The splenic vein is patent and flow is towards the liver. The left,  middle, and right hepatic veins are patent  with flow towards the IVC.  The IVC is patent with flow towards the heart. The visualized aorta  is not dilated.     Gallbladder: Cholecystectomy.     Bile Ducts: Both the intra- and extrahepatic biliary system are of  normal caliber.  The common bile duct measures 7 mm in diameter.     Kidney: The right kidney measures 11.7 cm long. The left kidney  measures 11.6 cm long. There is no hydronephrosis or hydroureter, no  shadowing renal calculi, cystic lesion or mass.      Spleen: The spleen is enlarged, measuring 17.5 cm in craniocaudal  dimension.                                             IMPRESSION:    1. Cirrhotic configuration of the liver with splenomegaly, compatible  with portal hypertension. No ascites. No focal intrahepatic lesion.  2. Nonocclusive thrombus in the main portal vein extending into the  left portal vein. Otherwise, patent hepatic vasculature with  appropriate antegrade flow as above.  3. Small right pleural effusion.     I have personally reviewed the examination and initial interpretation  and I agree with the findings.     GURINDER ARITA, DO

## 2020-07-21 NOTE — COMMITTEE REVIEW
Abdominal Committee Review Note     Evaluation Date: 1/6/2020  Committee Review Date: 7/21/2020    Organ being evaluated for: Liver    Transplant Phase: Evaluation  Transplant Status: Active    Transplant Coordinator: Konrad Gaitan Jr.  Transplant Surgeon:   Terrance Marsh    Referring Physician: Anjelica Reyes    Primary Diagnosis: Alcoholic Cirrhosis  Secondary Diagnosis:     Committee Review Members:  Nurse Practitioner Arabella Small, NP   Nutrition Anabel Gustafson, RD   Pharmacy Artie [place], [facility]    - Clinical Wale Trimble, BRANDY, Karlee Diez, [facility]   Transplant [place] Danny Gaming MD, John Campbell MD, Celso Najera MD, Jr Konrad Gaitan, HUY, Mulu Garcia MD, Kat Coffman, HUY, Viola Santos MD, Chet Elder MD, Ruthie Dexter, HUY, Terrance Marsh MD, Thomas M. Leventhal, MD, Levi Galarza MD, Heri Villanueva MD       Transplant Eligibility: Cirrhosis with MELD, ETOH    Committee Review Decision: Needs Re-presentation    Relative Contraindications: Other, acute hospitalization    Absolute Contraindications: None    Committee Chair Leventhal, Thomas Michael, MD verbally attested to the committee's decision.    Committee Discussion Details:     Re-discuss  IP txp sx to see

## 2020-07-21 NOTE — PROVIDER NOTIFICATION
DATE:  7/21/2020   TIME OF RECEIPT FROM LAB:  0810  LAB TEST:  Hgb, Plt   LAB VALUE:  Hgb dropped from 16.6 to 12.3 in 24 hours.  Crit PLT: 45   RESULTS GIVEN WITH READ-BACK TO (PROVIDER):  Paged Jenae 1 at pager number [phone].   TIME LAB VALUE REPORTED TO PROVIDER:   0817

## 2020-07-21 NOTE — PLAN OF CARE
Admitted for abdominal pain, protal vein thrombosis. Neuros intact ex forgetful, alert and oriented x4. [place] score 0 x2 overnight. BLE numbness baseline, slow speech. Right ear numbness. Oxy/tylenol for pain. PIV SL. Strict I&O, voids. Multiple BM overnight. 2 gram NA diet. Up independently. VSS on RA. Plan adb. MRA today. Continue to monitor.

## 2020-07-22 ENCOUNTER — APPOINTMENT (OUTPATIENT)
Dept: ULTRASOUND IMAGING | Facility: CLINIC | Age: 30
End: 2020-07-22
Attending: STUDENT IN AN ORGANIZED HEALTH CARE EDUCATION/TRAINING PROGRAM
Payer: COMMERCIAL

## 2020-07-22 ENCOUNTER — APPOINTMENT (OUTPATIENT)
Dept: GENERAL RADIOLOGY | Facility: CLINIC | Age: 30
End: 2020-07-22
Attending: STUDENT IN AN ORGANIZED HEALTH CARE EDUCATION/TRAINING PROGRAM
Payer: COMMERCIAL

## 2020-07-22 LAB
ALBUMIN SERPL-MCNC: 4.3 G/DL (ref 3.4–5)
ALBUMIN UR-MCNC: 10 MG/DL
ALP SERPL-CCNC: 99 U/L (ref 40–150)
ALT SERPL W P-5'-P-CCNC: 32 U/L (ref 0–70)
ANION GAP SERPL CALCULATED.3IONS-SCNC: 5 MMOL/L (ref 3–14)
ANION GAP SERPL CALCULATED.3IONS-SCNC: 5 MMOL/L (ref 3–14)
APPEARANCE UR: ABNORMAL
AST SERPL W P-5'-P-CCNC: 41 U/L (ref 0–45)
BILIRUB DIRECT SERPL-MCNC: 0.7 MG/DL (ref 0–0.2)
BILIRUB SERPL-MCNC: 2.1 MG/DL (ref 0.2–1.3)
BILIRUB UR QL STRIP: NEGATIVE
BUN SERPL-MCNC: 17 MG/DL (ref 7–30)
BUN SERPL-MCNC: 17 MG/DL (ref 7–30)
C DIFF TOX B STL QL: NEGATIVE
CALCIUM SERPL-MCNC: 9.3 MG/DL (ref 8.5–10.1)
CALCIUM SERPL-MCNC: 9.8 MG/DL (ref 8.5–10.1)
CHLORIDE SERPL-SCNC: 104 MMOL/L (ref 94–109)
CHLORIDE SERPL-SCNC: 105 MMOL/L (ref 94–109)
CO2 SERPL-SCNC: 24 MMOL/L (ref 20–32)
CO2 SERPL-SCNC: 25 MMOL/L (ref 20–32)
COLOR UR AUTO: ABNORMAL
CREAT SERPL-MCNC: 1.13 MG/DL (ref 0.66–1.25)
CREAT SERPL-MCNC: 1.16 MG/DL (ref 0.66–1.25)
ERYTHROCYTE [DISTWIDTH] IN BLOOD BY AUTOMATED COUNT: 14 % (ref 10–15)
GFR SERPL CREATININE-BSD FRML MDRD: 83 ML/MIN/{1.73_M2}
GFR SERPL CREATININE-BSD FRML MDRD: 86 ML/MIN/{1.73_M2}
GLUCOSE BLDC GLUCOMTR-MCNC: 129 MG/DL (ref 70–99)
GLUCOSE BLDC GLUCOMTR-MCNC: 143 MG/DL (ref 70–99)
GLUCOSE BLDC GLUCOMTR-MCNC: 176 MG/DL (ref 70–99)
GLUCOSE BLDC GLUCOMTR-MCNC: 80 MG/DL (ref 70–99)
GLUCOSE SERPL-MCNC: 139 MG/DL (ref 70–99)
GLUCOSE SERPL-MCNC: 231 MG/DL (ref 70–99)
GLUCOSE UR STRIP-MCNC: NEGATIVE MG/DL
HCT VFR BLD AUTO: 42.2 % (ref 40–53)
HGB BLD-MCNC: 13.9 G/DL (ref 13.3–17.7)
HGB UR QL STRIP: NEGATIVE
INR PPP: 1.36 (ref 0.86–1.14)
KETONES UR STRIP-MCNC: 5 MG/DL
LACTATE BLD-SCNC: 1.8 MMOL/L (ref 0.7–2)
LACTATE BLD-SCNC: 2 MMOL/L (ref 0.7–2)
LEUKOCYTE ESTERASE UR QL STRIP: NEGATIVE
LMWH PPP CHRO-ACNC: 0.25 IU/ML
MCH RBC QN AUTO: 31.9 PG (ref 26.5–33)
MCHC RBC AUTO-ENTMCNC: 32.9 G/DL (ref 31.5–36.5)
MCV RBC AUTO: 97 FL (ref 78–100)
NITRATE UR QL: NEGATIVE
PH UR STRIP: 5.5 PH (ref 5–7)
PLATELET # BLD AUTO: 31 10E9/L (ref 150–450)
POTASSIUM SERPL-SCNC: 4 MMOL/L (ref 3.4–5.3)
POTASSIUM SERPL-SCNC: 4.3 MMOL/L (ref 3.4–5.3)
PROCALCITONIN SERPL-MCNC: <0.05 NG/ML
PROT SERPL-MCNC: 8 G/DL (ref 6.8–8.8)
RBC # BLD AUTO: 4.36 10E12/L (ref 4.4–5.9)
RBC #/AREA URNS AUTO: 6 /HPF (ref 0–2)
SODIUM SERPL-SCNC: 134 MMOL/L (ref 133–144)
SODIUM SERPL-SCNC: 134 MMOL/L (ref 133–144)
SOURCE: ABNORMAL
SP GR UR STRIP: 1.03 (ref 1–1.03)
SPECIMEN SOURCE: NORMAL
UROBILINOGEN UR STRIP-MCNC: NORMAL MG/DL (ref 0–2)
WBC # BLD AUTO: 5.1 10E9/L (ref 4–11)
WBC #/AREA URNS AUTO: 97 /HPF (ref 0–5)

## 2020-07-22 PROCEDURE — 87040 BLOOD CULTURE FOR BACTERIA: CPT | Performed by: STUDENT IN AN ORGANIZED HEALTH CARE EDUCATION/TRAINING PROGRAM

## 2020-07-22 PROCEDURE — 80048 BASIC METABOLIC PNL TOTAL CA: CPT | Performed by: STUDENT IN AN ORGANIZED HEALTH CARE EDUCATION/TRAINING PROGRAM

## 2020-07-22 PROCEDURE — 25000125 ZZHC RX 250: Performed by: STUDENT IN AN ORGANIZED HEALTH CARE EDUCATION/TRAINING PROGRAM

## 2020-07-22 PROCEDURE — 85520 HEPARIN ASSAY: CPT | Performed by: STUDENT IN AN ORGANIZED HEALTH CARE EDUCATION/TRAINING PROGRAM

## 2020-07-22 PROCEDURE — 87493 C DIFF AMPLIFIED PROBE: CPT | Performed by: STUDENT IN AN ORGANIZED HEALTH CARE EDUCATION/TRAINING PROGRAM

## 2020-07-22 PROCEDURE — 25000132 ZZH RX MED GY IP 250 OP 250 PS 637: Performed by: STUDENT IN AN ORGANIZED HEALTH CARE EDUCATION/TRAINING PROGRAM

## 2020-07-22 PROCEDURE — 36415 COLL VENOUS BLD VENIPUNCTURE: CPT | Performed by: STUDENT IN AN ORGANIZED HEALTH CARE EDUCATION/TRAINING PROGRAM

## 2020-07-22 PROCEDURE — 87086 URINE CULTURE/COLONY COUNT: CPT | Performed by: INTERNAL MEDICINE

## 2020-07-22 PROCEDURE — 80053 COMPREHEN METABOLIC PANEL: CPT | Performed by: STUDENT IN AN ORGANIZED HEALTH CARE EDUCATION/TRAINING PROGRAM

## 2020-07-22 PROCEDURE — 12000001 ZZH R&B MED SURG/OB UMMC

## 2020-07-22 PROCEDURE — 82248 BILIRUBIN DIRECT: CPT | Performed by: STUDENT IN AN ORGANIZED HEALTH CARE EDUCATION/TRAINING PROGRAM

## 2020-07-22 PROCEDURE — 85027 COMPLETE CBC AUTOMATED: CPT | Performed by: STUDENT IN AN ORGANIZED HEALTH CARE EDUCATION/TRAINING PROGRAM

## 2020-07-22 PROCEDURE — 84145 PROCALCITONIN (PCT): CPT | Performed by: STUDENT IN AN ORGANIZED HEALTH CARE EDUCATION/TRAINING PROGRAM

## 2020-07-22 PROCEDURE — 85610 PROTHROMBIN TIME: CPT | Performed by: STUDENT IN AN ORGANIZED HEALTH CARE EDUCATION/TRAINING PROGRAM

## 2020-07-22 PROCEDURE — 36415 COLL VENOUS BLD VENIPUNCTURE: CPT | Performed by: HOSPITALIST

## 2020-07-22 PROCEDURE — 25000128 H RX IP 250 OP 636: Performed by: STUDENT IN AN ORGANIZED HEALTH CARE EDUCATION/TRAINING PROGRAM

## 2020-07-22 PROCEDURE — 76705 ECHO EXAM OF ABDOMEN: CPT

## 2020-07-22 PROCEDURE — 83605 ASSAY OF LACTIC ACID: CPT | Performed by: HOSPITALIST

## 2020-07-22 PROCEDURE — 83605 ASSAY OF LACTIC ACID: CPT | Performed by: STUDENT IN AN ORGANIZED HEALTH CARE EDUCATION/TRAINING PROGRAM

## 2020-07-22 PROCEDURE — 99233 SBSQ HOSP IP/OBS HIGH 50: CPT | Mod: GC | Performed by: INTERNAL MEDICINE

## 2020-07-22 PROCEDURE — 00000146 ZZHCL STATISTIC GLUCOSE BY METER IP

## 2020-07-22 PROCEDURE — 99207 ZZC CDG-CUT & PASTE-POTENTIAL IMPACT ON LEVEL: CPT | Performed by: INTERNAL MEDICINE

## 2020-07-22 PROCEDURE — 81001 URINALYSIS AUTO W/SCOPE: CPT | Performed by: STUDENT IN AN ORGANIZED HEALTH CARE EDUCATION/TRAINING PROGRAM

## 2020-07-22 PROCEDURE — 71045 X-RAY EXAM CHEST 1 VIEW: CPT

## 2020-07-22 RX ORDER — CIPROFLOXACIN 500 MG/1
500 TABLET, FILM COATED ORAL DAILY
Status: CANCELLED | OUTPATIENT
Start: 2020-07-22

## 2020-07-22 RX ORDER — PIPERACILLIN SODIUM, TAZOBACTAM SODIUM 4; .5 G/20ML; G/20ML
4.5 INJECTION, POWDER, LYOPHILIZED, FOR SOLUTION INTRAVENOUS EVERY 6 HOURS
Status: DISCONTINUED | OUTPATIENT
Start: 2020-07-22 | End: 2020-07-23

## 2020-07-22 RX ORDER — FLUORIDE TOOTHPASTE
15 TOOTHPASTE DENTAL 4 TIMES DAILY
Status: DISCONTINUED | OUTPATIENT
Start: 2020-07-22 | End: 2020-07-27 | Stop reason: HOSPADM

## 2020-07-22 RX ADMIN — CYCLOBENZAPRINE 10 MG: 10 TABLET, FILM COATED ORAL at 07:04

## 2020-07-22 RX ADMIN — INSULIN ASPART 1 UNITS: 100 INJECTION, SOLUTION INTRAVENOUS; SUBCUTANEOUS at 08:23

## 2020-07-22 RX ADMIN — ACETAMINOPHEN 650 MG: 325 TABLET, FILM COATED ORAL at 12:38

## 2020-07-22 RX ADMIN — LACTULOSE 45 G: 20 SOLUTION ORAL at 14:00

## 2020-07-22 RX ADMIN — MELATONIN 2000 UNITS: at 08:21

## 2020-07-22 RX ADMIN — DICLOFENAC SODIUM 2 G: 10 GEL TOPICAL at 20:44

## 2020-07-22 RX ADMIN — ACETAMINOPHEN 650 MG: 325 TABLET, FILM COATED ORAL at 04:29

## 2020-07-22 RX ADMIN — ERYTHROMYCIN 1 G: 5 OINTMENT OPHTHALMIC at 21:53

## 2020-07-22 RX ADMIN — RIFAXIMIN 550 MG: 550 TABLET ORAL at 08:21

## 2020-07-22 RX ADMIN — OXYCODONE HYDROCHLORIDE 15 MG: 5 TABLET ORAL at 04:34

## 2020-07-22 RX ADMIN — OXYCODONE HYDROCHLORIDE 15 MG: 5 TABLET ORAL at 16:29

## 2020-07-22 RX ADMIN — CYCLOBENZAPRINE 10 MG: 10 TABLET, FILM COATED ORAL at 23:32

## 2020-07-22 RX ADMIN — OXYCODONE HYDROCHLORIDE 15 MG: 5 TABLET ORAL at 12:45

## 2020-07-22 RX ADMIN — LACTULOSE 40 G: 20 SOLUTION ORAL at 02:41

## 2020-07-22 RX ADMIN — FOLIC ACID 1 MG: 1 TABLET ORAL at 20:44

## 2020-07-22 RX ADMIN — PIPERACILLIN AND TAZOBACTAM 4.5 G: 4; .5 INJECTION, POWDER, FOR SOLUTION INTRAVENOUS at 12:35

## 2020-07-22 RX ADMIN — Medication 15 ML: at 20:45

## 2020-07-22 RX ADMIN — LACTULOSE 45 G: 20 SOLUTION ORAL at 18:04

## 2020-07-22 RX ADMIN — INSULIN ASPART 1 UNITS: 100 INJECTION, SOLUTION INTRAVENOUS; SUBCUTANEOUS at 12:39

## 2020-07-22 RX ADMIN — OFLOXACIN 1 DROP: 3 SOLUTION/ DROPS OPHTHALMIC at 08:20

## 2020-07-22 RX ADMIN — RIFAXIMIN 550 MG: 550 TABLET ORAL at 20:44

## 2020-07-22 RX ADMIN — LACTULOSE 40 G: 20 SOLUTION ORAL at 00:10

## 2020-07-22 RX ADMIN — LACTULOSE 40 G: 20 SOLUTION ORAL at 04:28

## 2020-07-22 RX ADMIN — QUETIAPINE FUMARATE 50 MG: 50 TABLET ORAL at 23:32

## 2020-07-22 RX ADMIN — OXYCODONE HYDROCHLORIDE 15 MG: 5 TABLET ORAL at 20:44

## 2020-07-22 RX ADMIN — DICLOFENAC SODIUM 2 G: 10 GEL TOPICAL at 15:50

## 2020-07-22 RX ADMIN — GABAPENTIN 600 MG: 300 CAPSULE ORAL at 14:00

## 2020-07-22 RX ADMIN — POLYETHYLENE GLYCOL 3350 17 G: 17 POWDER, FOR SOLUTION ORAL at 12:45

## 2020-07-22 RX ADMIN — PANTOPRAZOLE SODIUM 40 MG: 40 TABLET, DELAYED RELEASE ORAL at 08:21

## 2020-07-22 RX ADMIN — CYCLOBENZAPRINE 10 MG: 10 TABLET, FILM COATED ORAL at 15:46

## 2020-07-22 RX ADMIN — PIPERACILLIN AND TAZOBACTAM 4.5 G: 4; .5 INJECTION, POWDER, FOR SOLUTION INTRAVENOUS at 21:52

## 2020-07-22 RX ADMIN — Medication 15 ML: at 07:05

## 2020-07-22 RX ADMIN — ACETAMINOPHEN 650 MG: 325 TABLET, FILM COATED ORAL at 20:44

## 2020-07-22 RX ADMIN — DICLOFENAC SODIUM 2 G: 10 GEL TOPICAL at 12:40

## 2020-07-22 RX ADMIN — ENOXAPARIN SODIUM 60 MG: 60 INJECTION SUBCUTANEOUS at 15:46

## 2020-07-22 RX ADMIN — PIPERACILLIN AND TAZOBACTAM 4.5 G: 4; .5 INJECTION, POWDER, FOR SOLUTION INTRAVENOUS at 15:46

## 2020-07-22 RX ADMIN — GABAPENTIN 600 MG: 300 CAPSULE ORAL at 08:21

## 2020-07-22 RX ADMIN — LIDOCAINE 1 PATCH: 560 PATCH PERCUTANEOUS; TOPICAL; TRANSDERMAL at 20:42

## 2020-07-22 RX ADMIN — ENOXAPARIN SODIUM 60 MG: 60 INJECTION SUBCUTANEOUS at 04:29

## 2020-07-22 RX ADMIN — FOLIC ACID 1 MG: 1 TABLET ORAL at 08:21

## 2020-07-22 RX ADMIN — OXYCODONE HYDROCHLORIDE 15 MG: 5 TABLET ORAL at 08:37

## 2020-07-22 RX ADMIN — INSULIN GLARGINE 10 UNITS: 100 INJECTION, SOLUTION SUBCUTANEOUS at 08:26

## 2020-07-22 RX ADMIN — OXYCODONE HYDROCHLORIDE 15 MG: 5 TABLET ORAL at 00:05

## 2020-07-22 RX ADMIN — DICLOFENAC SODIUM 2 G: 10 GEL TOPICAL at 08:18

## 2020-07-22 RX ADMIN — HYDROXYZINE HYDROCHLORIDE 25 MG: 25 TABLET, FILM COATED ORAL at 08:37

## 2020-07-22 RX ADMIN — Medication 15 ML: at 08:20

## 2020-07-22 RX ADMIN — GABAPENTIN 600 MG: 300 CAPSULE ORAL at 20:44

## 2020-07-22 RX ADMIN — LACTULOSE 45 G: 20 SOLUTION ORAL at 08:18

## 2020-07-22 RX ADMIN — OFLOXACIN 1 DROP: 3 SOLUTION/ DROPS OPHTHALMIC at 20:42

## 2020-07-22 RX ADMIN — FERROUS SULFATE TAB 325 MG (65 MG ELEMENTAL FE) 325 MG: 325 (65 FE) TAB at 08:21

## 2020-07-22 RX ADMIN — OFLOXACIN 1 DROP: 3 SOLUTION/ DROPS OPHTHALMIC at 15:46

## 2020-07-22 ASSESSMENT — ACTIVITIES OF DAILY LIVING (ADL)
ADLS_ACUITY_SCORE: 10

## 2020-07-22 ASSESSMENT — MIFFLIN-ST. JEOR: SCORE: 1882.05

## 2020-07-22 NOTE — PLAN OF CARE
Admitted for portal vein thrombosis. Adb pain managed with oxy/tylenol. Flexeril for muscle spasms. Neuros: right ear numbness, BLE numbness baseline. Forgetful, d/o time overnight and occasionally place. [place] score 1 overnight, PRN lactulose given per order. 1 BM. VSS pm RA. PIV SL. 2 gram NA diet. Up ad red. Voids, strict I&O. Mother would like to administer Lovenox shots, she administers to patient at home. Patient would like to self administer insulin shots as well. Continue to monitor.

## 2020-07-22 NOTE — PLAN OF CARE
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   Vitals: VSS  Neuros: A/O x4. N/t to BLE at baseline. R ear n/ and decreased sensation. Scored 0 x2 per [place] flowsheet   IV: PIV SL   Diet: 2g Na  Bowel status: no BM    : Voiding spontaneously. Strict I &O's  Pain: c/o pain to abdomen, lidocaine patch applied to right of abd. Tylenol scheduled. Oxy and flexeril for pain control   Activity: Up ad red  Social: Mom okayed to stay the night w/ pt   Plan:  Continue to follow POC

## 2020-07-22 NOTE — PROGRESS NOTES
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     Changes:  - Sepsis protocol triggered d/t fever to 101.6 degrees F, tachycardia to 121 (though BPA did not alert)   - BCx drawn today NTD, checking UA, CXR, C diff antigen, US abdomen w/o ascites   - started Zosyn for broad-spectrum coverage   - continue holding home diuretics d/t sepsis workup  - anti-Xa levels after 5th dose of Lovenox are in prophylactic but subtherapeutic range; will consolidate doses to give 1.5 mg/kg in one dose daily  - added ordering assistance by Nutrition    #Sepsis, rule out  Pt triggering sepsis protocol 7/22 AM w/ fever to 101.6 degrees F and tachycardia to 121 bpm. Pt feeling feverish in AM w/ sweats, chills, and bloating sensation in abdomen, with exam notable for mild abdominal distension. Lactate drawn at 0754 borderline at 2.0, recheck at 1000 at 1.8.  - BCx drawn, NTD  - will check CXR, UA,   - US abdomen w/o significant ascites  - checking C diff antigen  - start Zosyn for broad-spectrum coverage    # Acute kidney Injury  New JUAN on 7/20. UA with hyaline casts, all cell lines rising, and hypercalcemia all suggest pre-renal etiology, particularly in the setting of poor PO intake and increased stool output due to the increased lactulose for HE treatment.  - Recovered today  - Continue to monitor daily CMP    # Acute portal vein thrombosis  # Protein C deficiency  Pt w/ hypercoagulability 2/2 to liver  disease, protein C deficiency. 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 Recently admitted 1/2020 for mesenteric ischemia, discharged on 1 mg/kg Lovenox. Reports compliance and consistency with Lovenox dosing at home. Pt now with nonocclusive portal vein thrombosis identified on imaging at OSH, US abdomen w/ Doppler 7/17 w/ PVT w/o splenic vein involvement. Started on heparin gtt and transitioned to lovenox 7/19, w/ anti-Xa level subtherapeutic at 0.25 7/22 after 5th dose of Lovenox 60 mg BID. MRI 7/21 w/ stable PVT and SMV thromboses.  - Lovenox 1.5 mg/kg to be consolidated into one dose and given once daily   - will monitor for evidence of GI bleed   - anti-Xa level w/in 4-6 hours after 4th dose of Lovenox  - Hematology consulted, appreciate recs: Low anti-thrombin level  - Images from OSH will be arriving by mail  - MRI abdomen to further evaluate LUQ pain    # Acute on chronic Abdominal pain  Chronic abdominal pain due to chronic pancreatitis. Current pain feels like previous pain experienced when he had a portal vein thrombosis and mesenteric ischemia. Patient recognizes that there is an anxiety component to his pain as well.  - 15 mg oxycodone Q4H PRN  - IV hydromorphone 0.3mg q4h PRN. Currently IV pain meds on hold due to encephalopathy. Once mentation improves, if requiring IV for breakthrough pain, may increase to 20 mg oxycodone Q4H PRN  - Tylenol 650 mg scheduled Q8H, and lidocaine patches PRN for additional pain control  - Seroquel 50 mg at bedtime and 25mg PRN for anxiety 2/2 pain; hydroxyzine PRN for anxiety  - increased gabapentin to 600mg TID (may increase to 900 mg TID in OP setting).   - cyclobenzaprine for muscular pain/back pain  - Will contact PCP on discharge to coordinate outpatient pain management plan  - Pain consulted, appreciate assistance    # Decompensated alcoholic cirrhosis  c/b HE, SBP,   # Hepatic encephalopathy  Currently sober and undergoing transplant evaluation, transplant candidate per Dr. Marsh. Ascites initially required therapeutic paracenteses 2x/week, then 1x/week, but he has not required one in the last 5 month none. Has had SBP, on ciprofloxacin 500 mg daily for prophylaxis. His last MELD score is 14 6/23. Requires continuous EGD surveillance for esophageal varices, and he will be seen by GI in 3-6 months for follow up. Worsening confusion on 7/19 concerning for hepatic encephalopathy. Infectious work-up NTD.  Etiology: Alcohol  MELD-Na 13  Hepatic encephalopathy: None, no episodes of HE in the last 7 months  Ascites: None noted on examination and ultrasound. On high dose diuretics, last paracentesis was 01/2020  SBP prophylaxis: On Ciprofloxacin  TIPS: None  Esophageal/Gastric varices: Last EGD was done 11/2018 with no varices seen. Will need a repeat soon.  Hepatocellular carcinoma: Last USS was done 7/17/2020 and notable for no mass lesions  Transplant: Evaluation ongoing                         Blood type AB+  - Hepatology consulted, appreciate recs  - Continue PTA ciprofloxacin 500mg once daily for SBP prophylaxis  - Continue PTA lactulose, titrate to 3-4 BM/day  - [place] protocol for lactulose until mentation clears  - Continue rifaximin 550 mg BID  - Holding PTA spironolactone 100mg TID and lasix 40mg TID for now given JUAN, sepsis workup  - Continue with 2 g Na restriction diet   - nutrition assistant to aid with mealtime ordering to assist w/ meal ordering  - Will need repeat CT abd w/ contrast in 1 month as outpatient; Transplant service will order.  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  - Ophthalmology consulted, appreciate recs  - Ofloxacin QID left eye  - Erythromycin at night left eye  - Artificial tears ou PRN  - Please do not patch the eye    #Right ear, lateral facial paresthesia  Pt admitted to OSH with abdominal pain, right ear and right lateral facial paresthesias. OSH w/ negative head CT, neuro at OSH recommending MRI to evaluate further. MRI brain w/o contrast without abnormality. Seen by neurology, not felt to be emergent but willing to follow up outpatient. Pt declined further workup at this time.  - Neurology consulted and signed off, appreciated assistance     #Chronic pancreatitis  Pt w/ hx of chronic pancreatitis. History of pancreatic cyst but does not require further evaluation and can be cleared for liver transplant per Dr. Acosta. Per pt, current abdominal pain worse than chronic pancreatitis pain but in similar distribution. Lipase only mildly elevated, not indicative of acute pancreatitis     #Diabetes Mellitus II  Last Hgb A1C 8.4% (11/2019), recheck 7/17 is 5.9%.  - Continue glargine at 10 units daily  - Sliding scale: medium sliding scale insulin  - Carb coverage: 1 U/15 g CHO at each meal  - Hypoglycemic protocol   - QID fingersticks AC HS     Diet: low sodium diet  Fluids: albumin followed by NS bolus  DVT Prophylaxis: Therapeutic lovenox  Glaser Catheter: not present  Code Status: full     Disposition Plan     Expected discharge: 2 - 3 days, recommended to prior living arrangement once appropriate pain management and anticoagulation is done.  Entered: Emanuel Houston MD 07/21/20     The patient's care was discussed with the Attending physician, Dr. Ronit Ca, MS4  [phone]    Resident/Fellow Attestation   I, Dalila Fu, was present with the medical student who participated in the  "service and in the documentation of the note.  I have verified the history and personally performed the physical exam and medical decision making.  I agree with the assessment and plan of care as documented in the note.        Dalila Fu MD  PGY3  Date of Service (when I saw the patient): 07/22/20      Interval History  No acute events overnight.  Patient's mom reports mentation is much improved and close to baseline, about 85-90% of normal today. Pt notes that he has felt a little feverish with chills and sweats this AM, otherwise denies infectious symptoms (congestion, rhinorrhea, SOB, chest pain, nausea not worse than baseline, vomiting, dysuria, changes in stool appearance or color other than diarrhea 2/2 lactulose). LUQ abdominal pain is stable today. Of note, pt is mostly smiling today and makes some jokes and laughs during interview today, although he becomes tearful when thinking about the management of sciatica once he leaves the hospital.    Data reviewed today: I reviewed all medications, new labs and imaging results over the last 24 hours.    Physical Exam  /69 (BP Location: Right arm)   Pulse 94   Temp 98.8  F (37.1  C) (Oral)   Resp 18   Ht 1.88 m (6' 2\")   Wt 85.7 kg (189 lb)   SpO2 98%   BMI 24.27 kg/m     GA: A&Ox3, coherent, brighter this AM  HEENT: anicteric sclerae, mucus membranes moist, poor dentition with multiple missing teeth  Lungs: CTAB  CVS: RRR, normal heart sounds  Abd: normal bowel sound, abdomen appears mildly more distended this AM, is somewhat mildly more tense to palpation but is otherwise soft with stable generalized epigastric tenderness LUQ>RUQ, some focal tenderness in LLQ; no rebound or guarding; liver and spleen edges not easily palpable  Extremity: no edema seen  Neuro: grossly intact, AO x 4, speech and thought processes are linear.    Data  Last Comprehensive Metabolic Panel:  Sodium   Date Value Ref Range Status   07/22/2020 134 133 - 144 " mmol/L Final     Potassium   Date Value Ref Range Status   07/22/2020 4.3 3.4 - 5.3 mmol/L Final     Chloride   Date Value Ref Range Status   07/22/2020 105 94 - 109 mmol/L Final     Carbon Dioxide   Date Value Ref Range Status   07/22/2020 24 20 - 32 mmol/L Final     Anion Gap   Date Value Ref Range Status   07/22/2020 5 3 - 14 mmol/L Final     Glucose   Date Value Ref Range Status   07/22/2020 231 (H) 70 - 99 mg/dL Final     Urea Nitrogen   Date Value Ref Range Status   07/22/2020 17 7 - 30 mg/dL Final     Creatinine   Date Value Ref Range Status   07/22/2020 1.13 0.66 - 1.25 mg/dL Final     GFR Estimate   Date Value Ref Range Status   07/22/2020 86 >60 mL/min/[1.73_m2] Final     Comment:     Non  GFR Calc  Starting 12/18/2018, serum creatinine based estimated GFR (eGFR) will be   calculated using the Chronic Kidney Disease Epidemiology Collaboration   (CKD-EPI) equation.       Calcium   Date Value Ref Range Status   07/22/2020 9.3 8.5 - 10.1 mg/dL Final     Lab Results   Component Value Date    WBC 5.1 07/22/2020     Lab Results   Component Value Date    RBC 4.36 07/22/2020     Lab Results   Component Value Date    HGB 13.9 07/22/2020     Lab Results   Component Value Date    HCT 42.2 07/22/2020     Lab Results   Component Value Date    MCV 97 07/22/2020     Lab Results   Component Value Date    MCH 31.9 07/22/2020     Lab Results   Component Value Date    MCHC 32.9 07/22/2020     Lab Results   Component Value Date    RDW 14.0 07/22/2020     Lab Results   Component Value Date    PLT 31 07/22/2020     INR: 1.36  Antithrombin IIIc: 59  Hep Xa level 7/19: 0.25 (high intensity range 0.30-0.70)     Exam: US ABDOMEN LIMITED, 7/22/2020 11:46 AM  Indication: Pt w/ PVT and mild ascites per 7/21 MRI, now with mild  abdominal distension and triggering sepsis protocol. US abdomen to  evaluate ascites.  Comparison: MRI: 7/21/2020.     Findings/                                                                    Impression:   No evidence of significant ascites. Overlying obscuring bowel gas.      I have personally reviewed the examination and initial interpretation  and I agree with the findings.     VILMA MARTI MD      MRI ABDOMEN  Comparison study: Ultrasound 7/17/2020, MRI 3/10/2020, CT 7/16/2020     History: Known portal vein thrombosis with extension into mesentery  having worsening LUQ abdominal pain. GI recommending MRI triple or  quadruple phase with and without contrast.     TECHNIQUE: Images were acquired with and without intravenous contrast  through the abdomen. The following MR images were acquired: TrueFISP,  multiplanar T2 weighted, axial T1 in/out of phase, axial fat-saturated  T1, diffusion-weighted. Multiplanar T1-weighted images with fat  saturation were before contrast administration and at multiple time  points following the administration of intravenous contrast. Contrast  dose: 8 mL intravenous Gadavist.     FINDINGS:     Liver: Cirrhotic configuration of the liver with nodular contour,  heterogeneous lacy T2 signal which particularly affects the right  lobe. T2 signal irregularity corresponds to lacy enhancement seen on  postcontrast sequences. Mild loss of signal intensity in the liver  parenchyma on out of phase imaging. No suspicious liver lesions.  Previously described hypoenhancing lesion in segment 8 is not seen on  today's study.     Gallbladder: Surgically absent. Mild prominence of the central  intrahepatic and the intrahepatic bile ducts, compatible with  reservoir effect.     Spleen: Splenomegaly, measuring 14.9 x 15.4 cm in maximum axial  diameter. No focal splenic lesion.     Kidneys: Simple renal cortical cysts. No soft tissue masses,  hydronephrosis.     Adrenal glands: No adrenal nodules.     Pancreas: Diffusely atrophic with some loss of intrinsic T1  hyperintensity. No pancreatic ductal dilatation. No focal pancreatic  lesion.     Bowel: No dilated loops of bowel or bowel  wall thickening.  Postsurgical changes of Yan-en-Y gastric bypass.     Lymph nodes: Prominent upper abdominal lymph nodes not enlarged by  size criteria.     Blood vessels: Normal caliber abdominal aorta. Nonocclusive thrombus  in the proximal SMV. Nonocclusive thrombus in the main portal vein.  Nearly occlusive thrombus in the left portal vein, which demonstrates  expansion. Nonocclusive thrombus in the right portal vein.  Portosystemic collaterals in the left upper quadrant. Splenic vein is  patent. Recanalization of the periumbilical vein.     Lung bases: Small pleural effusions. No focal consolidation. Bibasilar  atelectasis.     Bones and soft tissues: No suspicious osseous lesions. No soft tissue  masses.     Mesentery and abdominal wall: Edematous mesentery.     Ascites: Trace perihepatic ascites.                                                            IMPRESSION:  1.  Unchanged configuration of the bland thrombus in the SMV, main  portal vein, right portal vein, and left portal vein. The thrombus is  nonocclusive, becoming nearly occlusive in the left portal vein  (unchanged).  2.  Steatosis and cirrhosis with sequela of portal hypertension,  including splenomegaly, portosystemic collaterals, and trace ascites.  3.  Stable findings of chronic pancreatitis.     I have personally reviewed the examination and initial interpretation  and I agree with the findings.     DARY KU MD        Exam: XR CHEST PORT 1 VW, 7/22/2020 2:02 PM  Indication: sirs  Comparison: Chest x-ray 1/6/2020     Findings:   Trachea appears midline. Normal cardiac silhouette. No pneumothorax.  Trace bilateral pleural effusions. Pulmonary vasculature is distinct.  No focal airspace disease. No acute osseous or soft tissue  abnormalities. No acute upper abdominal pathology.                                                                      Impression:      No focal airspace disease.     I have personally reviewed the examination  and initial interpretation  and I agree with the findings.     HONG DICKERSON MD        US abdomen with Doppler 7/17  EXAMINATION: Limited Abdominal Ultrasound, 7/17/2020 8:12 AM      COMPARISON: US 6/30/2020, 1/8/2020 and MRI 3/10/2020.     HISTORY: evaluate for ascites, PVT with mesenteric involvement     FINDINGS:   Fluid: Right pleural effusion. No ascites.     Liver: The liver demonstrates nodular contour of the liver with  coarsened echotexture, measuring 16 cm in craniocaudal dimension.  There is no focal mass.      Nonocclusive thrombus in the main portal vein and left portal vein.   Extrahepatic portal vein flow around the thrombus is antegrade at 32  cm/sec.  Right portal vein flow is antegrade, measuring 23 cm/sec.  Left portal vein flow around the thrombus is antegrade, measuring 20  cm/sec.     Flow in the hepatic artery is towards the liver and:  45 cm/s peak systolic  0.83 resistive index.      The splenic vein is patent and flow is towards the liver. The left,  middle, and right hepatic veins are patent with flow towards the IVC.  The IVC is patent with flow towards the heart. The visualized aorta  is not dilated.     Gallbladder: Cholecystectomy.     Bile Ducts: Both the intra- and extrahepatic biliary system are of  normal caliber.  The common bile duct measures 7 mm in diameter.     Kidney: The right kidney measures 11.7 cm long. The left kidney  measures 11.6 cm long. There is no hydronephrosis or hydroureter, no  shadowing renal calculi, cystic lesion or mass.      Spleen: The spleen is enlarged, measuring 17.5 cm in craniocaudal  dimension.                                             IMPRESSION:    1. Cirrhotic configuration of the liver with splenomegaly, compatible  with portal hypertension. No ascites. No focal intrahepatic lesion.  2. Nonocclusive thrombus in the main portal vein extending into the  left portal vein. Otherwise, patent hepatic vasculature with  appropriate antegrade flow as  above.  3. Small right pleural effusion.     I have personally reviewed the examination and initial interpretation  and I agree with the findings.     GURINDER ARITA, DO

## 2020-07-22 NOTE — PROVIDER NOTIFICATION
07/22/20 0832   Vital Signs   Temp 101.6  F (38.7  C)   Temp src Oral   Resp 18   Heart Rate 121   Pulse/Heart Rate Source Monitor   /74   BP Location Right arm   Room air 98%.   MD (Eva) aware

## 2020-07-22 NOTE — PLAN OF CARE
Alert and oriented x 4. Febrile. Had blood culture, stool for c-diff done. Started on iv Zosyn. On ACHS blood sugar and carb coverage. Ambulates independently. Mom at the bedside, supportive. Plan:Continue care. Transfer to medicine floor when bed available.

## 2020-07-22 NOTE — PROGRESS NOTES
"[facility]    Hepatology Follow-up    CC:           Abdominal pain     Dx:          PVT with SMV extension (non-occlusive)                   Protein C deficiency                  Decompensated alcoholic cirrhosis     24 hour events:      Low grade fever overnight.  Febrile this morning     Subjective:  Persistent LUQ pain is unchanged.  Febrile this morning to 101.6F. No nausea or vomiting  Worried about new fever    Medications  Current Facility-Administered Medications   Medication Dose Route Frequency     acetaminophen  650 mg Oral Q8H     artificial saliva  15 mL Swish & Spit 4x Daily     cholecalciferol  1,250 mcg Oral Q7 Days     ciprofloxacin  500 mg Oral Daily     diclofenac  2 g Topical 4x Daily     enoxaparin ANTICOAGULANT  60 mg Subcutaneous Q12H     erythromycin   Left Eye At Bedtime     ferrous sulfate  325 mg Oral Daily with breakfast     folic acid  1 mg Oral BID     [Held by provider] furosemide  40 mg Oral TID     gabapentin  600 mg Oral TID     insulin aspart   Subcutaneous TID w/meals     insulin aspart  1-7 Units Subcutaneous TID AC     insulin aspart  1-5 Units Subcutaneous At Bedtime     insulin glargine  10 Units Subcutaneous QAM AC     lactulose  45 g Oral TID     lidocaine  1 patch Transdermal Q24h    And     lidocaine   Transdermal Q8H     ofloxacin  1 drop Left Eye 4x Daily     pantoprazole  40 mg Oral Daily     piperacillin-tazobactam  4.5 g Intravenous Q6H     QUEtiapine  50 mg Oral At Bedtime     rifaximin  550 mg Oral BID     sodium chloride (PF)  3 mL Intracatheter Q8H     sodium chloride (PF)  3 mL Intracatheter Q8H     [Held by provider] spironolactone  100 mg Oral TID     Vitamin D3  2,000 Units Oral Daily       Review of systems  A 10-point review of systems was negative, unless stated above    Examination  /74 (BP Location: Right arm)   Pulse 85   Temp 101.6  F (38.7  C) (Oral)   Resp 18   Ht 1.88 m (6' 2\")   Wt 85.7 kg (189 lb)   SpO2 98%  "  BMI 24.27 kg/m      Intake/Output Summary (Last 24 hours) at 7/22/2020 1023  Last data filed at 7/22/2020 0800  Gross per 24 hour   Intake 2167 ml   Output 880 ml   Net 1287 ml       General: Not in distress  CVS: RRR  Resp: CTA  Abdomen: Obese, mild distension  Extremities: no edema  Neuro: Non-focal exam    Laboratory  Lab Results   Component Value Date     07/22/2020    POTASSIUM 4.0 07/22/2020    CHLORIDE 104 07/22/2020    CO2 25 07/22/2020    BUN 17 07/22/2020    CR 1.16 07/22/2020       Lab Results   Component Value Date    BILITOTAL 2.1 07/22/2020    ALT 32 07/22/2020    AST 41 07/22/2020    ALKPHOS 99 07/22/2020       Lab Results   Component Value Date    WBC 5.1 07/22/2020    HGB 13.9 07/22/2020    MCV 97 07/22/2020    PLT 31 07/22/2020       Lab Results   Component Value Date    INR 1.36 07/22/2020       MELD-Na score: 17 at 7/22/2020  7:54 AM  MELD score: 15 at 7/22/2020  7:54 AM  Calculated from:  Serum Creatinine: 1.16 mg/dL at 7/22/2020  7:54 AM  Serum Sodium: 134 mmol/L at 7/22/2020  7:54 AM  Total Bilirubin: 2.1 mg/dL at 7/22/2020  7:54 AM  INR(ratio): 1.36 at 7/22/2020  7:54 AM  Age: 30 years 5 months       Radiology  MRI 7/21/2020   IMPRESSION:  1.  Unchanged configuration of the bland thrombus in the SMV, main  portal vein, right portal vein, and left portal vein. The thrombus is  nonocclusive, becoming nearly occlusive in the left portal vein  (unchanged).  2.  Steatosis and cirrhosis with sequela of portal hypertension,  including splenomegaly, portosystemic collaterals, and trace ascites.  3.  Stable findings of chronic pancreatitis.    Assessment  30 year old with a history of protein C deficiency, RNYGB in 2009, and decompensated alcoholic cirrhosis manifested by HE and ascites; admitted with abdominal pain and found to have PVT. MRI yesterday shows non-occlusive PVT with extension into SMV as well as splenomegaly. Overnight he has developed fevers and tachycardia. Given new fevers,  will work up for infection     Recommendations  -- Infectious diseases work up   - Blood cultures   - CXR (thora if amenable)   - Diagnostic paracentesis with cell count, gram stain/culture, cytology, albumin, protein   - Urine culture  -- Continue Lovenox per Hematology recommendations  -- Continue diuretics as you are    Patient seen and discussed with attending physician, Dr. Leventhal Chimaobi Anugwom, MD  PGY 6  Transplant Hepatology Fellow  [phone]

## 2020-07-23 LAB
ALBUMIN SERPL-MCNC: 3.3 G/DL (ref 3.4–5)
ALP SERPL-CCNC: 93 U/L (ref 40–150)
ALT SERPL W P-5'-P-CCNC: 26 U/L (ref 0–70)
ANION GAP SERPL CALCULATED.3IONS-SCNC: 5 MMOL/L (ref 3–14)
AST SERPL W P-5'-P-CCNC: 28 U/L (ref 0–45)
BACTERIA SPEC CULT: NO GROWTH
BILIRUB SERPL-MCNC: 1.4 MG/DL (ref 0.2–1.3)
BUN SERPL-MCNC: 21 MG/DL (ref 7–30)
CALCIUM SERPL-MCNC: 8.6 MG/DL (ref 8.5–10.1)
CHLORIDE SERPL-SCNC: 107 MMOL/L (ref 94–109)
CO2 SERPL-SCNC: 24 MMOL/L (ref 20–32)
CREAT SERPL-MCNC: 1.15 MG/DL (ref 0.66–1.25)
ERYTHROCYTE [DISTWIDTH] IN BLOOD BY AUTOMATED COUNT: 14 % (ref 10–15)
GFR SERPL CREATININE-BSD FRML MDRD: 84 ML/MIN/{1.73_M2}
GLUCOSE BLDC GLUCOMTR-MCNC: 109 MG/DL (ref 70–99)
GLUCOSE BLDC GLUCOMTR-MCNC: 139 MG/DL (ref 70–99)
GLUCOSE BLDC GLUCOMTR-MCNC: 81 MG/DL (ref 70–99)
GLUCOSE BLDC GLUCOMTR-MCNC: 87 MG/DL (ref 70–99)
GLUCOSE BLDC GLUCOMTR-MCNC: 99 MG/DL (ref 70–99)
GLUCOSE SERPL-MCNC: 110 MG/DL (ref 70–99)
HCT VFR BLD AUTO: 32.3 % (ref 40–53)
HGB BLD-MCNC: 10.8 G/DL (ref 13.3–17.7)
INR PPP: 1.43 (ref 0.86–1.14)
MCH RBC QN AUTO: 32 PG (ref 26.5–33)
MCHC RBC AUTO-ENTMCNC: 33.4 G/DL (ref 31.5–36.5)
MCV RBC AUTO: 96 FL (ref 78–100)
PLATELET # BLD AUTO: 27 10E9/L (ref 150–450)
POTASSIUM SERPL-SCNC: 3.6 MMOL/L (ref 3.4–5.3)
PROT SERPL-MCNC: 6.5 G/DL (ref 6.8–8.8)
RBC # BLD AUTO: 3.37 10E12/L (ref 4.4–5.9)
SODIUM SERPL-SCNC: 137 MMOL/L (ref 133–144)
SPECIMEN SOURCE: NORMAL
WBC # BLD AUTO: 2.5 10E9/L (ref 4–11)

## 2020-07-23 PROCEDURE — 25000132 ZZH RX MED GY IP 250 OP 250 PS 637: Performed by: STUDENT IN AN ORGANIZED HEALTH CARE EDUCATION/TRAINING PROGRAM

## 2020-07-23 PROCEDURE — 12000001 ZZH R&B MED SURG/OB UMMC

## 2020-07-23 PROCEDURE — 25000128 H RX IP 250 OP 636: Performed by: STUDENT IN AN ORGANIZED HEALTH CARE EDUCATION/TRAINING PROGRAM

## 2020-07-23 PROCEDURE — 40000141 ZZH STATISTIC PERIPHERAL IV START W/O US GUIDANCE

## 2020-07-23 PROCEDURE — P9047 ALBUMIN (HUMAN), 25%, 50ML: HCPCS | Performed by: STUDENT IN AN ORGANIZED HEALTH CARE EDUCATION/TRAINING PROGRAM

## 2020-07-23 PROCEDURE — 36415 COLL VENOUS BLD VENIPUNCTURE: CPT | Performed by: STUDENT IN AN ORGANIZED HEALTH CARE EDUCATION/TRAINING PROGRAM

## 2020-07-23 PROCEDURE — 25000125 ZZHC RX 250: Performed by: STUDENT IN AN ORGANIZED HEALTH CARE EDUCATION/TRAINING PROGRAM

## 2020-07-23 PROCEDURE — 85610 PROTHROMBIN TIME: CPT | Performed by: STUDENT IN AN ORGANIZED HEALTH CARE EDUCATION/TRAINING PROGRAM

## 2020-07-23 PROCEDURE — 99233 SBSQ HOSP IP/OBS HIGH 50: CPT | Mod: GC | Performed by: INTERNAL MEDICINE

## 2020-07-23 PROCEDURE — 80053 COMPREHEN METABOLIC PANEL: CPT | Performed by: STUDENT IN AN ORGANIZED HEALTH CARE EDUCATION/TRAINING PROGRAM

## 2020-07-23 PROCEDURE — 00000146 ZZHCL STATISTIC GLUCOSE BY METER IP

## 2020-07-23 PROCEDURE — 99207 ZZC CDG-CUT & PASTE-POTENTIAL IMPACT ON LEVEL: CPT | Performed by: INTERNAL MEDICINE

## 2020-07-23 PROCEDURE — 85027 COMPLETE CBC AUTOMATED: CPT | Performed by: STUDENT IN AN ORGANIZED HEALTH CARE EDUCATION/TRAINING PROGRAM

## 2020-07-23 RX ORDER — ALBUMIN (HUMAN) 12.5 G/50ML
100 SOLUTION INTRAVENOUS DAILY
Status: COMPLETED | OUTPATIENT
Start: 2020-07-23 | End: 2020-07-25

## 2020-07-23 RX ORDER — CEFTRIAXONE 1 G/1
1 INJECTION, POWDER, FOR SOLUTION INTRAMUSCULAR; INTRAVENOUS EVERY 24 HOURS
Status: DISCONTINUED | OUTPATIENT
Start: 2020-07-23 | End: 2020-07-25

## 2020-07-23 RX ADMIN — LIDOCAINE: 40 CREAM TOPICAL at 06:06

## 2020-07-23 RX ADMIN — RIFAXIMIN 550 MG: 550 TABLET ORAL at 08:40

## 2020-07-23 RX ADMIN — FERROUS SULFATE TAB 325 MG (65 MG ELEMENTAL FE) 325 MG: 325 (65 FE) TAB at 08:40

## 2020-07-23 RX ADMIN — Medication 15 ML: at 20:13

## 2020-07-23 RX ADMIN — PIPERACILLIN AND TAZOBACTAM 4.5 G: 4; .5 INJECTION, POWDER, FOR SOLUTION INTRAVENOUS at 04:37

## 2020-07-23 RX ADMIN — RIFAXIMIN 550 MG: 550 TABLET ORAL at 20:11

## 2020-07-23 RX ADMIN — ERYTHROMYCIN 1 G: 5 OINTMENT OPHTHALMIC at 22:08

## 2020-07-23 RX ADMIN — OXYCODONE HYDROCHLORIDE 15 MG: 5 TABLET ORAL at 00:49

## 2020-07-23 RX ADMIN — MELATONIN 2000 UNITS: at 08:40

## 2020-07-23 RX ADMIN — ENOXAPARIN SODIUM 135 MG: 150 INJECTION SUBCUTANEOUS at 08:42

## 2020-07-23 RX ADMIN — OFLOXACIN 1 DROP: 3 SOLUTION/ DROPS OPHTHALMIC at 20:12

## 2020-07-23 RX ADMIN — OFLOXACIN 1 DROP: 3 SOLUTION/ DROPS OPHTHALMIC at 08:41

## 2020-07-23 RX ADMIN — ACETAMINOPHEN 650 MG: 325 TABLET, FILM COATED ORAL at 04:42

## 2020-07-23 RX ADMIN — OXYCODONE HYDROCHLORIDE 15 MG: 5 TABLET ORAL at 17:07

## 2020-07-23 RX ADMIN — OXYCODONE HYDROCHLORIDE 15 MG: 5 TABLET ORAL at 21:11

## 2020-07-23 RX ADMIN — ALBUMIN HUMAN 100 G: 0.25 SOLUTION INTRAVENOUS at 12:18

## 2020-07-23 RX ADMIN — ACETAMINOPHEN 650 MG: 325 TABLET, FILM COATED ORAL at 20:11

## 2020-07-23 RX ADMIN — LACTULOSE 40 G: 20 SOLUTION ORAL at 04:42

## 2020-07-23 RX ADMIN — OFLOXACIN 1 DROP: 3 SOLUTION/ DROPS OPHTHALMIC at 12:53

## 2020-07-23 RX ADMIN — ACETAMINOPHEN 650 MG: 325 TABLET, FILM COATED ORAL at 12:53

## 2020-07-23 RX ADMIN — PROMETHAZINE HYDROCHLORIDE 25 MG: 25 TABLET ORAL at 18:46

## 2020-07-23 RX ADMIN — CEFTRIAXONE 1 G: 1 INJECTION, POWDER, FOR SOLUTION INTRAMUSCULAR; INTRAVENOUS at 11:16

## 2020-07-23 RX ADMIN — DICLOFENAC SODIUM 2 G: 10 GEL TOPICAL at 08:43

## 2020-07-23 RX ADMIN — PROMETHAZINE HYDROCHLORIDE 25 MG: 25 TABLET ORAL at 00:49

## 2020-07-23 RX ADMIN — QUETIAPINE FUMARATE 50 MG: 50 TABLET ORAL at 22:07

## 2020-07-23 RX ADMIN — LIDOCAINE: 40 CREAM TOPICAL at 18:47

## 2020-07-23 RX ADMIN — PROMETHAZINE HYDROCHLORIDE 25 MG: 25 TABLET ORAL at 08:38

## 2020-07-23 RX ADMIN — Medication 15 ML: at 15:57

## 2020-07-23 RX ADMIN — FOLIC ACID 1 MG: 1 TABLET ORAL at 20:12

## 2020-07-23 RX ADMIN — OXYCODONE HYDROCHLORIDE 15 MG: 5 TABLET ORAL at 12:53

## 2020-07-23 RX ADMIN — DICLOFENAC SODIUM 2 G: 10 GEL TOPICAL at 12:54

## 2020-07-23 RX ADMIN — LIDOCAINE 1 PATCH: 560 PATCH PERCUTANEOUS; TOPICAL; TRANSDERMAL at 20:12

## 2020-07-23 RX ADMIN — FOLIC ACID 1 MG: 1 TABLET ORAL at 08:41

## 2020-07-23 RX ADMIN — LACTULOSE 45 G: 20 SOLUTION ORAL at 08:41

## 2020-07-23 RX ADMIN — Medication 15 ML: at 08:42

## 2020-07-23 RX ADMIN — CYCLOBENZAPRINE 10 MG: 10 TABLET, FILM COATED ORAL at 16:19

## 2020-07-23 RX ADMIN — GABAPENTIN 600 MG: 300 CAPSULE ORAL at 20:12

## 2020-07-23 RX ADMIN — CARBOXYMETHYLCELLULOSE SODIUM 1 DROP: 5 SOLUTION/ DROPS OPHTHALMIC at 00:04

## 2020-07-23 RX ADMIN — LACTULOSE 45 G: 20 SOLUTION ORAL at 17:46

## 2020-07-23 RX ADMIN — OFLOXACIN 1 DROP: 3 SOLUTION/ DROPS OPHTHALMIC at 17:01

## 2020-07-23 RX ADMIN — Medication 15 ML: at 12:54

## 2020-07-23 RX ADMIN — DICLOFENAC SODIUM 2 G: 10 GEL TOPICAL at 20:14

## 2020-07-23 RX ADMIN — INSULIN GLARGINE 10 UNITS: 100 INJECTION, SOLUTION SUBCUTANEOUS at 08:46

## 2020-07-23 RX ADMIN — HYDROXYZINE HYDROCHLORIDE 25 MG: 25 TABLET, FILM COATED ORAL at 12:53

## 2020-07-23 RX ADMIN — CYCLOBENZAPRINE 10 MG: 10 TABLET, FILM COATED ORAL at 09:38

## 2020-07-23 RX ADMIN — GABAPENTIN 600 MG: 300 CAPSULE ORAL at 08:40

## 2020-07-23 RX ADMIN — GABAPENTIN 600 MG: 300 CAPSULE ORAL at 14:11

## 2020-07-23 RX ADMIN — DICLOFENAC SODIUM 2 G: 10 GEL TOPICAL at 15:57

## 2020-07-23 RX ADMIN — PANTOPRAZOLE SODIUM 40 MG: 40 TABLET, DELAYED RELEASE ORAL at 08:40

## 2020-07-23 RX ADMIN — OXYCODONE HYDROCHLORIDE 15 MG: 5 TABLET ORAL at 08:38

## 2020-07-23 ASSESSMENT — ACTIVITIES OF DAILY LIVING (ADL)
ADLS_ACUITY_SCORE: 10

## 2020-07-23 NOTE — PROGRESS NOTES
"Ophthalmology Interval Note    S: Patient reports eyes feel much better since Sunday and initiation of eye drops and ointment. Reports redness resolved and he feels a bit of residual \"scratchiness\" in the left eye.     O: Agree with examination as documented in Ophthalmology Progress note by Dr. JAX Jose.    Patient's eyes appear white and quiet, no conjunctival injection. There is mild staining with fluorescein, PEE left eye> right eye however the epithelial defect is resolved.     A&P: Agree with assessment and plan as documented in Ophthalmology Progress note by Dr. JAX Jose. Considering patient's epithelial defect healed so well can ease off on the antibiotic drops and ointment, as follows:    - Discontinue ofloxacin  - Discontinue erythromycin   - Increase preservative free artificial tears to 4 times daily both eyes  - Start artificial tear ointment at bedtime both eyes  - Ophthalmology will sign off    It is our pleasure to participate in this patient's care and treatment. Please contact us with any further questions or concerns.    Jeanne Gomez MD  Ophthalmology PGY-2    "

## 2020-07-23 NOTE — PROGRESS NOTES
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     Changes:   - Sepsis protocol triggered 7/22 d/t fever to 101.6 degrees F, tachycardia to 121 (though BPA did not alert)   - BCx drawn NTD, w/ CXR, US abdomen, and C diff antigen negative; UA w/ some WBCs, RBCs, LE, UCx pending   - narrowed from Zosyn for broad-spectrum coverage to ceftriaxone d/t low suspicion for Pseudomonas at this time and decreasing platelets   - continue holding home diuretics d/t sepsis workup  - consolidated doses to give 1.5 mg/kg in one dose daily  - AM labs c/f acute-on-chronic thrombocytopenia, with platelets slowly dropping from baseline of 30's-40's to 27 this AM w/ Hgb dropping from 13.9 to 10.8 w/o evidence of a bleed   -per Hematology, will plan to hold Lovenox if platelets <30, but c/f hypercoagulability if Lovenox dose reduced too soon (PVT developed after Lovenox dose decreased from 1 mg/kg BID to 1 mg/kg)  - added ordering assistance by Nutrition    #Sepsis, rule out  Pt triggering sepsis protocol 7/22 AM w/ fever to 101.6 degrees F and tachycardia to 121 bpm. Pt feeling feverish in AM w/ sweats, chills, and bloating sensation in abdomen, with exam notable for mild abdominal distension. Lactate drawn at 0754 borderline at 2.0, recheck at 1000 at 1.8. BCx NTD, w/ negative C diff antigen, CXR WNL. UA w/ protein of 10, WBC 97, and RBC 6, reflex to culture pending. US abdomen w/o notable ascites. Pt feeling  better 7/23 AM, afebrile for 24 hrs.  - BCx drawn, NTD, will continue to monitor  - UCx pending, NTD  - narrowed broad-spectrum antibiotics to ceftriaxone d/t low suspicion for Pseudomonas    #Acute on chronic thrombocytopenia  Pt w/ chronic thrombocytopenia, baseline in 40's. Has hx of multiple GI bleeds while on anticoagulation in the past (most recent 12/2019). Platelets steadily decreasing from baseline of 30's-40's, now 27 on 7/23 AM. Zosyn may be contributing to thrombocytopenia. Concern for increased risk of bleed while on increased anticoagulation.  - Heme consulted, appreciate recs    # Acute Kidney Injury, stable  New JUAN on 7/20. UA with hyaline casts, all cell lines rising, and hypercalcemia all suggest pre-renal etiology, particularly in the setting of poor PO intake and increased stool output due to the increased lactulose for HE treatment. As of 7/23, Cr improved to 1.1's but has reached plateau above pt's baseline of 0.8.  - albumin 100 g TID  - Continue to monitor daily CMP    # Acute portal vein thrombosis  # Protein C deficiency  Pt w/ hypercoagulability 2/2 to liver disease, protein C deficiency. 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 Recently admitted 1/2020 for mesenteric ischemia, discharged on 1 mg/kg Lovenox. Reports compliance and consistency with Lovenox dosing at home. Pt now with nonocclusive portal vein thrombosis identified on imaging at OSH, US abdomen w/ Doppler 7/17 w/ PVT w/o splenic vein involvement. Started on heparin gtt and transitioned to lovenox 7/19, w/ anti-Xa level subtherapeutic at 0.25 7/22 after 5th dose of Lovenox 60 mg BID. MRI 7/21 w/ stable PVT and SMV thromboses.  - Lovenox 1.5 mg/kg to be consolidated into one dose and given once daily   - will monitor for evidence of GI bleed   - anti-Xa level w/in 4-6 hours after 4th dose of Lovenox  - Hematology  consulted, appreciate recs: Low anti-thrombin level  - Images from OSH will be arriving by mail  - MRI abdomen to further evaluate LUQ pain    # Acute on chronic Abdominal pain, stable  Chronic abdominal pain due to chronic pancreatitis. Current pain feels like previous pain experienced when he had a portal vein thrombosis and mesenteric ischemia. Patient recognizes that there is an anxiety component to his pain as well.  - 15 mg oxycodone Q4H PRN  - IV hydromorphone 0.3mg q4h PRN. Currently IV pain meds on hold due to encephalopathy. Once mentation improves, if requiring IV for breakthrough pain, may increase to 20 mg oxycodone Q4H PRN  - Tylenol 650 mg scheduled Q8H, and lidocaine patches PRN for additional pain control  - Seroquel 50 mg at bedtime and 25mg PRN for anxiety 2/2 pain; hydroxyzine PRN for anxiety  - increased gabapentin to 600mg TID (may increase to 900 mg TID in OP setting).   - cyclobenzaprine for muscular pain/back pain  - Will contact PCP on discharge to coordinate outpatient pain management plan  - Pain consulted, appreciate assistance    # Decompensated alcoholic cirrhosis c/b HE, SBP,   # Hepatic encephalopathy  Currently sober and undergoing transplant evaluation, transplant candidate per Dr. Marsh. Ascites initially required therapeutic paracenteses 2x/week, then 1x/week, but he has not required one in the last 5 month none. Has had SBP, on ciprofloxacin 500 mg daily for prophylaxis. His last MELD score is 14 6/23. Requires continuous EGD surveillance for esophageal varices, and he will be seen by GI in 3-6 months for follow up. Worsening confusion on 7/19 concerning for hepatic encephalopathy. Infectious work-up NTD.  Etiology: Alcohol  MELD-Na 13  Hepatic encephalopathy: None, no episodes of HE in the last 7 months  Ascites: None noted on examination and ultrasound. On high dose diuretics, last paracentesis was 01/2020  SBP prophylaxis: On  Ciprofloxacin  TIPS: None  Esophageal/Gastric varices: Last EGD was done 11/2018 with no varices seen. Will need a repeat soon.  Hepatocellular carcinoma: Last USS was done 7/17/2020 and notable for no mass lesions  Transplant: Evaluation ongoing                         Blood type AB+  - Hepatology consulted, appreciate recs  - Continue PTA ciprofloxacin 500mg once daily for SBP prophylaxis  - Continue PTA lactulose, titrate to 3-4 BM/day  - [place] protocol for lactulose until mentation clears  - Continue rifaximin 550 mg BID  - Holding PTA spironolactone 100mg TID and lasix 40mg TID for now given JUAN, sepsis workup  - Continue with 2 g Na restriction diet   - nutrition assistant to aid with mealtime ordering to assist w/ meal ordering  - Will need repeat CT abd w/ contrast in 1 month as outpatient; Transplant service will order.  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  - Ophthalmology consulted, appreciate recs  - Discontinue ofloxacin  - Discontinue erythromycin   - Increase preservative free artificial tears to 4 times daily both eyes  - Start artificial tear ointment at bedtime both eyes    #Right ear, lateral facial paresthesia, resolved  Pt admitted to OSH with abdominal pain, right ear and right lateral facial paresthesias. OSH w/ negative head CT, neuro at OSH recommending MRI to evaluate further. MRI brain w/o contrast without abnormality. Seen by neurology, not felt to be emergent but willing to follow up outpatient. Pt declined further workup at this time. Pt's ear symptoms  spontaneously resolved 7/22 AM, with normal sensation returning entirely.  - Neurology consulted and signed off, appreciated assistance     #Chronic pancreatitis, at baseline  Pt w/ hx of chronic pancreatitis. History of pancreatic cyst but does not require further evaluation and can be cleared for liver transplant per Dr. Acosta. Per pt, current abdominal pain worse than chronic pancreatitis pain but in similar distribution. Lipase only mildly elevated, not indicative of acute pancreatitis.     #Diabetes Mellitus II, at baseline  Last Hgb A1C 8.4% (11/2019), recheck 7/17 is 5.9%.  - Continue glargine at 10 units daily  - Sliding scale: medium sliding scale insulin  - Carb coverage: 1 U/15 g CHO at each meal  - Hypoglycemic protocol   - QID fingersticks AC HS     Diet: low sodium diet  Fluids: albumin followed by NS bolus  DVT Prophylaxis: Therapeutic lovenox  Glaser Catheter: not present  Code Status: full     Disposition Plan     Expected discharge: 2 - 3 days, recommended to prior living arrangement once appropriate pain management and anticoagulation is done.  Entered: Emanuel Houston MD 07/21/20     The patient's care was discussed with the Attending physician, Dr. Ronit Ca, MS4  [phone]    Resident/Fellow Attestation   I, Dalila Fu MD, was present with the medical student who participated in the service and in the documentation of the note.  I have verified the history and personally performed the physical exam and medical decision making.  I agree with the assessment and plan of care as documented in the note.      Dalila Fu MD  PGY 3  Date of Service (when I saw the patient): 07/23/20      Interval History  No acute events overnight.  Pt's mom reports mentation is much closer to baseline today, notes that pt can be very groggy in the morning and overnight when woken from sleep. Pt notes that he feels better since yesterday and has not had more fevers, sweats, or chills. Continues  "to deny infectious symptoms such as coughing, SOB, chest pain, changes in abdominal pain, dysuria. LUQ abdominal pain is stable today. Of note, pt is very tired this AM but frequently smiles today during interview today.    Pt and mother very concerned re receiving all anticoagulation in a single dose d/t pt's history of GI bleeds when on high doses of sing;e-dose anticoagulants in the past. Pt and mother intimate understanding the difficulty of pt's bleeding risk and predisposition for clotting with regard to his treatment plan. Pt and mother indicate understanding of risks of under-dosing and overdosing anticoagulation and risks and benefits of once-daily Lovenox 1.5 mg/kg as a middle-ground of sorts (higher than PTA dose that resulted in PVT, lower than optimal therapeutic dosing at 1 mg/kg BID so lower risk of bleed relative to optimal therapeutic dosing, clotting will increase pt's pain and likely impact pt's transplant candidacy). Pt and mother decided to try 1.5 mg/kg Lovenox once daily, especially while evidence of GI bleed can be monitored while inpatient and with close outpatient follow up.     Data reviewed today: I reviewed all medications, new labs and imaging results over the last 24 hours.    Physical Exam  BP 98/59 (BP Location: Right arm)   Pulse 86   Temp 96.6  F (35.9  C) (Oral)   Resp 18   Ht 1.88 m (6' 2\")   Wt 85.7 kg (189 lb)   SpO2 95%   BMI 24.27 kg/m     GA: A&Ox3, coherent, brighter this AM  HEENT: anicteric sclerae, mucus membranes moist, poor dentition with multiple missing teeth  Lungs: CTAB  CVS: RRR, normal heart sounds  Abd: normal bowel sound, mild abdominal distension appears stable this AM, abdomen is soft and minimally tense with stable generalized epigastric tenderness LUQ>RUQ, some focal tenderness in LLQ; no rebound or guarding; liver and spleen edges not easily palpable  Extremity: no edema seen  Neuro: grossly intact, AO x 4, speech and thought processes are " linear.    Data  Last Comprehensive Metabolic Panel:  Sodium   Date Value Ref Range Status   07/23/2020 137 133 - 144 mmol/L Final     Potassium   Date Value Ref Range Status   07/23/2020 3.6 3.4 - 5.3 mmol/L Final     Chloride   Date Value Ref Range Status   07/23/2020 107 94 - 109 mmol/L Final     Carbon Dioxide   Date Value Ref Range Status   07/23/2020 24 20 - 32 mmol/L Final     Anion Gap   Date Value Ref Range Status   07/23/2020 5 3 - 14 mmol/L Final     Glucose   Date Value Ref Range Status   07/23/2020 110 (H) 70 - 99 mg/dL Final     Urea Nitrogen   Date Value Ref Range Status   07/23/2020 21 7 - 30 mg/dL Final     Creatinine   Date Value Ref Range Status   07/23/2020 1.15 0.66 - 1.25 mg/dL Final     GFR Estimate   Date Value Ref Range Status   07/23/2020 84 >60 mL/min/[1.73_m2] Final     Comment:     Non  GFR Calc  Starting 12/18/2018, serum creatinine based estimated GFR (eGFR) will be   calculated using the Chronic Kidney Disease Epidemiology Collaboration   (CKD-EPI) equation.       Calcium   Date Value Ref Range Status   07/23/2020 8.6 8.5 - 10.1 mg/dL Final     Lab Results   Component Value Date    WBC 2.5 07/23/2020     Lab Results   Component Value Date    RBC 3.37 07/23/2020     Lab Results   Component Value Date    HGB 10.8 07/23/2020     Lab Results   Component Value Date    HCT 32.3 07/23/2020     No components found for: MCT  Lab Results   Component Value Date    MCV 96 07/23/2020     Lab Results   Component Value Date    MCH 32.0 07/23/2020     Lab Results   Component Value Date    MCHC 33.4 07/23/2020     Lab Results   Component Value Date    RDW 14.0 07/23/2020     Lab Results   Component Value Date    PLT 27 07/23/2020     INR: 1.43  Antithrombin IIIc: 59  Hep Xa level 7/19: 0.25 (high intensity range 0.30-0.70)     Exam: US ABDOMEN LIMITED, 7/22/2020 11:46 AM  Indication: Pt w/ PVT and mild ascites per 7/21 MRI, now with mild  abdominal distension and triggering sepsis  protocol. US abdomen to  evaluate ascites.  Comparison: MRI: 7/21/2020.     Findings/                                                                   Impression:   No evidence of significant ascites. Overlying obscuring bowel gas.      I have personally reviewed the examination and initial interpretation  and I agree with the findings.     VILMA MARTI MD      MRI ABDOMEN  Comparison study: Ultrasound 7/17/2020, MRI 3/10/2020, CT 7/16/2020     History: Known portal vein thrombosis with extension into mesentery  having worsening LUQ abdominal pain. GI recommending MRI triple or  quadruple phase with and without contrast.     TECHNIQUE: Images were acquired with and without intravenous contrast  through the abdomen. The following MR images were acquired: TrueFISP,  multiplanar T2 weighted, axial T1 in/out of phase, axial fat-saturated  T1, diffusion-weighted. Multiplanar T1-weighted images with fat  saturation were before contrast administration and at multiple time  points following the administration of intravenous contrast. Contrast  dose: 8 mL intravenous Gadavist.     FINDINGS:     Liver: Cirrhotic configuration of the liver with nodular contour,  heterogeneous lacy T2 signal which particularly affects the right  lobe. T2 signal irregularity corresponds to lacy enhancement seen on  postcontrast sequences. Mild loss of signal intensity in the liver  parenchyma on out of phase imaging. No suspicious liver lesions.  Previously described hypoenhancing lesion in segment 8 is not seen on  today's study.     Gallbladder: Surgically absent. Mild prominence of the central  intrahepatic and the intrahepatic bile ducts, compatible with  reservoir effect.     Spleen: Splenomegaly, measuring 14.9 x 15.4 cm in maximum axial  diameter. No focal splenic lesion.     Kidneys: Simple renal cortical cysts. No soft tissue masses,  hydronephrosis.     Adrenal glands: No adrenal nodules.     Pancreas: Diffusely atrophic with  some loss of intrinsic T1  hyperintensity. No pancreatic ductal dilatation. No focal pancreatic  lesion.     Bowel: No dilated loops of bowel or bowel wall thickening.  Postsurgical changes of Yan-en-Y gastric bypass.     Lymph nodes: Prominent upper abdominal lymph nodes not enlarged by  size criteria.     Blood vessels: Normal caliber abdominal aorta. Nonocclusive thrombus  in the proximal SMV. Nonocclusive thrombus in the main portal vein.  Nearly occlusive thrombus in the left portal vein, which demonstrates  expansion. Nonocclusive thrombus in the right portal vein.  Portosystemic collaterals in the left upper quadrant. Splenic vein is  patent. Recanalization of the periumbilical vein.     Lung bases: Small pleural effusions. No focal consolidation. Bibasilar  atelectasis.     Bones and soft tissues: No suspicious osseous lesions. No soft tissue  masses.     Mesentery and abdominal wall: Edematous mesentery.     Ascites: Trace perihepatic ascites.                                                            IMPRESSION:  1.  Unchanged configuration of the bland thrombus in the SMV, main  portal vein, right portal vein, and left portal vein. The thrombus is  nonocclusive, becoming nearly occlusive in the left portal vein  (unchanged).  2.  Steatosis and cirrhosis with sequela of portal hypertension,  including splenomegaly, portosystemic collaterals, and trace ascites.  3.  Stable findings of chronic pancreatitis.     I have personally reviewed the examination and initial interpretation  and I agree with the findings.     DARY KU MD        Exam: XR CHEST PORT 1 VW, 7/22/2020 2:02 PM  Indication: sirs  Comparison: Chest x-ray 1/6/2020     Findings:   Trachea appears midline. Normal cardiac silhouette. No pneumothorax.  Trace bilateral pleural effusions. Pulmonary vasculature is distinct.  No focal airspace disease. No acute osseous or soft tissue  abnormalities. No acute upper abdominal pathology.                                                                       Impression:      No focal airspace disease.     I have personally reviewed the examination and initial interpretation  and I agree with the findings.     HONG DICKERSON MD        US abdomen with Doppler 7/17  EXAMINATION: Limited Abdominal Ultrasound, 7/17/2020 8:12 AM      COMPARISON: US 6/30/2020, 1/8/2020 and MRI 3/10/2020.     HISTORY: evaluate for ascites, PVT with mesenteric involvement     FINDINGS:   Fluid: Right pleural effusion. No ascites.     Liver: The liver demonstrates nodular contour of the liver with  coarsened echotexture, measuring 16 cm in craniocaudal dimension.  There is no focal mass.      Nonocclusive thrombus in the main portal vein and left portal vein.   Extrahepatic portal vein flow around the thrombus is antegrade at 32  cm/sec.  Right portal vein flow is antegrade, measuring 23 cm/sec.  Left portal vein flow around the thrombus is antegrade, measuring 20  cm/sec.     Flow in the hepatic artery is towards the liver and:  45 cm/s peak systolic  0.83 resistive index.      The splenic vein is patent and flow is towards the liver. The left,  middle, and right hepatic veins are patent with flow towards the IVC.  The IVC is patent with flow towards the heart. The visualized aorta  is not dilated.     Gallbladder: Cholecystectomy.     Bile Ducts: Both the intra- and extrahepatic biliary system are of  normal caliber.  The common bile duct measures 7 mm in diameter.     Kidney: The right kidney measures 11.7 cm long. The left kidney  measures 11.6 cm long. There is no hydronephrosis or hydroureter, no  shadowing renal calculi, cystic lesion or mass.      Spleen: The spleen is enlarged, measuring 17.5 cm in craniocaudal  dimension.                                             IMPRESSION:    1. Cirrhotic configuration of the liver with splenomegaly, compatible  with portal hypertension. No ascites. No focal intrahepatic lesion.  2.  Nonocclusive thrombus in the main portal vein extending into the  left portal vein. Otherwise, patent hepatic vasculature with  appropriate antegrade flow as above.  3. Small right pleural effusion.     I have personally reviewed the examination and initial interpretation  and I agree with the findings.     GURINDER ARITA, DO

## 2020-07-23 NOTE — PROGRESS NOTES
Transplant Social Work Services Progress Note      Date of Initial Social Work Evaluation: 1/7/20  Collaborated with: Liver Transplant Team    Data: Obed is being evaluated for liver transplant.  Intervention: I called patient and reviewed the substance use assessment recommendations provided by TJ Silva as we discussed on 6/29/20.  I directed him to [employer]'s website and explained how to enrol in telephone recovery support.  We also discussed availability of 1:1 meetings with a peer  and group meetings.  Assessment: Obed and I reviewed the importance of relapse prevention and connecting with [place] [facility] as a means of relapse prevention.  Obed agrees to begin weekly telephone coaching, and plans to complete the online registration today.   I support listing Obed for a liver transplant from a psychosocial perspective.  Education provided by : [email or web address]  Plan: I will remain available for the psychosocial needs of this patient.        BRANDY Alvarado, [facility]  Liver Transplant   Phone [phone]  Pager [phone]

## 2020-07-23 NOTE — PROGRESS NOTES
OPHTHALMOLOGY PROGRESS NOTE  07/23/20    Patient: Obed Wiley      HISTORY OF PRESENTING ILLNESS:     Obed Wiley is a 30 year old male with decompensated alcoholic cirrhosis, insulin-dependent Type II DM, protein c deficiency, history of multiple VTEs, admitted on 7/17 with acute portal vein thrombosis while on lovenox.     Ophthalmology was consulted by the primary team out of concern for left eye irritation, and slight inflammation. Patient reports he has been been experiencing left sided foreign body sensation, irritation, burning, itching, and slight eyelid swelling since this morning. Prior to this his mom reports both eyes looked blood shot.  Patient reports that his vision is stable. Denies viral symptoms or being around sick contacts or anyone with pink eye.  Right eye feels well, no concerns. Patient denies history of seasonal allergies.    Interval update: His eyes overall is doing better. He does complain of mild irritation in the eyes which improved with artificial tears. Patient asked if he could have more artificial tears.       10+ review of systems were otherwise negative except for that which has been stated above.      OCULAR/MEDICAL/SURGICAL HISTORIES:     Past Ocular History:  No hx of eye surgeries or eye drops  Epithelial defect, left eye    Family History:  Glaucoma on father's side  Mom adopted - uncertain of family  History    Allergies: sulfa, morphine    Past Medical History:   Diagnosis Date     Alcoholic cirrhosis of liver with ascites (H) 12/23/2019     Diabetes (H)     Type 2 DM, Uses Insulin      DVT (deep vein thrombosis) in pregnancy      H/O protein C deficiency      Hepatic encephalopathy (H)      Hepatitis     Hep A when an infant      History of blood transfusion     2019 at [facility]      Leukopenia 1/11/2020     SBP (spontaneous bacterial peritonitis) (H) 11/2019       Past Surgical History:   Procedure Laterality Date     ABDOMEN SURGERY      Gastric Bypass 2009.  [facility]      CARDIAC SURGERY      [facility]      COLONOSCOPY       ENT SURGERY      Tonsils and Adenoids Removed at 6-7 Years Old      GALLBLADDER SURGERY  2017     GI SURGERY      Upper GI        EXAMINATION:     Base Eye Exam     Visual Acuity       Right Left    Near cc 20/20 20/20          Neuro/Psych     Oriented x3:  Yes    Mood/Affect:  Normal            Slit Lamp and Fundus Exam     External Exam       Right Left    External Normal Normal          Slit Lamp Exam       Right Left    Lids/Lashes Normal Normal    Conjunctiva/Sclera White and quiet White and quiet    Cornea Clear, no staining Clear, no staining    Anterior Chamber Deep and quiet Deep and quiet    Iris Round and reactive Round and reactive    Lens Clear Clear                Labs/Studies/Imaging Performed  NA     ASSESSMENT/PLAN:     Obed Wiley is a 30 year old male with left sided foreign body sensation, irritation, redness, found to have ~3mm central epithelial defect with mild conjunctival injection and left upper eyelid redness.  Unknown mechanism of injury. No foreign body found on exam. It is in his central vision despite that best corrected visual acuity is 20/25. The right eye exam is wnl.     #Epithelial defect, left eye.   #Dry Eye Syndrome, both eyes.   - Discontinue ofloxacin  - Discontinue erythromycin   - INCREASE preservative free artificial tears to 4 times daily both eyes.   - Start artificial tear ointment at bedtime both eyes.   - Ophthalmology will sign off.     #Diabetes  - Dilated exam on 07/19/2020 by Dr. Jeanne Gomez.     It is our pleasure to participate in this patient's care and treatment. Please contact us with any further questions or concerns.    Heide Jose OD.   Ophthalmology  Adjunct   Pager: [phone]

## 2020-07-23 NOTE — CONSULTS
Assessment and Plan:  1. liver transplant evaluation - patient is a good candidate overall. Benefits and surgical risks of a liver transplantation were discussed.  2. End stage liver disease due to cirrohsis of the liver due to alcohol consumption    Surgical evaluation:  1. Portal Vein: non-occlusive thrombus in portal vein and SMV with anterograde flow (ultrasound on 7/17/20)  2. Hepatic Artery: open  3. TIPS: none  4. Previous Abdominal Surgery: Yes; cholecystectomy, Gastric bypass done in [place]  5. Hepatocellular Carcinoma: no focal mass detected (ultrasound on 7/17/20)  6. Ascites: none  7. Costal Angle: narrow  8. Portopulmonary Hypertension: absent  9. Hepatopulmonary Syndrome: absent  10. Cardiac Evaluation: completed dobutamine stress test on 1/7/20  11. Nutritional Status: good    Recommendations:   -The patient has a newly found portal vein/SMV thrombus, please continue anticoagulation  -The patient also has fever of unknown origin at the moment, please keep patient inactive on the list until resolution of fevers  -continue to look for etiology of his fevers    Patient seen and discussed with Dr. Danie Campbell MD  Transplant Surgery Fellow      Patients overall evaluation will be discussed at the Liver Transplant selection committee meeting with a final recommendation on the patients suitability for transplant to be made at that time.    Consult Full Details:  The patient was seen in consultation at the request of Dr. Leventhal due to newly diagnosed portal vein and SMV Thrombus        HPI:  Presenting complaint: abdominal pain and fevers    Patient has been diagnosed to have Laënnec's cirrhosis.  He has decompensated in the form of ascites and encephalopathy.  He has required multiple abdominal paracenteses.  He does have 2 episodes of spontaneous bacterial peritonitis and is currently on prophylaxis with ciprofloxacin.  There is no history of variceal bleeding.  He received Yan-en-Y  gastric bypass in 2009 in [place].  The surgical details are not clear but that is what the patient remembers.  He does not give any history of angina or cardiac disease, but he is diabetic on insulin. He has a history of protein C deficiency and was admitted on 7/17/20 due to abdominal pain likely related to newly found portal vein and SMV thrombus      Previous Transplant Hx: no    Cardiovascular Hx:  h/o Cardiac Issues: no  Exercise Tolerance: adequate    Potential Donor(s): The patient has 2 potential living donor livers    ROS:   REVIEW OF SYSTEMS (check box if normal)  [X]   GENERAL [X]   PULMONARY [X]   GENITOURINARY  [X]    CNS [X]   CARDIAC [X]    ENDOCRINE  [X]   EARS,NOSE,THROAT [X]    GASTROINTESTINAL [X]   NEUROLOGIC   [X]   MUSCLOSKELTAL [X]    HEMATOLOGY    Examination:     Vitals:  There were no vitals taken for this visit.    GENERAL APPEARANCE: alert and no distress  EYES: PERRL  HENT: mouth without ulcers or lesions  NECK: supple, no adenopathy  RESP: non-labored breathing on room air  CV: regular rhythm, normal rate, no rub   ABDOMEN: soft, mild tenderness in epigastric region, non-distended  MS: extremities normal- no gross deformities noted, no evidence of inflammation in joints, no muscle tenderness  SKIN: no rash  NEURO: Normal strength and tone, sensory exam grossly normal, mentation intact and speech normal  PSYCH: mentation appears normal. and affect normal/bright      Results:   Us Abdomen Limited    Result Date: 7/22/2020  Exam: US ABDOMEN LIMITED, 7/22/2020 11:46 AM Indication: Pt w/ PVT and mild ascites per 7/21 MRI, now with mild abdominal distension and triggering sepsis protocol. US abdomen to evaluate ascites. Comparison: MRI: 7/21/2020. Findings/    Impression: No evidence of significant ascites. Overlying obscuring bowel gas. I have personally reviewed the examination and initial interpretation and I agree with the findings. VILMA MARTI MD    Xr Chest Port 1  View    Result Date: 7/22/2020  Exam: XR CHEST PORT 1 VW, 7/22/2020 2:02 PM Indication: sirs Comparison: Chest x-ray 1/6/2020 Findings: Trachea appears midline. Normal cardiac silhouette. No pneumothorax. Trace bilateral pleural effusions. Pulmonary vasculature is distinct. No focal airspace disease. No acute osseous or soft tissue abnormalities. No acute upper abdominal pathology.     Impression: No focal airspace disease. I have personally reviewed the examination and initial interpretation and I agree with the findings. HONG DICKERSON MD    Mr Abdomen W/o & W Contrast    Result Date: 7/21/2020  MRI ABDOMEN Comparison study: Ultrasound 7/17/2020, MRI 3/10/2020, CT 7/16/2020 History: Known portal vein thrombosis with extension into mesentery having worsening LUQ abdominal pain. GI recommending MRI triple or quadruple phase with and without contrast. TECHNIQUE: Images were acquired with and without intravenous contrast through the abdomen. The following MR images were acquired: TrueFISP, multiplanar T2 weighted, axial T1 in/out of phase, axial fat-saturated T1, diffusion-weighted. Multiplanar T1-weighted images with fat saturation were before contrast administration and at multiple time points following the administration of intravenous contrast. Contrast dose: 8 mL intravenous Gadavist. FINDINGS: Liver: Cirrhotic configuration of the liver with nodular contour, heterogeneous lacy T2 signal which particularly affects the right lobe. T2 signal irregularity corresponds to lacy enhancement seen on postcontrast sequences. Mild loss of signal intensity in the liver parenchyma on out of phase imaging. No suspicious liver lesions. Previously described hypoenhancing lesion in segment 8 is not seen on today's study. Gallbladder: Surgically absent. Mild prominence of the central intrahepatic and the intrahepatic bile ducts, compatible with reservoir effect. Spleen: Splenomegaly, measuring 14.9 x 15.4 cm in maximum axial  diameter. No focal splenic lesion. Kidneys: Simple renal cortical cysts. No soft tissue masses, hydronephrosis. Adrenal glands: No adrenal nodules. Pancreas: Diffusely atrophic with some loss of intrinsic T1 hyperintensity. No pancreatic ductal dilatation. No focal pancreatic lesion. Bowel: No dilated loops of bowel or bowel wall thickening. Postsurgical changes of Yan-en-Y gastric bypass. Lymph nodes: Prominent upper abdominal lymph nodes not enlarged by size criteria. Blood vessels: Normal caliber abdominal aorta. Nonocclusive thrombus in the proximal SMV. Nonocclusive thrombus in the main portal vein. Nearly occlusive thrombus in the left portal vein, which demonstrates expansion. Nonocclusive thrombus in the right portal vein. Portosystemic collaterals in the left upper quadrant. Splenic vein is patent. Recanalization of the periumbilical vein. Lung bases: Small pleural effusions. No focal consolidation. Bibasilar atelectasis. Bones and soft tissues: No suspicious osseous lesions. No soft tissue masses. Mesentery and abdominal wall: Edematous mesentery. Ascites: Trace perihepatic ascites.     IMPRESSION: 1.  Unchanged configuration of the bland thrombus in the SMV, main portal vein, right portal vein, and left portal vein. The thrombus is nonocclusive, becoming nearly occlusive in the left portal vein (unchanged). 2.  Steatosis and cirrhosis with sequela of portal hypertension, including splenomegaly, portosystemic collaterals, and trace ascites. 3.  Stable findings of chronic pancreatitis. I have personally reviewed the examination and initial interpretation and I agree with the findings. DARY KU MD      I have reviewed history, examined patient and discussed plan with the fellow/resident/TRUE.  I concur with the findings in this note.

## 2020-07-23 NOTE — PLAN OF CARE
"Shift: 2300 - 0700  VS: /75 (BP Location: Left arm)   Pulse 86   Temp 98  F (36.7  C) (Oral)   Resp 20   Ht 1.88 m (6' 2\")   Wt 85.7 kg (189 lb)   SpO2 98%   BMI 24.27 kg/m      Neuro: A&Ox4, CMS at baseline, [place] = 0 but pt asked for PRN lactulose x1  Cardiac: WDL  Resp: lung sounds clear, no SOB reported, sating well on RA  GI: passing gas, bowel sounds active, watery stool measured  : voiding spontaneously   Skin: L groin opening due to pt popping cyst, scratches on back  Pain/Nausea: LLQ ab pain treated with lido patch, flexeril and oxy; int nausea treated with PO phenergan  LDA: L PIV SL  Diet: 2g Na  Mobility: up ad red  Labs: reviewed, BG 99  Plan: pt mother has concerns about lovenox dose, continue plan of care     "

## 2020-07-23 NOTE — PLAN OF CARE
"/69 (BP Location: Left arm)   Pulse 97   Temp 98.4  F (36.9  C) (Oral)   Resp 18   Ht 1.88 m (6' 2\")   Wt 85.7 kg (189 lb)   SpO2 95%   BMI 24.27 kg/m      Shift: 3732-1750. Pt arrived to floor 1900.   Neuro/Mood: Baseline numbness in LE, no other neuro deficits noted. Pleasant throughout shift.   Respiratory: Clear LS, no SOB or WARNER.  Cardiac: WNL, no chest pain reported. BP stable.   GI/: Voiding w/o difficulty, last BM 07/22/20, 2 this shift.   Diet: 2 gram Na, tolerating well.  Pain/Nausea: Oxy x1 & tylenol x1. No nausea reported.   Incision/Skin/Drains: Revisit skin note, but no major skin deficits noted. Lidocaine patch on abdomen.  Iv access: PIV on L, zosyn ran x1, currently TKO.   Activity: Up ad red.   Labs: Neg for c-diff. UA results in. Last BG 80.   Plan: Continue with teams POC.     "

## 2020-07-23 NOTE — PLAN OF CARE
Transvenous pacing lead inserted into the RV. VSS.   GI/: voiding, having BMs  Diet: tolerating 2G sodium  Skin: pink rash on BLE, scratches on back. Bruising on abdomen  IV: PIV SL  Activity: up ad red  Labs: platelets 27  PRN meds: oxy, flexeril, atarax, and phenergan given  Changes this shift: albumin given, rocephin new AB; pt reports raspy voice and scratchy throat.   Plan: monitor pain, possible discharge tomorrow

## 2020-07-23 NOTE — PROGRESS NOTES
"[facility]    Hepatology Follow-up    CC:           Abdominal pain     Dx:          Probable UTI    PVT with SMV extension (non-occlusive)                   Protein C deficiency                  Decompensated alcoholic cirrhosis     24 hour events:      NAEON     Subjective:  Persistent LUQ pain is unchanged.  No new fevers, seems comfortable today  Mother wondering about liver transplant status    Medications  Current Facility-Administered Medications   Medication Dose Route Frequency     acetaminophen  650 mg Oral Q8H     albumin human  100 g Intravenous Daily     artificial saliva  15 mL Swish & Spit 4x Daily     cefTRIAXone  1 g Intravenous Q24H     cholecalciferol  1,250 mcg Oral Q7 Days     [Held by provider] ciprofloxacin  500 mg Oral Daily     diclofenac  2 g Topical 4x Daily     enoxaparin ANTICOAGULANT  1.5 mg/kg Subcutaneous Q24H     erythromycin   Left Eye At Bedtime     ferrous sulfate  325 mg Oral Daily with breakfast     folic acid  1 mg Oral BID     [Held by provider] furosemide  40 mg Oral TID     gabapentin  600 mg Oral TID     insulin aspart   Subcutaneous TID w/meals     insulin aspart  1-7 Units Subcutaneous TID AC     insulin aspart  1-5 Units Subcutaneous At Bedtime     insulin glargine  10 Units Subcutaneous QAM AC     lactulose  45 g Oral TID     lidocaine  1 patch Transdermal Q24h    And     lidocaine   Transdermal Q8H     ofloxacin  1 drop Left Eye 4x Daily     pantoprazole  40 mg Oral Daily     QUEtiapine  50 mg Oral At Bedtime     rifaximin  550 mg Oral BID     sodium chloride (PF)  3 mL Intracatheter Q8H     sodium chloride (PF)  3 mL Intracatheter Q8H     [Held by provider] spironolactone  100 mg Oral TID     Vitamin D3  2,000 Units Oral Daily       Review of systems  A 10-point review of systems was negative, unless stated above    Examination  /67 (BP Location: Right arm)   Pulse 86   Temp 96.6  F (35.9  C) (Oral)   Resp 18   Ht 1.88 m (6' 2\")   " Wt 85.7 kg (189 lb)   SpO2 98%   BMI 24.27 kg/m      Intake/Output Summary (Last 24 hours) at 7/23/2020 1207  Last data filed at 7/23/2020 0920  Gross per 24 hour   Intake 550 ml   Output 950 ml   Net -400 ml       General: No new issues, afebrile  CVS: RRR  Resp: CTA  Abdomen: Obese, soft, mild generalized tenderness  Extremities:mild edema  Neuro: Alert    Laboratory  Lab Results   Component Value Date     07/23/2020    POTASSIUM 3.6 07/23/2020    CHLORIDE 107 07/23/2020    CO2 24 07/23/2020    BUN 21 07/23/2020    CR 1.15 07/23/2020       Lab Results   Component Value Date    BILITOTAL 1.4 07/23/2020    ALT 26 07/23/2020    AST 28 07/23/2020    ALKPHOS 93 07/23/2020       Lab Results   Component Value Date    WBC 2.5 07/23/2020    HGB 10.8 07/23/2020    MCV 96 07/23/2020    PLT 27 07/23/2020       Lab Results   Component Value Date    INR 1.43 07/23/2020       MELD-Na score: 13 at 7/23/2020  6:48 AM  MELD score: 13 at 7/23/2020  6:48 AM  Calculated from:  Serum Creatinine: 1.15 mg/dL at 7/23/2020  6:48 AM  Serum Sodium: 137 mmol/L at 7/23/2020  6:48 AM  Total Bilirubin: 1.4 mg/dL at 7/23/2020  6:48 AM  INR(ratio): 1.43 at 7/23/2020  6:48 AM  Age: 30 years 5 months      Assessment  30 year old with a history of protein C deficiency, RNYGB in 2009, and decompensated alcoholic cirrhosis manifested by HE and ascites; admitted with abdominal pain and found to have PVT. MRI yesterday shows non-occlusive PVT with extension into SMV as well as splenomegaly. Remains stable, UA with 97 WBC, negative blood cultures, no ascites.   MELD-Na 13     Recommendations  -- Antibiotics per primary team  -- Will discuss with SW about completion of checklist for listing for LT. It appears all other requirements are met.  -- Continue Lovenox per Hematology recommendations  -- Continue diuretics as you are    Patient seen and discussed with attending physician, Dr. Jose Gaming MD  PGY 6  Transplant Hepatology  Fellow    ATTENDING NOTE, GASTROENTEROLOGY/HEPATOLOGY    I saw and discussed this patient with the fellow and participated in the decision making. I agree with the fellow's note. Mulu Garcia MD       25-May-2017

## 2020-07-24 LAB
ALBUMIN SERPL-MCNC: 3.8 G/DL (ref 3.4–5)
ALP SERPL-CCNC: 73 U/L (ref 40–150)
ALT SERPL W P-5'-P-CCNC: 24 U/L (ref 0–70)
ANION GAP SERPL CALCULATED.3IONS-SCNC: 5 MMOL/L (ref 3–14)
AST SERPL W P-5'-P-CCNC: 28 U/L (ref 0–45)
BASOPHILS # BLD AUTO: 0 10E9/L (ref 0–0.2)
BASOPHILS NFR BLD AUTO: 0.5 %
BILIRUB DIRECT SERPL-MCNC: 0.4 MG/DL (ref 0–0.2)
BILIRUB SERPL-MCNC: 0.9 MG/DL (ref 0.2–1.3)
BUN SERPL-MCNC: 20 MG/DL (ref 7–30)
CALCIUM SERPL-MCNC: 8.8 MG/DL (ref 8.5–10.1)
CHLORIDE SERPL-SCNC: 111 MMOL/L (ref 94–109)
CO2 SERPL-SCNC: 23 MMOL/L (ref 20–32)
COPATH REPORT: NORMAL
CREAT SERPL-MCNC: 0.87 MG/DL (ref 0.66–1.25)
DIFFERENTIAL METHOD BLD: ABNORMAL
EOSINOPHIL # BLD AUTO: 0.1 10E9/L (ref 0–0.7)
EOSINOPHIL NFR BLD AUTO: 4.3 %
ERYTHROCYTE [DISTWIDTH] IN BLOOD BY AUTOMATED COUNT: 14.1 % (ref 10–15)
GFR SERPL CREATININE-BSD FRML MDRD: >90 ML/MIN/{1.73_M2}
GLUCOSE BLDC GLUCOMTR-MCNC: 117 MG/DL (ref 70–99)
GLUCOSE BLDC GLUCOMTR-MCNC: 203 MG/DL (ref 70–99)
GLUCOSE BLDC GLUCOMTR-MCNC: 204 MG/DL (ref 70–99)
GLUCOSE BLDC GLUCOMTR-MCNC: 74 MG/DL (ref 70–99)
GLUCOSE BLDC GLUCOMTR-MCNC: 81 MG/DL (ref 70–99)
GLUCOSE BLDC GLUCOMTR-MCNC: 82 MG/DL (ref 70–99)
GLUCOSE SERPL-MCNC: 118 MG/DL (ref 70–99)
HCT VFR BLD AUTO: 31.6 % (ref 40–53)
HGB BLD-MCNC: 10.6 G/DL (ref 13.3–17.7)
IMM GRANULOCYTES # BLD: 0 10E9/L (ref 0–0.4)
IMM GRANULOCYTES NFR BLD: 0.5 %
INR PPP: 1.37 (ref 0.86–1.14)
LYMPHOCYTES # BLD AUTO: 0.5 10E9/L (ref 0.8–5.3)
LYMPHOCYTES NFR BLD AUTO: 27.2 %
MCH RBC QN AUTO: 32.2 PG (ref 26.5–33)
MCHC RBC AUTO-ENTMCNC: 33.5 G/DL (ref 31.5–36.5)
MCV RBC AUTO: 96 FL (ref 78–100)
MONOCYTES # BLD AUTO: 0.2 10E9/L (ref 0–1.3)
MONOCYTES NFR BLD AUTO: 9.2 %
NEUTROPHILS # BLD AUTO: 1.1 10E9/L (ref 1.6–8.3)
NEUTROPHILS NFR BLD AUTO: 58.3 %
NRBC # BLD AUTO: 0 10*3/UL
NRBC BLD AUTO-RTO: 0 /100
PLATELET # BLD AUTO: 24 10E9/L (ref 150–450)
POTASSIUM SERPL-SCNC: 3.9 MMOL/L (ref 3.4–5.3)
PROT SERPL-MCNC: 6.6 G/DL (ref 6.8–8.8)
RBC # BLD AUTO: 3.29 10E12/L (ref 4.4–5.9)
RETICS # AUTO: 59.5 10E9/L (ref 25–95)
RETICS/RBC NFR AUTO: 1.8 % (ref 0.5–2)
SODIUM SERPL-SCNC: 139 MMOL/L (ref 133–144)
WBC # BLD AUTO: 1.8 10E9/L (ref 4–11)

## 2020-07-24 PROCEDURE — 25000132 ZZH RX MED GY IP 250 OP 250 PS 637: Performed by: STUDENT IN AN ORGANIZED HEALTH CARE EDUCATION/TRAINING PROGRAM

## 2020-07-24 PROCEDURE — 80048 BASIC METABOLIC PNL TOTAL CA: CPT | Performed by: STUDENT IN AN ORGANIZED HEALTH CARE EDUCATION/TRAINING PROGRAM

## 2020-07-24 PROCEDURE — 25000125 ZZHC RX 250: Performed by: STUDENT IN AN ORGANIZED HEALTH CARE EDUCATION/TRAINING PROGRAM

## 2020-07-24 PROCEDURE — 99207 ZZC CDG-CUT & PASTE-POTENTIAL IMPACT ON LEVEL: CPT | Performed by: INTERNAL MEDICINE

## 2020-07-24 PROCEDURE — 00000146 ZZHCL STATISTIC GLUCOSE BY METER IP

## 2020-07-24 PROCEDURE — 99233 SBSQ HOSP IP/OBS HIGH 50: CPT | Mod: GC | Performed by: INTERNAL MEDICINE

## 2020-07-24 PROCEDURE — 12000001 ZZH R&B MED SURG/OB UMMC

## 2020-07-24 PROCEDURE — P9047 ALBUMIN (HUMAN), 25%, 50ML: HCPCS | Performed by: STUDENT IN AN ORGANIZED HEALTH CARE EDUCATION/TRAINING PROGRAM

## 2020-07-24 PROCEDURE — 80076 HEPATIC FUNCTION PANEL: CPT | Performed by: STUDENT IN AN ORGANIZED HEALTH CARE EDUCATION/TRAINING PROGRAM

## 2020-07-24 PROCEDURE — 85045 AUTOMATED RETICULOCYTE COUNT: CPT | Performed by: STUDENT IN AN ORGANIZED HEALTH CARE EDUCATION/TRAINING PROGRAM

## 2020-07-24 PROCEDURE — 25000128 H RX IP 250 OP 636: Performed by: STUDENT IN AN ORGANIZED HEALTH CARE EDUCATION/TRAINING PROGRAM

## 2020-07-24 PROCEDURE — 85610 PROTHROMBIN TIME: CPT | Performed by: STUDENT IN AN ORGANIZED HEALTH CARE EDUCATION/TRAINING PROGRAM

## 2020-07-24 PROCEDURE — 36415 COLL VENOUS BLD VENIPUNCTURE: CPT | Performed by: STUDENT IN AN ORGANIZED HEALTH CARE EDUCATION/TRAINING PROGRAM

## 2020-07-24 PROCEDURE — 40000611 ZZHCL STATISTIC MORPHOLOGY W/INTERP HEMEPATH TC 85060: Performed by: STUDENT IN AN ORGANIZED HEALTH CARE EDUCATION/TRAINING PROGRAM

## 2020-07-24 PROCEDURE — 85025 COMPLETE CBC W/AUTO DIFF WBC: CPT | Performed by: STUDENT IN AN ORGANIZED HEALTH CARE EDUCATION/TRAINING PROGRAM

## 2020-07-24 RX ORDER — TRAZODONE HYDROCHLORIDE 100 MG/1
100 TABLET ORAL
Status: DISCONTINUED | OUTPATIENT
Start: 2020-07-24 | End: 2020-07-27 | Stop reason: HOSPADM

## 2020-07-24 RX ADMIN — LACTULOSE 40 G: 20 SOLUTION ORAL at 03:33

## 2020-07-24 RX ADMIN — CYCLOBENZAPRINE 10 MG: 10 TABLET, FILM COATED ORAL at 00:44

## 2020-07-24 RX ADMIN — RIFAXIMIN 550 MG: 550 TABLET ORAL at 08:30

## 2020-07-24 RX ADMIN — FOLIC ACID 1 MG: 1 TABLET ORAL at 08:30

## 2020-07-24 RX ADMIN — GABAPENTIN 600 MG: 300 CAPSULE ORAL at 13:13

## 2020-07-24 RX ADMIN — CYCLOBENZAPRINE 10 MG: 10 TABLET, FILM COATED ORAL at 09:37

## 2020-07-24 RX ADMIN — ACETAMINOPHEN 650 MG: 325 TABLET, FILM COATED ORAL at 12:10

## 2020-07-24 RX ADMIN — OXYCODONE HYDROCHLORIDE 15 MG: 5 TABLET ORAL at 08:29

## 2020-07-24 RX ADMIN — SENNOSIDES 8.6 MG: 8.6 TABLET, FILM COATED ORAL at 09:37

## 2020-07-24 RX ADMIN — LACTULOSE 45 G: 20 SOLUTION ORAL at 08:30

## 2020-07-24 RX ADMIN — HYDROXYZINE HYDROCHLORIDE 25 MG: 25 TABLET, FILM COATED ORAL at 03:33

## 2020-07-24 RX ADMIN — MELATONIN 2000 UNITS: at 08:30

## 2020-07-24 RX ADMIN — FOLIC ACID 1 MG: 1 TABLET ORAL at 19:56

## 2020-07-24 RX ADMIN — PROMETHAZINE HYDROCHLORIDE 25 MG: 25 TABLET ORAL at 00:44

## 2020-07-24 RX ADMIN — INSULIN GLARGINE 10 UNITS: 100 INJECTION, SOLUTION SUBCUTANEOUS at 08:31

## 2020-07-24 RX ADMIN — LACTULOSE 45 G: 20 SOLUTION ORAL at 17:16

## 2020-07-24 RX ADMIN — OFLOXACIN 1 DROP: 3 SOLUTION/ DROPS OPHTHALMIC at 08:31

## 2020-07-24 RX ADMIN — OXYCODONE HYDROCHLORIDE 15 MG: 5 TABLET ORAL at 21:20

## 2020-07-24 RX ADMIN — ACETAMINOPHEN 650 MG: 325 TABLET, FILM COATED ORAL at 19:56

## 2020-07-24 RX ADMIN — BENZOCAINE AND MENTHOL 1 LOZENGE: 15; 3.6 LOZENGE ORAL at 00:44

## 2020-07-24 RX ADMIN — Medication 15 ML: at 19:56

## 2020-07-24 RX ADMIN — Medication 15 ML: at 11:37

## 2020-07-24 RX ADMIN — OXYCODONE HYDROCHLORIDE 15 MG: 5 TABLET ORAL at 01:25

## 2020-07-24 RX ADMIN — PROMETHAZINE HYDROCHLORIDE 25 MG: 25 TABLET ORAL at 08:29

## 2020-07-24 RX ADMIN — CARBOXYMETHYLCELLULOSE SODIUM 1 DROP: 5 SOLUTION/ DROPS OPHTHALMIC at 03:37

## 2020-07-24 RX ADMIN — FERROUS SULFATE TAB 325 MG (65 MG ELEMENTAL FE) 325 MG: 325 (65 FE) TAB at 08:30

## 2020-07-24 RX ADMIN — CYCLOBENZAPRINE 10 MG: 10 TABLET, FILM COATED ORAL at 18:34

## 2020-07-24 RX ADMIN — DICLOFENAC SODIUM 2 G: 10 GEL TOPICAL at 21:21

## 2020-07-24 RX ADMIN — RIFAXIMIN 550 MG: 550 TABLET ORAL at 19:56

## 2020-07-24 RX ADMIN — OFLOXACIN 1 DROP: 3 SOLUTION/ DROPS OPHTHALMIC at 17:17

## 2020-07-24 RX ADMIN — LIDOCAINE: 40 CREAM TOPICAL at 06:09

## 2020-07-24 RX ADMIN — HYDROXYZINE HYDROCHLORIDE 25 MG: 25 TABLET, FILM COATED ORAL at 18:36

## 2020-07-24 RX ADMIN — GABAPENTIN 600 MG: 300 CAPSULE ORAL at 08:30

## 2020-07-24 RX ADMIN — ENOXAPARIN SODIUM 135 MG: 150 INJECTION SUBCUTANEOUS at 17:18

## 2020-07-24 RX ADMIN — OFLOXACIN 1 DROP: 3 SOLUTION/ DROPS OPHTHALMIC at 19:57

## 2020-07-24 RX ADMIN — GABAPENTIN 600 MG: 300 CAPSULE ORAL at 19:56

## 2020-07-24 RX ADMIN — INSULIN ASPART 2 UNITS: 100 INJECTION, SOLUTION INTRAVENOUS; SUBCUTANEOUS at 17:19

## 2020-07-24 RX ADMIN — LIDOCAINE 1 PATCH: 560 PATCH PERCUTANEOUS; TOPICAL; TRANSDERMAL at 19:56

## 2020-07-24 RX ADMIN — LACTULOSE 45 G: 20 SOLUTION ORAL at 13:13

## 2020-07-24 RX ADMIN — BENZOCAINE AND MENTHOL 1 LOZENGE: 15; 3.6 LOZENGE ORAL at 08:32

## 2020-07-24 RX ADMIN — POLYETHYLENE GLYCOL 3350 17 G: 17 POWDER, FOR SOLUTION ORAL at 03:37

## 2020-07-24 RX ADMIN — OXYCODONE HYDROCHLORIDE 15 MG: 5 TABLET ORAL at 13:13

## 2020-07-24 RX ADMIN — CEFTRIAXONE 1 G: 1 INJECTION, POWDER, FOR SOLUTION INTRAMUSCULAR; INTRAVENOUS at 11:37

## 2020-07-24 RX ADMIN — ACETAMINOPHEN 650 MG: 325 TABLET, FILM COATED ORAL at 03:33

## 2020-07-24 RX ADMIN — ALBUMIN HUMAN 100 G: 0.25 SOLUTION INTRAVENOUS at 08:31

## 2020-07-24 RX ADMIN — CARBOXYMETHYLCELLULOSE SODIUM 1 DROP: 5 SOLUTION/ DROPS OPHTHALMIC at 11:37

## 2020-07-24 RX ADMIN — OXYCODONE HYDROCHLORIDE 15 MG: 5 TABLET ORAL at 17:29

## 2020-07-24 RX ADMIN — DICLOFENAC SODIUM 2 G: 10 GEL TOPICAL at 08:33

## 2020-07-24 RX ADMIN — ERYTHROMYCIN 1 G: 5 OINTMENT OPHTHALMIC at 22:23

## 2020-07-24 RX ADMIN — DICLOFENAC SODIUM 2 G: 10 GEL TOPICAL at 11:38

## 2020-07-24 RX ADMIN — OFLOXACIN 1 DROP: 3 SOLUTION/ DROPS OPHTHALMIC at 12:10

## 2020-07-24 RX ADMIN — PANTOPRAZOLE SODIUM 40 MG: 40 TABLET, DELAYED RELEASE ORAL at 08:30

## 2020-07-24 RX ADMIN — HYDROXYZINE HYDROCHLORIDE 25 MG: 25 TABLET, FILM COATED ORAL at 09:37

## 2020-07-24 RX ADMIN — Medication 15 ML: at 08:33

## 2020-07-24 ASSESSMENT — ACTIVITIES OF DAILY LIVING (ADL)
ADLS_ACUITY_SCORE: 12
ADLS_ACUITY_SCORE: 10
ADLS_ACUITY_SCORE: 12
ADLS_ACUITY_SCORE: 12

## 2020-07-24 NOTE — PROGRESS NOTES
CLINICAL NUTRITION SERVICES - ASSESSMENT NOTE     Nutrition Prescription    RECOMMENDATIONS FOR MDs/PROVIDERS TO ORDER:  Continue to encourage adequate PO intakes (ie 3 meals daily + supplements as tolerated).     Monitor appropriateness of vitamin D supplementation (receiving daily 2000 units and weekly 50,000 units cholecalciferol)    Consider the following micronutrient supplementation given s/p Yan-en-y Gastric Bypass:   - Multivitamin/minerals: adult dose 2 times daily (consider multivitamin without minerals in setting of cirrhosis and potential c/f trace element toxicity with this)   - Calcium Citrate containing vitamin D: 500 mg 3 times daily or 600 mg 2 times daily   - Vitamin B12: sublingual form of at least 500 mcg daily or injection of 1000 mcg monthly (Vitamin B12 level was WNL when last checked but still at higher risk for deficiency)   - B-50 Complex daily    Malnutrition Status:    Unable to determine due to unable to obtain NFPE at this time    Recommendations already ordered by Registered Dietitian (RD):  Supplements: continue  Room Service with Assist: continue  [employer] - will provide when in-house    Future/Additional Recommendations:  Continue to monitor adequacy of PO intake vs need for calorie counts to quantify PO intake and determine if more aggressive nutrition interventions warranted.     With history of chronic pancreatitis, consider checking vitamin A and E levels. Additionally would check fecal elastase, however currently on lactulose and diarrhea can falsely depress level d/t diluted sample. Can also monitor for s/s of malabsorption and/or trial PERT to see if any benefit.      REASON FOR ASSESSMENT  Obed Wiley is a/an 30 year old male assessed by the dietitian for LOS.    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 Admitted 7/17 from OSH for abdominal pain 2/2 to acute portal vein thrombosis.    NUTRITION HISTORY  Pt unknown to Clinical Nutrition PTA. Per 7/19 RD discussion with patient \"Pt reports that he has been following a low sodium, consistent carbohydrate diet at home and has been following with an RD OP...He is concerned about carbohydrate content about current oral nutrition supplements (on boost shakes) - adjusted to boost plus per his request - he is aware of boost glucose control availability. Typically he is drinking protein shakes TID at home with each meal with goal to optimize nutrition. He has had some concerns about food in house - either food is cold and no palatable or there are missing/incorrect items and he wants the supplements for both nutrition and to have carbohydrate allotment in case he doesn't eat full tray of food that he has ordered. He tries to eat a more organic diet at home and cooks a lot of food - limits sodium intake and familiar with sodium content of foods.  He has difficulty ordering off of menu without Nutritionals available on menu. Of note, pt with history of RYGB in 2009. He has been taking micronutrients at home (MVI, calcium, fe, vitamin D). Recent B12 level check 1/2020 which was WNL\"    No food allergies noted per chart review. Spoke with patient's mother via in-room phone. She reports that pt's appetite has been good. He is eating 3 meals daily plus Boost and Magic Cup supplements. She reports that Room Service with Assist has been extremely helpful for planning meals and getting adequate sources of protein. Pt had a stressful morning and was resting at time of RD phone call.     CURRENT NUTRITION ORDERS  Diet: 2 g Sodium, Supplements: Boost Plus with meals (vanilla), Room Service with Assist  Intake/Tolerance: Per " "flowsheets, pt consuming % of 1-2 meals daily. Pt's mom reports he is consistently consuming 3 meals + supplements daily.     GI:    - Per intake/output, last BM documented today with 1x occurrence (on lactulose)  - Chronic abdominal pain due to chronic pancreatitis  - Requires continuous EGD surveillance for esophageal varices, and he will be seen by GI in 3-6 months for follow up    LABS  Labs reviewed  - Na+ 139 (WNL)  - K+ 3.9 (WNL)  - Glucose Trends 117 <- 118 <- 204 <- 139  - Urinary Ketones 5 on 7/22   - T Bili 0.9 (WNL), Direct Bili 0.4 (H), Alk Phos 73 (WNL), ALT 24 (WNL), AST 28 (WNL)    Renal: JUAN stable  - BUN 20 (WNL)  - Cr 0.87 (WNL)    MEDICATIONS  Medications reviewed  - Cholecalciferol 50,000 units every 7 days  - Ferrous sulfate 325 mg  - Folic acid 1 mg  - Lasix (holding, if Cr stable will restart)  - Insulin: 1 units per 15 grams of carbohydrate, medium resistance sliding scale, Lantus 10 units every AM  - Lactulose TID  - Cholecalciferol 2000 units daily    ANTHROPOMETRICS  Height: 188 cm (6' 2\")  Most Recent Weight: 85.7 kg (189 lb)    IBW: 86.4 kg (99% IBW)  BMI: Normal BMI  Weight History:   Wt Readings from Last 15 Encounters:   07/22/20 85.7 kg (189 lb)   03/10/20 93 kg (205 lb)   03/09/20 94.4 kg (208 lb 1.6 oz)   01/11/20 94.7 kg (208 lb 12.8 oz)   01/07/20 86.2 kg (190 lb)   12/03/19 90.7 kg (200 lb)   Per Care Everywhere:   06/27/20          81.6 kg (180 lb)  11/28/19          94.2 kg (207 lb 9.6 oz)  10/29/19          92.3 kg (203 lb 7.8 oz)    Over the past ~9 months, pt's weight has fluctuated between 85.7 - 94.7 kg, cannot r/o shifts in fluid status with history of ascites initially required therapeutic paracenteses 2x/week, then 1x/week, but he has not required one in the last 5 months. Since 3/10/20, pt has lost 7.3 kg (8% body weight), which is considered clinically significant non-severe if all true weight loss.     Dosing Weight: 82 kg (actual) - based on lowest " documented wt so far this adm (81.7 kg on 7/17) and IBW of 86.4 kg    ASSESSED NUTRITION NEEDS  Estimated Energy Needs: 2460+ kcals/day (30+ kcals/kg)  Justification:  Increased needs 2/2 cirrhosis and chronic pancreatitis  Estimated Protein Needs: 98 - 123+ grams protein/day (1.2 - 1.5+ grams of pro/kg)  Justification: Increased needs (pending renal function)  Estimated Fluid Needs: 1 mL/kcal  Justification: Maintenance or Per provider pending fluid status    PHYSICAL FINDINGS  See malnutrition section below.    MALNUTRITION  % Intake: Decreased intake does not meet criteria (per discussion with pt's mom)  % Weight Loss: Up to 7.5% in 3 months (non-severe)  Subcutaneous Fat Loss: Unable to assess  Muscle Loss: Unable to assess  Fluid Accumulation/Edema: Unable to assess  Malnutrition Diagnosis: Unable to determine due to unable to obtain NFPE at this time    NUTRITION DIAGNOSIS  Increased nutrient needs (energy, protein) related to increased demand 2/2 cirrhisos and chronic pancreatitis as evidenced by estimated nutrition needs 30 kcal/kg and 1.2-1.5 g pro/kg.       INTERVENTIONS  Implementation  Room Service with Assist - continue  Medical food supplement therapy - continue  Multivitamin/mineral supplement therapy - recs above    Goals  Patient to consume % of nutritionally adequate meal trays TID, or the equivalent with supplements/snacks.     Monitoring/Evaluation  Progress toward goals will be monitored and evaluated per protocol.    Terry Quintanilla, MS, RD, LD  5A [phone])/7B floor pager [phone]

## 2020-07-24 NOTE — PLAN OF CARE
"Shift: 2300 - 0700  VS: /63 (BP Location: Right arm)   Pulse 86   Temp 96.8  F (36  C) (Oral)   Resp 16   Ht 1.88 m (6' 2\")   Wt 85.7 kg (189 lb)   SpO2 97%   BMI 24.27 kg/m    Neuro: A&Ox4, CMS at baseline, [place] = 0 but pt asked for PRN lactulose x1, atarax given x1, denies lightheadedness  Cardiac: WDL  Resp: lung sounds clear, no SOB reported, sating well on RA  GI: passing gas, bowel sounds active, loose stool measured, pt asking for miralax despite loose stools  : voiding spontaneously, pt inconsistent with saving urine - given new urinal  Skin: L groin opening due to pt popping cyst, scratches on back, light pink rash on legs bilat  Pain/Nausea: LLQ ab pain treated with lido patch, flexeril and oxy; int nausea treated with PO phenergan  LDA: L PIV SL  Diet: 2g Na  Mobility: up ad red  Labs: reviewed, , lidocaine cream applied prior to lab draw  Plan: continue plan of care   "

## 2020-07-24 NOTE — PLAN OF CARE
VSS.   GI/: voiding, having BMs  Diet: tolerating 2G sodium diet, BG stable CC  Skin: pink rash on BLE improving, scratches on back improving. Bruising on abdomen  IV: PIV SL  Activity: up ad red  Labs: platelets 24, lovenox held.   PRN meds: oxy, flexeril, atarax, and phenergan given  Changes this shift: albumin given   Plan: monitor pain and hematology recs

## 2020-07-24 NOTE — PLAN OF CARE
7B: PT check in with patient and RN about discharge plan and need for SPC/DME at time of discharge. Likely discharge within the next few days but overall variable discharge plan. PT will check in regularly to ensure patient gets DME at discharge. Provided with SPC today from rehab department to use in halls.

## 2020-07-24 NOTE — PROGRESS NOTES
"  Hematology - Inpatient Consult Service  Progress Note   Date of Service: 07/24/2020    Patient: Obed Wiley  MRN: [id number]  Admission Date: 7/17/2020  Hospital Day # 7      Interval History:  No acute overnight events.     Physical Exam:    Blood pressure 95/53, pulse 86, temperature 97.3  F (36.3  C), temperature source Oral, resp. rate 18, height 1.88 m (6' 2\"), weight 85.7 kg (189 lb), SpO2 98 %.  General: Alert and cooperative, lying in bed, no acute distress  CV: RRR  Resp: CTAB, no wheezing/crackles, normal respiratory effort on ambient air  GI: Soft, non-tender, non-distended, bowel sounds present and normoactive  Neuro: AOx3, moves all extremities equally, no focal deficits    Medications:  I reviewed all medications.     Labs & Studies:    CBC  Recent Labs   Lab 07/24/20  0708 07/23/20  0648 07/22/20  0754 07/21/20  1125   WBC 1.8* 2.5* 5.1 2.9*   RBC 3.29* 3.37* 4.36* 3.97*   HGB 10.6* 10.8* 13.9 12.5*   HCT 31.6* 32.3* 42.2 38.1*   MCV 96 96 97 96   MCH 32.2 32.0 31.9 31.5   MCHC 33.5 33.4 32.9 32.8   RDW 14.1 14.0 14.0 14.0   PLT 24* 27* 31* 40*     INR  Recent Labs   Lab 07/24/20  0708 07/23/20  0648 07/22/20  0754 07/21/20  0655   INR 1.37* 1.43* 1.36* 1.58*       Problem list   1. Alcoholic cirrhosis  2. Heterozygous protein C deficiency  3. Complicated hx of PE, DVT, mesenteric vein thrombosis and now portal vein thrombosis  4. Hx of GI bleeding   5. 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 Chronic indwelling IVC filter in need for chronic anticoagulation  7. Hx of Yan-en-Y gastric bypass    Assessment and Recommendations  Patient is chronically thrombocytopenic. While his platelets have continued to trend downward and are down to 24 today, his baseline is 30-40's and therefore they do not seem a whole lot different than where patient normally lives. We recommend to continue enoxaparin at the current dose of 1.5 mg/kg daily and closely trend platelets while understanding that patient " remains a high bleeding risk.     If the thrombocytopenia continues to worsen, we can consider Eltrombopag. It is a thrombopoietin agonist. It should stimulate platelet production in this patient who is presumably thrombopoietin deficient given it is made in the liver and patient has cirrhosis. It carries an additional risk of thrombosis and therefore we are trying to avoid it for now given hx of recurrent VTE.     We will continue to follow this patient while hospitalized. Please do not hesitate to page with any questions or concerns. Patient was seen and plan of care was discussed with attending physician Dr. Calhoun.    Familia Rodriguez  Hematology/Oncology Fellow  Pager: [phone]  Attending  The patient was seen and examined by me separate from the resident/fellow provider.The note above reflects my assessment and plan. I have personally reviewed today's labs,vital and radiology results. The points of care that were added by me are:    I saw Obed in January with a similar presentation.His platelets were ~ 30 k. For now I would suggest we continue enoxaparin 1.5mg/kg daily and follow the platelet count.He has no petechiae on leg or mouth His right ey hs a sub conjunctival redness but no petechiae. If platelets go below 20 k would back off enoxaparin and add promacta to increase platelets and minimize bleeding risk though it may be prothrombotic  John Calhoun M.D.  [phone]

## 2020-07-24 NOTE — PLAN OF CARE
"/63 (BP Location: Left arm)   Pulse 86   Temp 97.5  F (36.4  C) (Oral)   Resp 16   Ht 1.88 m (6' 2\")   Wt 85.7 kg (189 lb)   SpO2 96%   BMI 24.27 kg/m      Shift: 4903-4469  Neuro: A&Ox4  Resp: RA, denies SOB  Cardiac: WNL, denies CP  GI/: voiding, having Bms - attempting to measure both  Diet: tolerating 2G sodium  Skin: pink rash on BLE, scratches on back. Bruising on abdomen  IV: L PIV SL  Activity: up ad red; ambulating in [place]   Labs: platelets 27 - team aware; BG 87 & 139  PRN meds: oxy, flexeril, and phenergan given  Changes this shift: nauseous - PRN phenergan; c/o lightheadedness - MD made aware, MD evaluated pt at bedside. Albumin given previous shift.   Plan: Continue POC, monitor pain, possible discharge tomorrow  "

## 2020-07-24 NOTE — PROGRESS NOTES
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     Changes:   - Restart lovenox per hematology. Okay with platelets >20  - Monitor for signs and symptoms of bleeding  - If Cr stable tomorrow, will plan to restart diuretics 7/25 (and can liberalize diet at that point)      # SIRS, rule out  Pt triggering sepsis protocol 7/22 AM w/ fever to 101.6 degrees F and tachycardia to 121 bpm. Pt feeling feverish in AM w/ sweats, chills, and bloating sensation in abdomen, with exam notable for mild abdominal distension. Lactate drawn at 0754 borderline at 2.0, recheck at 1000 at 1.8. BCx NTD, w/ negative C diff antigen, CXR WNL. UA w/ protein of 10, WBC 97, and RBC 6, reflex to culture pending. US abdomen w/o notable ascites. Pt feeling better 7/23 AM, afebrile for 24 hrs.  - BCx drawn, NTD, will continue to monitor  - UCx pending, NTD  - narrowed broad-spectrum antibiotics to ceftriaxone d/t low suspicion for Pseudomonas    #Acute on chronic thrombocytopenia  Pt w/ chronic thrombocytopenia, baseline in 40's. Has hx of multiple GI bleeds while on anticoagulation in the past (most recent 12/2019). Platelets steadily decreasing from baseline of 30's-40's, now 27 on 7/23 AM. Zosyn may be contributing to thrombocytopenia as well as sequestration in the spleen, and consumption. Concern for increased risk of bleed while on increased anticoagulation.  - Heme consulted, appreciate recs  - monitor for signs or  symptoms of bleeding. Low threshold to check hgb    # Acute Kidney Injury, resolved  New JUAN on 7/20. UA with hyaline casts, all cell lines rising, and hypercalcemia all suggest pre-renal etiology, particularly in the setting of poor PO intake and increased stool output due to the increased lactulose for HE treatment. As of 7/23, Cr improved to 1.1's but has reached plateau above pt's baseline of 0.8.  - albumin 100 g TID  - Continue to monitor daily CMP  - if Cr stable 7/25, can restart diuretics    # Acute portal vein thrombosis  # Protein C deficiency  Pt w/ hypercoagulability 2/2 to liver disease, protein C deficiency. 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 Recently admitted 1/2020 for mesenteric ischemia, discharged on 1 mg/kg Lovenox. Reports compliance and consistency with Lovenox dosing at home. Pt now with nonocclusive portal vein thrombosis identified on imaging at OSH, US abdomen w/ Doppler 7/17 w/ PVT w/o splenic vein involvement. Started on heparin gtt and transitioned to lovenox 7/19, w/ anti-Xa level subtherapeutic at 0.25 7/22 after 5th dose of Lovenox 60 mg BID. MRI 7/21 w/ stable PVT and SMV thromboses.  - Lovenox 1.5 mg/kg to be consolidated into one dose and given once daily   - will monitor for evidence of GI bleed   - anti-Xa level w/in 4-6 hours after 4th dose of Lovenox  - Hematology consulted, appreciate recs: Low anti-thrombin level    # Acute on chronic Abdominal pain, stable  Chronic abdominal pain due to chronic pancreatitis. Current pain feels like previous pain experienced when he had a portal vein thrombosis and mesenteric ischemia. Patient recognizes that there is an anxiety component to his pain as well.  - 15 mg oxycodone Q4H PRN  - IV hydromorphone 0.3mg q4h PRN. Currently IV pain meds on hold due to encephalopathy. Once mentation improves, if requiring IV for  breakthrough pain, may increase to 20 mg oxycodone Q4H PRN  - Tylenol 650 mg scheduled Q8H, and lidocaine patches PRN for additional pain control  - Seroquel 50 mg at bedtime and 25mg PRN for anxiety 2/2 pain; hydroxyzine PRN for anxiety  - increased gabapentin to 600mg TID (may increase to 900 mg TID in OP setting).   - cyclobenzaprine for muscular pain/back pain  - Will contact PCP on discharge to coordinate outpatient pain management plan  - Pain consulted, appreciate assistance    # Decompensated alcoholic cirrhosis c/b HE, SBP,   # Hepatic encephalopathy  Currently sober and undergoing transplant evaluation, transplant candidate per Dr. Marsh. Ascites initially required therapeutic paracenteses 2x/week, then 1x/week, but he has not required one in the last 5 month none. Has had SBP, on ciprofloxacin 500 mg daily for prophylaxis. His last MELD score is 14 6/23. Requires continuous EGD surveillance for esophageal varices, and he will be seen by GI in 3-6 months for follow up. Worsening confusion on 7/19 concerning for hepatic encephalopathy. Infectious work-up NTD.  Etiology: Alcohol  MELD-Na 13  Hepatic encephalopathy: None, no episodes of HE in the last 7 months  Ascites: None noted on examination and ultrasound. On high dose diuretics, last paracentesis was 01/2020  SBP prophylaxis: On Ciprofloxacin  TIPS: None  Esophageal/Gastric varices: Last EGD was done 11/2018 with no varices seen. Will need a repeat soon.  Hepatocellular carcinoma: Last USS was done 7/17/2020 and notable for no mass lesions  Transplant: Evaluation ongoing                         Blood type AB+  - Hepatology consulted, appreciate recs  - Continue PTA ciprofloxacin 500mg once daily for SBP prophylaxis  - Continue PTA lactulose, titrate to 3-4 BM/day  - [place] protocol for lactulose until mentation clears  - Continue rifaximin 550 mg BID  - Holding PTA spironolactone 100mg TID and lasix 40mg TID for now given JUAN, sepsis workup  -  Continue with 2 g Na restriction diet   - nutrition assistant to aid with mealtime ordering to assist w/ meal ordering  - Will need repeat CT abd w/ contrast in 1 month as outpatient; Transplant service will order.  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  - Ophthalmology consulted, appreciate recs  - Discontinue ofloxacin  - Discontinue erythromycin   - Increase preservative free artificial tears to 4 times daily both eyes  - Start artificial tear ointment at bedtime both eyes    #Right ear, lateral facial paresthesia, resolved  Pt admitted to OSH with abdominal pain, right ear and right lateral facial paresthesias. OSH w/ negative head CT, neuro at OSH recommending MRI to evaluate further. MRI brain w/o contrast without abnormality. Seen by neurology, not felt to be emergent but willing to follow up outpatient. Pt declined further workup at this time. Pt's ear symptoms spontaneously resolved 7/22 AM, with normal sensation returning entirely.  - Neurology consulted and signed off, appreciated assistance     #Chronic pancreatitis, at baseline  Pt w/ hx of chronic pancreatitis. History of pancreatic cyst but does not require further evaluation and can be cleared for liver transplant per Dr. Acosta. Per pt, current abdominal pain worse than chronic pancreatitis pain but in similar distribution. Lipase only mildly elevated, not indicative of acute pancreatitis.     #Diabetes Mellitus II, at baseline  Last Hgb A1C 8.4% (11/2019), recheck 7/17 is 5.9%.  - Continue glargine at 10 units daily  - Sliding  "scale: medium sliding scale insulin  - Carb coverage: 1 U/15 g CHO at each meal  - Hypoglycemic protocol   - QID fingersticks AC HS     Diet: low sodium diet  Fluids: albumin followed by NS bolus  DVT Prophylaxis: Therapeutic lovenox  Glaser Catheter: not present  Code Status: full     Disposition Plan     Expected discharge: 2 - 3 days, recommended to prior living arrangement once appropriate pain management and anticoagulation is done.  Entered: Emanuel Houston MD 07/21/20     The patient's care was discussed with the Attending physician, Dr. Ronit Ca, MS4  [phone]    Resident/Fellow Attestation   I, Dalila Fu MD, was present with the medical student who participated in the service and in the documentation of the note.  I have verified the history and personally performed the physical exam and medical decision making.  I agree with the assessment and plan of care as documented in the note.      Dalila Fu MD  PGY 3  Date of Service (when I saw the patient): 07/24/20      Interval History  No acute events overnight.  Feeling well today. Continues to have abdominal pain, but it is no worse. Nausea is at baseline. Frustrated with low salt diet. Reports right hip pain when walking, but no evidence of bruising.     Data reviewed today: I reviewed all medications, new labs and imaging results over the last 24 hours.    Physical Exam  /63 (BP Location: Right arm)   Pulse 86   Temp 96.8  F (36  C) (Oral)   Resp 16   Ht 1.88 m (6' 2\")   Wt 85.7 kg (189 lb)   SpO2 97%   BMI 24.27 kg/m     GA: A&Ox3, coherent, brighter this AM  HEENT: anicteric sclerae, mucus membranes moist, poor dentition with multiple missing teeth  Lungs: CTAB  CVS: RRR, normal heart sounds  Abd: normal bowel sound, mild abdominal distension appears stable this AM, abdomen is soft and minimally tense with stable generalized epigastric tenderness LUQ>RUQ, some focal tenderness in LLQ; no rebound or guarding; liver " and spleen edges not easily palpable  Extremity: no edema seen  Neuro: grossly intact, AO x 4, speech and thought processes are linear.    Data  Last Comprehensive Metabolic Panel:  Sodium   Date Value Ref Range Status   07/23/2020 137 133 - 144 mmol/L Final     Potassium   Date Value Ref Range Status   07/23/2020 3.6 3.4 - 5.3 mmol/L Final     Chloride   Date Value Ref Range Status   07/23/2020 107 94 - 109 mmol/L Final     Carbon Dioxide   Date Value Ref Range Status   07/23/2020 24 20 - 32 mmol/L Final     Anion Gap   Date Value Ref Range Status   07/23/2020 5 3 - 14 mmol/L Final     Glucose   Date Value Ref Range Status   07/23/2020 110 (H) 70 - 99 mg/dL Final     Urea Nitrogen   Date Value Ref Range Status   07/23/2020 21 7 - 30 mg/dL Final     Creatinine   Date Value Ref Range Status   07/23/2020 1.15 0.66 - 1.25 mg/dL Final     GFR Estimate   Date Value Ref Range Status   07/23/2020 84 >60 mL/min/[1.73_m2] Final     Comment:     Non  GFR Calc  Starting 12/18/2018, serum creatinine based estimated GFR (eGFR) will be   calculated using the Chronic Kidney Disease Epidemiology Collaboration   (CKD-EPI) equation.       Calcium   Date Value Ref Range Status   07/23/2020 8.6 8.5 - 10.1 mg/dL Final     Lab Results   Component Value Date    WBC 2.5 07/23/2020     Lab Results   Component Value Date    RBC 3.37 07/23/2020     Lab Results   Component Value Date    HGB 10.8 07/23/2020     Lab Results   Component Value Date    HCT 32.3 07/23/2020     No components found for: MCT  Lab Results   Component Value Date    MCV 96 07/23/2020     Lab Results   Component Value Date    MCH 32.0 07/23/2020     Lab Results   Component Value Date    MCHC 33.4 07/23/2020     Lab Results   Component Value Date    RDW 14.0 07/23/2020     Lab Results   Component Value Date    PLT 27 07/23/2020     INR: 1.43  Antithrombin IIIc: 59  Hep Xa level 7/19: 0.25 (high intensity range 0.30-0.70)     Exam: US ABDOMEN LIMITED,  7/22/2020 11:46 AM  Indication: Pt w/ PVT and mild ascites per 7/21 MRI, now with mild  abdominal distension and triggering sepsis protocol. US abdomen to  evaluate ascites.  Comparison: MRI: 7/21/2020.     Findings/                                                                   Impression:   No evidence of significant ascites. Overlying obscuring bowel gas.      I have personally reviewed the examination and initial interpretation  and I agree with the findings.     VILMA MARTI MD      MRI ABDOMEN  Comparison study: Ultrasound 7/17/2020, MRI 3/10/2020, CT 7/16/2020     History: Known portal vein thrombosis with extension into mesentery  having worsening LUQ abdominal pain. GI recommending MRI triple or  quadruple phase with and without contrast.     TECHNIQUE: Images were acquired with and without intravenous contrast  through the abdomen. The following MR images were acquired: TrueFISP,  multiplanar T2 weighted, axial T1 in/out of phase, axial fat-saturated  T1, diffusion-weighted. Multiplanar T1-weighted images with fat  saturation were before contrast administration and at multiple time  points following the administration of intravenous contrast. Contrast  dose: 8 mL intravenous Gadavist.     FINDINGS:     Liver: Cirrhotic configuration of the liver with nodular contour,  heterogeneous lacy T2 signal which particularly affects the right  lobe. T2 signal irregularity corresponds to lacy enhancement seen on  postcontrast sequences. Mild loss of signal intensity in the liver  parenchyma on out of phase imaging. No suspicious liver lesions.  Previously described hypoenhancing lesion in segment 8 is not seen on  today's study.     Gallbladder: Surgically absent. Mild prominence of the central  intrahepatic and the intrahepatic bile ducts, compatible with  reservoir effect.     Spleen: Splenomegaly, measuring 14.9 x 15.4 cm in maximum axial  diameter. No focal splenic lesion.     Kidneys: Simple renal  cortical cysts. No soft tissue masses,  hydronephrosis.     Adrenal glands: No adrenal nodules.     Pancreas: Diffusely atrophic with some loss of intrinsic T1  hyperintensity. No pancreatic ductal dilatation. No focal pancreatic  lesion.     Bowel: No dilated loops of bowel or bowel wall thickening.  Postsurgical changes of Yan-en-Y gastric bypass.     Lymph nodes: Prominent upper abdominal lymph nodes not enlarged by  size criteria.     Blood vessels: Normal caliber abdominal aorta. Nonocclusive thrombus  in the proximal SMV. Nonocclusive thrombus in the main portal vein.  Nearly occlusive thrombus in the left portal vein, which demonstrates  expansion. Nonocclusive thrombus in the right portal vein.  Portosystemic collaterals in the left upper quadrant. Splenic vein is  patent. Recanalization of the periumbilical vein.     Lung bases: Small pleural effusions. No focal consolidation. Bibasilar  atelectasis.     Bones and soft tissues: No suspicious osseous lesions. No soft tissue  masses.     Mesentery and abdominal wall: Edematous mesentery.     Ascites: Trace perihepatic ascites.                                                            IMPRESSION:  1.  Unchanged configuration of the bland thrombus in the SMV, main  portal vein, right portal vein, and left portal vein. The thrombus is  nonocclusive, becoming nearly occlusive in the left portal vein  (unchanged).  2.  Steatosis and cirrhosis with sequela of portal hypertension,  including splenomegaly, portosystemic collaterals, and trace ascites.  3.  Stable findings of chronic pancreatitis.     I have personally reviewed the examination and initial interpretation  and I agree with the findings.     DARY KU MD        Exam: XR CHEST PORT 1 VW, 7/22/2020 2:02 PM  Indication: sirs  Comparison: Chest x-ray 1/6/2020     Findings:   Trachea appears midline. Normal cardiac silhouette. No pneumothorax.  Trace bilateral pleural effusions. Pulmonary vasculature  is distinct.  No focal airspace disease. No acute osseous or soft tissue  abnormalities. No acute upper abdominal pathology.                                                                      Impression:      No focal airspace disease.     I have personally reviewed the examination and initial interpretation  and I agree with the findings.     HONG DICKERSON MD        US abdomen with Doppler 7/17  EXAMINATION: Limited Abdominal Ultrasound, 7/17/2020 8:12 AM      COMPARISON: US 6/30/2020, 1/8/2020 and MRI 3/10/2020.     HISTORY: evaluate for ascites, PVT with mesenteric involvement     FINDINGS:   Fluid: Right pleural effusion. No ascites.     Liver: The liver demonstrates nodular contour of the liver with  coarsened echotexture, measuring 16 cm in craniocaudal dimension.  There is no focal mass.      Nonocclusive thrombus in the main portal vein and left portal vein.   Extrahepatic portal vein flow around the thrombus is antegrade at 32  cm/sec.  Right portal vein flow is antegrade, measuring 23 cm/sec.  Left portal vein flow around the thrombus is antegrade, measuring 20  cm/sec.     Flow in the hepatic artery is towards the liver and:  45 cm/s peak systolic  0.83 resistive index.      The splenic vein is patent and flow is towards the liver. The left,  middle, and right hepatic veins are patent with flow towards the IVC.  The IVC is patent with flow towards the heart. The visualized aorta  is not dilated.     Gallbladder: Cholecystectomy.     Bile Ducts: Both the intra- and extrahepatic biliary system are of  normal caliber.  The common bile duct measures 7 mm in diameter.     Kidney: The right kidney measures 11.7 cm long. The left kidney  measures 11.6 cm long. There is no hydronephrosis or hydroureter, no  shadowing renal calculi, cystic lesion or mass.      Spleen: The spleen is enlarged, measuring 17.5 cm in craniocaudal  dimension.                                             IMPRESSION:    1. Cirrhotic  configuration of the liver with splenomegaly, compatible  with portal hypertension. No ascites. No focal intrahepatic lesion.  2. Nonocclusive thrombus in the main portal vein extending into the  left portal vein. Otherwise, patent hepatic vasculature with  appropriate antegrade flow as above.  3. Small right pleural effusion.     I have personally reviewed the examination and initial interpretation  and I agree with the findings.     GURINDER ARITA, DO

## 2020-07-25 LAB
ALBUMIN SERPL-MCNC: 4.1 G/DL (ref 3.4–5)
ALP SERPL-CCNC: 63 U/L (ref 40–150)
ALT SERPL W P-5'-P-CCNC: 23 U/L (ref 0–70)
ANION GAP SERPL CALCULATED.3IONS-SCNC: 6 MMOL/L (ref 3–14)
AST SERPL W P-5'-P-CCNC: 25 U/L (ref 0–45)
BACTERIA SPEC CULT: NO GROWTH
BASOPHILS # BLD AUTO: 0 10E9/L (ref 0–0.2)
BASOPHILS NFR BLD AUTO: 1.3 %
BILIRUB DIRECT SERPL-MCNC: 0.4 MG/DL (ref 0–0.2)
BILIRUB SERPL-MCNC: 0.8 MG/DL (ref 0.2–1.3)
BUN SERPL-MCNC: 15 MG/DL (ref 7–30)
CALCIUM SERPL-MCNC: 9.1 MG/DL (ref 8.5–10.1)
CHLORIDE SERPL-SCNC: 110 MMOL/L (ref 94–109)
CO2 SERPL-SCNC: 23 MMOL/L (ref 20–32)
CREAT SERPL-MCNC: 0.74 MG/DL (ref 0.66–1.25)
DIFFERENTIAL METHOD BLD: ABNORMAL
EOSINOPHIL # BLD AUTO: 0.1 10E9/L (ref 0–0.7)
EOSINOPHIL NFR BLD AUTO: 3.2 %
ERYTHROCYTE [DISTWIDTH] IN BLOOD BY AUTOMATED COUNT: 14.1 % (ref 10–15)
GFR SERPL CREATININE-BSD FRML MDRD: >90 ML/MIN/{1.73_M2}
GLUCOSE BLDC GLUCOMTR-MCNC: 105 MG/DL (ref 70–99)
GLUCOSE BLDC GLUCOMTR-MCNC: 108 MG/DL (ref 70–99)
GLUCOSE BLDC GLUCOMTR-MCNC: 111 MG/DL (ref 70–99)
GLUCOSE BLDC GLUCOMTR-MCNC: 124 MG/DL (ref 70–99)
GLUCOSE BLDC GLUCOMTR-MCNC: 147 MG/DL (ref 70–99)
GLUCOSE BLDC GLUCOMTR-MCNC: 197 MG/DL (ref 70–99)
GLUCOSE BLDC GLUCOMTR-MCNC: 253 MG/DL (ref 70–99)
GLUCOSE BLDC GLUCOMTR-MCNC: 270 MG/DL (ref 70–99)
GLUCOSE BLDC GLUCOMTR-MCNC: 297 MG/DL (ref 70–99)
GLUCOSE BLDC GLUCOMTR-MCNC: 54 MG/DL (ref 70–99)
GLUCOSE BLDC GLUCOMTR-MCNC: 70 MG/DL (ref 70–99)
GLUCOSE SERPL-MCNC: 95 MG/DL (ref 70–99)
HCT VFR BLD AUTO: 30.4 % (ref 40–53)
HGB BLD-MCNC: 10.1 G/DL (ref 13.3–17.7)
IMM GRANULOCYTES # BLD: 0 10E9/L (ref 0–0.4)
IMM GRANULOCYTES NFR BLD: 0.6 %
INR PPP: 1.44 (ref 0.86–1.14)
LYMPHOCYTES # BLD AUTO: 0.5 10E9/L (ref 0.8–5.3)
LYMPHOCYTES NFR BLD AUTO: 34.2 %
MCH RBC QN AUTO: 32.1 PG (ref 26.5–33)
MCHC RBC AUTO-ENTMCNC: 33.2 G/DL (ref 31.5–36.5)
MCV RBC AUTO: 97 FL (ref 78–100)
MONOCYTES # BLD AUTO: 0.1 10E9/L (ref 0–1.3)
MONOCYTES NFR BLD AUTO: 8.4 %
NEUTROPHILS # BLD AUTO: 0.8 10E9/L (ref 1.6–8.3)
NEUTROPHILS NFR BLD AUTO: 52.3 %
NRBC # BLD AUTO: 0 10*3/UL
NRBC BLD AUTO-RTO: 0 /100
PLATELET # BLD AUTO: 26 10E9/L (ref 150–450)
POTASSIUM SERPL-SCNC: 3.9 MMOL/L (ref 3.4–5.3)
PROT SERPL-MCNC: 6.6 G/DL (ref 6.8–8.8)
RBC # BLD AUTO: 3.15 10E12/L (ref 4.4–5.9)
SODIUM SERPL-SCNC: 139 MMOL/L (ref 133–144)
SPECIMEN SOURCE: NORMAL
WBC # BLD AUTO: 1.6 10E9/L (ref 4–11)

## 2020-07-25 PROCEDURE — P9047 ALBUMIN (HUMAN), 25%, 50ML: HCPCS | Performed by: STUDENT IN AN ORGANIZED HEALTH CARE EDUCATION/TRAINING PROGRAM

## 2020-07-25 PROCEDURE — 25000132 ZZH RX MED GY IP 250 OP 250 PS 637: Performed by: STUDENT IN AN ORGANIZED HEALTH CARE EDUCATION/TRAINING PROGRAM

## 2020-07-25 PROCEDURE — 25000125 ZZHC RX 250: Performed by: STUDENT IN AN ORGANIZED HEALTH CARE EDUCATION/TRAINING PROGRAM

## 2020-07-25 PROCEDURE — 80076 HEPATIC FUNCTION PANEL: CPT | Performed by: STUDENT IN AN ORGANIZED HEALTH CARE EDUCATION/TRAINING PROGRAM

## 2020-07-25 PROCEDURE — 25000132 ZZH RX MED GY IP 250 OP 250 PS 637: Performed by: INTERNAL MEDICINE

## 2020-07-25 PROCEDURE — 85610 PROTHROMBIN TIME: CPT | Performed by: STUDENT IN AN ORGANIZED HEALTH CARE EDUCATION/TRAINING PROGRAM

## 2020-07-25 PROCEDURE — 25000128 H RX IP 250 OP 636: Performed by: STUDENT IN AN ORGANIZED HEALTH CARE EDUCATION/TRAINING PROGRAM

## 2020-07-25 PROCEDURE — 99233 SBSQ HOSP IP/OBS HIGH 50: CPT | Mod: GC | Performed by: INTERNAL MEDICINE

## 2020-07-25 PROCEDURE — 00000146 ZZHCL STATISTIC GLUCOSE BY METER IP

## 2020-07-25 PROCEDURE — 36415 COLL VENOUS BLD VENIPUNCTURE: CPT | Performed by: STUDENT IN AN ORGANIZED HEALTH CARE EDUCATION/TRAINING PROGRAM

## 2020-07-25 PROCEDURE — 12000001 ZZH R&B MED SURG/OB UMMC

## 2020-07-25 PROCEDURE — 25800025 ZZH RX 258: Performed by: STUDENT IN AN ORGANIZED HEALTH CARE EDUCATION/TRAINING PROGRAM

## 2020-07-25 PROCEDURE — 80048 BASIC METABOLIC PNL TOTAL CA: CPT | Performed by: STUDENT IN AN ORGANIZED HEALTH CARE EDUCATION/TRAINING PROGRAM

## 2020-07-25 PROCEDURE — 99207 ZZC CDG-CUT & PASTE-POTENTIAL IMPACT ON LEVEL: CPT | Performed by: INTERNAL MEDICINE

## 2020-07-25 PROCEDURE — 85025 COMPLETE CBC W/AUTO DIFF WBC: CPT | Performed by: STUDENT IN AN ORGANIZED HEALTH CARE EDUCATION/TRAINING PROGRAM

## 2020-07-25 RX ADMIN — ENOXAPARIN SODIUM 135 MG: 150 INJECTION SUBCUTANEOUS at 17:14

## 2020-07-25 RX ADMIN — INSULIN ASPART 1 UNITS: 100 INJECTION, SOLUTION INTRAVENOUS; SUBCUTANEOUS at 13:11

## 2020-07-25 RX ADMIN — LACTULOSE 45 G: 20 SOLUTION ORAL at 17:15

## 2020-07-25 RX ADMIN — QUETIAPINE FUMARATE 50 MG: 50 TABLET ORAL at 00:10

## 2020-07-25 RX ADMIN — CYCLOBENZAPRINE 10 MG: 10 TABLET, FILM COATED ORAL at 02:24

## 2020-07-25 RX ADMIN — PROMETHAZINE HYDROCHLORIDE 25 MG: 25 TABLET ORAL at 10:08

## 2020-07-25 RX ADMIN — DEXTROSE MONOHYDRATE 25 ML: 500 INJECTION PARENTERAL at 22:22

## 2020-07-25 RX ADMIN — CHOLECALCIFEROL CAP 1.25 MG (50000 UNIT) 1250 MCG: 1.25 CAP at 13:09

## 2020-07-25 RX ADMIN — MELATONIN 2000 UNITS: at 09:45

## 2020-07-25 RX ADMIN — Medication 15 ML: at 09:48

## 2020-07-25 RX ADMIN — ALBUMIN HUMAN 100 G: 0.25 SOLUTION INTRAVENOUS at 09:41

## 2020-07-25 RX ADMIN — ACETAMINOPHEN 650 MG: 325 TABLET, FILM COATED ORAL at 20:38

## 2020-07-25 RX ADMIN — OXYCODONE HYDROCHLORIDE 15 MG: 5 TABLET ORAL at 16:05

## 2020-07-25 RX ADMIN — PROMETHAZINE HYDROCHLORIDE 25 MG: 25 TABLET ORAL at 17:15

## 2020-07-25 RX ADMIN — RIFAXIMIN 550 MG: 550 TABLET ORAL at 20:38

## 2020-07-25 RX ADMIN — PANTOPRAZOLE SODIUM 40 MG: 40 TABLET, DELAYED RELEASE ORAL at 09:46

## 2020-07-25 RX ADMIN — Medication 15 ML: at 16:11

## 2020-07-25 RX ADMIN — Medication 15 ML: at 20:39

## 2020-07-25 RX ADMIN — FOLIC ACID 1 MG: 1 TABLET ORAL at 20:37

## 2020-07-25 RX ADMIN — CYCLOBENZAPRINE 10 MG: 10 TABLET, FILM COATED ORAL at 10:08

## 2020-07-25 RX ADMIN — LACTULOSE 45 G: 20 SOLUTION ORAL at 09:43

## 2020-07-25 RX ADMIN — Medication: at 22:39

## 2020-07-25 RX ADMIN — DICLOFENAC SODIUM 2 G: 10 GEL TOPICAL at 20:39

## 2020-07-25 RX ADMIN — RIFAXIMIN 550 MG: 550 TABLET ORAL at 09:44

## 2020-07-25 RX ADMIN — OXYCODONE HYDROCHLORIDE 15 MG: 5 TABLET ORAL at 01:36

## 2020-07-25 RX ADMIN — DICLOFENAC SODIUM 2 G: 10 GEL TOPICAL at 09:42

## 2020-07-25 RX ADMIN — TRAZODONE HYDROCHLORIDE 100 MG: 100 TABLET ORAL at 00:10

## 2020-07-25 RX ADMIN — CYCLOBENZAPRINE 10 MG: 10 TABLET, FILM COATED ORAL at 18:29

## 2020-07-25 RX ADMIN — GABAPENTIN 600 MG: 300 CAPSULE ORAL at 09:45

## 2020-07-25 RX ADMIN — OXYCODONE HYDROCHLORIDE 15 MG: 5 TABLET ORAL at 11:14

## 2020-07-25 RX ADMIN — ACETAMINOPHEN 650 MG: 325 TABLET, FILM COATED ORAL at 04:35

## 2020-07-25 RX ADMIN — OXYCODONE HYDROCHLORIDE 15 MG: 5 TABLET ORAL at 20:37

## 2020-07-25 RX ADMIN — DICLOFENAC SODIUM 2 G: 10 GEL TOPICAL at 16:10

## 2020-07-25 RX ADMIN — GABAPENTIN 600 MG: 300 CAPSULE ORAL at 13:46

## 2020-07-25 RX ADMIN — INSULIN GLARGINE 10 UNITS: 100 INJECTION, SOLUTION SUBCUTANEOUS at 09:47

## 2020-07-25 RX ADMIN — FERROUS SULFATE TAB 325 MG (65 MG ELEMENTAL FE) 325 MG: 325 (65 FE) TAB at 09:45

## 2020-07-25 RX ADMIN — ACETAMINOPHEN 650 MG: 325 TABLET, FILM COATED ORAL at 13:09

## 2020-07-25 RX ADMIN — FOLIC ACID 1 MG: 1 TABLET ORAL at 09:45

## 2020-07-25 RX ADMIN — HYDROXYZINE HYDROCHLORIDE 25 MG: 25 TABLET, FILM COATED ORAL at 10:08

## 2020-07-25 RX ADMIN — LIDOCAINE 1 PATCH: 560 PATCH PERCUTANEOUS; TOPICAL; TRANSDERMAL at 20:38

## 2020-07-25 RX ADMIN — DICLOFENAC SODIUM 2 G: 10 GEL TOPICAL at 13:10

## 2020-07-25 RX ADMIN — GABAPENTIN 600 MG: 300 CAPSULE ORAL at 20:37

## 2020-07-25 RX ADMIN — Medication 15 ML: at 13:10

## 2020-07-25 RX ADMIN — LACTULOSE 45 G: 20 SOLUTION ORAL at 13:46

## 2020-07-25 ASSESSMENT — ACTIVITIES OF DAILY LIVING (ADL)
ADLS_ACUITY_SCORE: 12

## 2020-07-25 NOTE — PLAN OF CARE
"/65 (BP Location: Left arm)   Pulse 86   Temp 98.2  F (36.8  C) (Oral)   Resp 18   Ht 1.88 m (6' 2\")   Wt 85.7 kg (189 lb)   SpO2 95%   BMI 24.27 kg/m      Neuros: A/O x4, calls appropriately.   Cardiac: denies chest pain, BP stable.   Respiratory: denies SOB, LS clear, sating in mid 90's on RA.   GI/: voiding spontaneously. + BS, + flatus, pt reports had 4 BMs today.   Diet: 2 g sodium diet, denies n/v.   Activity: ambulated in [place] and room independently.   Skin: noted bruises on abd.   LDA: L PIV SL.   Pain: reports pain is tolerable with oral PRN oxycodone, flexeril, and schedule tylenol.   Lab: , 74, 82, and last BG was 81 at 2300.   New changes this shift: trazodone ordered for PRN at bedtime  Plan: continue to monitor and POC.     "

## 2020-07-25 NOTE — PLAN OF CARE
Afebrile,vitals are stable,back pain  comfortably managed with prn flexeril x 1 and oxycodone x1.Appetite good,blood glucose stable.Had a bowel movement x 2 .Up ad red .Complained of right hip pain radiating down his leg this afternoon ,provider notified and she came in to assess pt,no concern per provider,PT consult made.Platelet was 26 today,no signs of bleeding.Plan to discharge when pt is medically stable.

## 2020-07-25 NOTE — PROGRESS NOTES
"  Hematology - Inpatient Consult Service  Progress Note   Date of Service: 07/25/2020    Patient: Obed Wiley  MRN: [id number]  Admission Date: 7/17/2020  Hospital Day # 8      Interval History:  No acute events overnight. No episodes of bleeding overnight.     Physical Exam:    Blood pressure 113/63, pulse 86, temperature 97.5  F (36.4  C), temperature source Oral, resp. rate 18, height 1.88 m (6' 2\"), weight 85.7 kg (189 lb), SpO2 97 %.  General: Alert and cooperative, lying in bed, no acute distress  CV: RRR  Resp: CTAB, no wheezing/crackles, normal respiratory effort on ambient air  GI: Soft, non-tender, non-distended, bowel sounds present and normoactive    Medications:  Inpatient medications were reviewed.     Labs & Studies:     CBC  Recent Labs   Lab 07/25/20  0716 07/24/20  0708 07/23/20  0648 07/22/20  0754   WBC 1.6* 1.8* 2.5* 5.1   RBC 3.15* 3.29* 3.37* 4.36*   HGB 10.1* 10.6* 10.8* 13.9   HCT 30.4* 31.6* 32.3* 42.2   MCV 97 96 96 97   MCH 32.1 32.2 32.0 31.9   MCHC 33.2 33.5 33.4 32.9   RDW 14.1 14.1 14.0 14.0   PLT 26* 24* 27* 31*     INR  Recent Labs   Lab 07/25/20  0716 07/24/20  0708 07/23/20  0648 07/22/20  0754   INR 1.44* 1.37* 1.43* 1.36*       Problem list   1. Alcoholic cirrhosis  2. Heterozygous protein C deficiency  3. Complicated hx of PE, DVT, mesenteric vein thrombosis and now portal vein thrombosis  4. Hx of GI bleeding   5. 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 Chronic indwelling IVC filter in need for chronic anticoagulation  7. Hx of Yan-en-Y gastric bypass     Assessment and Recommendations  - Continue enoxaparin 1.5 mg/kg daily.  - If platelets fall below 20K, we will decrease the enoxaparin dose and start Eltrombopag as thrombopoietin agonist to augment the platelet count. This drug might be prothrombotic and therefore we are trying to avoid it for now given hx of recurrent VTE.       We will continue to follow this patient while hospitalized. Please do not hesitate " to page with any questions or concerns. Patient was seen and plan of care was discussed with attending physician Dr. Calhoun.     Familia Rodriguez  Hematology/Oncology Fellow  Pager: [phone]  Attending  The patient was seen and examined by me separate from the resident/fellow provider.The note above reflects my assessment and plan. I have personally reviewed today's labs,vital and radiology results. The points of care that were added by me are:    No bleeding feels good.Platelets still in the the mid 20's Tolerating enoxaparin 1.5mg/kg per day  John Calhoun M.D.  [phone]

## 2020-07-25 NOTE — PLAN OF CARE
"/58 (BP Location: Left arm)   Pulse 86   Temp 95.8  F (35.4  C) (Axillary)   Resp 18   Ht 1.88 m (6' 2\")   Wt 85.7 kg (189 lb)   SpO2 95%   BMI 24.27 kg/m     Patient with complaints of abd pain, leg cramping.  Given oxycodone, flexeril and scheduled tylenol this shift with fair control.   Has positive bowel sounds,  no report of bm this shift.  Ambulated in halls.  LS clear.  VSS.  Glc 197/111.  Mom in room.  Sleeping last half of shift.  Continue with POC.  "

## 2020-07-25 NOTE — PROGRESS NOTES
Social Work Services Progress Note    Hospital Day: 9    Data:  Paged regarding request for meal voucher for Obed's mother.     Intervention:  Spoke with mother by phone. She states she brought some bread with her from home that ran out and could really use a meal voucher for food.     Assessment:  Food insecurity, mother     Plan:  Provided 6 meal vouchers to support mother through weekend.     Anabel Biggs, JEISON   7/25/2020    Text paging available through [employer] on [employer] - search SOCIAL WORK    ON CALL PAGER   0800 - 1600   [phone]    ON CALL COVERAGE AFTER 1600  [phone]

## 2020-07-25 NOTE — PROGRESS NOTES
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     Changes:   - Lovenox 1.5 mg/kg dose #3 today, with dose #4 tomorrow   - anti-Xa levels must be assessed within 4-6 hours following dose administration (~9pm tomorrow)  - Monitor for signs and symptoms of bleeding  - If Cr stable tomorrow, will plan to restart diuretics at reduced doses 7/26 (and can liberalize diet at that point)      # SIRS, rule out  Pt triggering sepsis protocol 7/22 AM w/ fever to 101.6 degrees F and tachycardia to 121 bpm. Pt feeling feverish in AM w/ sweats, chills, and bloating sensation in abdomen, with exam notable for mild abdominal distension. Lactate drawn at 0754 borderline at 2.0, recheck at 1000 at 1.8. BCx NTD, w/ negative C diff antigen, CXR WNL. UA w/ protein of 10, WBC 97, and RBC 6, reflex to culture pending. US abdomen w/o notable ascites. Pt feeling better 7/23 AM, afebrile for ~72 hours 7/25 AM.  - BCx drawn, NGTD, will continue to monitor  - UCx pending, NGTD  - discontinued antibiotics d/t negative infectious workup   - d/t pt's leukopenia and neutropenia, will continue to monitor for signs of infection for 48 hours once off of antibiotics  - Patient is neutropenic now so if febrile, would need neutropenic fever treatment    #Acute on chronic thrombocytopenia  Pt w/ chronic thrombocytopenia, baseline in 40's. Has hx of multiple GI bleeds while on anticoagulation in the past (most recent 12/2019).  Platelets steadily decreasing from baseline of 30's-40's, 26 on 7/25 AM. Zosyn may be contributing to thrombocytopenia as well as sequestration in the spleen, and consumption. Pt has increased risk of bleed while on increased anticoagulation, but primary concern at this time is thrombocytopenia d/t consumption of platelets in clots.  - Heme consulted, appreciate recs  - monitor for signs or symptoms of bleeding. Low threshold to check hgb    # Acute Kidney Injury, resolved  New JUAN on 7/20. UA with hyaline casts, all cell lines rising, and hypercalcemia all suggest pre-renal etiology, particularly in the setting of poor PO intake and increased stool output due to the increased lactulose for HE treatment. As of 7/23, Cr improved to 1.1's but has reached plateau above pt's baseline of 0.8.  - albumin 100 g dose #3/3  - Continue to monitor daily CMP  - if Cr stable 7/26, can restart diuretics at reduced dose and titrate up to home dose 7/27    # Acute portal vein thrombosis  # Protein C deficiency  Pt w/ hypercoagulability 2/2 to liver disease, protein C deficiency. 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 Recently admitted 1/2020 for mesenteric ischemia, discharged on 1 mg/kg Lovenox. Reports compliance and consistency with Lovenox dosing at home. Pt now with nonocclusive portal vein thrombosis identified on imaging at OSH, US abdomen w/ Doppler 7/17 w/ PVT w/o splenic vein involvement. Started on heparin gtt and transitioned to lovenox 7/19, w/ anti-Xa level subtherapeutic at 0.25 7/22 after 5th dose of Lovenox 60 mg BID. MRI 7/21 w/ stable PVT and SMV thromboses.  - Lovenox 1.5 mg/kg given once daily, dose #3 today   - will monitor for evidence of GI bleed   - anti-Xa level w/in 4-6 hours after 4th dose of Lovenox (levels should be checked between 2100 and 2300 7/27)  - Hematology consulted, appreciate  recs: Low anti-thrombin level    # Acute on chronic Abdominal pain, stable  Chronic abdominal pain due to chronic pancreatitis. Current pain feels like previous pain experienced when he had a portal vein thrombosis and mesenteric ischemia. Patient recognizes that there is an anxiety component to his pain as well.  - 15 mg oxycodone Q4H PRN  - IV hydromorphone 0.3mg q4h PRN. Currently IV pain meds on hold due to encephalopathy. Once mentation improves, if requiring IV for breakthrough pain, may increase to 20 mg oxycodone Q4H PRN  - Tylenol 650 mg scheduled Q8H, and lidocaine patches PRN for additional pain control  - Seroquel 50 mg at bedtime and 25mg PRN for anxiety 2/2 pain; hydroxyzine PRN for anxiety  - increased gabapentin to 600mg TID (may increase to 900 mg TID in OP setting).   - cyclobenzaprine for muscular pain/back pain  - Will contact PCP on discharge to coordinate outpatient pain management plan  - Pain consulted, appreciate assistance    # Decompensated alcoholic cirrhosis c/b HE, SBP,   # Hepatic encephalopathy  Currently sober and undergoing transplant evaluation, transplant candidate per Dr. Marsh. Ascites initially required therapeutic paracenteses 2x/week, then 1x/week, but he has not required one in the last 5 month none. Has had SBP, on ciprofloxacin 500 mg daily for prophylaxis. His last MELD score is 14 6/23. Requires continuous EGD surveillance for esophageal varices, and he will be seen by GI in 3-6 months for follow up. Worsening confusion on 7/19 concerning for hepatic encephalopathy. Infectious work-up NTD.  Etiology: Alcohol  MELD-Na 13  Hepatic encephalopathy: None, no episodes of HE in the last 7 months  Ascites: None noted on examination and ultrasound. On high dose diuretics, last paracentesis was 01/2020  SBP prophylaxis: On Ciprofloxacin  TIPS: None  Esophageal/Gastric varices: Last EGD was done 11/2018 with no varices seen. Will need a repeat soon.  Hepatocellular  carcinoma: Last USS was done 7/17/2020 and notable for no mass lesions  Transplant: Evaluation ongoing                         Blood type AB+  - Hepatology consulted, appreciate recs  - Continue PTA ciprofloxacin 500mg once daily for SBP prophylaxis  - Continue PTA lactulose, titrate to 3-4 BM/day  - [place] protocol for lactulose until mentation clears  - Continue rifaximin 550 mg BID  - Holding PTA spironolactone 100mg TID and lasix 40mg TID for now given JUAN, sepsis workup   - as JUAN resolved and infectious workup NTD, will restart diuretics at reduced intensity tomorrow 7/26, anticipating return to home dosing 7/27  - Continue with 2 g Na restriction diet   - nutrition assistant to aid with mealtime ordering to assist w/ meal ordering  - Will need repeat CT abd w/ contrast in 1 month as outpatient; Transplant service will order.  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  - Ophthalmology consulted, appreciate recs  - Discontinue ofloxacin  - Discontinue erythromycin   - Increase preservative free artificial tears to 4 times daily both eyes  - Start artificial tear ointment at bedtime both eyes    #Right ear, lateral facial paresthesia, resolved  Pt admitted to OSH with abdominal pain, right ear and right lateral facial paresthesias. OSH w/ negative head CT, neuro at OSH recommending MRI to evaluate further. MRI brain w/o contrast without abnormality. Seen by neurology, not felt to be emergent but willing to follow up outpatient.  Pt declined further workup at this time. Pt's ear symptoms spontaneously resolved 7/22 AM, with normal sensation returning entirely.  - Neurology consulted and signed off, appreciated assistance     #Chronic pancreatitis, at baseline  Pt w/ hx of chronic pancreatitis. History of pancreatic cyst but does not require further evaluation and can be cleared for liver transplant per Dr. Acosta. Per pt, current abdominal pain worse than chronic pancreatitis pain but in similar distribution. Lipase only mildly elevated, not indicative of acute pancreatitis.     #Diabetes Mellitus II, at baseline  Last Hgb A1C 8.4% (11/2019), recheck 7/17 is 5.9%.  - Continue glargine at 10 units daily  - Sliding scale: medium sliding scale insulin  - Carb coverage: 1 U/15 g CHO at each meal  - Hypoglycemic protocol   - QID fingersticks AC HS     Diet: low sodium diet  Fluids: albumin followed by NS bolus  DVT Prophylaxis: Therapeutic lovenox  Glaser Catheter: not present  Code Status: full     Disposition Plan     Expected discharge: 2 - 3 days, recommended to prior living arrangement once appropriate pain management and anticoagulation is done.  Entered: Emanuel Houston MD 07/25/20     The patient's care was discussed with the Attending physician, Dr. Ronit Ca, MS4  [phone]    Resident/Fellow Attestation   I, Dalila Fu MD, was present with the medical student who participated in the service and in the documentation of the note.  I have verified the history and personally performed the physical exam and medical decision making.  I agree with the assessment and plan of care as documented in the note.      Dalila Fu MD  PGY 3  Date of Service (when I saw the patient): 07/25/20      Interval History  No acute events overnight.  Feeling well today. Continues to have abdominal pain, but this is stable. Nausea is slightly improved from baseline this AM. Frustrated with low salt diet, but understands necessity and  "hopes for diet liberalization following reinstitution of diuretics.  Mentation at baseline.     Data reviewed today: I reviewed all medications, new labs and imaging results over the last 24 hours.    Physical Exam  /53 (BP Location: Left arm)   Pulse 86   Temp 96.7  F (35.9  C) (Axillary)   Resp 18   Ht 1.88 m (6' 2\")   Wt 85.7 kg (189 lb)   SpO2 95%   BMI 24.27 kg/m     GA: A&Ox3, coherent, brighter this AM  HEENT: anicteric sclerae, mucus membranes moist, poor dentition with multiple missing teeth  Lungs: CTAB  CVS: RRR, normal heart sounds  Abd: normal bowel sound, mild abdominal distension appears stable this AM, abdomen is soft and minimally tense with stable generalized epigastric tenderness LUQ>RUQ, some focal tenderness in LLQ; no rebound or guarding; liver and spleen edges not easily palpable  Extremity: no edema seen  Neuro: grossly intact, AO x 4, speech and thought processes are linear.    Data  Last Comprehensive Metabolic Panel:  Sodium   Date Value Ref Range Status   07/25/2020 139 133 - 144 mmol/L Final     Potassium   Date Value Ref Range Status   07/25/2020 3.9 3.4 - 5.3 mmol/L Final     Chloride   Date Value Ref Range Status   07/25/2020 110 (H) 94 - 109 mmol/L Final     Carbon Dioxide   Date Value Ref Range Status   07/25/2020 23 20 - 32 mmol/L Final     Anion Gap   Date Value Ref Range Status   07/25/2020 6 3 - 14 mmol/L Final     Glucose   Date Value Ref Range Status   07/25/2020 95 70 - 99 mg/dL Final     Urea Nitrogen   Date Value Ref Range Status   07/25/2020 15 7 - 30 mg/dL Final     Creatinine   Date Value Ref Range Status   07/25/2020 0.74 0.66 - 1.25 mg/dL Final     GFR Estimate   Date Value Ref Range Status   07/25/2020 >90 >60 mL/min/[1.73_m2] Final     Comment:     Non  GFR Calc  Starting 12/18/2018, serum creatinine based estimated GFR (eGFR) will be   calculated using the Chronic Kidney Disease Epidemiology Collaboration   (CKD-EPI) equation.   "     Calcium   Date Value Ref Range Status   07/25/2020 9.1 8.5 - 10.1 mg/dL Final       CBC RESULTS:   Recent Labs   Lab Test 07/25/20  0716   WBC 1.6*   RBC 3.15*   HGB 10.1*   HCT 30.4*   MCV 97   MCH 32.1   MCHC 33.2   RDW 14.1   PLT 26*       INR: 1.44  Antithrombin IIIc: 59  Hep Xa level 7/19: 0.25 (high intensity range 0.30-0.70)     Exam: US ABDOMEN LIMITED, 7/22/2020 11:46 AM  Indication: Pt w/ PVT and mild ascites per 7/21 MRI, now with mild  abdominal distension and triggering sepsis protocol. US abdomen to  evaluate ascites.  Comparison: MRI: 7/21/2020.     Findings/                                                                   Impression:   No evidence of significant ascites. Overlying obscuring bowel gas.      I have personally reviewed the examination and initial interpretation  and I agree with the findings.     VILMA MARTI MD      MRI ABDOMEN  Comparison study: Ultrasound 7/17/2020, MRI 3/10/2020, CT 7/16/2020     History: Known portal vein thrombosis with extension into mesentery  having worsening LUQ abdominal pain. GI recommending MRI triple or  quadruple phase with and without contrast.     TECHNIQUE: Images were acquired with and without intravenous contrast  through the abdomen. The following MR images were acquired: TrueFISP,  multiplanar T2 weighted, axial T1 in/out of phase, axial fat-saturated  T1, diffusion-weighted. Multiplanar T1-weighted images with fat  saturation were before contrast administration and at multiple time  points following the administration of intravenous contrast. Contrast  dose: 8 mL intravenous Gadavist.     FINDINGS:     Liver: Cirrhotic configuration of the liver with nodular contour,  heterogeneous lacy T2 signal which particularly affects the right  lobe. T2 signal irregularity corresponds to lacy enhancement seen on  postcontrast sequences. Mild loss of signal intensity in the liver  parenchyma on out of phase imaging. No suspicious liver  lesions.  Previously described hypoenhancing lesion in segment 8 is not seen on  today's study.     Gallbladder: Surgically absent. Mild prominence of the central  intrahepatic and the intrahepatic bile ducts, compatible with  reservoir effect.     Spleen: Splenomegaly, measuring 14.9 x 15.4 cm in maximum axial  diameter. No focal splenic lesion.     Kidneys: Simple renal cortical cysts. No soft tissue masses,  hydronephrosis.     Adrenal glands: No adrenal nodules.     Pancreas: Diffusely atrophic with some loss of intrinsic T1  hyperintensity. No pancreatic ductal dilatation. No focal pancreatic  lesion.     Bowel: No dilated loops of bowel or bowel wall thickening.  Postsurgical changes of Yan-en-Y gastric bypass.     Lymph nodes: Prominent upper abdominal lymph nodes not enlarged by  size criteria.     Blood vessels: Normal caliber abdominal aorta. Nonocclusive thrombus  in the proximal SMV. Nonocclusive thrombus in the main portal vein.  Nearly occlusive thrombus in the left portal vein, which demonstrates  expansion. Nonocclusive thrombus in the right portal vein.  Portosystemic collaterals in the left upper quadrant. Splenic vein is  patent. Recanalization of the periumbilical vein.     Lung bases: Small pleural effusions. No focal consolidation. Bibasilar  atelectasis.     Bones and soft tissues: No suspicious osseous lesions. No soft tissue  masses.     Mesentery and abdominal wall: Edematous mesentery.     Ascites: Trace perihepatic ascites.                                                            IMPRESSION:  1.  Unchanged configuration of the bland thrombus in the SMV, main  portal vein, right portal vein, and left portal vein. The thrombus is  nonocclusive, becoming nearly occlusive in the left portal vein  (unchanged).  2.  Steatosis and cirrhosis with sequela of portal hypertension,  including splenomegaly, portosystemic collaterals, and trace ascites.  3.  Stable findings of chronic  pancreatitis.     I have personally reviewed the examination and initial interpretation  and I agree with the findings.     DARY KU MD        Exam: XR CHEST PORT 1 VW, 7/22/2020 2:02 PM  Indication: sirs  Comparison: Chest x-ray 1/6/2020     Findings:   Trachea appears midline. Normal cardiac silhouette. No pneumothorax.  Trace bilateral pleural effusions. Pulmonary vasculature is distinct.  No focal airspace disease. No acute osseous or soft tissue  abnormalities. No acute upper abdominal pathology.                                                                      Impression:      No focal airspace disease.     I have personally reviewed the examination and initial interpretation  and I agree with the findings.     HONG DICKERSON MD        US abdomen with Doppler 7/17  EXAMINATION: Limited Abdominal Ultrasound, 7/17/2020 8:12 AM      COMPARISON: US 6/30/2020, 1/8/2020 and MRI 3/10/2020.     HISTORY: evaluate for ascites, PVT with mesenteric involvement     FINDINGS:   Fluid: Right pleural effusion. No ascites.     Liver: The liver demonstrates nodular contour of the liver with  coarsened echotexture, measuring 16 cm in craniocaudal dimension.  There is no focal mass.      Nonocclusive thrombus in the main portal vein and left portal vein.   Extrahepatic portal vein flow around the thrombus is antegrade at 32  cm/sec.  Right portal vein flow is antegrade, measuring 23 cm/sec.  Left portal vein flow around the thrombus is antegrade, measuring 20  cm/sec.     Flow in the hepatic artery is towards the liver and:  45 cm/s peak systolic  0.83 resistive index.      The splenic vein is patent and flow is towards the liver. The left,  middle, and right hepatic veins are patent with flow towards the IVC.  The IVC is patent with flow towards the heart. The visualized aorta  is not dilated.     Gallbladder: Cholecystectomy.     Bile Ducts: Both the intra- and extrahepatic biliary system are of  normal caliber.  The  common bile duct measures 7 mm in diameter.     Kidney: The right kidney measures 11.7 cm long. The left kidney  measures 11.6 cm long. There is no hydronephrosis or hydroureter, no  shadowing renal calculi, cystic lesion or mass.      Spleen: The spleen is enlarged, measuring 17.5 cm in craniocaudal  dimension.                                             IMPRESSION:    1. Cirrhotic configuration of the liver with splenomegaly, compatible  with portal hypertension. No ascites. No focal intrahepatic lesion.  2. Nonocclusive thrombus in the main portal vein extending into the  left portal vein. Otherwise, patent hepatic vasculature with  appropriate antegrade flow as above.  3. Small right pleural effusion.     I have personally reviewed the examination and initial interpretation  and I agree with the findings.     GURINDER ARITA, DO

## 2020-07-26 LAB
ANION GAP SERPL CALCULATED.3IONS-SCNC: 4 MMOL/L (ref 3–14)
BASOPHILS # BLD AUTO: 0 10E9/L (ref 0–0.2)
BASOPHILS NFR BLD AUTO: 1.7 %
BUN SERPL-MCNC: 15 MG/DL (ref 7–30)
CALCIUM SERPL-MCNC: 9.2 MG/DL (ref 8.5–10.1)
CHLORIDE SERPL-SCNC: 110 MMOL/L (ref 94–109)
CO2 SERPL-SCNC: 25 MMOL/L (ref 20–32)
CREAT SERPL-MCNC: 0.68 MG/DL (ref 0.66–1.25)
DIFFERENTIAL METHOD BLD: ABNORMAL
EOSINOPHIL # BLD AUTO: 0.1 10E9/L (ref 0–0.7)
EOSINOPHIL NFR BLD AUTO: 5 %
ERYTHROCYTE [DISTWIDTH] IN BLOOD BY AUTOMATED COUNT: 14.1 % (ref 10–15)
GFR SERPL CREATININE-BSD FRML MDRD: >90 ML/MIN/{1.73_M2}
GLUCOSE BLDC GLUCOMTR-MCNC: 111 MG/DL (ref 70–99)
GLUCOSE BLDC GLUCOMTR-MCNC: 130 MG/DL (ref 70–99)
GLUCOSE BLDC GLUCOMTR-MCNC: 193 MG/DL (ref 70–99)
GLUCOSE BLDC GLUCOMTR-MCNC: 91 MG/DL (ref 70–99)
GLUCOSE SERPL-MCNC: 116 MG/DL (ref 70–99)
HCT VFR BLD AUTO: 33.8 % (ref 40–53)
HGB BLD-MCNC: 11.2 G/DL (ref 13.3–17.7)
IMM GRANULOCYTES # BLD: 0 10E9/L (ref 0–0.4)
IMM GRANULOCYTES NFR BLD: 0.6 %
INR PPP: 1.44 (ref 0.86–1.14)
LMWH PPP CHRO-ACNC: 0.43 IU/ML
LYMPHOCYTES # BLD AUTO: 0.6 10E9/L (ref 0.8–5.3)
LYMPHOCYTES NFR BLD AUTO: 31.1 %
MCH RBC QN AUTO: 31.7 PG (ref 26.5–33)
MCHC RBC AUTO-ENTMCNC: 33.1 G/DL (ref 31.5–36.5)
MCV RBC AUTO: 96 FL (ref 78–100)
MONOCYTES # BLD AUTO: 0.2 10E9/L (ref 0–1.3)
MONOCYTES NFR BLD AUTO: 10 %
NEUTROPHILS # BLD AUTO: 0.9 10E9/L (ref 1.6–8.3)
NEUTROPHILS NFR BLD AUTO: 51.6 %
NRBC # BLD AUTO: 0 10*3/UL
NRBC BLD AUTO-RTO: 0 /100
PLATELET # BLD AUTO: 28 10E9/L (ref 150–450)
POTASSIUM SERPL-SCNC: 4.2 MMOL/L (ref 3.4–5.3)
RBC # BLD AUTO: 3.53 10E12/L (ref 4.4–5.9)
SODIUM SERPL-SCNC: 139 MMOL/L (ref 133–144)
WBC # BLD AUTO: 1.8 10E9/L (ref 4–11)

## 2020-07-26 PROCEDURE — 25000132 ZZH RX MED GY IP 250 OP 250 PS 637: Performed by: STUDENT IN AN ORGANIZED HEALTH CARE EDUCATION/TRAINING PROGRAM

## 2020-07-26 PROCEDURE — 25000132 ZZH RX MED GY IP 250 OP 250 PS 637: Performed by: INTERNAL MEDICINE

## 2020-07-26 PROCEDURE — 99207 ZZC CDG-CUT & PASTE-POTENTIAL IMPACT ON LEVEL: CPT | Performed by: INTERNAL MEDICINE

## 2020-07-26 PROCEDURE — 85025 COMPLETE CBC W/AUTO DIFF WBC: CPT | Performed by: STUDENT IN AN ORGANIZED HEALTH CARE EDUCATION/TRAINING PROGRAM

## 2020-07-26 PROCEDURE — 85610 PROTHROMBIN TIME: CPT | Performed by: STUDENT IN AN ORGANIZED HEALTH CARE EDUCATION/TRAINING PROGRAM

## 2020-07-26 PROCEDURE — 99233 SBSQ HOSP IP/OBS HIGH 50: CPT | Mod: GC | Performed by: INTERNAL MEDICINE

## 2020-07-26 PROCEDURE — 00000146 ZZHCL STATISTIC GLUCOSE BY METER IP

## 2020-07-26 PROCEDURE — 25000128 H RX IP 250 OP 636: Performed by: STUDENT IN AN ORGANIZED HEALTH CARE EDUCATION/TRAINING PROGRAM

## 2020-07-26 PROCEDURE — 85520 HEPARIN ASSAY: CPT | Performed by: STUDENT IN AN ORGANIZED HEALTH CARE EDUCATION/TRAINING PROGRAM

## 2020-07-26 PROCEDURE — 80048 BASIC METABOLIC PNL TOTAL CA: CPT | Performed by: STUDENT IN AN ORGANIZED HEALTH CARE EDUCATION/TRAINING PROGRAM

## 2020-07-26 PROCEDURE — 12000001 ZZH R&B MED SURG/OB UMMC

## 2020-07-26 PROCEDURE — 36415 COLL VENOUS BLD VENIPUNCTURE: CPT | Performed by: STUDENT IN AN ORGANIZED HEALTH CARE EDUCATION/TRAINING PROGRAM

## 2020-07-26 RX ORDER — OXYCODONE HYDROCHLORIDE 15 MG/1
15 TABLET ORAL EVERY 6 HOURS PRN
Qty: 28 TABLET | Refills: 0 | Status: CANCELLED | OUTPATIENT
Start: 2020-07-26 | End: 2020-08-02

## 2020-07-26 RX ORDER — FLUORIDE TOOTHPASTE
15 TOOTHPASTE DENTAL 4 TIMES DAILY
Qty: 1800 ML | Refills: 0 | Status: CANCELLED | OUTPATIENT
Start: 2020-07-27 | End: 2020-08-26

## 2020-07-26 RX ORDER — CARBOXYMETHYLCELLULOSE SODIUM 5 MG/ML
1 SOLUTION/ DROPS OPHTHALMIC 4 TIMES DAILY PRN
Qty: 1 BOTTLE | Refills: 0 | Status: CANCELLED | OUTPATIENT
Start: 2020-07-26 | End: 2020-08-25

## 2020-07-26 RX ORDER — LACTULOSE 10 G/15ML
45 SOLUTION ORAL 3 TIMES DAILY
Qty: 6075 ML | Refills: 0 | Status: CANCELLED | OUTPATIENT
Start: 2020-07-27 | End: 2020-08-26

## 2020-07-26 RX ORDER — GABAPENTIN 300 MG/1
600 CAPSULE ORAL 3 TIMES DAILY
Qty: 30 CAPSULE | Status: CANCELLED | OUTPATIENT
Start: 2020-07-26

## 2020-07-26 RX ORDER — LIDOCAINE 50 MG/G
1 PATCH TOPICAL EVERY 24 HOURS
Qty: 30 PATCH | Refills: 0 | Status: SHIPPED | OUTPATIENT
Start: 2020-07-26 | End: 2020-07-27

## 2020-07-26 RX ORDER — CYCLOBENZAPRINE HCL 10 MG
10 TABLET ORAL EVERY 8 HOURS PRN
Qty: 90 TABLET | Refills: 0 | Status: CANCELLED | OUTPATIENT
Start: 2020-07-26 | End: 2020-08-25

## 2020-07-26 RX ORDER — ACETAMINOPHEN 325 MG/1
650 TABLET ORAL EVERY 8 HOURS
Qty: 180 TABLET | Refills: 0 | Status: CANCELLED | OUTPATIENT
Start: 2020-07-27 | End: 2020-08-26

## 2020-07-26 RX ORDER — HYDROXYZINE HYDROCHLORIDE 25 MG/1
25 TABLET, FILM COATED ORAL EVERY 6 HOURS PRN
Qty: 120 TABLET | Refills: 0 | Status: CANCELLED | OUTPATIENT
Start: 2020-07-26 | End: 2020-08-25

## 2020-07-26 RX ADMIN — OXYCODONE HYDROCHLORIDE 15 MG: 5 TABLET ORAL at 13:38

## 2020-07-26 RX ADMIN — HYDROXYZINE HYDROCHLORIDE 25 MG: 25 TABLET, FILM COATED ORAL at 15:25

## 2020-07-26 RX ADMIN — FERROUS SULFATE TAB 325 MG (65 MG ELEMENTAL FE) 325 MG: 325 (65 FE) TAB at 09:12

## 2020-07-26 RX ADMIN — GABAPENTIN 600 MG: 300 CAPSULE ORAL at 09:12

## 2020-07-26 RX ADMIN — HYDROXYZINE HYDROCHLORIDE 25 MG: 25 TABLET, FILM COATED ORAL at 09:08

## 2020-07-26 RX ADMIN — RIFAXIMIN 550 MG: 550 TABLET ORAL at 09:12

## 2020-07-26 RX ADMIN — SPIRONOLACTONE 100 MG: 25 TABLET ORAL at 17:26

## 2020-07-26 RX ADMIN — OXYCODONE HYDROCHLORIDE 15 MG: 5 TABLET ORAL at 16:13

## 2020-07-26 RX ADMIN — OXYCODONE HYDROCHLORIDE 15 MG: 5 TABLET ORAL at 09:33

## 2020-07-26 RX ADMIN — CARBOXYMETHYLCELLULOSE SODIUM 1 DROP: 5 SOLUTION/ DROPS OPHTHALMIC at 22:24

## 2020-07-26 RX ADMIN — QUETIAPINE FUMARATE 50 MG: 50 TABLET ORAL at 22:24

## 2020-07-26 RX ADMIN — LIDOCAINE 1 PATCH: 560 PATCH PERCUTANEOUS; TOPICAL; TRANSDERMAL at 20:03

## 2020-07-26 RX ADMIN — Medication 15 ML: at 20:05

## 2020-07-26 RX ADMIN — FOLIC ACID 1 MG: 1 TABLET ORAL at 20:04

## 2020-07-26 RX ADMIN — SPIRONOLACTONE 100 MG: 25 TABLET ORAL at 13:38

## 2020-07-26 RX ADMIN — QUETIAPINE FUMARATE 50 MG: 50 TABLET ORAL at 01:26

## 2020-07-26 RX ADMIN — PROMETHAZINE HYDROCHLORIDE 25 MG: 25 TABLET ORAL at 20:04

## 2020-07-26 RX ADMIN — ENOXAPARIN SODIUM 135 MG: 150 INJECTION SUBCUTANEOUS at 16:13

## 2020-07-26 RX ADMIN — RIFAXIMIN 550 MG: 550 TABLET ORAL at 20:04

## 2020-07-26 RX ADMIN — OXYCODONE HYDROCHLORIDE 15 MG: 5 TABLET ORAL at 06:00

## 2020-07-26 RX ADMIN — INSULIN ASPART 2 UNITS: 100 INJECTION, SOLUTION INTRAVENOUS; SUBCUTANEOUS at 16:13

## 2020-07-26 RX ADMIN — CIPROFLOXACIN HYDROCHLORIDE 500 MG: 500 TABLET, FILM COATED ORAL at 20:04

## 2020-07-26 RX ADMIN — LACTULOSE 45 G: 20 SOLUTION ORAL at 17:25

## 2020-07-26 RX ADMIN — OXYCODONE HYDROCHLORIDE 15 MG: 5 TABLET ORAL at 19:13

## 2020-07-26 RX ADMIN — LACTULOSE 45 G: 20 SOLUTION ORAL at 09:11

## 2020-07-26 RX ADMIN — OXYCODONE HYDROCHLORIDE 15 MG: 5 TABLET ORAL at 22:24

## 2020-07-26 RX ADMIN — ACETAMINOPHEN 650 MG: 325 TABLET, FILM COATED ORAL at 03:36

## 2020-07-26 RX ADMIN — PROMETHAZINE HYDROCHLORIDE 25 MG: 25 TABLET ORAL at 09:34

## 2020-07-26 RX ADMIN — Medication: at 22:27

## 2020-07-26 RX ADMIN — MELATONIN 2000 UNITS: at 09:12

## 2020-07-26 RX ADMIN — Medication 15 ML: at 11:43

## 2020-07-26 RX ADMIN — FUROSEMIDE 40 MG: 40 TABLET ORAL at 17:26

## 2020-07-26 RX ADMIN — CYCLOBENZAPRINE 10 MG: 10 TABLET, FILM COATED ORAL at 02:49

## 2020-07-26 RX ADMIN — OXYCODONE HYDROCHLORIDE 15 MG: 5 TABLET ORAL at 01:08

## 2020-07-26 RX ADMIN — CYCLOBENZAPRINE 10 MG: 10 TABLET, FILM COATED ORAL at 11:42

## 2020-07-26 RX ADMIN — TRAZODONE HYDROCHLORIDE 100 MG: 100 TABLET ORAL at 01:26

## 2020-07-26 RX ADMIN — DICLOFENAC SODIUM 2 G: 10 GEL TOPICAL at 09:10

## 2020-07-26 RX ADMIN — GABAPENTIN 600 MG: 300 CAPSULE ORAL at 13:38

## 2020-07-26 RX ADMIN — CYCLOBENZAPRINE 10 MG: 10 TABLET, FILM COATED ORAL at 19:13

## 2020-07-26 RX ADMIN — DICLOFENAC SODIUM 2 G: 10 GEL TOPICAL at 15:27

## 2020-07-26 RX ADMIN — TRAZODONE HYDROCHLORIDE 100 MG: 100 TABLET ORAL at 22:24

## 2020-07-26 RX ADMIN — PANTOPRAZOLE SODIUM 40 MG: 40 TABLET, DELAYED RELEASE ORAL at 09:12

## 2020-07-26 RX ADMIN — GABAPENTIN 600 MG: 300 CAPSULE ORAL at 22:24

## 2020-07-26 RX ADMIN — FOLIC ACID 1 MG: 1 TABLET ORAL at 09:12

## 2020-07-26 RX ADMIN — Medication 15 ML: at 15:26

## 2020-07-26 RX ADMIN — DICLOFENAC SODIUM 2 G: 10 GEL TOPICAL at 11:42

## 2020-07-26 RX ADMIN — FUROSEMIDE 40 MG: 40 TABLET ORAL at 13:38

## 2020-07-26 RX ADMIN — ACETAMINOPHEN 650 MG: 325 TABLET, FILM COATED ORAL at 11:42

## 2020-07-26 RX ADMIN — ACETAMINOPHEN 650 MG: 325 TABLET, FILM COATED ORAL at 20:04

## 2020-07-26 RX ADMIN — DICLOFENAC SODIUM 2 G: 10 GEL TOPICAL at 20:05

## 2020-07-26 RX ADMIN — LACTULOSE 45 G: 20 SOLUTION ORAL at 13:37

## 2020-07-26 ASSESSMENT — ACTIVITIES OF DAILY LIVING (ADL)
ADLS_ACUITY_SCORE: 12

## 2020-07-26 NOTE — PLAN OF CARE
"[id number]:    /62 (BP Location: Right arm)   Pulse 70   Temp 98.5  F (36.9  C) (Oral)   Resp 16   Ht 1.88 m (6' 2\")   Wt 85.7 kg (189 lb)   SpO2 95%   BMI 24.27 kg/m       Neuro: Alert and oriented  Cardiac: WNL  Respiratory: On RA  GI/: Voiding, on lactulose, Per pt, more than 6 BMs this shift  Diet/Appetite: Regular diet, good appetite. BG ACHS. BG this morning and afternoon were under priameter. 4pm BG was 197, gave 2 units of insuline. Pt has been refusing carb coverage and also refused morning lantus.  Skin: Some bruising in abd otherwise WNL  LDA: PIV SL  Activity: Independent, walking in hallway with mother  Pain: Generalized pain- administered scheduled tylenol, PRN oxycodone (every 6 hrs PRN), flexeril (every 8 hours PRN), and atarax (PRN every 6 hours).     Plan: Possible discharge home tomorrow   "

## 2020-07-26 NOTE — PROGRESS NOTES
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     Changes:   - Restart PTA lasix and spironolactone  - Check hep 10 A level 10pm tonight  - discontinue low Na diet  - space out oxycodone to q6h  - reduce glargine to 7 units tomorrow morning     # SIRS, rule out  Pt triggering sepsis protocol 7/22 AM w/ fever to 101.6 degrees F and tachycardia to 121 bpm. Pt feeling feverish in AM w/ sweats, chills, and bloating sensation in abdomen, with exam notable for mild abdominal distension. Lactate drawn at 0754 borderline at 2.0, recheck at 1000 at 1.8. BCx NTD, w/ negative C diff antigen, CXR WNL. UA w/ protein of 10, WBC 97, and RBC 6, reflex to culture pending. US abdomen w/o notable ascites. Pt feeling better 7/23 AM, afebrile for ~72 hours 7/25 AM.  - BCx drawn, NGTD, will continue to monitor  - UCx pending, NGTD  - discontinued antibiotics d/t negative infectious workup   - d/t pt's leukopenia and neutropenia, will continue to monitor for signs of infection for 48 hours once off of antibiotics  - Patient is neutropenic now so if febrile, would need neutropenic fever treatment    #Acute on chronic thrombocytopenia  Pt w/ chronic thrombocytopenia, baseline in 40's. Has hx of multiple GI bleeds while on anticoagulation in the past (most recent 12/2019). Platelets steadily decreasing from baseline of 30's-40's, 26 on 7/25 AM. Zosyn may be contributing to thrombocytopenia as well as sequestration  in the spleen, and consumption. Pt has increased risk of bleed while on increased anticoagulation, but primary concern at this time is thrombocytopenia d/t consumption of platelets in clots.  - Heme consulted, appreciate recs  - monitor for signs or symptoms of bleeding. Low threshold to check hgb  - If platelets fall below 20K, we will decrease the enoxaparin dose and start Eltrombopag as thrombopoietin agonist to augment the platelet count. This drug might be prothrombotic and therefore we are trying to avoid it for now given hx of recurrent VTE.     # Acute Kidney Injury, resolved  [place] on 7/20. UA with hyaline casts, all cell lines rising, and hypercalcemia all suggest pre-renal etiology, particularly in the setting of poor PO intake and increased stool output due to the increased lactulose for HE treatment. As of 7/23, Cr improved to 1.1's but has reached plateau above pt's baseline of 0.8. S/p albumin x 3   - Given stability of Cr, will restart PTA diuretics  - monitor Cr on diuretics    # Acute portal vein thrombosis  # Protein C deficiency  Pt w/ hypercoagulability 2/2 to liver disease, protein C deficiency. 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 Recently admitted 1/2020 for mesenteric ischemia, discharged on 1 mg/kg Lovenox. Reports compliance and consistency with Lovenox dosing at home. Pt now with nonocclusive portal vein thrombosis identified on imaging at OSH, US abdomen w/ Doppler 7/17 w/ PVT w/o splenic vein involvement. Started on heparin gtt and transitioned to lovenox 7/19, w/ anti-Xa level subtherapeutic at 0.25 7/22 after 5th dose of Lovenox 60 mg BID. MRI 7/21 w/ stable PVT and SMV thromboses.  - Lovenox 1.5 mg/kg given once daily, dose #3 today   - will monitor for evidence of GI bleed   - anti-Xa level w/in 4-6 hours after 4th dose of Lovenox (levels should be checked between  2100 and 2300 7/27)  - Hematology consulted, appreciate recs: Low anti-thrombin level   - If platelets fall below 20K, we will decrease the enoxaparin dose and start Eltrombopag as thrombopoietin agonist to augment the platelet count. This drug might be prothrombotic and therefore we are trying to avoid it for now given hx of recurrent VTE.       # Acute on chronic Abdominal pain, stable  Chronic abdominal pain due to chronic pancreatitis. Current pain feels like previous pain experienced when he had a portal vein thrombosis and mesenteric ischemia. Patient recognizes that there is an anxiety component to his pain as well.  - 15 mg oxycodone Q4H PRN  - IV hydromorphone 0.3mg q4h PRN. Currently IV pain meds on hold due to encephalopathy. Once mentation improves, if requiring IV for breakthrough pain, may increase to 20 mg oxycodone Q4H PRN  - Tylenol 650 mg scheduled Q8H, and lidocaine patches PRN for additional pain control  - Seroquel 50 mg at bedtime and 25mg PRN for anxiety 2/2 pain; hydroxyzine PRN for anxiety  - increased gabapentin to 600mg TID (may increase to 900 mg TID in OP setting).   - cyclobenzaprine for muscular pain/back pain  - Will contact PCP on discharge to coordinate outpatient pain management plan  - Pain consulted, appreciate assistance    # Decompensated alcoholic cirrhosis c/b HE, SBP,   # Hepatic encephalopathy  Currently sober and undergoing transplant evaluation, transplant candidate per Dr. Marsh. Ascites initially required therapeutic paracenteses 2x/week, then 1x/week, but he has not required one in the last 5 month none. Has had SBP, on ciprofloxacin 500 mg daily for prophylaxis. His last MELD score is 14 6/23. Requires continuous EGD surveillance for esophageal varices, and he will be seen by GI in 3-6 months for follow up. Worsening confusion on 7/19 concerning for hepatic encephalopathy. Infectious work-up NTD.  Etiology: Alcohol  MELD-Na 10  Hepatic encephalopathy: None, no  episodes of HE in the last 7 months  Ascites: None noted on examination and ultrasound. On high dose diuretics, last paracentesis was 01/2020  SBP prophylaxis: On Ciprofloxacin  TIPS: None  Esophageal/Gastric varices: Last EGD was done 11/2018 with no varices seen. Will need a repeat soon.  Hepatocellular carcinoma: Last USS was done 7/17/2020 and notable for no mass lesions  Transplant: Evaluation ongoing                         Blood type AB+  - Hepatology consulted, appreciate recs  - Continue PTA ciprofloxacin 500mg once daily for SBP prophylaxis  - Continue PTA lactulose, titrate to 3-4 BM/day  - Continue rifaximin 550 mg BID  - Restart PTA spironolactone 100mg TID and lasix 40mg TID   - Will need repeat CT abd w/ contrast in 1 month as outpatient; Transplant service will order.  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  - Ophthalmology consulted, appreciate recs  - Discontinue ofloxacin  - Discontinue erythromycin   - Increase preservative free artificial tears to 4 times daily both eyes  - Start artificial tear ointment at bedtime both eyes    #Right ear, lateral facial paresthesia, resolved  Pt admitted to OSH with abdominal pain, right ear and right lateral facial paresthesias. OSH w/ negative head CT, neuro at OSH recommending MRI to evaluate further. MRI brain w/o contrast without abnormality. Seen by neurology, not felt to be emergent but willing to follow up outpatient. Pt declined further workup at this time. Pt's ear  "symptoms spontaneously resolved 7/22 AM, with normal sensation returning entirely.  - Neurology consulted and signed off, appreciated assistance     #Chronic pancreatitis, at baseline  Pt w/ hx of chronic pancreatitis. History of pancreatic cyst but does not require further evaluation and can be cleared for liver transplant per Dr. Acosta. Per pt, current abdominal pain worse than chronic pancreatitis pain but in similar distribution. Lipase only mildly elevated, not indicative of acute pancreatitis.     #Diabetes Mellitus II, at baseline  Last Hgb A1C 8.4% (11/2019), recheck 7/17 is 5.9%.  - Continue glargine at 10 units daily  - Sliding scale: medium sliding scale insulin  - Carb coverage: 1 U/15 g CHO at each meal  - Hypoglycemic protocol   - QID fingersticks AC HS     Diet: regular  Fluids: albumin followed by NS bolus  DVT Prophylaxis: Therapeutic lovenox  Glaser Catheter: not present  Code Status: full     Disposition Plan     Expected discharge: 1-2 days, recommended to prior living arrangement once appropriate pain management and anticoagulation is done.  Entered: Emanuel Houston MD 07/25/20     The patient's care was discussed with the Attending physician, Dr. Ronit Fu MD  PGY 3  Date of Service (when I saw the patient): 07/25/20      Interval History  No acute events overnight.  Tired this morning as he did not fall asleep until about 8 am this morning. Mom reports patient is a \"night owl\" and normally doesn't sleep much at night. Otherwise, he reports having regular bowel movements. No evidence of bleeding. No fevers, chills, SOB.    Data reviewed today: I reviewed all medications, new labs and imaging results over the last 24 hours.    Physical Exam  /56   Pulse 75   Temp 95.7  F (35.4  C) (Oral)   Resp 16   Ht 1.88 m (6' 2\")   Wt 85.7 kg (189 lb)   SpO2 95%   BMI 24.27 kg/m     GA: A&Ox3, coherent, brighter this AM  HEENT: anicteric sclerae, mucus membranes moist, poor " dentition with multiple missing teeth  Lungs: CTAB  CVS: RRR, normal heart sounds  Abd: normal bowel sound, mild abdominal distension appears stable this AM, abdomen is soft and minimally tense with stable generalized epigastric tenderness LUQ>RUQ, some focal tenderness in LLQ; no rebound or guarding; liver and spleen edges not easily palpable  Extremity: no edema seen  Neuro: grossly intact, AO x 4, speech and thought processes are linear.    Data  Last Comprehensive Metabolic Panel:  Sodium   Date Value Ref Range Status   07/26/2020 139 133 - 144 mmol/L Final     Potassium   Date Value Ref Range Status   07/26/2020 4.2 3.4 - 5.3 mmol/L Final     Chloride   Date Value Ref Range Status   07/26/2020 110 (H) 94 - 109 mmol/L Final     Carbon Dioxide   Date Value Ref Range Status   07/26/2020 25 20 - 32 mmol/L Final     Anion Gap   Date Value Ref Range Status   07/26/2020 4 3 - 14 mmol/L Final     Glucose   Date Value Ref Range Status   07/26/2020 116 (H) 70 - 99 mg/dL Final     Urea Nitrogen   Date Value Ref Range Status   07/26/2020 15 7 - 30 mg/dL Final     Creatinine   Date Value Ref Range Status   07/26/2020 0.68 0.66 - 1.25 mg/dL Final     GFR Estimate   Date Value Ref Range Status   07/26/2020 >90 >60 mL/min/[1.73_m2] Final     Comment:     Non  GFR Calc  Starting 12/18/2018, serum creatinine based estimated GFR (eGFR) will be   calculated using the Chronic Kidney Disease Epidemiology Collaboration   (CKD-EPI) equation.       Calcium   Date Value Ref Range Status   07/26/2020 9.2 8.5 - 10.1 mg/dL Final       CBC RESULTS:   Recent Labs   Lab Test 07/25/20  0716   WBC 1.6*   RBC 3.15*   HGB 10.1*   HCT 30.4*   MCV 97   MCH 32.1   MCHC 33.2   RDW 14.1   PLT 26*       INR: 1.44  Antithrombin IIIc: 59  Hep Xa level 7/19: 0.25 (high intensity range 0.30-0.70)     Exam: US ABDOMEN LIMITED, 7/22/2020 11:46 AM  Indication: Pt w/ PVT and mild ascites per 7/21 MRI, now with mild  abdominal distension and  triggering sepsis protocol. US abdomen to  evaluate ascites.  Comparison: MRI: 7/21/2020.     Findings/                                                                   Impression:   No evidence of significant ascites. Overlying obscuring bowel gas.      I have personally reviewed the examination and initial interpretation  and I agree with the findings.     VILMA MARTI MD      MRI ABDOMEN  Comparison study: Ultrasound 7/17/2020, MRI 3/10/2020, CT 7/16/2020     History: Known portal vein thrombosis with extension into mesentery  having worsening LUQ abdominal pain. GI recommending MRI triple or  quadruple phase with and without contrast.     TECHNIQUE: Images were acquired with and without intravenous contrast  through the abdomen. The following MR images were acquired: TrueFISP,  multiplanar T2 weighted, axial T1 in/out of phase, axial fat-saturated  T1, diffusion-weighted. Multiplanar T1-weighted images with fat  saturation were before contrast administration and at multiple time  points following the administration of intravenous contrast. Contrast  dose: 8 mL intravenous Gadavist.     FINDINGS:     Liver: Cirrhotic configuration of the liver with nodular contour,  heterogeneous lacy T2 signal which particularly affects the right  lobe. T2 signal irregularity corresponds to lacy enhancement seen on  postcontrast sequences. Mild loss of signal intensity in the liver  parenchyma on out of phase imaging. No suspicious liver lesions.  Previously described hypoenhancing lesion in segment 8 is not seen on  today's study.     Gallbladder: Surgically absent. Mild prominence of the central  intrahepatic and the intrahepatic bile ducts, compatible with  reservoir effect.     Spleen: Splenomegaly, measuring 14.9 x 15.4 cm in maximum axial  diameter. No focal splenic lesion.     Kidneys: Simple renal cortical cysts. No soft tissue masses,  hydronephrosis.     Adrenal glands: No adrenal nodules.     Pancreas:  Diffusely atrophic with some loss of intrinsic T1  hyperintensity. No pancreatic ductal dilatation. No focal pancreatic  lesion.     Bowel: No dilated loops of bowel or bowel wall thickening.  Postsurgical changes of Yan-en-Y gastric bypass.     Lymph nodes: Prominent upper abdominal lymph nodes not enlarged by  size criteria.     Blood vessels: Normal caliber abdominal aorta. Nonocclusive thrombus  in the proximal SMV. Nonocclusive thrombus in the main portal vein.  Nearly occlusive thrombus in the left portal vein, which demonstrates  expansion. Nonocclusive thrombus in the right portal vein.  Portosystemic collaterals in the left upper quadrant. Splenic vein is  patent. Recanalization of the periumbilical vein.     Lung bases: Small pleural effusions. No focal consolidation. Bibasilar  atelectasis.     Bones and soft tissues: No suspicious osseous lesions. No soft tissue  masses.     Mesentery and abdominal wall: Edematous mesentery.     Ascites: Trace perihepatic ascites.                                                            IMPRESSION:  1.  Unchanged configuration of the bland thrombus in the SMV, main  portal vein, right portal vein, and left portal vein. The thrombus is  nonocclusive, becoming nearly occlusive in the left portal vein  (unchanged).  2.  Steatosis and cirrhosis with sequela of portal hypertension,  including splenomegaly, portosystemic collaterals, and trace ascites.  3.  Stable findings of chronic pancreatitis.     I have personally reviewed the examination and initial interpretation  and I agree with the findings.     DARY KU MD        Exam: XR CHEST PORT 1 VW, 7/22/2020 2:02 PM  Indication: sirs  Comparison: Chest x-ray 1/6/2020     Findings:   Trachea appears midline. Normal cardiac silhouette. No pneumothorax.  Trace bilateral pleural effusions. Pulmonary vasculature is distinct.  No focal airspace disease. No acute osseous or soft tissue  abnormalities. No acute upper  abdominal pathology.                                                                      Impression:      No focal airspace disease.     I have personally reviewed the examination and initial interpretation  and I agree with the findings.     HONG DICKERSON MD        US abdomen with Doppler 7/17  EXAMINATION: Limited Abdominal Ultrasound, 7/17/2020 8:12 AM      COMPARISON: US 6/30/2020, 1/8/2020 and MRI 3/10/2020.     HISTORY: evaluate for ascites, PVT with mesenteric involvement     FINDINGS:   Fluid: Right pleural effusion. No ascites.     Liver: The liver demonstrates nodular contour of the liver with  coarsened echotexture, measuring 16 cm in craniocaudal dimension.  There is no focal mass.      Nonocclusive thrombus in the main portal vein and left portal vein.   Extrahepatic portal vein flow around the thrombus is antegrade at 32  cm/sec.  Right portal vein flow is antegrade, measuring 23 cm/sec.  Left portal vein flow around the thrombus is antegrade, measuring 20  cm/sec.     Flow in the hepatic artery is towards the liver and:  45 cm/s peak systolic  0.83 resistive index.      The splenic vein is patent and flow is towards the liver. The left,  middle, and right hepatic veins are patent with flow towards the IVC.  The IVC is patent with flow towards the heart. The visualized aorta  is not dilated.     Gallbladder: Cholecystectomy.     Bile Ducts: Both the intra- and extrahepatic biliary system are of  normal caliber.  The common bile duct measures 7 mm in diameter.     Kidney: The right kidney measures 11.7 cm long. The left kidney  measures 11.6 cm long. There is no hydronephrosis or hydroureter, no  shadowing renal calculi, cystic lesion or mass.      Spleen: The spleen is enlarged, measuring 17.5 cm in craniocaudal  dimension.                                             IMPRESSION:    1. Cirrhotic configuration of the liver with splenomegaly, compatible  with portal hypertension. No ascites. No focal  intrahepatic lesion.  2. Nonocclusive thrombus in the main portal vein extending into the  left portal vein. Otherwise, patent hepatic vasculature with  appropriate antegrade flow as above.  3. Small right pleural effusion.     I have personally reviewed the examination and initial interpretation  and I agree with the findings.     GURINDER ARITA, DO

## 2020-07-26 NOTE — PLAN OF CARE
"/64 (BP Location: Right arm)   Pulse 86   Temp 97.2  F (36.2  C) (Oral)   Resp 18   Ht 1.88 m (6' 2\")   Wt 85.7 kg (189 lb)   SpO2 98%   BMI 24.27 kg/m      Neuros: A/O x4, calls appropriately.   Cardiac: denies chest pain, BP stable.   Respiratory: denies SOB, LS clear, sating in mid 90's on RA.   GI/: voiding spontaneously. + BS, + flatus, pt reports had 2 BMs this shift.  Diet: 2 g sodium diet, denies n/v.   Activity: ambulated in [place] and room independently.   Skin: noted bruises on abd.   LDA: L PIV SL.   Pain: reports pain is tolerable with oral PRN oxycodone, flexeril, and schedule tylenol.   Lab: BG 70, 124, 105, 54, 253, and 293.   New changes this shift: patient reports shaky, checked BG was only 54. Given IV Dextrose 25g, BG trending up. Paged MD regarding hypoglycemic episode.   Plan: continue to monitor and POC.      "

## 2020-07-26 NOTE — PLAN OF CARE
"/64 (BP Location: Right arm)   Pulse 86   Temp 97.2  F (36.2  C) (Oral)   Resp 18   Ht 1.88 m (6' 2\")   Wt 85.7 kg (189 lb)   SpO2 98%   BMI 24.27 kg/m     Patient received oxycodone for abd pain, flexeril for leg cramping both with fair relief.  Received seroquel and trazadone for sleep but was awake most of shift.  Showered.  States he has had 2 stools this shift, voiding not saving.  Glc 270/130.  LS clear.  Mom in room.  Continue with POC.  "

## 2020-07-26 NOTE — PROGRESS NOTES
"  Hematology - Inpatient Consult Service  Progress Note   Date of Service: 07/26/2020    Patient: Obed Wiley  MRN: [id number]  Admission Date: 7/17/2020  Hospital Day # 9      Interval History:  Had episode of hypoglycemia last night requiring Dextrose infusion No episodes of bleeding overnight.     Physical Exam:    Blood pressure 114/68, pulse 80, temperature 98.1  F (36.7  C), temperature source Oral, resp. rate 16, height 1.88 m (6' 2\"), weight 85.7 kg (189 lb), SpO2 96 %.  General: Alert and cooperative, lying in bed, no acute distress  CV: RRR  Resp: CTAB, no wheezing/crackles, normal respiratory effort on ambient air  GI: Soft, non-tender, non-distended, bowel sounds present and normoactive    Medications:  Inpatient medications were reviewed.     Labs & Studies:     CBC  Recent Labs   Lab 07/26/20  0648 07/25/20  0716 07/24/20  0708 07/23/20  0648   WBC 1.8* 1.6* 1.8* 2.5*   RBC 3.53* 3.15* 3.29* 3.37*   HGB 11.2* 10.1* 10.6* 10.8*   HCT 33.8* 30.4* 31.6* 32.3*   MCV 96 97 96 96   MCH 31.7 32.1 32.2 32.0   MCHC 33.1 33.2 33.5 33.4   RDW 14.1 14.1 14.1 14.0   PLT 28* 26* 24* 27*     INR  Recent Labs   Lab 07/26/20  0648 07/25/20  0716 07/24/20  0708 07/23/20  0648   INR 1.44* 1.44* 1.37* 1.43*       Problem list   1. Alcoholic cirrhosis  2. Heterozygous protein C deficiency  3. Complicated hx of PE, DVT, mesenteric vein thrombosis and now portal vein thrombosis  4. Hx of GI bleeding   5. 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 Chronic indwelling IVC filter in need for chronic anticoagulation  7. Hx of Yan-en-Y gastric bypass     Assessment and Recommendations  - Continue enoxaparin 1.5 mg/kg daily.  - If platelets fall below 20K, we will decrease the enoxaparin dose and start Eltrombopag as thrombopoietin agonist to augment the platelet count. This drug might be prothrombotic and therefore we are trying to avoid it for now given hx of recurrent VTE.   Not sure why he go hypoglycemic but likely " related to DM meds      We will continue to follow this patient while hospitalized. Please do not hesitate to page with any questions or concerns. Patient was seen and plan of care was discussed with attending physician Dr. Calhoun.     John Calhoun M.D.  [phone]

## 2020-07-27 ENCOUNTER — PATIENT OUTREACH (OUTPATIENT)
Dept: CARE COORDINATION | Facility: CLINIC | Age: 30
End: 2020-07-27

## 2020-07-27 VITALS
RESPIRATION RATE: 15 BRPM | HEART RATE: 68 BPM | DIASTOLIC BLOOD PRESSURE: 54 MMHG | OXYGEN SATURATION: 96 % | WEIGHT: 189 LBS | SYSTOLIC BLOOD PRESSURE: 103 MMHG | HEIGHT: 74 IN | TEMPERATURE: 97.4 F | BODY MASS INDEX: 24.26 KG/M2

## 2020-07-27 LAB
ALBUMIN SERPL-MCNC: 4.1 G/DL (ref 3.4–5)
ALP SERPL-CCNC: 63 U/L (ref 40–150)
ALT SERPL W P-5'-P-CCNC: 24 U/L (ref 0–70)
ANION GAP SERPL CALCULATED.3IONS-SCNC: 6 MMOL/L (ref 3–14)
AST SERPL W P-5'-P-CCNC: 31 U/L (ref 0–45)
BASOPHILS # BLD AUTO: 0 10E9/L (ref 0–0.2)
BASOPHILS NFR BLD AUTO: 1.1 %
BILIRUB DIRECT SERPL-MCNC: 0.4 MG/DL (ref 0–0.2)
BILIRUB SERPL-MCNC: 1.5 MG/DL (ref 0.2–1.3)
BUN SERPL-MCNC: 11 MG/DL (ref 7–30)
CALCIUM SERPL-MCNC: 9 MG/DL (ref 8.5–10.1)
CHLORIDE SERPL-SCNC: 109 MMOL/L (ref 94–109)
CO2 SERPL-SCNC: 25 MMOL/L (ref 20–32)
CREAT SERPL-MCNC: 0.85 MG/DL (ref 0.66–1.25)
DIFFERENTIAL METHOD BLD: ABNORMAL
EOSINOPHIL # BLD AUTO: 0.1 10E9/L (ref 0–0.7)
EOSINOPHIL NFR BLD AUTO: 4 %
ERYTHROCYTE [DISTWIDTH] IN BLOOD BY AUTOMATED COUNT: 14 % (ref 10–15)
GFR SERPL CREATININE-BSD FRML MDRD: >90 ML/MIN/{1.73_M2}
GLUCOSE BLDC GLUCOMTR-MCNC: 121 MG/DL (ref 70–99)
GLUCOSE BLDC GLUCOMTR-MCNC: 158 MG/DL (ref 70–99)
GLUCOSE BLDC GLUCOMTR-MCNC: 287 MG/DL (ref 70–99)
GLUCOSE SERPL-MCNC: 121 MG/DL (ref 70–99)
HCT VFR BLD AUTO: 33.3 % (ref 40–53)
HGB BLD-MCNC: 11 G/DL (ref 13.3–17.7)
IMM GRANULOCYTES # BLD: 0 10E9/L (ref 0–0.4)
IMM GRANULOCYTES NFR BLD: 0 %
INR PPP: 1.49 (ref 0.86–1.14)
LYMPHOCYTES # BLD AUTO: 0.5 10E9/L (ref 0.8–5.3)
LYMPHOCYTES NFR BLD AUTO: 31 %
MCH RBC QN AUTO: 31.7 PG (ref 26.5–33)
MCHC RBC AUTO-ENTMCNC: 33 G/DL (ref 31.5–36.5)
MCV RBC AUTO: 96 FL (ref 78–100)
MONOCYTES # BLD AUTO: 0.2 10E9/L (ref 0–1.3)
MONOCYTES NFR BLD AUTO: 10.3 %
NEUTROPHILS # BLD AUTO: 0.9 10E9/L (ref 1.6–8.3)
NEUTROPHILS NFR BLD AUTO: 53.6 %
NRBC # BLD AUTO: 0 10*3/UL
NRBC BLD AUTO-RTO: 0 /100
PLATELET # BLD AUTO: 28 10E9/L (ref 150–450)
POTASSIUM SERPL-SCNC: 3.5 MMOL/L (ref 3.4–5.3)
PROT SERPL-MCNC: 6.7 G/DL (ref 6.8–8.8)
RBC # BLD AUTO: 3.47 10E12/L (ref 4.4–5.9)
SODIUM SERPL-SCNC: 140 MMOL/L (ref 133–144)
WBC # BLD AUTO: 1.7 10E9/L (ref 4–11)

## 2020-07-27 PROCEDURE — 85025 COMPLETE CBC W/AUTO DIFF WBC: CPT | Performed by: STUDENT IN AN ORGANIZED HEALTH CARE EDUCATION/TRAINING PROGRAM

## 2020-07-27 PROCEDURE — 99239 HOSP IP/OBS DSCHRG MGMT >30: CPT | Mod: GC | Performed by: INTERNAL MEDICINE

## 2020-07-27 PROCEDURE — 85610 PROTHROMBIN TIME: CPT | Performed by: STUDENT IN AN ORGANIZED HEALTH CARE EDUCATION/TRAINING PROGRAM

## 2020-07-27 PROCEDURE — 25000132 ZZH RX MED GY IP 250 OP 250 PS 637: Performed by: STUDENT IN AN ORGANIZED HEALTH CARE EDUCATION/TRAINING PROGRAM

## 2020-07-27 PROCEDURE — 25000128 H RX IP 250 OP 636: Performed by: STUDENT IN AN ORGANIZED HEALTH CARE EDUCATION/TRAINING PROGRAM

## 2020-07-27 PROCEDURE — 80048 BASIC METABOLIC PNL TOTAL CA: CPT | Performed by: STUDENT IN AN ORGANIZED HEALTH CARE EDUCATION/TRAINING PROGRAM

## 2020-07-27 PROCEDURE — 80076 HEPATIC FUNCTION PANEL: CPT | Performed by: STUDENT IN AN ORGANIZED HEALTH CARE EDUCATION/TRAINING PROGRAM

## 2020-07-27 PROCEDURE — 00000146 ZZHCL STATISTIC GLUCOSE BY METER IP

## 2020-07-27 PROCEDURE — 36415 COLL VENOUS BLD VENIPUNCTURE: CPT | Performed by: STUDENT IN AN ORGANIZED HEALTH CARE EDUCATION/TRAINING PROGRAM

## 2020-07-27 RX ORDER — OXYCODONE AND ACETAMINOPHEN 10; 325 MG/1; MG/1
1 TABLET ORAL EVERY 4 HOURS PRN
Qty: 30 TABLET | Refills: 0 | Status: SHIPPED | OUTPATIENT
Start: 2020-07-27 | End: 2021-02-19

## 2020-07-27 RX ORDER — HYDROXYZINE HYDROCHLORIDE 25 MG/1
25 TABLET, FILM COATED ORAL EVERY 6 HOURS PRN
Qty: 30 TABLET | Refills: 0 | Status: SHIPPED | OUTPATIENT
Start: 2020-07-27 | End: 2022-06-29

## 2020-07-27 RX ORDER — CYCLOBENZAPRINE HCL 10 MG
10 TABLET ORAL EVERY 8 HOURS PRN
Qty: 30 TABLET | Refills: 0 | Status: SHIPPED | OUTPATIENT
Start: 2020-07-27

## 2020-07-27 RX ORDER — LIDOCAINE 4 G/G
1 PATCH TOPICAL EVERY 24 HOURS
Qty: 10 PATCH | Refills: 0 | Status: SHIPPED | OUTPATIENT
Start: 2020-07-27 | End: 2021-02-19

## 2020-07-27 RX ORDER — LIDOCAINE 50 MG/G
1 PATCH TOPICAL EVERY 24 HOURS
Qty: 30 PATCH | Refills: 0 | Status: SHIPPED | OUTPATIENT
Start: 2020-07-27 | End: 2021-05-14

## 2020-07-27 RX ADMIN — MELATONIN 2000 UNITS: at 09:07

## 2020-07-27 RX ADMIN — RIFAXIMIN 550 MG: 550 TABLET ORAL at 09:07

## 2020-07-27 RX ADMIN — ACETAMINOPHEN 650 MG: 325 TABLET, FILM COATED ORAL at 12:23

## 2020-07-27 RX ADMIN — OXYCODONE HYDROCHLORIDE 15 MG: 5 TABLET ORAL at 09:18

## 2020-07-27 RX ADMIN — INSULIN ASPART 1 UNITS: 100 INJECTION, SOLUTION INTRAVENOUS; SUBCUTANEOUS at 12:24

## 2020-07-27 RX ADMIN — DICLOFENAC SODIUM 2 G: 10 GEL TOPICAL at 09:15

## 2020-07-27 RX ADMIN — Medication 15 ML: at 12:23

## 2020-07-27 RX ADMIN — FERROUS SULFATE TAB 325 MG (65 MG ELEMENTAL FE) 325 MG: 325 (65 FE) TAB at 09:08

## 2020-07-27 RX ADMIN — ENOXAPARIN SODIUM 135 MG: 150 INJECTION SUBCUTANEOUS at 14:33

## 2020-07-27 RX ADMIN — HYDROXYZINE HYDROCHLORIDE 25 MG: 25 TABLET, FILM COATED ORAL at 13:52

## 2020-07-27 RX ADMIN — GABAPENTIN 600 MG: 300 CAPSULE ORAL at 13:52

## 2020-07-27 RX ADMIN — PANTOPRAZOLE SODIUM 40 MG: 40 TABLET, DELAYED RELEASE ORAL at 09:07

## 2020-07-27 RX ADMIN — OXYCODONE HYDROCHLORIDE 15 MG: 5 TABLET ORAL at 12:30

## 2020-07-27 RX ADMIN — FOLIC ACID 1 MG: 1 TABLET ORAL at 09:07

## 2020-07-27 RX ADMIN — CYCLOBENZAPRINE 10 MG: 10 TABLET, FILM COATED ORAL at 09:22

## 2020-07-27 RX ADMIN — PROMETHAZINE HYDROCHLORIDE 25 MG: 25 TABLET ORAL at 13:55

## 2020-07-27 RX ADMIN — GABAPENTIN 600 MG: 300 CAPSULE ORAL at 09:07

## 2020-07-27 ASSESSMENT — ACTIVITIES OF DAILY LIVING (ADL)
ADLS_ACUITY_SCORE: 12

## 2020-07-27 NOTE — PLAN OF CARE
"[id number]    ../60 (BP Location: Right arm)   Pulse 66   Temp 97.9  F (36.6  C) (Oral)   Resp 15   Ht 1.88 m (6' 2\")   Wt 85.7 kg (189 lb)   SpO2 98%   BMI 24.27 kg/m        ..Activity: up ad red   Neuros: alert and orientated x 4, calm and cooperative   Cardiac: WNL  Respiratory: O2 sats 98% on RA, unlabored   GI/: abdomen soft/tender, last BM 7/26, voiding spont (not saving)   Diet: regular   Lines: no lines, PIV discontinued per patient request   Incisions/Drains: none   Labs: pending   Pain/nausea: oxycodone/tylenol prn with \"adequate\" pain relief overnight, no nausea   New changes this shift: none   Plan: possible discharge to home today, continue POC     "

## 2020-07-27 NOTE — PLAN OF CARE
Physical Therapy Discharge Summary    Reason for therapy discharge:    Discharged to home.    Progress towards therapy goal(s). See goals on Care Plan in [facility] electronic health record for goal details.  Goals met    Therapy recommendation(s):    No further therapy is recommended.

## 2020-07-27 NOTE — PLAN OF CARE
Patient AVSS. Up ad red. Ready for discharge to home today w/ lovenox. Lovenox pamphlet given. Has given injections in past. Oxycodone for pain w/ good control. Discharge teaching done. Encouraged to call w/ any questions or concerns. Small hematoma on abdomen from previous lovenox injection, md paged to look at it b4 discharge.

## 2020-07-27 NOTE — PROGRESS NOTES
"  Hematology - Inpatient Consult Service  Progress Note   Date of Service: 07/27/2020    Patient: Obed Wiley  MRN: [id number]  Admission Date: 7/17/2020  Hospital Day # 10      Problem list   1. Alcoholic cirrhosis  2. Heterozygous protein C deficiency  3. Complicated hx of PE, DVT, mesenteric vein thrombosis and now portal vein thrombosis  4. Hx of GI bleeding   5. 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 Chronic indwelling IVC filter in need for chronic anticoagulation  7. Hx of Yan-en-Y gastric bypass     Assessment and Recommendations  - Heparin Xa level from 7/26 9:45 PM was subtherapeutic at 0.43 (1.0-2.0 for 1.5 mg/kg once daily dosing). While this calls for a dose increment, we are holding off on it given thrombocytopenia and hx of GI bleeding.    - OK to discharge on enoxaparin 1.5 mg/kg daily with outpatient hematology follow-up.  - If platelets fall below 20K, we can considering decrease in the enoxaparin dose and start Eltrombopag as thrombopoietin agonist to augment the platelet count. This drug might be prothrombotic and therefore we are trying to avoid it for now given hx of recurrent VTE.       Patient was seen and plan of care was discussed with attending physician Dr. Calhoun.    Familia Rodriguez  Hematology/Oncology Fellow  Pager: [phone]  Attending  The patient was seen and examined by me separate from the resident/fellow provider.The note above reflects my assessment and plan. I have personally reviewed today's labs,vital and radiology results. The points of care that were added by me are:    Would conitnue enoxaparin 1.5mg/kg BID at this time monitor platelets  John Calhoun M.D.  [phone]            Interval History:  No acute events overnight. No episodes of bleeding.     Physical Exam:    Blood pressure 103/54, pulse 68, temperature 97.4  F (36.3  C), temperature source Oral, resp. rate 15, height 1.88 m (6' 2\"), weight 85.7 kg (189 lb), SpO2 96 %.  General: No acute " distress  CV: RRR  Resp: CTAB  GI: Soft, non-tender, non-distended, bowel sounds present  MSK/Skin: No edema  Neuro: AOx3, no focal deficits    Inpatient medications:  Medications were reviewed.     Labs & Studies:   Pertinent labs and imaging studies were reviewed.

## 2020-07-27 NOTE — DISCHARGE SUMMARY
[facility], [place]  Discharge Summary - Medicine & Pediatrics       Date of Admission:  7/17/2020  Date of Discharge:  7/27/2020  Discharging Provider: Ronit Fernando Service: Jenae Drew    Discharge Diagnoses   Portal Venous Thrombosis  Protein C Deficiency    Follow-ups Needed After Discharge   Follow-up Appointments     Adult [facility] Follow-up and recommended labs and tests      Follow up with primary care provider, Anjelica Reyes, within 4-5 days   to evaluate medication change and for hospital follow- up.  The following   labs/tests are recommended: CBC, CMP, INR, and Low Molecular Weight   heparin Xa level    Follow up with the liver transplant team within 1 month with a CT   abd/pelvis with contrast  Follow up with hematology within 1 month  Referral placed for Pain Management.    If you would like further neurology follow-up regarding your right ear   numbness, let your PCP know    Appointments on [place] and/or [facility] (with [facility] or [facility]   provider or service). Call [phone] if you haven't heard regarding   these appointments within 5 days of discharge.             Unresulted Labs Ordered in the Past 30 Days of this Admission     Date and Time Order Name Status Description    7/22/2020 0954 Blood culture Preliminary     7/22/2020 0954 Blood culture Preliminary       These results will be followed up by PCP    Discharge Disposition   Discharged to home  Condition at discharge: Stable    Hospital Course   Obed Wiley was admitted on 7/17/2020 for abdominal pain and found to have portal venous thrombosus. See admission H&P dated 7/17/2020 for details leading to hospitalization, where the following problems were addressed:      # Acute portal vein thrombosis  # Protein C deficiency  Pt w/ hypercoagulability 2/2 to liver disease, protein C deficiency. 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 Recently admitted 1/2020 for mesenteric ischemia, discharged on 1 mg/kg Lovenox. Reports compliance and consistency with Lovenox dosing at home. Pt now with nonocclusive portal vein thrombosis identified on imaging at OSH, US abdomen w/ Doppler 7/17 w/ PVT w/o splenic vein involvement. Started on heparin gtt and transitioned to lovenox 7/19, w/ anti-Xa level subtherapeutic at 0.25 7/22 after 5th dose of Lovenox 60 mg BID. MRI 7/21 w/ stable PVT and SMV thromboses. Per hematology consult will discharge on Lovenox 1.5 mg/kg and follow up labs including platelets within 7 days.  - Lovenox 1.5 mg/kg given once daily  - CBC, INR, Xa level 7d post discharge       # Decompensated alcoholic cirrhosis c/b HE, SBP,   # Hepatic encephalopathy  Currently sober and undergoing transplant evaluation, transplant candidate per Dr. Marsh. On presentation there was concern for hepatic encephalopathy but he did clear shortly after admission. Hepatology was consulted who recommended:  - Continue PTA ciprofloxacin 500mg once daily for SBP prophylaxis  - Continue PTA lactulose, titrate to 3-4 BM/day  - Continue rifaximin 550 mg BID  - Restart PTA spironolactone 100mg TID and lasix 40mg TID   - Will need repeat CT abd w/ contrast in 1 month as outpatient; Transplant service will order.     #Right ear, lateral facial paresthesia, resolved  Pt admitted to OSH with abdominal pain, right ear and right lateral facial paresthesias. OSH w/ negative head CT, neuro at OSH recommending MRI to evaluate further. MRI brain w/o contrast without abnormality. Seen by neurology, not felt to be emergent but willing to follow up outpatient. Pt declined further workup at this time. Pt's ear symptoms spontaneously resolved 7/22 AM, with normal sensation returning entirely.  - Neurology consulted and signed off, appreciated assistance     #Chronic pancreatitis, at baseline  Pt w/ hx of chronic pancreatitis.  History of pancreatic cyst but does not require further evaluation and can be cleared for liver transplant per Dr. Acosta. Per pt, current abdominal pain worse than chronic pancreatitis pain but in similar distribution. Lipase only mildly elevated, not indicative of acute pancreatitis.     #Diabetes Mellitus II, at baseline  Last Hgb A1C 8.4% (11/2019), recheck 7/17 is 5.9%.  - Decreased glargine to 7 units daily  - continue pta regimen medium sliding scale insulin  - Carb coverage: 1 U/15 g CHO at each meal      Consultations This Hospital Stay   GI HEPATOLOGY ADULT IP CONSULT  HEMATOLOGY ADULT IP CONSULT  HEMATOLOGY ADULT IP CONSULT  PAIN MANAGEMENT ADULT IP CONSULT  VASCULAR ACCESS CARE ADULT IP CONSULT  VASCULAR ACCESS CARE ADULT IP CONSULT  NEUROLOGY GENERAL ADULT IP CONSULT  NUTRITION SERVICES ADULT IP CONSULT  OPHTHALMOLOGY IP CONSULT  PHYSICAL THERAPY ADULT IP CONSULT  OCCUPATIONAL THERAPY ADULT IP CONSULT  VASCULAR ACCESS CARE ADULT IP CONSULT  PHYSICAL THERAPY ADULT IP CONSULT    Code Status   Full Code       The patient was discussed with Dr. Ronit Ahmadi MD MS    Internal Medicine and Pediatrics, PGY-3  [facility]  P: [phone]      ______________________________________________________________________    Physical Exam   Vital Signs: Temp: 97.4  F (36.3  C) Temp src: Oral BP: 103/54 Pulse: 68   Resp: 15 SpO2: 96 % O2 Device: None (Room air)    Weight: 189 lbs 0 oz  GA: A&Ox3, coherent  HEENT: anicteric sclerae, mucus membranes moist, poor dentition with multiple missing teeth  Lungs: CTAB  CVS: RRR, normal heart sounds  Abd: normal bowel sound, mild abdominal distension appears stable this AM, abdomen is soft and minimally tense with stable generalized epigastric tenderness LUQ>RUQ, some focal tenderness in LLQ; no rebound or guarding; liver and spleen edges not easily palpable  Extremity: no edema seen  Skin: no jaundice or pallor noted  Neuro: grossly intact, AO x 4,  speech and thought processes are linear.      Primary Care Physician   Anjelica Reyes    Discharge Orders      CBC with platelets differential    Last Lab Result: Hemoglobin (g/dL)       Date                     Value                 07/26/2020               11.2 (L)         ----------     Comprehensive metabolic panel     INR     Pain Management Referral      Nutrition Referral      Reason for your hospital stay    You were admitted to the hospital with worsening left-sided abdominal pain. Imaging showed that this pain was likely caused by the new portal vein and mesenteric vein thromboses. The dose of your lovenox (blood thinner) was increased. It is expected that as your body breaks up that clot, your pain will improve.     Adult [facility] Follow-up and recommended labs and tests    Follow up with primary care provider, Anjelica Reyes, within 4-5 days to evaluate medication change and for hospital follow- up.  The following labs/tests are recommended: CBC, CMP, INR, and Low Molecular Weight heparin Xa level    Follow up with the liver transplant team within 1 month with a CT abd/pelvis with contrast  Follow up with hematology within 1 month  Referral placed for Pain Management.    If you would like further neurology follow-up regarding your right ear numbness, let your PCP know    Appointments on [place] and/or [facility] (with [facility] or [facility] provider or service). Call [phone] if you haven't heard regarding these appointments within 5 days of discharge.     Activity    Your activity upon discharge: activity as tolerated     Monitor and record    blood glucose 4 times a day, before meals and at bedtime  weight every other day  Number of stools per day (goal 3-4 loose stools daily. If less than 3 stools, take additional lactulose)     When to contact your care team    Call your primary doctor if you have any of the following: increased shortness of breath, chest pain, increased swelling in your  abdomen or legs that does not resolve with lasix, increased abdominal pain or pain not relieved with your pain medications, bright red blood in your stool or urine, dark black sticky tar-like stools, new large bruises, lightheadedness or dizziness, or for any other symptoms that concern you.    Go to the Emergency room if you have a fever > 100.4 deg F, or if you have recurrence of symptoms that brought you into the hospital.     Discharge Instructions    Follow up with primary care in 5-7 days for routine labs and to refill medications.    From [employer]: [email or web address]     Full Code     Cane Order    I, the undersigned, certify that the above prescribed supplies are medically necessary for this patient and is both reasonable and necessary in reference to accepted standards of medical and necessary in reference to accepted standards of medical practice in the treatment of this patient's condition and is not prescribed as a convenience.      Diet    Follow this diet upon discharge: Orders Placed This Encounter      Snacks/Supplements Adult: Boost Plus; With Meals      Room Service      Regular Diet Adult- try to restrict salt intake to 2g daily or less       Significant Results and Procedures   Most Recent 3 CBC's:  Recent Labs   Lab Test 07/27/20  0651 07/26/20  0648 07/25/20  0716   WBC 1.7* 1.8* 1.6*   HGB 11.0* 11.2* 10.1*   MCV 96 96 97   PLT 28* 28* 26*     MRI Abdomen:  IMPRESSION:  1.  Unchanged configuration of the bland thrombus in the SMV, main portal vein, right portal vein, and left portal vein. The thrombus is nonocclusive, becoming nearly occlusive in the left portal vein (unchanged).  2.  Steatosis and cirrhosis with sequela of portal hypertension, including splenomegaly, portosystemic collaterals, and trace ascites.  3.  Stable findings of chronic pancreatitis.    MRI Brain:  Normal brain MRI.      Discharge Medications   Current Discharge  Medication List      START taking these medications    Details   cyclobenzaprine (FLEXERIL) 10 MG tablet Take 1 tablet (10 mg) by mouth every 8 hours as needed for muscle spasms  Qty: 30 tablet, Refills: 0    Associated Diagnoses: Chronic pain syndrome      diclofenac (VOLTAREN) 1 % topical gel Apply 2 g topically 4 times daily  Qty: 50 g, Refills: 0    Associated Diagnoses: Chronic pain syndrome      enoxaparin ANTICOAGULANT (LOVENOX) 150 MG/ML syringe Inject 0.9 mLs (135 mg) Subcutaneous every 24 hours  Qty: 30 mL, Refills: 0    Associated Diagnoses: Portal vein thrombosis      hydrOXYzine (ATARAX) 25 MG tablet Take 1 tablet (25 mg) by mouth every 6 hours as needed for anxiety  Qty: 30 tablet, Refills: 0    Associated Diagnoses: Chronic pain syndrome      Lidocaine (LIDOCARE) 4 % Patch Place 1 patch onto the skin every 24 hours To prevent lidocaine toxicity, patient should be patch free for 12 hrs daily.  Qty: 10 patch, Refills: 0    Associated Diagnoses: Chronic pain syndrome      lidocaine (LIDODERM) 5 % patch Place 1 patch onto the skin every 24 hours To prevent lidocaine toxicity, patient should be patch free for 12 hrs daily.  Qty: 30 patch, Refills: 0    Associated Diagnoses: Portal vein thrombosis         CONTINUE these medications which have CHANGED    Details   insulin glargine (LANTUS PEN) 100 UNIT/ML pen Inject 7 Units Subcutaneous every morning  Qty: 3 mL, Refills: 0    Comments: If Lantus is not covered by insurance, may substitute Basaglar at same dose and frequency.    Associated Diagnoses: Diabetes mellitus due to underlying condition with hyperosmolarity without coma, with long-term current use of insulin (H)      oxyCODONE-acetaminophen (PERCOCET)  MG per tablet Take 1 tablet by mouth every 4 hours as needed for moderate to severe pain  Qty: 30 tablet, Refills: 0    Associated Diagnoses: Chronic pain syndrome         CONTINUE these medications which have NOT CHANGED    Details   !!  cholecalciferol (VITAMIN D3) 41304 units (1250 mcg) capsule Take 50,000 Units by mouth every 7 days Saturdays      ciprofloxacin (CIPRO) 500 MG tablet Take 500 mg by mouth daily      ferrous sulfate (FEROSUL) 325 (65 Fe) MG tablet Take 325 mg by mouth daily (with breakfast)      folic acid (FOLVITE) 1 MG tablet Take 1 mg by mouth 2 times daily      furosemide (LASIX) 40 MG tablet Take 40 mg by mouth 3 times daily      gabapentin (NEURONTIN) 400 MG capsule Take 400 mg by mouth 3 times daily      !! lactulose (CHRONULAC) 10 GM/15ML solution Take 15 mLs (10 g) by mouth every hour as needed for constipation  Qty: 946 mL, Refills: 0    Associated Diagnoses: Alcoholic cirrhosis of liver with ascites (H)      magnesium oxide 400 MG CAPS Take 400 mg by mouth 3 times daily       MULTIPLE VITAMIN-FOLIC ACID PO Take 1 tablet by mouth 1 daily      Nutritional Supplements (ENSURE PO) Ensure/ boost - 237 ml bid daily      pantoprazole (PROTONIX) 40 MG EC tablet Take 40 mg by mouth daily      promethazine (PHENERGAN) 12.5 MG tablet Take 25 mg by mouth every 6 hours as needed for nausea      !! QUEtiapine (SEROQUEL) 25 MG tablet Take 25 mg by mouth daily as needed       !! Vitamin D, Cholecalciferol, 25 MCG (1000 UT) CAPS Take 2,000 Units by mouth daily       blood glucose (NO BRAND SPECIFIED) lancets standard Use to test blood sugar 6 times daily or as directed.      insulin aspart (NOVOLOG FLEXPEN) 100 UNIT/ML pen Inject Subcutaneous 3 times daily (with meals) Sliding scale as directed      !! lactulose (CHRONULAC) 10 GM/15ML solution Take 45 mLs (30 g) by mouth 3 times daily 20mg / 30 ml tid daily/ prn    Associated Diagnoses: Alcoholic cirrhosis of liver with ascites (H)      polyethylene glycol (MIRALAX/GLYCOLAX) packet Take 1 packet by mouth daily       !! QUEtiapine (SEROQUEL) 25 MG tablet Take 50 mg by mouth At Bedtime      rifaximin (XIFAXAN) 550 MG TABS tablet Take 550 mg by mouth 2 times daily       senna (SENOKOT) 8.6  MG tablet Take 1 tablet by mouth daily as needed for constipation    Associated Diagnoses: Constipation, unspecified constipation type      spironolactone (ALDACTONE) 100 MG tablet Take 100 mg by mouth 3 times daily      traZODone (DESYREL) 50 MG tablet Take 100 mg by mouth At Bedtime Prn       tretinoin (RETIN-A) 0.025 % external cream Apply topically At Bedtime       !! - Potential duplicate medications found. Please discuss with provider.      STOP taking these medications       enoxaparin ANTICOAGULANT (LOVENOX) 100 MG/ML syringe Comments:   Reason for Stopping:             Allergies   Allergies   Allergen Reactions     Bee Anaphylaxis     Adhesive Tape Rash     Sulfa Drugs Itching     Morphine      Internal Medicine Staff Attestation  Date of Service: 7/27/2020  I have seen and examined Obed Wiley, reviewed the data and discussed the plan of care with the care team on rounds.  I agree with the above documentation with the additions/changes to the ROS, HPI, Exam or data (including my edits in italics):    I discussed pt's care with bedside RN, case management/social work today.  I personally reviewed, labs, medications and past 24 hr notes.    Assessment/Plan/Diagnoses: plan/dx as below, which contains my edits and reflects our joint medical decision-making.   >50% of 45 minutes spent in direct patient care and coordinating discharge.    Danny Cottrell MD PhD  Internal Medicine Hospitalist & Staff Physician   of Internal Medicine   [facility]  Pager: [phone]

## 2020-07-28 ENCOUNTER — COMMITTEE REVIEW (OUTPATIENT)
Dept: TRANSPLANT | Facility: CLINIC | Age: 30
End: 2020-07-28

## 2020-07-28 DIAGNOSIS — I81 PORTAL VEIN THROMBOSIS: Primary | ICD-10-CM

## 2020-07-28 LAB
BACTERIA SPEC CULT: NO GROWTH
BACTERIA SPEC CULT: NO GROWTH
SPECIMEN SOURCE: NORMAL
SPECIMEN SOURCE: NORMAL

## 2020-07-28 NOTE — COMMITTEE REVIEW
Abdominal Committee Review Note     Evaluation Date: 1/6/2020  Committee Review Date: 7/28/2020    Organ being evaluated for: Liver    Transplant Phase: Evaluation  Transplant Status: Active    Transplant Coordinator: Konrad Gaitan Jr.  Transplant Surgeon:   Terrance Marsh    Referring Physician: Anjelica Reyes    Primary Diagnosis: Alcoholic Cirrhosis  Secondary Diagnosis:     Committee Review Members:  Nurse Practitioner Arabella Small, NP   Nutrition Anabel Gustafson, RD   Pharmacy Artie Samayoa, [facility]    - Clinical Wale Trimble, BRANDY, Karlee Diez, [facility]   Transplant [place] Danny Gaming MD, John Campbell MD, Celso Najera MD, Jr Konrad Gaitan RN, Mulu Garcia MD, Airam Rodríguez, APRN CNP, Kat Coffman, HUY, Viola Santos MD, Chet Elder MD, Terrance Marsh MD, Thomas M. Leventhal, MD, Heri Villanueva MD       Transplant Eligibility: Cirrhosis with MELD, ETOH    Committee Review Decision: Approved    Relative Contraindications: None    Absolute Contraindications: None    Committee Chair Mulu Garcia MD verbally attested to the committee's decision.    Committee Discussion Details:     Approved for list    Move early appt in August to virtual

## 2020-07-28 NOTE — PROGRESS NOTES
[facility]: Post-Discharge Note  SITUATION                                                      Admission:    Admission Date: 07/17/20   Reason for Admission: Portal Venous Thrombosis  Discharge:   Discharge Date: 07/27/20  Discharge Diagnosis: Portal Venous Thrombosis    BACKGROUND                                                      Obed Wiley was admitted on 7/17/2020 for abdominal pain and found to have portal venous thrombosus. See admission H&P dated 7/17/2020 for details leading to hospitalization, where the following problems were addressed:         ASSESSMENT      Discharge Assessment  Patient reports symptoms are: Improved  Does the patient have all of their medications?: Yes  Does patient know what their new medications are for?: Yes  Does patient have a follow-up appointment scheduled?: Yes  Does patient have any other questions or concerns?: No    Post-op  Did the patient have surgery or a procedure: No  Fever: No  Chills: No  Eating & Drinking: eating and drinking without complaints/concerns  PO Intake: regular diet  Bowel Function: normal  Urinary Status: voiding without complaint/concerns        PLAN                                                      Outpatient Plan:  Follow up with primary care provider, Anjelica Reyes, within 4-5 days   to evaluate medication change and for hospital follow- up.  The following   labs/tests are recommended: CBC, CMP, INR, and Low Molecular Weight   heparin Xa level     Follow up with the liver transplant team within 1 month with a CT   abd/pelvis with contrast  Follow up with hematology within 1 month  Referral placed for Pain Management.    If you would like further neurology follow-up regarding your right ear   numbness, let your PCP know    Future Appointments   Date Time Provider Department [place]   8/4/2020 12:15 PM Waylon Leon MD [facility]   8/4/2020  2:00 PM Edward Hansen PA-C [facility] [place]           Ashley  Margarita, CMA

## 2020-07-30 ENCOUNTER — TELEPHONE (OUTPATIENT)
Dept: ENDOCRINOLOGY | Facility: CLINIC | Age: 30
End: 2020-07-30

## 2020-07-30 NOTE — TELEPHONE ENCOUNTER
[facility] Call Center    Phone Message    May a detailed message be left on voicemail: yes     Reason for Call: Patient called wanting to know if he can change his 8/4 appointment to a video/telephone visit or if Dr. Leon has anything available at the [facility] around 8/25. Patient can't afford to drive 3 hours 2 times in one month. Please advise. Thank you.    Action Taken: Message routed to:  Adult Clinics: Endocrinology p [phone]    Travel Screening: Not Applicable

## 2020-07-31 NOTE — TELEPHONE ENCOUNTER
Spoke with patient, appointment changed to video visit, patient will mychart BG readings prior to the visit.    Jesscia Cyr [facility]  Adult Endocrinology  [facility]

## 2020-08-04 ENCOUNTER — VIRTUAL VISIT (OUTPATIENT)
Dept: ENDOCRINOLOGY | Facility: CLINIC | Age: 30
End: 2020-08-04
Payer: COMMERCIAL

## 2020-08-04 DIAGNOSIS — Z79.4 DIABETES MELLITUS DUE TO UNDERLYING CONDITION WITH OTHER SPECIFIED COMPLICATION, WITH LONG-TERM CURRENT USE OF INSULIN (H): ICD-10-CM

## 2020-08-04 DIAGNOSIS — Z09 HOSPITAL DISCHARGE FOLLOW-UP: Primary | ICD-10-CM

## 2020-08-04 DIAGNOSIS — E08.69 DIABETES MELLITUS DUE TO UNDERLYING CONDITION WITH OTHER SPECIFIED COMPLICATION, WITH LONG-TERM CURRENT USE OF INSULIN (H): ICD-10-CM

## 2020-08-04 PROCEDURE — 99214 OFFICE O/P EST MOD 30 MIN: CPT | Mod: 95 | Performed by: INTERNAL MEDICINE

## 2020-08-04 NOTE — PROGRESS NOTES
"Obed Wiley is a 30 year old male who is being evaluated via a billable video visit.      The patient has been notified of following:     \"This video visit will be conducted via a call between you and your physician/provider. We have found that certain health care needs can be provided without the need for an in-person physical exam.  This service lets us provide the care you need with a video conversation.  If a prescription is necessary we can send it directly to your pharmacy.  If lab work is needed we can place an order for that and you can then stop by our lab to have the test done at a later time.    Video visits are billed at different rates depending on your insurance coverage.  Please reach out to your insurance provider with any questions.    If during the course of the call the physician/provider feels a video visit is not appropriate, you will not be charged for this service.\"    Patient has given verbal consent for Video visit? Yes  How would you like to obtain your AVS? MyChart  If you are dropped from the video visit, the video invite should be resent to: Other e-mail: my chart  Will anyone else be joining your video visit? No      Endocrinology Video Visit    Chief Complaint: Diabetes     Information obtained from:Patient    Subjective:         HPI: Obed Wiley is a 30 year old male with history of diabetes who is seen for hospitalization follow-up today.    Patient was recently admitted to [facility] on 7/17/2020 and was discharged on 7/27/2020 with discharge diagnosis of portal venous thrombosis and protein C deficiency.  He has underlying clotting disorder.  He has known alcoholic cirrhosis.    Patient was diagnosed with diabetes about 4 years ago.  He has been on insulin since then.  Current antidiabetic medication is as follows-  Lantus 7 units in the morning   1 unit for every 15 grams   Correction scale 1 unit for every 50 above 120.  His blood sugar readings since " his hospital discharge were reviewed in detail with him over the phone.  Readings are as documented below.  Two days   July 30_ 89 fasting, 1 unit of fasting acting for shakes, lunch 128 - 3 fast acting for 45 grams of CHO, 189 - 3 units for 45 grams.   July 31 125, 2 units of fast acting, 7 units, 90 at lunch -60 carbs, 292 (4 sliding scale)  -    August 1st - 126 in the morning, 245, 270   Fasting, .   No low blood sugar since his hospital discharge.  Or hypoglycemia symptoms.  He reports not missing a single dose of insulin however he forgets to take 15 minutes prior to eating and he usually takes it does not he starts eating usually.  Prior to his hospitalization blood sugar readings were as follows    He has scanned his blood sugar readings and has sent to us which is copied below.               Allergies   Allergen Reactions     Bee Anaphylaxis     Adhesive Tape Rash     Sulfa Drugs Itching     Morphine        Current Outpatient Medications   Medication Sig Dispense Refill     ciprofloxacin (CIPRO) 500 MG tablet Take 500 mg by mouth daily       cyclobenzaprine (FLEXERIL) 10 MG tablet Take 1 tablet (10 mg) by mouth every 8 hours as needed for muscle spasms 30 tablet 0     diclofenac (VOLTAREN) 1 % topical gel Apply 2 g topically 4 times daily 50 g 0     enoxaparin ANTICOAGULANT (LOVENOX) 150 MG/ML syringe Inject 0.9 mLs (135 mg) Subcutaneous every 24 hours 30 mL 0     ferrous sulfate (FEROSUL) 325 (65 Fe) MG tablet Take 325 mg by mouth daily (with breakfast)       folic acid (FOLVITE) 1 MG tablet Take 1 mg by mouth 2 times daily       furosemide (LASIX) 40 MG tablet Take 40 mg by mouth 3 times daily       gabapentin (NEURONTIN) 400 MG capsule Take 400 mg by mouth 3 times daily       hydrOXYzine (ATARAX) 25 MG tablet Take 1 tablet (25 mg) by mouth every 6 hours as needed for anxiety 30 tablet 0     insulin aspart (NOVOLOG FLEXPEN) 100 UNIT/ML pen Inject Subcutaneous 3 times daily (with meals) Sliding scale  as directed       insulin glargine (LANTUS PEN) 100 UNIT/ML pen Inject 7 Units Subcutaneous every morning 3 mL 0     lactulose (CHRONULAC) 10 GM/15ML solution Take 15 mLs (10 g) by mouth every hour as needed for constipation 946 mL 0     magnesium oxide 400 MG CAPS Take 400 mg by mouth 3 times daily        MULTIPLE VITAMIN-FOLIC ACID PO Take 1 tablet by mouth 1 daily       Nutritional Supplements (ENSURE PO) Ensure/ boost - 237 ml bid daily       oxyCODONE-acetaminophen (PERCOCET)  MG per tablet Take 1 tablet by mouth every 4 hours as needed for moderate to severe pain 30 tablet 0     pantoprazole (PROTONIX) 40 MG EC tablet Take 40 mg by mouth daily       polyethylene glycol (MIRALAX/GLYCOLAX) packet Take 1 packet by mouth daily        promethazine (PHENERGAN) 12.5 MG tablet Take 25 mg by mouth every 6 hours as needed for nausea       QUEtiapine (SEROQUEL) 25 MG tablet Take 25 mg by mouth daily as needed        rifaximin (XIFAXAN) 550 MG TABS tablet Take 550 mg by mouth 2 times daily        senna (SENOKOT) 8.6 MG tablet Take 1 tablet by mouth daily as needed for constipation       spironolactone (ALDACTONE) 100 MG tablet Take 100 mg by mouth 3 times daily       traZODone (DESYREL) 50 MG tablet Take 150 mg by mouth At Bedtime Prn        tretinoin (RETIN-A) 0.025 % external cream Apply topically At Bedtime       Vitamin D, Cholecalciferol, 25 MCG (1000 UT) CAPS Take 2,000 Units by mouth daily        blood glucose (NO BRAND SPECIFIED) lancets standard Use to test blood sugar 6 times daily or as directed.       cholecalciferol (VITAMIN D3) 42313 units (1250 mcg) capsule Take 50,000 Units by mouth every 7 days Saturdays       lactulose (CHRONULAC) 10 GM/15ML solution Take 45 mLs (30 g) by mouth 3 times daily 20mg / 30 ml tid daily/ prn (Patient not taking: Reported on 4/27/2020)       Lidocaine (LIDOCARE) 4 % Patch Place 1 patch onto the skin every 24 hours To prevent lidocaine toxicity, patient should be patch  free for 12 hrs daily. (Patient not taking: Reported on 8/4/2020) 10 patch 0     lidocaine (LIDODERM) 5 % patch Place 1 patch onto the skin every 24 hours To prevent lidocaine toxicity, patient should be patch free for 12 hrs daily. (Patient not taking: Reported on 8/4/2020) 30 patch 0     QUEtiapine (SEROQUEL) 25 MG tablet Take 50 mg by mouth At Bedtime         Review of Systems     as per HPI above    Past Medical History:   Diagnosis Date     Alcoholic cirrhosis of liver with ascites (H) 12/23/2019     Diabetes (H)     Type 2 DM, Uses Insulin      DVT (deep vein thrombosis) in pregnancy      H/O protein C deficiency      Hepatic encephalopathy (H)      Hepatitis     Hep A when an infant      History of blood transfusion     2019 at [facility]      Leukopenia 1/11/2020     SBP (spontaneous bacterial peritonitis) (H) 11/2019       Past Surgical History:   Procedure Laterality Date     ABDOMEN SURGERY      Gastric Bypass 2009. [facility]      CARDIAC SURGERY      IVC      COLONOSCOPY       ENT SURGERY      Tonsils and Adenoids Removed at 6-7 Years Old      GALLBLADDER SURGERY  2017     GI SURGERY      Upper GI        Family History   Problem Relation Age of Onset     Protein C deficiency Mother      Pancreatic Cancer Father      Deonte Parkinson White syndrome Father      Alcoholism Brother      Substance Abuse Brother      Heart Failure Maternal Grandmother      Heart Failure Maternal Grandfather          Objective:   Alert and oriented.  Pleasant.  In House Labs:       TSH   Date Value Ref Range Status   06/22/2020 0.69 0.30 - 5.00 uIU/mL Final   01/06/2020 4.09 (H) 0.40 - 4.00 mU/L Final     T4 Free   Date Value Ref Range Status   01/06/2020 0.97 0.76 - 1.46 ng/dL Final       Creatinine   Date Value Ref Range Status   07/27/2020 0.85 0.66 - 1.25 mg/dL Final   ]    Recent Labs   Lab Test 01/06/20  0831   CHOL 107   HDL 67   LDL 31   TRIG 49         Assessment/Treatment Plan:      Hospital discharge  follow-up/diabetes unspecified type: C-peptide, glucose and AKANKSHA antibodies was ordered at his last visit however not completed yet.    He was currently hospitalized with portal vein thrombosis in the setting of underlying clotting disorder.  During hospital he was noted to have hypoglycemia and his insulin regimen was adjusted.  Since his discharge from the hospital we have 3 days worth of blood sugar readings which showed a fasting blood sugar ranging within the target range usually between .  Therefore, we will continue his Lantus dose without any change.  Postprandial blood sugars mostly within the target range however he has had few readings in the 200s.  I have advised him to continue 1 unit for every 15 g of carbohydrate with meals right now.  There might be an issue with the timing of his administration and I have advised him to take it 15 minutes prior to eating.  There might be an issue also with carbohydrate coverage as some days the mealtime coverage results and appropriate blood sugar readings whereas other days results in higher readings.  I think we need more data prior to further adjustment.  The most important thing is he did not have any hypoglycemia low blood sugar readings since his hospital discharge.  Patient is agreeable with this plan.  Patient Instructions   Obed,    Continue with Lantus 7 units daily.    Continue mealtime insulin coverage with 1 unit of NovoLog for every 15 g of carbohydrates with meals 3 times per day.    Continue current correction scale without any change.   Please monitor your blood sugar at least 4 times per day and contact us if you have blood sugar below 70 on more than two occasions.         I will contact the patient with the test results.  Return to clinic in 2 months.    Test and/or medications prescribed today:  Orders Placed This Encounter   Procedures     C-peptide         Waylon Leon MD  Staff Endocrinologist    [phone]  Division of  Endocrinology and Diabetes        Video-Visit Details    Type of service:  Video Visit  1st VideoStart: 08/04/2020 12:18 pm   Stop: 08/04/2020 12:38 pm    Originating Location (pt. Location): Home    Distant Location (provider location):  [facility]     Platform used for Video Visit: [employer]

## 2020-08-04 NOTE — PATIENT INSTRUCTIONS
Obed,    Continue with Lantus 7 units daily.    Continue mealtime insulin coverage with 1 unit of NovoLog for every 15 g of carbohydrates with meals 3 times per day.    Continue current correction scale without any change.   Please monitor your blood sugar at least 4 times per day and contact us if you have blood sugar below 70 on more than two occasions.

## 2020-08-04 NOTE — LETTER
"    8/4/2020         RE: Obed Wiley  [address] [address] [address]        Dear Colleague,    Thank you for referring your patient, Obed Wiley, to the [facility]. Please see a copy of my visit note below.    Obed Wiley is a 30 year old male who is being evaluated via a billable video visit.      The patient has been notified of following:     \"This video visit will be conducted via a call between you and your physician/provider. We have found that certain health care needs can be provided without the need for an in-person physical exam.  This service lets us provide the care you need with a video conversation.  If a prescription is necessary we can send it directly to your pharmacy.  If lab work is needed we can place an order for that and you can then stop by our lab to have the test done at a later time.    Video visits are billed at different rates depending on your insurance coverage.  Please reach out to your insurance provider with any questions.    If during the course of the call the physician/provider feels a video visit is not appropriate, you will not be charged for this service.\"    Patient has given verbal consent for Video visit? Yes  How would you like to obtain your AVS? MyChart  If you are dropped from the video visit, the video invite should be resent to: Other e-mail: my chart  Will anyone else be joining your video visit? No      Endocrinology Video Visit    Chief Complaint: Diabetes     Information obtained from:Patient    Subjective:         HPI: Obed Wiley is a 30 year old male with history of diabetes who is seen for hospitalization follow-up today.    Patient was recently admitted to [facility] on 7/17/2020 and was discharged on 7/27/2020 with discharge diagnosis of portal venous thrombosis and protein C deficiency.  He has underlying clotting disorder.  He has known alcoholic cirrhosis.    Patient was diagnosed with " diabetes about 4 years ago.  He has been on insulin since then.  Current antidiabetic medication is as follows-  Lantus 7 units in the morning   1 unit for every 15 grams   Correction scale 1 unit for every 50 above 120.  His blood sugar readings since his hospital discharge were reviewed in detail with him over the phone.  Readings are as documented below.  Two days   July 30_ 89 fasting, 1 unit of fasting acting for shakes, lunch 128 - 3 fast acting for 45 grams of CHO, 189 - 3 units for 45 grams.   July 31 125, 2 units of fast acting, 7 units, 90 at lunch -60 carbs, 292 (4 sliding scale)  -    August 1st - 126 in the morning, 245, 270   Fasting, .   No low blood sugar since his hospital discharge.  Or hypoglycemia symptoms.  He reports not missing a single dose of insulin however he forgets to take 15 minutes prior to eating and he usually takes it does not he starts eating usually.  Prior to his hospitalization blood sugar readings were as follows    He has scanned his blood sugar readings and has sent to us which is copied below.               Allergies   Allergen Reactions     Bee Anaphylaxis     Adhesive Tape Rash     Sulfa Drugs Itching     Morphine        Current Outpatient Medications   Medication Sig Dispense Refill     ciprofloxacin (CIPRO) 500 MG tablet Take 500 mg by mouth daily       cyclobenzaprine (FLEXERIL) 10 MG tablet Take 1 tablet (10 mg) by mouth every 8 hours as needed for muscle spasms 30 tablet 0     diclofenac (VOLTAREN) 1 % topical gel Apply 2 g topically 4 times daily 50 g 0     enoxaparin ANTICOAGULANT (LOVENOX) 150 MG/ML syringe Inject 0.9 mLs (135 mg) Subcutaneous every 24 hours 30 mL 0     ferrous sulfate (FEROSUL) 325 (65 Fe) MG tablet Take 325 mg by mouth daily (with breakfast)       folic acid (FOLVITE) 1 MG tablet Take 1 mg by mouth 2 times daily       furosemide (LASIX) 40 MG tablet Take 40 mg by mouth 3 times daily       gabapentin (NEURONTIN) 400 MG capsule Take 400 mg by  mouth 3 times daily       hydrOXYzine (ATARAX) 25 MG tablet Take 1 tablet (25 mg) by mouth every 6 hours as needed for anxiety 30 tablet 0     insulin aspart (NOVOLOG FLEXPEN) 100 UNIT/ML pen Inject Subcutaneous 3 times daily (with meals) Sliding scale as directed       insulin glargine (LANTUS PEN) 100 UNIT/ML pen Inject 7 Units Subcutaneous every morning 3 mL 0     lactulose (CHRONULAC) 10 GM/15ML solution Take 15 mLs (10 g) by mouth every hour as needed for constipation 946 mL 0     magnesium oxide 400 MG CAPS Take 400 mg by mouth 3 times daily        MULTIPLE VITAMIN-FOLIC ACID PO Take 1 tablet by mouth 1 daily       Nutritional Supplements (ENSURE PO) Ensure/ boost - 237 ml bid daily       oxyCODONE-acetaminophen (PERCOCET)  MG per tablet Take 1 tablet by mouth every 4 hours as needed for moderate to severe pain 30 tablet 0     pantoprazole (PROTONIX) 40 MG EC tablet Take 40 mg by mouth daily       polyethylene glycol (MIRALAX/GLYCOLAX) packet Take 1 packet by mouth daily        promethazine (PHENERGAN) 12.5 MG tablet Take 25 mg by mouth every 6 hours as needed for nausea       QUEtiapine (SEROQUEL) 25 MG tablet Take 25 mg by mouth daily as needed        rifaximin (XIFAXAN) 550 MG TABS tablet Take 550 mg by mouth 2 times daily        senna (SENOKOT) 8.6 MG tablet Take 1 tablet by mouth daily as needed for constipation       spironolactone (ALDACTONE) 100 MG tablet Take 100 mg by mouth 3 times daily       traZODone (DESYREL) 50 MG tablet Take 150 mg by mouth At Bedtime Prn        tretinoin (RETIN-A) 0.025 % external cream Apply topically At Bedtime       Vitamin D, Cholecalciferol, 25 MCG (1000 UT) CAPS Take 2,000 Units by mouth daily        blood glucose (NO BRAND SPECIFIED) lancets standard Use to test blood sugar 6 times daily or as directed.       cholecalciferol (VITAMIN D3) 48796 units (1250 mcg) capsule Take 50,000 Units by mouth every 7 days Saturdays       lactulose (CHRONULAC) 10 GM/15ML solution  Take 45 mLs (30 g) by mouth 3 times daily 20mg / 30 ml tid daily/ prn (Patient not taking: Reported on 4/27/2020)       Lidocaine (LIDOCARE) 4 % Patch Place 1 patch onto the skin every 24 hours To prevent lidocaine toxicity, patient should be patch free for 12 hrs daily. (Patient not taking: Reported on 8/4/2020) 10 patch 0     lidocaine (LIDODERM) 5 % patch Place 1 patch onto the skin every 24 hours To prevent lidocaine toxicity, patient should be patch free for 12 hrs daily. (Patient not taking: Reported on 8/4/2020) 30 patch 0     QUEtiapine (SEROQUEL) 25 MG tablet Take 50 mg by mouth At Bedtime         Review of Systems     as per HPI above    Past Medical History:   Diagnosis Date     Alcoholic cirrhosis of liver with ascites (H) 12/23/2019     Diabetes (H)     Type 2 DM, Uses Insulin      DVT (deep vein thrombosis) in pregnancy      H/O protein C deficiency      Hepatic encephalopathy (H)      Hepatitis     Hep A when an infant      History of blood transfusion     [facility]      Leukopenia 1/11/2020     SBP (spontaneous bacterial peritonitis) (H) 11/2019       Past Surgical History:   Procedure Laterality Date     ABDOMEN SURGERY      Gastric Bypass 2009. [facility]      CARDIAC SURGERY      IVC      COLONOSCOPY       ENT SURGERY      Tonsils and Adenoids Removed at 6-7 Years Old      GALLBLADDER SURGERY  2017     GI SURGERY      Upper GI        Family History   Problem Relation Age of Onset     Protein C deficiency Mother      Pancreatic Cancer Father      Deonte Parkinson White syndrome Father      Alcoholism Brother      Substance Abuse Brother      Heart Failure Maternal Grandmother      Heart Failure Maternal Grandfather          Objective:   Alert and oriented.  Pleasant.  In House Labs:       TSH   Date Value Ref Range Status   06/22/2020 0.69 0.30 - 5.00 uIU/mL Final   01/06/2020 4.09 (H) 0.40 - 4.00 mU/L Final     T4 Free   Date Value Ref Range Status   01/06/2020 0.97 0.76 - 1.46 ng/dL  Final       Creatinine   Date Value Ref Range Status   07/27/2020 0.85 0.66 - 1.25 mg/dL Final   ]    Recent Labs   Lab Test 01/06/20  0831   CHOL 107   HDL 67   LDL 31   TRIG 49         Assessment/Treatment Plan:      Hospital discharge follow-up/diabetes unspecified type: C-peptide, glucose and AKANKSHA antibodies was ordered at his last visit however not completed yet.    He was currently hospitalized with portal vein thrombosis in the setting of underlying clotting disorder.  During hospital he was noted to have hypoglycemia and his insulin regimen was adjusted.  Since his discharge from the hospital we have 3 days worth of blood sugar readings which showed a fasting blood sugar ranging within the target range usually between .  Therefore, we will continue his Lantus dose without any change.  Postprandial blood sugars mostly within the target range however he has had few readings in the 200s.  I have advised him to continue 1 unit for every 15 g of carbohydrate with meals right now.  There might be an issue with the timing of his administration and I have advised him to take it 15 minutes prior to eating.  There might be an issue also with carbohydrate coverage as some days the mealtime coverage results and appropriate blood sugar readings whereas other days results in higher readings.  I think we need more data prior to further adjustment.  The most important thing is he did not have any hypoglycemia low blood sugar readings since his hospital discharge.  Patient is agreeable with this plan.  Patient Instructions   Obed,    Continue with Lantus 7 units daily.    Continue mealtime insulin coverage with 1 unit of NovoLog for every 15 g of carbohydrates with meals 3 times per day.    Continue current correction scale without any change.   Please monitor your blood sugar at least 4 times per day and contact us if you have blood sugar below 70 on more than two occasions.         I will contact the patient with the  test results.  Return to clinic in 2 months.    Test and/or medications prescribed today:  Orders Placed This Encounter   Procedures     C-peptide         Waylon Leon MD  Staff Endocrinologist    [phone]  Division of Endocrinology and Diabetes        Video-Visit Details    Type of service:  Video Visit  1st VideoStart: 08/04/2020 12:18 pm   Stop: 08/04/2020 12:38 pm    Originating Location (pt. Location): Home    Distant Location (provider location):  [facility]     Platform used for Video Visit: [facility]            Again, thank you for allowing me to participate in the care of your patient.        Sincerely,        Waylon Leon MD

## 2020-08-07 ENCOUNTER — TELEPHONE (OUTPATIENT)
Dept: TRANSPLANT | Facility: CLINIC | Age: 30
End: 2020-08-07

## 2020-08-07 NOTE — TELEPHONE ENCOUNTER
Patient Call: Voicemail  Date/Time: 8/7 3:56p  Reason for call: would like to know how to set up appt w/ pain management doctor

## 2020-08-11 ENCOUNTER — TELEPHONE (OUTPATIENT)
Dept: TRANSPLANT | Facility: CLINIC | Age: 30
End: 2020-08-11

## 2020-08-11 NOTE — TELEPHONE ENCOUNTER
Transplant Social Work Services Phone Call      Data: Received voice message from patient to call him.  Intervention: I returned patient's call.  Assessment: Obed reports he registered for telephone coaching through [facility] but has not heard from them yet.  I advised him to contact [place] [employer] to make sure they received his registration.  Obed agrees to this plan.  Obed is interested in being referred to a pain management clinic. I have advised him to contact his primary physician for a referral.  Obed agrees to this plan.  Education provided by SW: Virtual Liver Transplant Support Group(instructions emailed to patient)  Plan: I will remain available for the psychosocial needs of this patient.      BRANDY Alvarado, [facility]  Liver Transplant   Phone [phone]  Pager [phone]

## 2020-08-12 ENCOUNTER — DOCUMENTATION ONLY (OUTPATIENT)
Dept: TRANSPLANT | Facility: CLINIC | Age: 30
End: 2020-08-12

## 2020-08-12 NOTE — PROGRESS NOTES
Patient listed for live txp  MELD 13  ABO AB  ETOH cirrhosis    Patient is signed off on extended criteria donors    Labs used to list patient were from 7/27/20 given risk of covid by sending him in to get them redrawn per [facility] policy.

## 2020-08-12 NOTE — TELEPHONE ENCOUNTER
Discussed pain mgmt with patient today.     He is going to check with his PCP first to try and find a local pain clinic.    He will call back if PCP is not able to help and I will set up down here.

## 2020-08-12 NOTE — LETTER
August 12, 2020    Obed Wiley  [address] [address] [address]    Dear Mr. Wiley,    This letter is sent to confirm that you have completed your transplant work-up and you are a candidate in the liver transplant program at the [facility].  You were placed on the liver active waitlist on 8/12/20.      When you are active on the waitlist and an organ becomes available, a coordinator will need to speak to you immediately.  You could be contacted at any time during the day or night as an organ could become available at any time.  Please make certain our office always has your current telephone numbers and address.      Items we will need from you:      We have received approval from your insurance company for the transplant procedure.  It is critical that you notify us if there is any change in your insurance.  It is also important that you familiarize yourself with the details of your specific insurance policy.  Our patient  is available to assist you if you should have any questions regarding your coverage.      During this waiting period, we may request additional periodic laboratory tests with your primary physician.  It will be your responsibility to remind your physician to forward your results to the Transplant Office.      We need to be kept informed of any changes in your medical condition such as:    o changes in your medications,   o significant changes in your health  o significant infections (such as pneumonia or abscesses)  o blood transfusions  o any condition which requires hospitalization  o any surgery      Remember to complete any routine cancer screening tests required before your transplant.  This includes colonoscopy; prostate screening for men, and mammogram and gynecologic testing for women, as well as dental work.  Your primary care clinic can assist you with arranging for these exams.  Remind your caregivers to forward  copies of the records and final reports.    We want you to know that our program has physician and surgeon coverage 24 hours a day, 365 days a year. If this coverage changes or there are substantial program changes, you will be notified in writing by letter.     Attached is a letter from the [employer] for Organ Sharing (UNOS). It describes the services and information offered to patients by UNOS and the Organ Procurement and Transplantation Network.    We appreciate having had the opportunity to participate in your care.  If you have questions, please feel free to call the Transplant Office at [phone] or [phone].    Sincerely,    CLAUDIA Gaxiola Jr.N, RN  Liver Transplant Coordinator  [phone]    Solid Organ Transplant  [facility]    Enclosures: JANAK Letter  cc: Care Team                                    The Organ Procurement and Transplantation Network  Toll-free patient services line:     Your resource for organ transplant information    If you have a question regarding your own medical care, you always should call your transplant hospital first. However, for general organ transplant-related information, you can call the Organ Procurement and Transplantation Network (OPTN) toll-free patient services line at [phone]. Anyone, including potential transplant candidates, candidates, recipients, family members, friends, living donors, and donor family members, can call this number to:          Talk about organ donation, living donation, the transplant process, the donation process, and transplant policies.    Get a free patient information kit with helpful booklets, waiting list and transplant information, and a list of all transplant hospitals.    Ask questions about the OPTN website (https://optn.transplant.hrsa.gov/), the [employer] Network for Organ Sharing s (UNOS) website (https://unos.org/), or the UNOS website  for living donors and transplant recipients. (https://www.transplantliving.org/).    Learn how the OPTN can help you.    Talk about any concerns that you may have with a transplant hospital.    The nation s transplant system, the OPTN, is managed under federal contract by the [employer] for Organ Sharing (UNOS), which is a non-profit charitable organization. The OPTN helps create and define organ sharing policies that make the best use of donated organs. This process continuously evaluating new advances and discoveries so policies can be adapted to best serve patients waiting for transplants. To do so, the OPTN works closely with transplant professionals, transplant patients, transplant candidates, donor families, living donors, and the public. All transplant programs and organ procurement organizations throughout the country are OPTN members and are obligated to follow the policies the OPTN creates for allocating organs.    The OPTN also is responsible for:      Providing educational material for patients, the public, and professionals.    Raising awareness of the need for donated organs and tissue.    Coordinating organ procurement, matching, and placement.    Collecting information about every organ transplant and donation that occurs in the [place].    Remember, you should contact your transplant hospital directly if you have questions or concerns about your own medical care including medical records, work-up progress, and test results.    We are not your transplant hospital, and our staff will not be able to answer questions about your case, so please keep your transplant hospital s phone number handy.    However, while you research your transplant needs and learn as much as you can about transplantation and donation, we welcome your call to our toll-free patient services line at [phone].          Updated 4/1/2019

## 2020-08-12 NOTE — LETTER
PHYSICIAN ORDERS    DATE & TIME ISSUED: 202010:34 AM  PATIENT NAME: Obed Wiley   : 1990     [facility] MR# : [id number]     DIAGNOSIS:  cirrhosis  ICD-10 CODE: K70.31  Orders  1 year after issue.  Please draw every 3 months while on the liver transplant list  These labs must be drawn all together on the same day when done:                 INR                 Hepatic panel                 BMP                 CBC w/ platelets                 Phosphatidylethanol (PEth)    PLEASE FAX RESULTS AS SOON AS POSSIBLE TO [phone], ATTN:Lab DE    .    Please call Hudson Gaitan RN coordinator at [phone] with questions.

## 2020-08-12 NOTE — PHARMACY-MEDICATION REGIMEN REVIEW
Pharmacy Liver Pre-Transplant Medication Evaluation    This patient is a 30 year old male being considered for liver transplantation.   As part of the liver pre-transplant patient evaluation, pharmacy has screened this patient s electronic medical record for medication related concerns.     Assessment/Plan:  The following medication related issues may be of possible concern for this patient post transplant, based on the medical record medication list review:     Spironolactone: K+ sparing. Increased risk of hyperkalemia in environment of tacrolimus immunosuppression. Consider monitoring or alternative diuretic if needed post transplant.     Pharmacy will continue to participate in this patient's care throughout the transplant course. Please contact pharmacy with any further medication related questions or concerns.

## 2020-08-17 ENCOUNTER — TELEPHONE (OUTPATIENT)
Dept: NUTRITION | Facility: CLINIC | Age: 30
End: 2020-08-17

## 2020-08-17 NOTE — PROGRESS NOTES
Called patient for video visit.  Patient states unsure why RD appointment was scheduled as [place] RD [place] on diabetic diet, liver diet and high fat diet to prevent further weight loss.  RD canceled appointment due to duplicate service.  Notified transplant coordinator that RD appointment canceled and to contact RD if appointment needs to get rescheduled.  Patient verbalized understanding of plan.    Rex Persaud, RD, LD  [facility] Outpatient Dietitian  [phone] (office phone)

## 2020-08-24 ENCOUNTER — TELEPHONE (OUTPATIENT)
Dept: TRANSPLANT | Facility: CLINIC | Age: 30
End: 2020-08-24

## 2020-08-24 NOTE — TELEPHONE ENCOUNTER
Patient Call: General    Reason for call: Aaron called because online he was supposed to have an appt w/Dr. Martin? on 9/2, but now it's not showing that on My Chart. If you could call Obed back he would appreciate it.    Call back needed? Yes    Return Call Needed  Same as documented in contacts section  When to return call?: Greater than one day: Route standard priority

## 2020-09-01 ENCOUNTER — OFFICE VISIT (OUTPATIENT)
Dept: HEMATOLOGY | Facility: CLINIC | Age: 30
End: 2020-09-01
Attending: PHYSICIAN ASSISTANT
Payer: COMMERCIAL

## 2020-09-01 ENCOUNTER — ANCILLARY PROCEDURE (OUTPATIENT)
Dept: CT IMAGING | Facility: CLINIC | Age: 30
End: 2020-09-01
Attending: INTERNAL MEDICINE
Payer: COMMERCIAL

## 2020-09-01 ENCOUNTER — ALLIED HEALTH/NURSE VISIT (OUTPATIENT)
Dept: HEMATOLOGY | Facility: CLINIC | Age: 30
End: 2020-09-01
Payer: COMMERCIAL

## 2020-09-01 VITALS
WEIGHT: 187 LBS | SYSTOLIC BLOOD PRESSURE: 114 MMHG | OXYGEN SATURATION: 97 % | BODY MASS INDEX: 24 KG/M2 | DIASTOLIC BLOOD PRESSURE: 76 MMHG | RESPIRATION RATE: 12 BRPM | TEMPERATURE: 98.2 F | HEART RATE: 80 BPM | HEIGHT: 74 IN

## 2020-09-01 DIAGNOSIS — K74.60 CIRRHOSIS OF LIVER (H): ICD-10-CM

## 2020-09-01 DIAGNOSIS — I81 PORTAL VEIN THROMBOSIS: ICD-10-CM

## 2020-09-01 DIAGNOSIS — K70.31 ALCOHOLIC CIRRHOSIS OF LIVER WITH ASCITES (H): ICD-10-CM

## 2020-09-01 DIAGNOSIS — K55.059 ACUTE MESENTERIC ISCHEMIA (H): ICD-10-CM

## 2020-09-01 DIAGNOSIS — I81 PVT (PORTAL VEIN THROMBOSIS): ICD-10-CM

## 2020-09-01 DIAGNOSIS — Z79.01 CHRONIC ANTICOAGULATION: Primary | ICD-10-CM

## 2020-09-01 DIAGNOSIS — D68.59 PROTEIN C DEFICIENCY (H): ICD-10-CM

## 2020-09-01 DIAGNOSIS — D68.9 COAGULOPATHY (H): ICD-10-CM

## 2020-09-01 LAB
ALBUMIN SERPL-MCNC: 3.7 G/DL (ref 3.4–5)
ALP SERPL-CCNC: 88 U/L (ref 40–150)
ALT SERPL W P-5'-P-CCNC: 28 U/L (ref 0–70)
ANION GAP SERPL CALCULATED.3IONS-SCNC: 7 MMOL/L (ref 3–14)
APTT PPP: 34 SEC (ref 22–37)
AST SERPL W P-5'-P-CCNC: 25 U/L (ref 0–45)
BASOPHILS # BLD AUTO: 0 10E9/L (ref 0–0.2)
BASOPHILS NFR BLD AUTO: 0.5 %
BILIRUB DIRECT SERPL-MCNC: 0.4 MG/DL (ref 0–0.2)
BILIRUB SERPL-MCNC: 1 MG/DL (ref 0.2–1.3)
BUN SERPL-MCNC: 12 MG/DL (ref 7–30)
CALCIUM SERPL-MCNC: 8.5 MG/DL (ref 8.5–10.1)
CHLORIDE SERPL-SCNC: 106 MMOL/L (ref 94–109)
CO2 SERPL-SCNC: 25 MMOL/L (ref 20–32)
CREAT SERPL-MCNC: 0.51 MG/DL (ref 0.66–1.25)
DIFFERENTIAL METHOD BLD: ABNORMAL
EOSINOPHIL # BLD AUTO: 0.1 10E9/L (ref 0–0.7)
EOSINOPHIL NFR BLD AUTO: 1.7 %
ERYTHROCYTE [DISTWIDTH] IN BLOOD BY AUTOMATED COUNT: 13.4 % (ref 10–15)
ERYTHROCYTE [SEDIMENTATION RATE] IN BLOOD BY WESTERGREN METHOD: 5 MM/H (ref 0–15)
FERRITIN SERPL-MCNC: 26 NG/ML (ref 26–388)
FIBRINOGEN PPP-MCNC: 197 MG/DL (ref 200–420)
GFR SERPL CREATININE-BSD FRML MDRD: >90 ML/MIN/{1.73_M2}
GLUCOSE SERPL-MCNC: 213 MG/DL (ref 70–99)
HCT VFR BLD AUTO: 42.1 % (ref 40–53)
HGB BLD-MCNC: 14.3 G/DL (ref 13.3–17.7)
IMM GRANULOCYTES # BLD: 0 10E9/L (ref 0–0.4)
IMM GRANULOCYTES NFR BLD: 0.2 %
INR PPP: 1.33 (ref 0.86–1.14)
IRON SATN MFR SERPL: 31 % (ref 15–46)
IRON SERPL-MCNC: 90 UG/DL (ref 35–180)
LDH SERPL L TO P-CCNC: 149 U/L (ref 85–227)
LYMPHOCYTES # BLD AUTO: 0.8 10E9/L (ref 0.8–5.3)
LYMPHOCYTES NFR BLD AUTO: 20 %
MCH RBC QN AUTO: 31.2 PG (ref 26.5–33)
MCHC RBC AUTO-ENTMCNC: 34 G/DL (ref 31.5–36.5)
MCV RBC AUTO: 92 FL (ref 78–100)
MONOCYTES # BLD AUTO: 0.2 10E9/L (ref 0–1.3)
MONOCYTES NFR BLD AUTO: 4.5 %
NEUTROPHILS # BLD AUTO: 2.9 10E9/L (ref 1.6–8.3)
NEUTROPHILS NFR BLD AUTO: 73.1 %
NRBC # BLD AUTO: 0 10*3/UL
NRBC BLD AUTO-RTO: 0 /100
PLATELET # BLD AUTO: 38 10E9/L (ref 150–450)
POTASSIUM SERPL-SCNC: 4 MMOL/L (ref 3.4–5.3)
PROT SERPL-MCNC: 7.3 G/DL (ref 6.8–8.8)
RBC # BLD AUTO: 4.59 10E12/L (ref 4.4–5.9)
RETICS # AUTO: 56 10E9/L (ref 25–95)
RETICS/RBC NFR AUTO: 1.2 % (ref 0.5–2)
SODIUM SERPL-SCNC: 138 MMOL/L (ref 133–144)
TIBC SERPL-MCNC: 287 UG/DL (ref 240–430)
WBC # BLD AUTO: 4 10E9/L (ref 4–11)

## 2020-09-01 PROCEDURE — 84630 ASSAY OF ZINC: CPT | Performed by: INTERNAL MEDICINE

## 2020-09-01 PROCEDURE — 83540 ASSAY OF IRON: CPT | Performed by: INTERNAL MEDICINE

## 2020-09-01 PROCEDURE — 82525 ASSAY OF COPPER: CPT | Performed by: INTERNAL MEDICINE

## 2020-09-01 PROCEDURE — 83550 IRON BINDING TEST: CPT | Performed by: INTERNAL MEDICINE

## 2020-09-01 PROCEDURE — 36415 COLL VENOUS BLD VENIPUNCTURE: CPT

## 2020-09-01 PROCEDURE — 85025 COMPLETE CBC W/AUTO DIFF WBC: CPT | Performed by: INTERNAL MEDICINE

## 2020-09-01 PROCEDURE — 80053 COMPREHEN METABOLIC PANEL: CPT | Performed by: INTERNAL MEDICINE

## 2020-09-01 PROCEDURE — 80321 ALCOHOLS BIOMARKERS 1OR 2: CPT | Performed by: INTERNAL MEDICINE

## 2020-09-01 PROCEDURE — 85045 AUTOMATED RETICULOCYTE COUNT: CPT | Performed by: INTERNAL MEDICINE

## 2020-09-01 PROCEDURE — 85384 FIBRINOGEN ACTIVITY: CPT | Performed by: INTERNAL MEDICINE

## 2020-09-01 PROCEDURE — 83615 LACTATE (LD) (LDH) ENZYME: CPT | Performed by: INTERNAL MEDICINE

## 2020-09-01 PROCEDURE — 85610 PROTHROMBIN TIME: CPT | Performed by: INTERNAL MEDICINE

## 2020-09-01 PROCEDURE — G0463 HOSPITAL OUTPT CLINIC VISIT: HCPCS

## 2020-09-01 PROCEDURE — 82248 BILIRUBIN DIRECT: CPT | Performed by: INTERNAL MEDICINE

## 2020-09-01 PROCEDURE — 82728 ASSAY OF FERRITIN: CPT | Performed by: INTERNAL MEDICINE

## 2020-09-01 PROCEDURE — 85730 THROMBOPLASTIN TIME PARTIAL: CPT | Performed by: INTERNAL MEDICINE

## 2020-09-01 PROCEDURE — 85652 RBC SED RATE AUTOMATED: CPT | Performed by: INTERNAL MEDICINE

## 2020-09-01 PROCEDURE — 99213 OFFICE O/P EST LOW 20 MIN: CPT | Performed by: PHYSICIAN ASSISTANT

## 2020-09-01 RX ORDER — IOPAMIDOL 755 MG/ML
115 INJECTION, SOLUTION INTRAVASCULAR ONCE
Status: COMPLETED | OUTPATIENT
Start: 2020-09-01 | End: 2020-09-01

## 2020-09-01 RX ADMIN — IOPAMIDOL 115 ML: 755 INJECTION, SOLUTION INTRAVASCULAR at 16:34

## 2020-09-01 ASSESSMENT — MIFFLIN-ST. JEOR: SCORE: 1872.98

## 2020-09-01 ASSESSMENT — PAIN SCALES - GENERAL: PAINLEVEL: EXTREME PAIN (8)

## 2020-09-01 NOTE — PROGRESS NOTES
"    [facility]  [address] [place] [address], [place] [address]  Main: [phone], Fax: [phone]    Patient seen at: [facility] at [address] [place] [address]    Outpatient Visit Note:    Patient: Obed Wiley  MRN: [id number]  : 1990  CURLY: 2020    Reason:  History of intra-abdominal venous thrombosis. ESLD secondary to alcoholic cirrhosis. S/P Yan-en-Y. Recent hospitalization of new portal vein thrombosis. Here for post hospitalization follow up.     Clinical History Summary:  Obed is a 30 year old male with a very complicated past medical history  Including alcoholic cirrhosis, heterozygous protein C deficiency, history of pulmonary embolism, DVT, mesenteric vein thrombosis and most recently with portal vein thrombosis as well as history of GI bleeding and chronic thrombocytopenia secondary to liver disease S/P permanent IVC filter in place, returns to clinic today after his recent hospitalization of new portal vein thrombosis. This patient was last seen by Dr. Patti Mcallister, staff hematologist at this clinic on 2020 and by Dr. John Calhoun, staff hematologist during his recent hospitalization at the end of 2020. Please refer to their previous notes for this patient's detail complicated clinical history.     Briefly, Obed was diagnosed with first blood clot at age 23 in his lower intestine. Was placed on coumadin but apparently had some internal bleeding. Then he tried Xarelto as well as LMWH and also had some \"internal bleeding\". Eventually, he was on unfractionated heparin TID dosing for a long period of time. He apparently has had extensive right leg clot in the past for which he did have an IVC filter placed at age 24. Then he was at one point placed on aspirin. Then in 2020, he apparently had another venous thrombosis in the lower intestine and spleen. Has since been on enoxaparin at 1.5 mg/kg " Patient notified via Hum message and was Read by Mary oMntoya at 1/16/2017  7:34 PM. Patient has EOB scheduled on 01/31/17. "SubQ Q 24 hours. He is currently also on insulin as well as occasional paracentesis.     Interim History:  Most recently, back on 7/17/2020, he was admitted with abdominal pain concerning for acute portal vein and mesenteric vein thrombosis. During this admission, he was seen by Dr. Calhoun who recommended that the patient continue enoxaparin at discharge.     Today, Obed reports that he is doing well on enoxaparin injections and has been compliant with giving himself the injections daily. He does complaint of some injection site discomfort and mild ecchymosis but denies any significant bleeding issues. Denies any epistaxis. No oral mucosal bleeding. Denies any hematuria or blood in stools. He has no shortness of breath or chest pain. Denies any other bruising.     ROS:  As above.     Medication, Allergies and PmHx:  All have been reviewed by this writer in the electronic medical records.    Social History and Family History:  Deferred.    Objective:  Pleasant in no acute distress.  Vitals: /76 (BP Location: Right arm, Patient Position: Sitting, Cuff Size: Adult Regular)   Pulse 80   Temp 98.2  F (36.8  C) (Oral)   Resp 12   Ht 1.88 m (6' 2\")   Wt 84.8 kg (187 lb)   SpO2 97%   BMI 24.01 kg/m    Exam:  Abdomen has some mild ecchymosis and subcutaneous hematoma from the enoxaparin injection as well as insulin. However, none of these hematoma are large or significant. No swelling of the upper or lower extremities are noted today.     Labs:  Dr. Mcallister has ordered labs and are pending.     Assessment / Plan:  In summary, Obed is a 30 year old male with a history of alcoholic cirrhosis, numerous intra-abdominal venous thrombosis, DVT, pulmonary embolism, heterozygous for protein C deficiency, chronic thrombocytopenia secondary to cirrhosis, S/P permanent IVC filter in place, currently on anticoagulation therapy with enoxaparin at 1.5 mg/kg SubQ Q 24 hours, presents to clinic today after his recent " hospitalization for new portal vein thrombosis. He has done well with enoxaparin since his recent discharge from the hospital. He is compliant with enoxaparin and despite his thrombocytopenia, he has no signs of bleeding complications.     According to the patient, he has been placed on the liver transplant list 3 weeks ago. Dr. Patti Mcallister, staff hematologist has requested a few labs to be done today for which they are pending.     Will have the patient continue with enoxaparin at 1.5 mg/kg SubQ Q 24 hours at this time. Dr. Patti Mcallister will give final recommendation in regard to this patient's management around the time of his transplant surgery once these labs are back.       Edward Hansen PA-C, MPAS  Physician Assistant  [facility].

## 2020-09-02 ENCOUNTER — OFFICE VISIT (OUTPATIENT)
Dept: GASTROENTEROLOGY | Facility: CLINIC | Age: 30
End: 2020-09-02
Attending: INTERNAL MEDICINE
Payer: COMMERCIAL

## 2020-09-02 VITALS
HEIGHT: 74 IN | DIASTOLIC BLOOD PRESSURE: 75 MMHG | BODY MASS INDEX: 23.92 KG/M2 | HEART RATE: 93 BPM | TEMPERATURE: 98.5 F | OXYGEN SATURATION: 97 % | SYSTOLIC BLOOD PRESSURE: 115 MMHG | WEIGHT: 186.4 LBS

## 2020-09-02 DIAGNOSIS — K70.31 ALCOHOLIC CIRRHOSIS OF LIVER WITH ASCITES (H): Primary | ICD-10-CM

## 2020-09-02 PROCEDURE — G0463 HOSPITAL OUTPT CLINIC VISIT: HCPCS | Mod: ZF

## 2020-09-02 ASSESSMENT — MIFFLIN-ST. JEOR: SCORE: 1870.25

## 2020-09-02 ASSESSMENT — PAIN SCALES - GENERAL: PAINLEVEL: NO PAIN (0)

## 2020-09-02 NOTE — PROGRESS NOTES
[facility]    Hepatology follow-up    CHIEF COMPLAINT/REASON FOR VISIT:  Alcoholic liver disease.      SUBJECTIVE:  Mr. Wiley is a 30-year-old male with a history significant for a Yan-en-Y surgery for obesity in 2007, type 2 diabetes mellitus, insulin requiring, and past history of deep venous thrombosis on both lower extremities.  He also had a history of pulmonary emboli and had an IVC filter placed.  Mr. Wiley has also a history of alcohol abuse and this led him progressing to cirrhosis.  He did have initially significant fluid retention that required paracenteses which were initially biweekly and then even went all the way to weekly.  He had the last time in January.  Since then, he is requiring less medications for fluid retention.  He did not have, as I have said before, paracenteses almost all of this year.  He has some fatigue but his stamina has improved compared to before.  He does not have any edema.  He has no significant confusion or memory issues.  He has sleep cycle disturbances and he uses trazodone, which is not helping him.  In fact, he has an appointment with his PCP to change it to something else.  He also tells me that he has occasional abdominal pain, some nausea but no vomiting.  He has no jaundice.  He is moving his bowels 5-6 times a day with no blood in it.  He also denies any fever.  No cough.  He has some shortness of breath but no chest pain.  Lately his weight has remained stable.     Medical hx Surgical hx   Past Medical History:   Diagnosis Date     Alcoholic cirrhosis of liver with ascites (H) 12/23/2019     Diabetes (H)     Type 2 DM, Uses Insulin      DVT (deep vein thrombosis) in pregnancy      H/O protein C deficiency      Hepatic encephalopathy (H)      Hepatitis     Hep A when an infant      History of blood transfusion     2019 at [facility]      Leukopenia 1/11/2020     SBP (spontaneous bacterial peritonitis) (H) 11/2019      Past Surgical  History:   Procedure Laterality Date     ABDOMEN SURGERY      Gastric Bypass 2009. [facility]      CARDIAC SURGERY      IVC      COLONOSCOPY       ENT SURGERY      Tonsils and Adenoids Removed at 6-7 Years Old      GALLBLADDER SURGERY  2017     GI SURGERY      Upper GI           Medications  Prior to Admission medications    Medication Sig Start Date End Date Taking? Authorizing Provider   blood glucose (NO BRAND SPECIFIED) lancets standard Use to test blood sugar 6 times daily or as directed.   Yes Reported, Patient   cholecalciferol (VITAMIN D3) 23994 units (1250 mcg) capsule Take 50,000 Units by mouth every 7 days Saturdays   Yes Reported, Patient   ciprofloxacin (CIPRO) 500 MG tablet Take 500 mg by mouth daily 1/27/19  Yes Reported, Patient   cyclobenzaprine (FLEXERIL) 10 MG tablet Take 1 tablet (10 mg) by mouth every 8 hours as needed for muscle spasms 7/27/20  Yes Danny Cottrell MD   diclofenac (VOLTAREN) 1 % topical gel Apply 2 g topically 4 times daily 7/27/20  Yes Danny Cottrell MD   ferrous sulfate (FEROSUL) 325 (65 Fe) MG tablet Take 325 mg by mouth daily (with breakfast)   Yes Reported, Patient   folic acid (FOLVITE) 1 MG tablet Take 1 mg by mouth 2 times daily   Yes Reported, Patient   furosemide (LASIX) 40 MG tablet Take 40 mg by mouth 3 times daily 4/20/20  Yes Reported, Patient   gabapentin (NEURONTIN) 400 MG capsule Take 400 mg by mouth 3 times daily   Yes Reported, Patient   hydrOXYzine (ATARAX) 25 MG tablet Take 1 tablet (25 mg) by mouth every 6 hours as needed for anxiety 7/27/20  Yes Danny Cottrell MD   insulin aspart (NOVOLOG FLEXPEN) 100 UNIT/ML pen Inject Subcutaneous 3 times daily (with meals) Sliding scale as directed   Yes Reported, Patient   insulin glargine (LANTUS PEN) 100 UNIT/ML pen Inject 7 Units Subcutaneous every morning 7/27/20  Yes Danny Cottrell MD   lactulose (CHRONULAC) 10 GM/15ML solution Take 45 mLs (30 g) by mouth 3 times daily 20mg /  30 ml tid daily/ prn 1/10/20  Yes Jesusita Oneal MD   lactulose (CHRONULAC) 10 GM/15ML solution Take 15 mLs (10 g) by mouth every hour as needed for constipation 1/10/20  Yes Jesusita Oneal MD   Lidocaine (LIDOCARE) 4 % Patch Place 1 patch onto the skin every 24 hours To prevent lidocaine toxicity, patient should be patch free for 12 hrs daily. 7/27/20  Yes Danny Cottrell MD   lidocaine (LIDODERM) 5 % patch Place 1 patch onto the skin every 24 hours To prevent lidocaine toxicity, patient should be patch free for 12 hrs daily. 7/27/20  Yes Danny Cottrell MD   magnesium oxide 400 MG CAPS Take 400 mg by mouth 3 times daily    Yes Reported, Patient   MULTIPLE VITAMIN-FOLIC ACID PO Take 1 tablet by mouth 1 daily   Yes Reported, Patient   Nutritional Supplements (ENSURE PO) Ensure/ boost - 237 ml bid daily   Yes Reported, Patient   oxyCODONE-acetaminophen (PERCOCET)  MG per tablet Take 1 tablet by mouth every 4 hours as needed for moderate to severe pain 7/27/20  Yes Danny Cottrell MD   pantoprazole (PROTONIX) 40 MG EC tablet Take 40 mg by mouth daily   Yes Reported, Patient   polyethylene glycol (MIRALAX/GLYCOLAX) packet Take 1 packet by mouth daily    Yes Reported, Patient   promethazine (PHENERGAN) 12.5 MG tablet Take 25 mg by mouth every 6 hours as needed for nausea   Yes Reported, Patient   QUEtiapine (SEROQUEL) 25 MG tablet Take 50 mg by mouth At Bedtime   Yes Unknown, Entered By History   QUEtiapine (SEROQUEL) 25 MG tablet Take 25 mg by mouth daily as needed    Yes Reported, Patient   rifaximin (XIFAXAN) 550 MG TABS tablet Take 550 mg by mouth 2 times daily    Yes Reported, Patient   senna (SENOKOT) 8.6 MG tablet Take 1 tablet by mouth daily as needed for constipation 1/10/20  Yes Jesusita Oneal MD   spironolactone (ALDACTONE) 100 MG tablet Take 100 mg by mouth 3 times daily   Yes Reported, Patient   traZODone (DESYREL) 50 MG tablet Take 150 mg by mouth At Bedtime Prn    Yes  "Reported, Patient   tretinoin (RETIN-A) 0.025 % external cream Apply topically At Bedtime   Yes Reported, Patient   Vitamin D, Cholecalciferol, 25 MCG (1000 UT) CAPS Take 2,000 Units by mouth daily    Yes Reported, Patient       Allergies  Allergies   Allergen Reactions     Bee Anaphylaxis     Adhesive Tape Rash     Sulfa Drugs Itching     Morphine        Review of systems  A 10-point review of systems was negative    Examination  /75   Pulse 93   Temp 98.5  F (36.9  C) (Oral)   Ht 1.88 m (6' 2\")   Wt 84.6 kg (186 lb 6.4 oz)   SpO2 97%   BMI 23.93 kg/m    Body mass index is 23.93 kg/m .    Gen- well, NAD, A+Ox3, normal color  Lym- no palpable LAD  CVS- RRR  RS- CTA  Abd- Not distended. Not tender.  Extr- hands normal, no BETH  Skin- no rash or jaundice  Neuro- no asterixis  Psych- normal mood    Laboratory  Lab Results   Component Value Date     09/01/2020    POTASSIUM 4.0 09/01/2020    CHLORIDE 106 09/01/2020    CO2 25 09/01/2020    BUN 12 09/01/2020    CR 0.51 09/01/2020       Lab Results   Component Value Date    BILITOTAL 1.0 09/01/2020    ALT 28 09/01/2020    AST 25 09/01/2020    ALKPHOS 88 09/01/2020       Lab Results   Component Value Date    ALBUMIN 3.7 09/01/2020    PROTTOTAL 7.3 09/01/2020        Lab Results   Component Value Date    WBC 4.0 09/01/2020    HGB 14.3 09/01/2020    MCV 92 09/01/2020    PLT 38 09/01/2020       Lab Results   Component Value Date    INR 1.33 09/01/2020     MELD-Na score: 9 at 9/1/2020  2:20 PM  MELD score: 9 at 9/1/2020  2:20 PM  Calculated from:  Serum Creatinine: 0.51 mg/dL (Rounded to 1 mg/dL) at 9/1/2020  2:20 PM  Serum Sodium: 138 mmol/L (Rounded to 137 mmol/L) at 9/1/2020  2:20 PM  Total Bilirubin: 1.0 mg/dL at 9/1/2020  2:20 PM  INR(ratio): 1.33 at 9/1/2020  2:20 PM  Age: 30 years 6 months    Radiology    ASSESSMENT AND PLAN:  Mr. Wiley has cirrhosis of the liver, which is related with his alcohol abuse.  He did decompensate, although upon abstinence, he " has improved.  His initial decompensation was mostly ascites.  He has also had hepatic encephalopathy.  Mr. 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  Now he is using lactulose and he is having 3-4 bowel movements with no blood in it.  Plan will be to continue the lactulose.  He will need also to have his upper endoscopy repeated.  He will need to continue doing surveillance for HCC, as he is at high risk, and he has thrombophilia, which is being worked up and followed by our Hematology group.  In fact, he had a visit yesterday with Edward Hansen, and they identified that he is heterozygous for protein C deficiency.  He also has chronic thrombocytopenia which is related with the cirrhosis and has a permanent IVC filter.  He is anticoagulated with enoxaparin and he is going to follow up with them.  Otherwise, he will be seen here in 3 months.  His MELD score is low and we will repeat that.      This was a 25-minute visit of which more than 50% was spent in explaining to the patient what our plan of care was.  We answered all his questions.     Viola Santos MD  Hepatology  [facility]

## 2020-09-02 NOTE — LETTER
9/2/2020         RE: Obed Wiley  [address] [facility] [address]        Dear Colleague,    Thank you for referring your patient, Obed Wiley, to the [facility] HEPATOLOGY. Please see a copy of my visit note below.    [facility]    Hepatology follow-up    CHIEF COMPLAINT/REASON FOR VISIT:  Alcoholic liver disease.      SUBJECTIVE:  Mr. Wiley is a 30-year-old male with a history significant for a Yan-en-Y surgery for obesity in 2007, type 2 diabetes mellitus, insulin requiring, and past history of deep venous thrombosis on both lower extremities.  He also had a history of pulmonary emboli and had an IVC filter placed.  Mr. Wiley has also a history of alcohol abuse and this led him progressing to cirrhosis.  He did have initially significant fluid retention that required paracenteses which were initially biweekly and then even went all the way to weekly.  He had the last time in January.  Since then, he is requiring less medications for fluid retention.  He did not have, as I have said before, paracenteses almost all of this year.  He has some fatigue but his stamina has improved compared to before.  He does not have any edema.  He has no significant confusion or memory issues.  He has sleep cycle disturbances and he uses trazodone, which is not helping him.  In fact, he has an appointment with his PCP to change it to something else.  He also tells me that he has occasional abdominal pain, some nausea but no vomiting.  He has no jaundice.  He is moving his bowels 5-6 times a day with no blood in it.  He also denies any fever.  No cough.  He has some shortness of breath but no chest pain.  Lately his weight has remained stable.     Medical hx Surgical hx   Past Medical History:   Diagnosis Date     Alcoholic cirrhosis of liver with ascites (H) 12/23/2019     Diabetes (H)     Type 2 DM, Uses Insulin      DVT (deep vein thrombosis) in pregnancy      H/O protein C deficiency       Hepatic encephalopathy (H)      Hepatitis     Hep A when an infant      History of blood transfusion     2019 at [facility]      Leukopenia 1/11/2020     SBP (spontaneous bacterial peritonitis) (H) 11/2019      Past Surgical History:   Procedure Laterality Date     ABDOMEN SURGERY      Gastric Bypass 2009. [facility]      CARDIAC SURGERY      IVC      COLONOSCOPY       ENT SURGERY      Tonsils and Adenoids Removed at 6-7 Years Old      GALLBLADDER SURGERY  2017     GI SURGERY      Upper GI           Medications  Prior to Admission medications    Medication Sig Start Date End Date Taking? Authorizing Provider   blood glucose (NO BRAND SPECIFIED) lancets standard Use to test blood sugar 6 times daily or as directed.   Yes Reported, Patient   cholecalciferol (VITAMIN D3) 65605 units (1250 mcg) capsule Take 50,000 Units by mouth every 7 days Saturdays   Yes Reported, Patient   ciprofloxacin (CIPRO) 500 MG tablet Take 500 mg by mouth daily 1/27/19  Yes Reported, Patient   cyclobenzaprine (FLEXERIL) 10 MG tablet Take 1 tablet (10 mg) by mouth every 8 hours as needed for muscle spasms 7/27/20  Yes Danny Cottrell MD   diclofenac (VOLTAREN) 1 % topical gel Apply 2 g topically 4 times daily 7/27/20  Yes Danny Cottrell MD   ferrous sulfate (FEROSUL) 325 (65 Fe) MG tablet Take 325 mg by mouth daily (with breakfast)   Yes Reported, Patient   folic acid (FOLVITE) 1 MG tablet Take 1 mg by mouth 2 times daily   Yes Reported, Patient   furosemide (LASIX) 40 MG tablet Take 40 mg by mouth 3 times daily 4/20/20  Yes Reported, Patient   gabapentin (NEURONTIN) 400 MG capsule Take 400 mg by mouth 3 times daily   Yes Reported, Patient   hydrOXYzine (ATARAX) 25 MG tablet Take 1 tablet (25 mg) by mouth every 6 hours as needed for anxiety 7/27/20  Yes Danny Cottrell MD   insulin aspart (NOVOLOG FLEXPEN) 100 UNIT/ML pen Inject Subcutaneous 3 times daily (with meals) Sliding scale as directed   Yes Reported,  Patient   insulin glargine (LANTUS PEN) 100 UNIT/ML pen Inject 7 Units Subcutaneous every morning 7/27/20  Yes Danny Cottrell MD   lactulose (CHRONULAC) 10 GM/15ML solution Take 45 mLs (30 g) by mouth 3 times daily 20mg / 30 ml tid daily/ prn 1/10/20  Yes Jesusita Oneal MD   lactulose (CHRONULAC) 10 GM/15ML solution Take 15 mLs (10 g) by mouth every hour as needed for constipation 1/10/20  Yes Jesusita Oneal MD   Lidocaine (LIDOCARE) 4 % Patch Place 1 patch onto the skin every 24 hours To prevent lidocaine toxicity, patient should be patch free for 12 hrs daily. 7/27/20  Yes Danny Cottrell MD   lidocaine (LIDODERM) 5 % patch Place 1 patch onto the skin every 24 hours To prevent lidocaine toxicity, patient should be patch free for 12 hrs daily. 7/27/20  Yes Danny Cottrell MD   magnesium oxide 400 MG CAPS Take 400 mg by mouth 3 times daily    Yes Reported, Patient   MULTIPLE VITAMIN-FOLIC ACID PO Take 1 tablet by mouth 1 daily   Yes Reported, Patient   Nutritional Supplements (ENSURE PO) Ensure/ boost - 237 ml bid daily   Yes Reported, Patient   oxyCODONE-acetaminophen (PERCOCET)  MG per tablet Take 1 tablet by mouth every 4 hours as needed for moderate to severe pain 7/27/20  Yes Danny Cottrell MD   pantoprazole (PROTONIX) 40 MG EC tablet Take 40 mg by mouth daily   Yes Reported, Patient   polyethylene glycol (MIRALAX/GLYCOLAX) packet Take 1 packet by mouth daily    Yes Reported, Patient   promethazine (PHENERGAN) 12.5 MG tablet Take 25 mg by mouth every 6 hours as needed for nausea   Yes Reported, Patient   QUEtiapine (SEROQUEL) 25 MG tablet Take 50 mg by mouth At Bedtime   Yes Unknown, Entered By History   QUEtiapine (SEROQUEL) 25 MG tablet Take 25 mg by mouth daily as needed    Yes Reported, Patient   rifaximin (XIFAXAN) 550 MG TABS tablet Take 550 mg by mouth 2 times daily    Yes Reported, Patient   senna (SENOKOT) 8.6 MG tablet Take 1 tablet by mouth daily as needed for  "constipation 1/10/20  Yes Jesusita Oneal MD   spironolactone (ALDACTONE) 100 MG tablet Take 100 mg by mouth 3 times daily   Yes Reported, Patient   traZODone (DESYREL) 50 MG tablet Take 150 mg by mouth At Bedtime Prn    Yes Reported, Patient   tretinoin (RETIN-A) 0.025 % external cream Apply topically At Bedtime   Yes Reported, Patient   Vitamin D, Cholecalciferol, 25 MCG (1000 UT) CAPS Take 2,000 Units by mouth daily    Yes Reported, Patient       Allergies  Allergies   Allergen Reactions     Bee Anaphylaxis     Adhesive Tape Rash     Sulfa Drugs Itching     Morphine        Review of systems  A 10-point review of systems was negative    Examination  /75   Pulse 93   Temp 98.5  F (36.9  C) (Oral)   Ht 1.88 m (6' 2\")   Wt 84.6 kg (186 lb 6.4 oz)   SpO2 97%   BMI 23.93 kg/m    Body mass index is 23.93 kg/m .    Gen- well, NAD, A+Ox3, normal color  Lym- no palpable LAD  CVS- RRR  RS- CTA  Abd- Not distended. Not tender.  Extr- hands normal, no BETH  Skin- no rash or jaundice  Neuro- no asterixis  Psych- normal mood    Laboratory  Lab Results   Component Value Date     09/01/2020    POTASSIUM 4.0 09/01/2020    CHLORIDE 106 09/01/2020    CO2 25 09/01/2020    BUN 12 09/01/2020    CR 0.51 09/01/2020       Lab Results   Component Value Date    BILITOTAL 1.0 09/01/2020    ALT 28 09/01/2020    AST 25 09/01/2020    ALKPHOS 88 09/01/2020       Lab Results   Component Value Date    ALBUMIN 3.7 09/01/2020    PROTTOTAL 7.3 09/01/2020        Lab Results   Component Value Date    WBC 4.0 09/01/2020    HGB 14.3 09/01/2020    MCV 92 09/01/2020    PLT 38 09/01/2020       Lab Results   Component Value Date    INR 1.33 09/01/2020     MELD-Na score: 9 at 9/1/2020  2:20 PM  MELD score: 9 at 9/1/2020  2:20 PM  Calculated from:  Serum Creatinine: 0.51 mg/dL (Rounded to 1 mg/dL) at 9/1/2020  2:20 PM  Serum Sodium: 138 mmol/L (Rounded to 137 mmol/L) at 9/1/2020  2:20 PM  Total Bilirubin: 1.0 mg/dL at 9/1/2020  2:20 " PM  INR(ratio): 1.33 at 9/1/2020  2:20 PM  Age: 30 years 6 months    Radiology    ASSESSMENT AND PLAN:  Mr. Wiley has cirrhosis of the liver, which is related with his alcohol abuse.  He did decompensate, although upon abstinence, he has improved.  His initial decompensation was mostly ascites.  He has also had hepatic encephalopathy.  Mr. 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  Now he is using lactulose and he is having 3-4 bowel movements with no blood in it.  Plan will be to continue the lactulose.  He will need also to have his upper endoscopy repeated.  He will need to continue doing surveillance for HCC, as he is at high risk, and he has thrombophilia, which is being worked up and followed by our Hematology group.  In fact, he had a visit yesterday with Edward Hansen, and they identified that he is heterozygous for protein C deficiency.  He also has chronic thrombocytopenia which is related with the cirrhosis and has a permanent IVC filter.  He is anticoagulated with enoxaparin and he is going to follow up with them.  Otherwise, he will be seen here in 3 months.  His MELD score is low and we will repeat that.      This was a 25-minute visit of which more than 50% was spent in explaining to the patient what our plan of care was.  We answered all his questions.     Viola Santos MD  Hepatology  [facility]      Again, thank you for allowing me to participate in the care of your patient.        Sincerely,        Viola Santos MD

## 2020-09-02 NOTE — LETTER
9/2/2020         RE: Obed Wiley  [address]  [facility] [address]      [facility]    Hepatology follow-up    CHIEF COMPLAINT/REASON FOR VISIT:  Alcoholic liver disease.      SUBJECTIVE:  Mr. Wiley is a 30-year-old male with a history significant for a Yan-en-Y surgery for obesity in 2007, type 2 diabetes mellitus, insulin requiring, and past history of deep venous thrombosis on both lower extremities.  He also had a history of pulmonary emboli and had an IVC filter placed.  Mr. Wiley has also a history of alcohol abuse and this led him progressing to cirrhosis.  He did have initially significant fluid retention that required paracenteses which were initially biweekly and then even went all the way to weekly.  He had the last time in January.  Since then, he is requiring less medications for fluid retention.  He did not have, as I have said before, paracenteses almost all of this year.  He has some fatigue but his stamina has improved compared to before.  He does not have any edema.  He has no significant confusion or memory issues.  He has sleep cycle disturbances and he uses trazodone, which is not helping him.  In fact, he has an appointment with his PCP to change it to something else.  He also tells me that he has occasional abdominal pain, some nausea but no vomiting.  He has no jaundice.  He is moving his bowels 5-6 times a day with no blood in it.  He also denies any fever.  No cough.  He has some shortness of breath but no chest pain.  Lately his weight has remained stable.     Medical hx Surgical hx   Past Medical History:   Diagnosis Date     Alcoholic cirrhosis of liver with ascites (H) 12/23/2019     Diabetes (H)     Type 2 DM, Uses Insulin      DVT (deep vein thrombosis) in pregnancy      H/O protein C deficiency      Hepatic encephalopathy (H)      Hepatitis     Hep A when an infant      History of blood transfusion     2019 at \[facility]""      Leukopenia 1/11/2020      SBP (spontaneous bacterial peritonitis) (H) 11/2019      Past Surgical History:   Procedure Laterality Date     ABDOMEN SURGERY      Gastric Bypass 2009. [facility]      CARDIAC SURGERY      IVC      COLONOSCOPY       ENT SURGERY      Tonsils and Adenoids Removed at 6-7 Years Old      GALLBLADDER SURGERY  2017     GI SURGERY      Upper GI           Medications  Prior to Admission medications    Medication Sig Start Date End Date Taking? Authorizing Provider   blood glucose (NO BRAND SPECIFIED) lancets standard Use to test blood sugar 6 times daily or as directed.   Yes Reported, Patient   cholecalciferol (VITAMIN D3) 49160 units (1250 mcg) capsule Take 50,000 Units by mouth every 7 days Saturdays   Yes Reported, Patient   ciprofloxacin (CIPRO) 500 MG tablet Take 500 mg by mouth daily 1/27/19  Yes Reported, Patient   cyclobenzaprine (FLEXERIL) 10 MG tablet Take 1 tablet (10 mg) by mouth every 8 hours as needed for muscle spasms 7/27/20  Yes Danny Cottrell MD   diclofenac (VOLTAREN) 1 % topical gel Apply 2 g topically 4 times daily 7/27/20  Yes Danny Cottrell MD   ferrous sulfate (FEROSUL) 325 (65 Fe) MG tablet Take 325 mg by mouth daily (with breakfast)   Yes Reported, Patient   folic acid (FOLVITE) 1 MG tablet Take 1 mg by mouth 2 times daily   Yes Reported, Patient   furosemide (LASIX) 40 MG tablet Take 40 mg by mouth 3 times daily 4/20/20  Yes Reported, Patient   gabapentin (NEURONTIN) 400 MG capsule Take 400 mg by mouth 3 times daily   Yes Reported, Patient   hydrOXYzine (ATARAX) 25 MG tablet Take 1 tablet (25 mg) by mouth every 6 hours as needed for anxiety 7/27/20  Yes Danny Cottrell MD   insulin aspart (NOVOLOG FLEXPEN) 100 UNIT/ML pen Inject Subcutaneous 3 times daily (with meals) Sliding scale as directed   Yes Reported, Patient   insulin glargine (LANTUS PEN) 100 UNIT/ML pen Inject 7 Units Subcutaneous every morning 7/27/20  Yes Danny Cottrell MD   lactulose  (CHRONULAC) 10 GM/15ML solution Take 45 mLs (30 g) by mouth 3 times daily 20mg / 30 ml tid daily/ prn 1/10/20  Yes Jesusita Oneal MD   lactulose (CHRONULAC) 10 GM/15ML solution Take 15 mLs (10 g) by mouth every hour as needed for constipation 1/10/20  Yes Jesusita Oneal MD   Lidocaine (LIDOCARE) 4 % Patch Place 1 patch onto the skin every 24 hours To prevent lidocaine toxicity, patient should be patch free for 12 hrs daily. 7/27/20  Yes Danny Cottrell MD   lidocaine (LIDODERM) 5 % patch Place 1 patch onto the skin every 24 hours To prevent lidocaine toxicity, patient should be patch free for 12 hrs daily. 7/27/20  Yes Danny Cottrell MD   magnesium oxide 400 MG CAPS Take 400 mg by mouth 3 times daily    Yes Reported, Patient   MULTIPLE VITAMIN-FOLIC ACID PO Take 1 tablet by mouth 1 daily   Yes Reported, Patient   Nutritional Supplements (ENSURE PO) Ensure/ boost - 237 ml bid daily   Yes Reported, Patient   oxyCODONE-acetaminophen (PERCOCET)  MG per tablet Take 1 tablet by mouth every 4 hours as needed for moderate to severe pain 7/27/20  Yes Danny Cottrell MD   pantoprazole (PROTONIX) 40 MG EC tablet Take 40 mg by mouth daily   Yes Reported, Patient   polyethylene glycol (MIRALAX/GLYCOLAX) packet Take 1 packet by mouth daily    Yes Reported, Patient   promethazine (PHENERGAN) 12.5 MG tablet Take 25 mg by mouth every 6 hours as needed for nausea   Yes Reported, Patient   QUEtiapine (SEROQUEL) 25 MG tablet Take 50 mg by mouth At Bedtime   Yes Unknown, Entered By History   QUEtiapine (SEROQUEL) 25 MG tablet Take 25 mg by mouth daily as needed    Yes Reported, Patient   rifaximin (XIFAXAN) 550 MG TABS tablet Take 550 mg by mouth 2 times daily    Yes Reported, Patient   senna (SENOKOT) 8.6 MG tablet Take 1 tablet by mouth daily as needed for constipation 1/10/20  Yes Jesusita Oneal MD   spironolactone (ALDACTONE) 100 MG tablet Take 100 mg by mouth 3 times daily   Yes Reported, Patient  "  traZODone (DESYREL) 50 MG tablet Take 150 mg by mouth At Bedtime Prn    Yes Reported, Patient   tretinoin (RETIN-A) 0.025 % external cream Apply topically At Bedtime   Yes Reported, Patient   Vitamin D, Cholecalciferol, 25 MCG (1000 UT) CAPS Take 2,000 Units by mouth daily    Yes Reported, Patient       Allergies  Allergies   Allergen Reactions     Bee Anaphylaxis     Adhesive Tape Rash     Sulfa Drugs Itching     Morphine        Review of systems  A 10-point review of systems was negative    Examination  /75   Pulse 93   Temp 98.5  F (36.9  C) (Oral)   Ht 1.88 m (6' 2\")   Wt 84.6 kg (186 lb 6.4 oz)   SpO2 97%   BMI 23.93 kg/m    Body mass index is 23.93 kg/m .    Gen- well, NAD, A+Ox3, normal color  Lym- no palpable LAD  CVS- RRR  RS- CTA  Abd- Not distended. Not tender.  Extr- hands normal, no BETH  Skin- no rash or jaundice  Neuro- no asterixis  Psych- normal mood    Laboratory  Lab Results   Component Value Date     09/01/2020    POTASSIUM 4.0 09/01/2020    CHLORIDE 106 09/01/2020    CO2 25 09/01/2020    BUN 12 09/01/2020    CR 0.51 09/01/2020       Lab Results   Component Value Date    BILITOTAL 1.0 09/01/2020    ALT 28 09/01/2020    AST 25 09/01/2020    ALKPHOS 88 09/01/2020       Lab Results   Component Value Date    ALBUMIN 3.7 09/01/2020    PROTTOTAL 7.3 09/01/2020        Lab Results   Component Value Date    WBC 4.0 09/01/2020    HGB 14.3 09/01/2020    MCV 92 09/01/2020    PLT 38 09/01/2020       Lab Results   Component Value Date    INR 1.33 09/01/2020     MELD-Na score: 9 at 9/1/2020  2:20 PM  MELD score: 9 at 9/1/2020  2:20 PM  Calculated from:  Serum Creatinine: 0.51 mg/dL (Rounded to 1 mg/dL) at 9/1/2020  2:20 PM  Serum Sodium: 138 mmol/L (Rounded to 137 mmol/L) at 9/1/2020  2:20 PM  Total Bilirubin: 1.0 mg/dL at 9/1/2020  2:20 PM  INR(ratio): 1.33 at 9/1/2020  2:20 PM  Age: 30 years 6 months    Radiology    ASSESSMENT AND PLAN:  Mr. Wiley has cirrhosis of the liver, which is " related with his alcohol abuse.  He did decompensate, although upon abstinence, he has improved.  His initial decompensation was mostly ascites.  He has also had hepatic encephalopathy.  Mr. 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  Now he is using lactulose and he is having 3-4 bowel movements with no blood in it.  Plan will be to continue the lactulose.  He will need also to have his upper endoscopy repeated.  He will need to continue doing surveillance for HCC, as he is at high risk, and he has thrombophilia, which is being worked up and followed by our Hematology group.  In fact, he had a visit yesterday with Edward Hansen, and they identified that he is heterozygous for protein C deficiency.  He also has chronic thrombocytopenia which is related with the cirrhosis and has a permanent IVC filter.  He is anticoagulated with enoxaparin and he is going to follow up with them.  Otherwise, he will be seen here in 3 months.  His MELD score is low and we will repeat that.      This was a 25-minute visit of which more than 50% was spent in explaining to the patient what our plan of care was.  We answered all his questions.     Viola Santos MD  Hepatology  [facility]        Viola Santos MD

## 2020-09-03 LAB
COPPER SERPL-MCNC: 98.2 UG/DL (ref 70–140)
ZINC SERPL-MCNC: 74.8 UG/DL (ref 60–120)

## 2020-09-04 ENCOUNTER — TRANSFERRED RECORDS (OUTPATIENT)
Dept: HEALTH INFORMATION MANAGEMENT | Facility: CLINIC | Age: 30
End: 2020-09-04

## 2020-09-04 LAB — PETH BLD-MCNC: NEGATIVE NG/ML

## 2020-09-14 ENCOUNTER — PRE VISIT (OUTPATIENT)
Dept: GASTROENTEROLOGY | Facility: CLINIC | Age: 30
End: 2020-09-14

## 2020-10-02 ENCOUNTER — TRANSFERRED RECORDS (OUTPATIENT)
Dept: HEALTH INFORMATION MANAGEMENT | Facility: CLINIC | Age: 30
End: 2020-10-02

## 2020-10-08 ENCOUNTER — MEDICAL CORRESPONDENCE (OUTPATIENT)
Dept: HEALTH INFORMATION MANAGEMENT | Facility: CLINIC | Age: 30
End: 2020-10-08

## 2020-10-09 ENCOUNTER — TRANSCRIBE ORDERS (OUTPATIENT)
Dept: OTHER | Age: 30
End: 2020-10-09

## 2020-10-09 DIAGNOSIS — K42.0 INCARCERATED UMBILICAL HERNIA: Primary | ICD-10-CM

## 2020-10-12 ENCOUNTER — TELEPHONE (OUTPATIENT)
Dept: SURGERY | Facility: CLINIC | Age: 30
End: 2020-10-12

## 2020-10-13 NOTE — TELEPHONE ENCOUNTER
REFERRAL INFORMATION:    Referring Provider:  Dr. Anjelica Reyes     Referring Clinic:  [facility]     Reason for Visit/Diagnosis: Incarcerated Umbilical Hernia        FUTURE VISIT INFORMATION:    Appointment Date: 2020    Appointment Time: 11 AM      NOTES RECORD STATUS  DETAILS   OFFICE NOTE from Referring Provider Care Everywhere 10/2/2020 Office visit with Dr. Reyes    OFFICE NOTE from Other Specialists N/A    HOSPITAL DISCHARGE SUMMARY/ ED VISITS  N/A    OPERATIVE REPORT N/A    ENDOSCOPY (EGD)  Care Everywhere EGD: 18, 10/2/18, 18, 18, 16 ([facility])    PERTINENT LABS Care Everywhere    PATHOLOGY REPORTS (RELATED) N/A    IMAGING (CT, MRI, US, XR)  Internal CT Abdomen: 2020  US Abdomen: 2020, 2020  MR Abdomen: 2020    [facility]:  - CT Abdomen Pelvis: 2020, 19, 19, 10/28/19    [facility]:  - US Abdomen Pelvis: 2020, 19, 1/15/19  - CT Abdomen Pelvis: 19, 10/5/19    [facility]:  - CT Abdomen Pelvis: 10/29/19    *More images in Care Everywhere; only requested for images noted above     ** Images in red are in PACS     10/13/2020 9:13am Fax requests for images noted above sent to:  - [facility] ([phone])  [employer]    - [facility] ([phone])  [employer]    - [facility] ([phone])    2020 All images requested are in PACS. Closing encounter now. -Loyda

## 2020-10-15 NOTE — TELEPHONE ENCOUNTER
Action Radha 10/15/20 1:21PM   Action Taken CSS received two CD's from [facility] and [facility] - CD's dropped off in Radiology black bin box.

## 2020-10-27 ENCOUNTER — TRANSFERRED RECORDS (OUTPATIENT)
Dept: HEALTH INFORMATION MANAGEMENT | Facility: CLINIC | Age: 30
End: 2020-10-27

## 2020-10-27 LAB
ALBUMIN SERPL-MCNC: 3.7 G/DL (ref 3.4–5.3)
ALBUMIN SERPL-MCNC: 3.7 G/DL (ref 3.4–5.3)
ALP SERPL-CCNC: 121 U/L (ref 45–108)
ALP SERPL-CCNC: 121 U/L (ref 45–108)
ALT SERPL-CCNC: 28 U/L (ref 8–51)
ALT SERPL-CCNC: 28 U/L (ref 8–51)
ANION GAP SERPL CALCULATED.3IONS-SCNC: 15.8 MMOL/L
AST SERPL-CCNC: 35 U/L (ref 10–36)
AST SERPL-CCNC: 35 U/L (ref 10–36)
BILIRUB SERPL-MCNC: 1.1 MG/DL (ref 0.2–1.2)
BILIRUB SERPL-MCNC: 1.1 MG/DL (ref 0.2–1.2)
BILIRUBIN DIRECT: 0.3 MG/DL (ref 0.2–0.3)
BILIRUBIN DIRECT: 0.3 MG/DL (ref 0.2–0.3)
BUN SERPL-MCNC: 9 MG/DL (ref 7–27)
CALCIUM SERPL-MCNC: 8.9 MG/DL (ref 8.6–10.3)
CHLORIDE SERPLBLD-SCNC: 100 MMOL/L (ref 97–107)
CO2 SERPL-SCNC: 23 MMOL/L (ref 22–29)
CREAT SERPL-MCNC: 0.48 MG/DL (ref 0.64–1.34)
ERYTHROCYTE [DISTWIDTH] IN BLOOD BY AUTOMATED COUNT: 13.9 % (ref 11.5–14.5)
ERYTHROCYTE [DISTWIDTH] IN BLOOD BY AUTOMATED COUNT: 13.9 % (ref 11.5–14.5)
GFR SERPL CREATININE-BSD FRML MDRD: >60 ML/MIN/1.73M2
GLUCOSE SERPL-MCNC: 315 MG/DL (ref 70–99)
HCT VFR BLD AUTO: 40.6 % (ref 42–52)
HCT VFR BLD AUTO: 40.6 % (ref 42–52)
HEMOGLOBIN: 13.8 G/DL (ref 13.3–17.7)
HEMOGLOBIN: 13.8 G/DL (ref 13.3–17.7)
INR PPP: 1.1 (ref 0.9–1.1)
MCH RBC QN AUTO: 29.5 PG (ref 27–31)
MCH RBC QN AUTO: 29.5 PG (ref 27–31)
MCHC RBC AUTO-ENTMCNC: 34 G/DL (ref 31–36.5)
MCHC RBC AUTO-ENTMCNC: 34 G/DL (ref 31–36.5)
MCV RBC AUTO: 86.8 FL (ref 80–94)
MCV RBC AUTO: 86.8 FL (ref 80–94)
PLATELET # BLD AUTO: 52 10^9/L (ref 150–350)
PLATELET # BLD AUTO: 52 10^9/L (ref 150–350)
POTASSIUM SERPL-SCNC: 3.8 MMOL/L (ref 3.5–5.1)
PROT SERPL-MCNC: 6.8 G/DL (ref 6.3–8.2)
PROT SERPL-MCNC: 6.8 G/DL (ref 6.3–8.2)
PROTHROMBIN TIME: 11.7 SECONDS (ref 9.8–12)
RBC # BLD AUTO: 4.68 10^12/L (ref 4.7–6.1)
RBC # BLD AUTO: 4.68 10^12/L (ref 4.7–6.1)
SODIUM SERPL-SCNC: 135 MMOL/L (ref 134–145)
WBC # BLD AUTO: 3.2 10^9/L (ref 4.8–10.8)
WBC # BLD AUTO: 3.2 10^9/L (ref 4.8–10.8)

## 2020-11-08 ENCOUNTER — MYC MEDICAL ADVICE (OUTPATIENT)
Dept: ENDOCRINOLOGY | Facility: CLINIC | Age: 30
End: 2020-11-08

## 2020-11-09 ENCOUNTER — VIRTUAL VISIT (OUTPATIENT)
Dept: ENDOCRINOLOGY | Facility: CLINIC | Age: 30
End: 2020-11-09
Payer: COMMERCIAL

## 2020-11-09 DIAGNOSIS — Z79.4 DIABETES MELLITUS DUE TO UNDERLYING CONDITION WITH HYPEROSMOLARITY WITHOUT COMA, WITH LONG-TERM CURRENT USE OF INSULIN (H): ICD-10-CM

## 2020-11-09 DIAGNOSIS — E08.69 DIABETES MELLITUS DUE TO UNDERLYING CONDITION WITH OTHER SPECIFIED COMPLICATION, WITH LONG-TERM CURRENT USE OF INSULIN (H): Primary | ICD-10-CM

## 2020-11-09 DIAGNOSIS — E08.00 DIABETES MELLITUS DUE TO UNDERLYING CONDITION WITH HYPEROSMOLARITY WITHOUT COMA, WITH LONG-TERM CURRENT USE OF INSULIN (H): ICD-10-CM

## 2020-11-09 DIAGNOSIS — Z79.4 DIABETES MELLITUS DUE TO UNDERLYING CONDITION WITH OTHER SPECIFIED COMPLICATION, WITH LONG-TERM CURRENT USE OF INSULIN (H): Primary | ICD-10-CM

## 2020-11-09 PROCEDURE — 99214 OFFICE O/P EST MOD 30 MIN: CPT | Mod: 95 | Performed by: INTERNAL MEDICINE

## 2020-11-09 RX ORDER — BUPRENORPHINE HYDROCHLORIDE 150 UG/1
150 FILM, SOLUBLE BUCCAL DAILY
COMMUNITY
Start: 2020-10-29

## 2020-11-09 RX ORDER — INSULIN ASPART 100 [IU]/ML
INJECTION, SOLUTION INTRAVENOUS; SUBCUTANEOUS
Qty: 45 ML | Refills: 0
Start: 2020-11-09

## 2020-11-09 NOTE — PROGRESS NOTES
"Blood sugar readings in my chart encounter 11/8    Obed Wiley is a 30 year old male who is being evaluated via a billable video visit.      The patient has been notified of following:     \"This video visit will be conducted via a call between you and your physician/provider. We have found that certain health care needs can be provided without the need for an in-person physical exam.  This service lets us provide the care you need with a video conversation.  If a prescription is necessary we can send it directly to your pharmacy.  If lab work is needed we can place an order for that and you can then stop by our lab to have the test done at a later time.    Video visits are billed at different rates depending on your insurance coverage.  Please reach out to your insurance provider with any questions.    If during the course of the call the physician/provider feels a video visit is not appropriate, you will not be charged for this service.\"    Patient has given verbal consent for Video visit? Yes  How would you like to obtain your AVS? MyChart  If you are dropped from the video visit, the video invite should be resent to: Other e-mail: my chart   Will anyone else be joining your video visit? No        Endocrinology Video Visit    Chief Complaint: Diabetes     Information obtained from:Patient    Subjective:         HPI: Obed Wiley is a 30 year old male with history of diabetes who is seen for a follow-up today.    No admission for diabetes since I saw him last.   September 2020 admission for blood clot and got blood thinner adjusted.     Patient was diagnosed with diabetes about 4 years ago.  He has been on insulin since then.  Current antidiabetic medication is as follows-  Lantus 15 units in the morning and lantus 5 at night = total 20 units daily.   Novolog 1 unit for every 15 grams   Correction scale 1 unit for every 50 above 120.  No low blood sugar.  Or hypoglycemia symptoms.  He reports not missing a " single dose of insulin however he forgets to take 15 minutes prior to eating and he usually takes it once he increase lantus starts eating usually.  He has scanned his blood sugar readings and has sent to us which is copied below.  A1c 5.9 to 10.2   gastric bypass - 2008.   BG fasting has been high consistently and postprandial BG has been variable.                       Allergies   Allergen Reactions     Bee Anaphylaxis     Adhesive Tape Rash     Sulfa Drugs Itching     Morphine        Current Outpatient Medications   Medication Sig Dispense Refill     BELBUCA 150 MCG FILM buccal film Place 150 mcg inside cheek daily       blood glucose (NO BRAND SPECIFIED) lancets standard Use to test blood sugar 6 times daily or as directed.       cholecalciferol (VITAMIN D3) 06936 units (1250 mcg) capsule Take 50,000 Units by mouth every 7 days Saturdays       ciprofloxacin (CIPRO) 500 MG tablet Take 500 mg by mouth daily       cyclobenzaprine (FLEXERIL) 10 MG tablet Take 1 tablet (10 mg) by mouth every 8 hours as needed for muscle spasms 30 tablet 0     diclofenac (VOLTAREN) 1 % topical gel Apply 2 g topically 4 times daily 50 g 0     ferrous sulfate (FEROSUL) 325 (65 Fe) MG tablet Take 325 mg by mouth daily (with breakfast)       folic acid (FOLVITE) 1 MG tablet Take 1 mg by mouth 2 times daily       furosemide (LASIX) 40 MG tablet Take 40 mg by mouth 3 times daily       gabapentin (NEURONTIN) 400 MG capsule Take 400 mg by mouth 3 times daily       insulin aspart (NOVOLOG FLEXPEN) 100 UNIT/ML pen 1 unit for every 12 grams of carbohydrate with meals and snacks plus correction scale. Uses up to 40 units per day. 45 mL 0     insulin glargine (LANTUS PEN) 100 UNIT/ML pen Inject 26 Units Subcutaneous every morning 30 mL 0     lactulose (CHRONULAC) 10 GM/15ML solution Take 45 mLs (30 g) by mouth 3 times daily 20mg / 30 ml tid daily/ prn       lactulose (CHRONULAC) 10 GM/15ML solution Take 15 mLs (10 g) by mouth every hour as needed  for constipation 946 mL 0     lidocaine (LIDODERM) 5 % patch Place 1 patch onto the skin every 24 hours To prevent lidocaine toxicity, patient should be patch free for 12 hrs daily. 30 patch 0     magnesium oxide 400 MG CAPS Take 400 mg by mouth 3 times daily        MULTIPLE VITAMIN-FOLIC ACID PO Take 1 tablet by mouth 1 daily       Nutritional Supplements (ENSURE PO) Ensure/ boost - 237 ml bid daily       pantoprazole (PROTONIX) 40 MG EC tablet Take 40 mg by mouth daily       polyethylene glycol (MIRALAX/GLYCOLAX) packet Take 1 packet by mouth daily        promethazine (PHENERGAN) 12.5 MG tablet Take 25 mg by mouth every 6 hours as needed for nausea       QUEtiapine (SEROQUEL) 25 MG tablet Take 25 mg by mouth daily as needed        rifaximin (XIFAXAN) 550 MG TABS tablet Take 550 mg by mouth 2 times daily        senna (SENOKOT) 8.6 MG tablet Take 1 tablet by mouth daily as needed for constipation       spironolactone (ALDACTONE) 100 MG tablet Take 100 mg by mouth 3 times daily       tretinoin (RETIN-A) 0.025 % external cream Apply topically At Bedtime       Vitamin D, Cholecalciferol, 25 MCG (1000 UT) CAPS Take 2,000 Units by mouth daily        hydrOXYzine (ATARAX) 25 MG tablet Take 1 tablet (25 mg) by mouth every 6 hours as needed for anxiety (Patient not taking: Reported on 11/9/2020) 30 tablet 0     Lidocaine (LIDOCARE) 4 % Patch Place 1 patch onto the skin every 24 hours To prevent lidocaine toxicity, patient should be patch free for 12 hrs daily. 10 patch 0     oxyCODONE-acetaminophen (PERCOCET)  MG per tablet Take 1 tablet by mouth every 4 hours as needed for moderate to severe pain 30 tablet 0     QUEtiapine (SEROQUEL) 25 MG tablet Take 50 mg by mouth At Bedtime       traZODone (DESYREL) 50 MG tablet Take 150 mg by mouth At Bedtime Prn          Review of Systems     as per HPI above    Past Medical History:   Diagnosis Date     Alcoholic cirrhosis of liver with ascites (H) 12/23/2019     Diabetes (H)      Type 2 DM, Uses Insulin      DVT (deep vein thrombosis) in pregnancy      H/O protein C deficiency      Hepatic encephalopathy (H)      Hepatitis     Hep A when an infant      History of blood transfusion     2019 at [facility]      Leukopenia 1/11/2020     SBP (spontaneous bacterial peritonitis) (H) 11/2019       Past Surgical History:   Procedure Laterality Date     ABDOMEN SURGERY      Gastric Bypass 2009. [facility]      CARDIAC SURGERY      IVC      COLONOSCOPY       ENT SURGERY      Tonsils and Adenoids Removed at 6-7 Years Old      GALLBLADDER SURGERY  2017     GI SURGERY      Upper GI        Family History   Problem Relation Age of Onset     Protein C deficiency Mother      Pancreatic Cancer Father      Deonte Parkinson White syndrome Father      Alcoholism Brother      Substance Abuse Brother      Heart Failure Maternal Grandmother      Heart Failure Maternal Grandfather          Objective:   GENERAL: Healthy, alert and no distress  EYES: Eyes grossly normal to inspection.   RESP: No audible wheeze, cough, or visible cyanosis.    NEURO: alert and oriented.  Mentation and speech appropriate for age.    In House Labs:       TSH   Date Value Ref Range Status   06/22/2020 0.69 0.30 - 5.00 uIU/mL Final   01/06/2020 4.09 (H) 0.40 - 4.00 mU/L Final     T4 Free   Date Value Ref Range Status   01/06/2020 0.97 0.76 - 1.46 ng/dL Final       Creatinine   Date Value Ref Range Status   10/27/2020 0.48 (A) 0.64 - 1.34 mg/dL Final   ]    Recent Labs   Lab Test 01/06/20  0831   CHOL 107   HDL 67   LDL 31   TRIG 49     Hemoglobin A1C   Date Value Ref Range Status   07/17/2020 5.9 (H) 0 - 5.6 % Final     Comment:     Normal <5.7% Prediabetes 5.7-6.4%  Diabetes 6.5% or higher - adopted from ADA   consensus guidelines.     06/30/2020 6.4 (H) <5.7 % Final   12/21/2019 8.0 (A) 4.8 - 6.0 % Final         Assessment/Treatment Plan:      Hospital discharge follow-up/diabetes unspecified type: C-peptide, glucose and AKANKSHA  antibodies ordered again to assess endogenous insulin production. BG fasting has been high consistently and postprandial BG has been variable (normal and high). Suspect this might be secondary to CHO counting. If c-peptide normal - consider adding non-insulin therapy.    Plan:  Patient Instructions     Obed,    Increase Lantus 26 units daily (from the current 20 units daily).    Increase mealtime insulin coverage with 1 unit of NovoLog for every 12 g of carbohydrates with meals 3 times per day and snacks.    Continue current correction scale without any change   For  - 169 give 1 unit.   For  - 219 give 2 units.   For  - 269 give 3 units.   For  - 319 give 4 units.   For BG = or > 320 give 5 units.    Please monitor your blood sugar at least 4 times per day and contact us if you have blood sugar below 70 on more than two occasions.   Send BG readings in 1-2 weeks.   Please check the following labs at your PCP office.   Orders Placed This Encounter   Procedures     C-peptide     Glucose           I will contact the patient with the test results.  Return to clinic in 1.5 months.    Test and/or medications prescribed today:  Orders Placed This Encounter   Procedures     C-peptide     Glucose         Waylon Leon MD  Staff Endocrinologist    [phone]  Division of Endocrinology and Diabetes            Video-Visit Details    Type of service:  Video Visit  All times are in your local time  1st VideoStart: 11/09/2020 03:04 pm   Stop: 11/09/2020 03:35 pm  Originating Location (pt. Location): Home    Distant Location (provider location):  [facility]     Platform used for Video Visit: [employer]

## 2020-11-09 NOTE — PATIENT INSTRUCTIONS
Obed,    Increase Lantus 26 units daily (from the current 20 units daily).    Increase mealtime insulin coverage with 1 unit of NovoLog for every 12 g of carbohydrates with meals 3 times per day and snacks.    Continue current correction scale without any change   For  - 169 give 1 unit.   For  - 219 give 2 units.   For  - 269 give 3 units.   For  - 319 give 4 units.   For BG = or > 320 give 5 units.    Please monitor your blood sugar at least 4 times per day and contact us if you have blood sugar below 70 on more than two occasions.   Send BG readings in 1-2 weeks.   Please check the following labs at your PCP office.   Orders Placed This Encounter   Procedures     C-peptide     Glucose

## 2020-11-09 NOTE — LETTER
"    11/9/2020         RE: Obed Wiley  [address] [facility] [address]        Dear Colleague,    Thank you for referring your patient, Obed Wiley, to the [facility]. Please see a copy of my visit note below.    Blood sugar readings in my chart encounter 11/8    Obed Wiley is a 30 year old male who is being evaluated via a billable video visit.      The patient has been notified of following:     \"This video visit will be conducted via a call between you and your physician/provider. We have found that certain health care needs can be provided without the need for an in-person physical exam.  This service lets us provide the care you need with a video conversation.  If a prescription is necessary we can send it directly to your pharmacy.  If lab work is needed we can place an order for that and you can then stop by our lab to have the test done at a later time.    Video visits are billed at different rates depending on your insurance coverage.  Please reach out to your insurance provider with any questions.    If during the course of the call the physician/provider feels a video visit is not appropriate, you will not be charged for this service.\"    Patient has given verbal consent for Video visit? Yes  How would you like to obtain your AVS? MyChart  If you are dropped from the video visit, the video invite should be resent to: Other e-mail: my chart   Will anyone else be joining your video visit? No        Endocrinology Video Visit    Chief Complaint: Diabetes     Information obtained from:Patient    Subjective:         HPI: Obed Wiley is a 30 year old male with history of diabetes who is seen for a follow-up today.    No admission for diabetes since I saw him last.   September 2020 admission for blood clot and got blood thinner adjusted.     Patient was diagnosed with diabetes about 4 years ago.  He has been on insulin since then.  Current antidiabetic medication is as " follows-  Lantus 15 units in the morning and lantus 5 at night = total 20 units daily.   Novolog 1 unit for every 15 grams   Correction scale 1 unit for every 50 above 120.  No low blood sugar.  Or hypoglycemia symptoms.  He reports not missing a single dose of insulin however he forgets to take 15 minutes prior to eating and he usually takes it once he increase lantus starts eating usually.  He has scanned his blood sugar readings and has sent to us which is copied below.  A1c 5.9 to 10.2   gastric bypass - 2008.   BG fasting has been high consistently and postprandial BG has been variable.                       Allergies   Allergen Reactions     Bee Anaphylaxis     Adhesive Tape Rash     Sulfa Drugs Itching     Morphine        Current Outpatient Medications   Medication Sig Dispense Refill     BELBUCA 150 MCG FILM buccal film Place 150 mcg inside cheek daily       blood glucose (NO BRAND SPECIFIED) lancets standard Use to test blood sugar 6 times daily or as directed.       cholecalciferol (VITAMIN D3) 38482 units (1250 mcg) capsule Take 50,000 Units by mouth every 7 days Saturdays       ciprofloxacin (CIPRO) 500 MG tablet Take 500 mg by mouth daily       cyclobenzaprine (FLEXERIL) 10 MG tablet Take 1 tablet (10 mg) by mouth every 8 hours as needed for muscle spasms 30 tablet 0     diclofenac (VOLTAREN) 1 % topical gel Apply 2 g topically 4 times daily 50 g 0     ferrous sulfate (FEROSUL) 325 (65 Fe) MG tablet Take 325 mg by mouth daily (with breakfast)       folic acid (FOLVITE) 1 MG tablet Take 1 mg by mouth 2 times daily       furosemide (LASIX) 40 MG tablet Take 40 mg by mouth 3 times daily       gabapentin (NEURONTIN) 400 MG capsule Take 400 mg by mouth 3 times daily       insulin aspart (NOVOLOG FLEXPEN) 100 UNIT/ML pen 1 unit for every 12 grams of carbohydrate with meals and snacks plus correction scale. Uses up to 40 units per day. 45 mL 0     insulin glargine (LANTUS PEN) 100 UNIT/ML pen Inject 26 Units  Subcutaneous every morning 30 mL 0     lactulose (CHRONULAC) 10 GM/15ML solution Take 45 mLs (30 g) by mouth 3 times daily 20mg / 30 ml tid daily/ prn       lactulose (CHRONULAC) 10 GM/15ML solution Take 15 mLs (10 g) by mouth every hour as needed for constipation 946 mL 0     lidocaine (LIDODERM) 5 % patch Place 1 patch onto the skin every 24 hours To prevent lidocaine toxicity, patient should be patch free for 12 hrs daily. 30 patch 0     magnesium oxide 400 MG CAPS Take 400 mg by mouth 3 times daily        MULTIPLE VITAMIN-FOLIC ACID PO Take 1 tablet by mouth 1 daily       Nutritional Supplements (ENSURE PO) Ensure/ boost - 237 ml bid daily       pantoprazole (PROTONIX) 40 MG EC tablet Take 40 mg by mouth daily       polyethylene glycol (MIRALAX/GLYCOLAX) packet Take 1 packet by mouth daily        promethazine (PHENERGAN) 12.5 MG tablet Take 25 mg by mouth every 6 hours as needed for nausea       QUEtiapine (SEROQUEL) 25 MG tablet Take 25 mg by mouth daily as needed        rifaximin (XIFAXAN) 550 MG TABS tablet Take 550 mg by mouth 2 times daily        senna (SENOKOT) 8.6 MG tablet Take 1 tablet by mouth daily as needed for constipation       spironolactone (ALDACTONE) 100 MG tablet Take 100 mg by mouth 3 times daily       tretinoin (RETIN-A) 0.025 % external cream Apply topically At Bedtime       Vitamin D, Cholecalciferol, 25 MCG (1000 UT) CAPS Take 2,000 Units by mouth daily        hydrOXYzine (ATARAX) 25 MG tablet Take 1 tablet (25 mg) by mouth every 6 hours as needed for anxiety (Patient not taking: Reported on 11/9/2020) 30 tablet 0     Lidocaine (LIDOCARE) 4 % Patch Place 1 patch onto the skin every 24 hours To prevent lidocaine toxicity, patient should be patch free for 12 hrs daily. 10 patch 0     oxyCODONE-acetaminophen (PERCOCET)  MG per tablet Take 1 tablet by mouth every 4 hours as needed for moderate to severe pain 30 tablet 0     QUEtiapine (SEROQUEL) 25 MG tablet Take 50 mg by mouth At  Bedtime       traZODone (DESYREL) 50 MG tablet Take 150 mg by mouth At Bedtime Prn          Review of Systems     as per HPI above    Past Medical History:   Diagnosis Date     Alcoholic cirrhosis of liver with ascites (H) 12/23/2019     Diabetes (H)     Type 2 DM, Uses Insulin      DVT (deep vein thrombosis) in pregnancy      H/O protein C deficiency      Hepatic encephalopathy (H)      Hepatitis     Hep A when an infant      History of blood transfusion     2019 at [facility]      Leukopenia 1/11/2020     SBP (spontaneous bacterial peritonitis) (H) 11/2019       Past Surgical History:   Procedure Laterality Date     ABDOMEN SURGERY      Gastric Bypass 2009. [facility]      CARDIAC SURGERY      IVC      COLONOSCOPY       ENT SURGERY      Tonsils and Adenoids Removed at 6-7 Years Old      GALLBLADDER SURGERY  2017     GI SURGERY      Upper GI        Family History   Problem Relation Age of Onset     Protein C deficiency Mother      Pancreatic Cancer Father      Deonte Parkinson White syndrome Father      Alcoholism Brother      Substance Abuse Brother      Heart Failure Maternal Grandmother      Heart Failure Maternal Grandfather          Objective:   GENERAL: Healthy, alert and no distress  EYES: Eyes grossly normal to inspection.   RESP: No audible wheeze, cough, or visible cyanosis.    NEURO: alert and oriented.  Mentation and speech appropriate for age.    In House Labs:       TSH   Date Value Ref Range Status   06/22/2020 0.69 0.30 - 5.00 uIU/mL Final   01/06/2020 4.09 (H) 0.40 - 4.00 mU/L Final     T4 Free   Date Value Ref Range Status   01/06/2020 0.97 0.76 - 1.46 ng/dL Final       Creatinine   Date Value Ref Range Status   10/27/2020 0.48 (A) 0.64 - 1.34 mg/dL Final   ]    Recent Labs   Lab Test 01/06/20  0831   CHOL 107   HDL 67   LDL 31   TRIG 49     Hemoglobin A1C   Date Value Ref Range Status   07/17/2020 5.9 (H) 0 - 5.6 % Final     Comment:     Normal <5.7% Prediabetes 5.7-6.4%  Diabetes 6.5% or  higher - adopted from ADA   consensus guidelines.     06/30/2020 6.4 (H) <5.7 % Final   12/21/2019 8.0 (A) 4.8 - 6.0 % Final         Assessment/Treatment Plan:      Hospital discharge follow-up/diabetes unspecified type: C-peptide, glucose and AKANKSHA antibodies ordered again to assess endogenous insulin production. BG fasting has been high consistently and postprandial BG has been variable (normal and high). Suspect this might be secondary to CHO counting. If c-peptide normal - consider adding non-insulin therapy.    Plan:  Patient Instructions     Obed,    Increase Lantus 26 units daily (from the current 20 units daily).    Increase mealtime insulin coverage with 1 unit of NovoLog for every 12 g of carbohydrates with meals 3 times per day and snacks.    Continue current correction scale without any change   For  - 169 give 1 unit.   For  - 219 give 2 units.   For  - 269 give 3 units.   For  - 319 give 4 units.   For BG = or > 320 give 5 units.    Please monitor your blood sugar at least 4 times per day and contact us if you have blood sugar below 70 on more than two occasions.   Send BG readings in 1-2 weeks.   Please check the following labs at your PCP office.   Orders Placed This Encounter   Procedures     C-peptide     Glucose           I will contact the patient with the test results.  Return to clinic in 1.5 months.    Test and/or medications prescribed today:  Orders Placed This Encounter   Procedures     C-peptide     Glucose         Waylon Leon MD  Staff Endocrinologist    [phone]  Division of Endocrinology and Diabetes            Video-Visit Details    Type of service:  Video Visit  All times are in your local time  1st VideoStart: 11/09/2020 03:04 pm   Stop: 11/09/2020 03:35 pm  Originating Location (pt. Location): Home    Distant Location (provider location):  [facility]     Platform used for Video Visit: James            Lab orders faxed to [facility].    Jessica Cyr CMA  Adult Endocrinology  [facility]        Again, thank you for allowing me to participate in the care of your patient.        Sincerely,        Waylon Leon MD

## 2020-11-10 NOTE — PROGRESS NOTES
Lab orders faxed to [facility].    Jessica Cyr, [facility]  Adult Endocrinology  [facility]

## 2020-11-25 ENCOUNTER — VIRTUAL VISIT (OUTPATIENT)
Dept: SURGERY | Facility: CLINIC | Age: 30
End: 2020-11-25
Attending: FAMILY MEDICINE
Payer: COMMERCIAL

## 2020-11-25 ENCOUNTER — PRE VISIT (OUTPATIENT)
Dept: SURGERY | Facility: CLINIC | Age: 30
End: 2020-11-25

## 2020-11-25 VITALS — WEIGHT: 179 LBS | HEIGHT: 74 IN | BODY MASS INDEX: 22.97 KG/M2

## 2020-11-25 DIAGNOSIS — K42.9 UMBILICAL HERNIA WITHOUT OBSTRUCTION AND WITHOUT GANGRENE: Primary | ICD-10-CM

## 2020-11-25 PROCEDURE — 99201 PR OFFICE/OUTPT VISIT, NEW, LEVEL I: CPT | Mod: 95 | Performed by: SURGERY

## 2020-11-25 ASSESSMENT — PAIN SCALES - GENERAL: PAINLEVEL: SEVERE PAIN (7)

## 2020-11-25 ASSESSMENT — MIFFLIN-ST. JEOR: SCORE: 1836.69

## 2020-11-25 NOTE — NURSING NOTE
"Chief Complaint   Patient presents with     Consult     New appt for umbilical hernia.        Vitals:    11/25/20 1041   Weight: 81.2 kg (179 lb)   Height: 1.88 m (6' 2\")       Body mass index is 22.98 kg/m .                            FAUZIA PARIKH, EMT    "

## 2020-11-25 NOTE — PROGRESS NOTES
"Obed Wiley is a 30 year old male who is being evaluated via a billable video visit.      The patient has been notified of following:     \"This video visit will be conducted via a call between you and your physician/provider. We have found that certain health care needs can be provided without the need for an in-person physical exam.  This service lets us provide the care you need with a video conversation.  If a prescription is necessary we can send it directly to your pharmacy.  If lab work is needed we can place an order for that and you can then stop by our lab to have the test done at a later time.    Video visits are billed at different rates depending on your insurance coverage.  Please reach out to your insurance provider with any questions.    If during the course of the call the physician/provider feels a video visit is not appropriate, you will not be charged for this service.\"    Patient has given verbal consent for Video visit? Yes  How would you like to obtain your AVS? MyChart  If you are dropped from the video visit, the video invite should be resent to: Text to cell phone: [phone]  Will anyone else be joining your video visit? No        Video-Visit Details    This is a 10-minute video visit on a 30-year-old gentleman with a complex medical history.  Patient has alcoholic cirrhosis with a history of hepatitis A.  He has had a hypercoagulable state with numerous intra-abdominal mesenteric and portal venous thromboses, deep venous thromboses, pulmonary emboli in the setting of chronic thrombocytopenia secondary to cirrhosis.  The patient has a permanent IVC filter.  He is heterozygous for protein C deficiency.  He is followed by both our hepatologists and hematologists and is maintained on enoxaparin 1.5 mg/kg subcu every 24 hours.  His most recent hospitalization was in July.  He has been considered for liver transplant but he feels this may be delayed as he has a meld score that has dropped " to 7.  He is more than a year and a half free of alcohol intake.  The patient has a history of a gastric bypass at the age of 17 in [place].  He has had over 200 paracentesis.  He comes today for evaluation of his umbilical hernia.  This is symptomatic for him and can enlarge significantly and causes a great deal of pain.  In this virtual visit it is not possible to ascertain exactly the nature of the umbilical hernia.  He was hoping this would be fixed at the time of his liver transplant which may actually be pushed back.  He notes that before his paracentesis he stops the enoxaparin the day before and the day of the procedure.  We are contemplating evaluating him for an open umbilical hernia repair.  I will connect with both his hematology and hepatology group to see what next steps would be as we schedule a formal inpatient visit.  He understands plan.  He understands that he has a high risk of hematologic complication.

## 2020-12-02 ENCOUNTER — VIRTUAL VISIT (OUTPATIENT)
Dept: GASTROENTEROLOGY | Facility: CLINIC | Age: 30
End: 2020-12-02
Attending: INTERNAL MEDICINE
Payer: COMMERCIAL

## 2020-12-02 DIAGNOSIS — I81 PORTAL VEIN THROMBOSIS: Primary | ICD-10-CM

## 2020-12-02 DIAGNOSIS — K55.059 ACUTE MESENTERIC ISCHEMIA (H): ICD-10-CM

## 2020-12-02 DIAGNOSIS — K70.31 ALCOHOLIC CIRRHOSIS OF LIVER WITH ASCITES (H): ICD-10-CM

## 2020-12-02 PROCEDURE — 99214 OFFICE O/P EST MOD 30 MIN: CPT | Mod: 95 | Performed by: INTERNAL MEDICINE

## 2020-12-02 ASSESSMENT — PAIN SCALES - GENERAL: PAINLEVEL: SEVERE PAIN (6)

## 2020-12-02 NOTE — LETTER
"    12/2/2020         RE: Obed Wiley  [address] [facility] [address]        Dear Colleague,    Thank you for referring your patient, Obed Wiley, to the [facility] [place]. Please see a copy of my visit note below.    Obed Wiley is a 30 year old male who is being evaluated via a billable video visit.      The patient has been notified of following:     \"This video visit will be conducted via a call between you and your physician/provider. We have found that certain health care needs can be provided without the need for an in-person physical exam.  This service lets us provide the care you need with a video conversation.  If a prescription is necessary we can send it directly to your pharmacy.  If lab work is needed we can place an order for that and you can then stop by our lab to have the test done at a later time.    Video visits are billed at different rates depending on your insurance coverage.  Please reach out to your insurance provider with any questions.    If during the course of the call the physician/provider feels a video visit is not appropriate, you will not be charged for this service.\"    Patient has given verbal consent for Video visit? Yes  How would you like to obtain your AVS? MyChart  If you are dropped from the video visit, the video invite should be resent to: Send to e-mail at: [email or web address]  Will anyone else be joining your video visit? No      Video-Visit Details    Type of service:  Video Visit    Video Start Time: 0959  Video End Time: 1030    Originating Location (pt. 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  He has been doing well since cessation of alcohol use in June of 2018.  He has not had any paracentesis since 01/2020.  His ascites is well controlled with diuretics.  He has also not had any episodes of hepatic encephalopathy, or variceal hemorrhage.  His last upper endoscopy was in 2018, and he will be repeating this soon.  His major complaint is an umbilical hernia.  He has had this for up to 3 years.  He states that he has had episodes of incarceration last year, especially with lifting heavy weights.  His restriction currently limits him to 5 pounds, and he is quite worried about this.  He is concerned that he is unable to do much around the house especially now that his mother is in the hospital.  He has not had any recent episodes of incarceration.  He currently has no abdominal pain, nausea, vomiting, dyspnea, cough, chest pain, melena, hematochezia, hematemesis, jaundice, fatigue, itching, abdominal swelling, or leg swelling.    ROS: 10pt ROS performed and otherwise negative.    PERTINENT PAST MEDICAL HISTORY:  Past Medical History:   Diagnosis Date     Alcoholic cirrhosis of liver with ascites (H) 12/23/2019     Diabetes (H)     Type 2 DM, Uses Insulin      DVT (deep vein thrombosis) in pregnancy      H/O protein C deficiency      Hepatic encephalopathy (H)      Hepatitis     Hep A when an infant      History of blood transfusion     2019 at [facility]      Leukopenia 1/11/2020     SBP (spontaneous bacterial peritonitis) (H) 11/2019       PREVIOUS ABDOMINAL/GYNECOLOGIC SURGERIES:  Past Surgical History:   Procedure Laterality Date     ABDOMEN SURGERY      Gastric Bypass 2009. [facility]      CARDIAC SURGERY      IVC      COLONOSCOPY       ENT SURGERY      Tonsils and Adenoids Removed at 6-7 Years Old      GALLBLADDER SURGERY  2017     GI SURGERY      Upper GI       PREVIOUS ENDOSCOPY:  Last EGD 11/2018 noting eradicated esophageal varices  Total of 4 EGDs in 2018    PERTINENT MEDICATIONS:    Current Outpatient Medications:      BELBUCA 150 MCG FILM buccal film, Place 150 mcg inside cheek daily, Disp: , Rfl:      blood glucose (NO BRAND SPECIFIED) lancets standard, Use to test blood sugar 6 times daily or as directed., Disp: , Rfl:      cholecalciferol (VITAMIN D3) 15674 units (1250 mcg) capsule, Take 50,000 Units by mouth every 7 days Saturdays, Disp: , Rfl:      ciprofloxacin (CIPRO) 500 MG tablet, Take 500 mg by mouth daily, Disp: , Rfl:      cyclobenzaprine (FLEXERIL) 10 MG tablet, Take 1 tablet (10 mg) by mouth every 8 hours as needed for muscle spasms, Disp: 30 tablet, Rfl: 0     diclofenac (VOLTAREN) 1 % topical gel, Apply 2 g topically 4 times daily, Disp: 50 g, Rfl: 0     ferrous sulfate (FEROSUL) 325 (65 Fe) MG tablet, Take 325 mg by mouth daily (with breakfast), Disp: , Rfl:      folic acid (FOLVITE) 1 MG tablet, Take 1 mg by mouth 2 times daily, Disp: , Rfl:      furosemide (LASIX) 40 MG tablet, Take 40 mg by mouth 3 times daily, Disp: , Rfl:      gabapentin (NEURONTIN) 400 MG capsule, Take 400 mg by mouth 3 times daily, Disp: , Rfl:      insulin aspart (NOVOLOG FLEXPEN) 100 UNIT/ML pen, 1 unit for every 12 grams of carbohydrate with meals and snacks plus correction scale. Uses up to 40 units per day., Disp: 45 mL, Rfl: 0     insulin glargine (LANTUS PEN) 100 UNIT/ML pen, Inject 26 Units Subcutaneous every morning, Disp: 30 mL, Rfl: 0     lactulose (CHRONULAC) 10 GM/15ML solution, Take 45 mLs (30 g) by mouth 3 times daily 20mg / 30 ml  tid daily/ prn, Disp: , Rfl:      lactulose (CHRONULAC) 10 GM/15ML solution, Take 15 mLs (10 g) by mouth every hour as needed for constipation, Disp: 946 mL, Rfl: 0     lidocaine (LIDODERM) 5 % patch, Place 1 patch onto the skin every 24 hours To prevent lidocaine toxicity, patient should be patch free for 12 hrs daily., Disp: 30 patch, Rfl: 0     magnesium oxide 400 MG CAPS, Take 400 mg by mouth 3 times daily , Disp: , Rfl:      MULTIPLE VITAMIN-FOLIC ACID PO, Take 1 tablet by mouth 1 daily, Disp: , Rfl:      Nutritional Supplements (ENSURE PO), Ensure/ boost - 237 ml bid daily, Disp: , Rfl:      pantoprazole (PROTONIX) 40 MG EC tablet, Take 40 mg by mouth daily, Disp: , Rfl:      polyethylene glycol (MIRALAX/GLYCOLAX) packet, Take 1 packet by mouth daily , Disp: , Rfl:      promethazine (PHENERGAN) 12.5 MG tablet, Take 25 mg by mouth every 6 hours as needed for nausea, Disp: , Rfl:      QUEtiapine (SEROQUEL) 25 MG tablet, Take 50 mg by mouth At Bedtime, Disp: , Rfl:      QUEtiapine (SEROQUEL) 25 MG tablet, Take 25 mg by mouth daily as needed , Disp: , Rfl:      rifaximin (XIFAXAN) 550 MG TABS tablet, Take 550 mg by mouth 2 times daily , Disp: , Rfl:      senna (SENOKOT) 8.6 MG tablet, Take 1 tablet by mouth daily as needed for constipation, Disp: , Rfl:      spironolactone (ALDACTONE) 100 MG tablet, Take 100 mg by mouth 3 times daily, Disp: , Rfl:      tretinoin (RETIN-A) 0.025 % external cream, Apply topically At Bedtime, Disp: , Rfl:      Vitamin D, Cholecalciferol, 25 MCG (1000 UT) CAPS, Take 2,000 Units by mouth daily , Disp: , Rfl:      hydrOXYzine (ATARAX) 25 MG tablet, Take 1 tablet (25 mg) by mouth every 6 hours as needed for anxiety (Patient not taking: Reported on 12/2/2020), Disp: 30 tablet, Rfl: 0     Lidocaine (LIDOCARE) 4 % Patch, Place 1 patch onto the skin every 24 hours To prevent lidocaine toxicity, patient should be patch free for 12 hrs daily. (Patient not taking: Reported on 12/2/2020), Disp:  10 patch, Rfl: 0     oxyCODONE-acetaminophen (PERCOCET)  MG per tablet, Take 1 tablet by mouth every 4 hours as needed for moderate to severe pain (Patient not taking: Reported on 12/2/2020), Disp: 30 tablet, Rfl: 0     traZODone (DESYREL) 50 MG tablet, Take 150 mg by mouth At Bedtime Prn , Disp: , Rfl:         PHYSICAL EXAMINATION:   General: Alert, not in respiratory or painful distress,   HEENT: anicteric sclera, PERRL, EOMI   Lungs: Unlabored breathing  Abd: Full, lax abdominal wall skin, no visible umbilical hernia on visual inspection and on coughing  Neuro: AOx3       PERTINENT STUDIES:  MELD-Na score: 8 at 10/27/2020 12:00 AM  MELD score: 8 at 10/27/2020 12:00 AM  Calculated from:  Serum Creatinine: 0.48 mg/dL (Rounded to 1 mg/dL) at 10/27/2020 12:00 AM  Serum Sodium: 135 mmol/L at 10/27/2020 12:00 AM  Total Bilirubin: 1.1 mg/dL at 10/27/2020 12:00 AM  INR(ratio): 1.1 at 10/27/2020 12:00 AM  Age: 30 years 8 months     CT abdomen 9/1/2020  Impression:   1. Decreased burden of nonocclusive thrombus primarily within the main  portal vein. There are components of nonocclusive thrombus in the  proximal left portal vein and SMV.  2. Steatosis and cirrhosis with sequelae of portal hypertension  including splenomegaly and portosystemic collaterals.  3. Stable findings of chronic pancreatitis.    ASSESSMENT/PLAN:  30 year old male with a medical history as documented below, but pertinent for Protein C deficiency with multiple clotting episodes (ischemic bowel in 2013, mesenteric vein thrombosis in 8/2014, DVT in 11/2015 and 7/2018, bilateral PE in 8/2016 s/p IVC filter placement; relapsing acute pancreatitis and resultant chronic pancreatitis, RNYGB in 2009 for weight loss, and decompensated alcoholic cirrhosis manifested by hepatic encephalopathy and ascites with SBP (now on indefinite antibiotic prophylaxis); who is now listed for liver transplant; seen for follow up.    #. Umbilical hernia  Not apparent on  physical examination, even with increased intra-abdominal pressure, however he states that this is a major source of distress for him. Unclear how urgent this is. We did discuss the risk of liver decompensation after non-transplant surgery, fortunately, he is on the transplant list. His risk is also compounded by the presence of coagulopathy, his history of bleeding and clotting, as well as chronic anticoagulation use. We recommend an in-person visit with the surgeon first before moving forward. From a liver standpoint, we recommend herniorrhaphy, only if urgent and necessary. He will also need clearance from his hematologist as his AC will need to be stopped for a prolonged period    #. Coagulopathy (Heterozygous Protein C deficiency)  Extensive history of clotting and bleeding with ischemic bowel in 2013 s/p Coumadin and then Xarelto complicated by GI bleed, mesenteric vein thrombosis in 8/2014, DVT in 11/2015 and 7/2018, bilateral PE in 8/2016 s/p IVC filter placement and history of hemoperitoneum. Followed by hematology. Last seen 9/1/2020  Remains on Enoxaparin    #. Alcohol related Cirrhosis:  Initially decompensated disease, based on the presence of ascites with SBP hx, HE. Now stable especially with extended abstinence  Etiology: Alcohol  MELD-Na of  8  Hepatic encephalopathy: No episodes in the last 12 months   Ascites: Well controlled on diuretics. Last paracentesis was 1/2020  SBP prophylaxis: On Ciprofloxacin  TIPS: None  Esophageal/Gastric varices: Last EGD was done 11/2018 with varices eradicated.  Hepatocellular carcinoma: CT abdomen 9/2020 with no HCC   Transplant: Listed.                          Blood type AB+     RECOMMENDATIONS:  -- Continue home Rifaximin 550 mg BID and Lactulose PO, titrate to 3-4 BMs per day  -- Follow up with surgery  -- Continue Enoxaparin  -- Ensure sodium restriction to 2000 mg per day  -- Continue diuretics  -- Adequate protein diet (1.2 - 1.5g/kg/day)                  -  Recommend multiple meals during the day, Snacks and shakes during the day/night                  - Can use Ensure/Boost drinks TID         RTC 6 months, sooner if symptomatic.      Thank you for this consultation. It was a pleasure to participate in the care of this patient; please contact us with any further questions.    The visit lasted up to 31 minutes, with more than half of the time spent on counseling and education. All questions were answered to patient's satisfaction    Patient seen and discussed with staff GI physician, Dr. Santos, who agrees with my assessment and plan.     Jennifer Gaming MD  Transplant Hepatology fellow  PGY 6  [phone]    Jennifer Gaming MD    Attestation:  This patient has been seen and evaluated by me, Viola Santos.  Discussed with the house staff team or resident(s) and agree with the findings and plan in this note.           Again, thank you for allowing me to participate in the care of your patient.        Sincerely,        Viola Santos MD

## 2020-12-02 NOTE — PROGRESS NOTES
"Obed Wiley is a 30 year old male who is being evaluated via a billable video visit.      The patient has been notified of following:     \"This video visit will be conducted via a call between you and your physician/provider. We have found that certain health care needs can be provided without the need for an in-person physical exam.  This service lets us provide the care you need with a video conversation.  If a prescription is necessary we can send it directly to your pharmacy.  If lab work is needed we can place an order for that and you can then stop by our lab to have the test done at a later time.    Video visits are billed at different rates depending on your insurance coverage.  Please reach out to your insurance provider with any questions.    If during the course of the call the physician/provider feels a video visit is not appropriate, you will not be charged for this service.\"    Patient has given verbal consent for Video visit? Yes  How would you like to obtain your AVS? MyChart  If you are dropped from the video visit, the video invite should be resent to: Send to e-mail at: [email or web address]  Will anyone else be joining your video visit? No      Video-Visit Details    Type of service:  Video Visit    Video Start Time: 0959  Video End Time: 1030    Originating Location (pt. 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  He has been doing well since cessation of alcohol use in June of 2018.  He has not had any paracentesis since 01/2020.  His ascites is well controlled with diuretics.  He has also not had any episodes of hepatic encephalopathy, or variceal hemorrhage.  His last upper endoscopy was in 2018, and he will be repeating this soon.  His major complaint is an umbilical hernia.  He has had this for up to 3 years.  He states that he has had episodes of incarceration last year, especially with lifting heavy weights.  His restriction currently limits him to 5 pounds, and he is quite worried about this.  He is concerned that he is unable to do much around the house especially now that his mother is in the hospital.  He has not had any recent episodes of incarceration.  He currently has no abdominal pain, nausea, vomiting, dyspnea, cough, chest pain, melena, hematochezia, hematemesis, jaundice, fatigue, itching, abdominal swelling, or leg swelling.    ROS: 10pt ROS performed and otherwise negative.    PERTINENT PAST MEDICAL HISTORY:  Past Medical History:   Diagnosis Date     Alcoholic cirrhosis of liver with ascites (H) 12/23/2019     Diabetes (H)     Type 2 DM, Uses Insulin      DVT (deep vein thrombosis) in pregnancy      H/O protein C deficiency      Hepatic encephalopathy (H)      Hepatitis     Hep A when an infant      History of blood transfusion     2019 at [facility]      Leukopenia 1/11/2020     SBP (spontaneous bacterial peritonitis) (H) 11/2019      PREVIOUS ABDOMINAL/GYNECOLOGIC SURGERIES:  Past Surgical History:   Procedure Laterality Date     ABDOMEN SURGERY      Gastric Bypass 2009. [facility]      CARDIAC SURGERY      IVC      COLONOSCOPY       ENT SURGERY      Tonsils and Adenoids Removed at 6-7  Years Old      GALLBLADDER SURGERY  2017     GI SURGERY      Upper GI       PREVIOUS ENDOSCOPY:  Last EGD 11/2018 noting eradicated esophageal varices  Total of 4 EGDs in 2018    PERTINENT MEDICATIONS:    Current Outpatient Medications:      BELBUCA 150 MCG FILM buccal film, Place 150 mcg inside cheek daily, Disp: , Rfl:      blood glucose (NO BRAND SPECIFIED) lancets standard, Use to test blood sugar 6 times daily or as directed., Disp: , Rfl:      cholecalciferol (VITAMIN D3) 77804 units (1250 mcg) capsule, Take 50,000 Units by mouth every 7 days Saturdays, Disp: , Rfl:      ciprofloxacin (CIPRO) 500 MG tablet, Take 500 mg by mouth daily, Disp: , Rfl:      cyclobenzaprine (FLEXERIL) 10 MG tablet, Take 1 tablet (10 mg) by mouth every 8 hours as needed for muscle spasms, Disp: 30 tablet, Rfl: 0     diclofenac (VOLTAREN) 1 % topical gel, Apply 2 g topically 4 times daily, Disp: 50 g, Rfl: 0     ferrous sulfate (FEROSUL) 325 (65 Fe) MG tablet, Take 325 mg by mouth daily (with breakfast), Disp: , Rfl:      folic acid (FOLVITE) 1 MG tablet, Take 1 mg by mouth 2 times daily, Disp: , Rfl:      furosemide (LASIX) 40 MG tablet, Take 40 mg by mouth 3 times daily, Disp: , Rfl:      gabapentin (NEURONTIN) 400 MG capsule, Take 400 mg by mouth 3 times daily, Disp: , Rfl:      insulin aspart (NOVOLOG FLEXPEN) 100 UNIT/ML pen, 1 unit for every 12 grams of carbohydrate with meals and snacks plus correction scale. Uses up to 40 units per day., Disp: 45 mL, Rfl: 0     insulin glargine (LANTUS PEN) 100 UNIT/ML pen, Inject 26 Units Subcutaneous every morning, Disp: 30 mL, Rfl: 0     lactulose (CHRONULAC) 10 GM/15ML solution, Take 45 mLs (30 g) by mouth 3 times daily 20mg / 30 ml tid daily/ prn, Disp: , Rfl:      lactulose (CHRONULAC) 10 GM/15ML solution, Take 15 mLs (10 g) by mouth every hour as needed for constipation, Disp: 946 mL, Rfl: 0     lidocaine (LIDODERM) 5 % patch, Place 1 patch onto the skin every 24 hours To prevent  lidocaine toxicity, patient should be patch free for 12 hrs daily., Disp: 30 patch, Rfl: 0     magnesium oxide 400 MG CAPS, Take 400 mg by mouth 3 times daily , Disp: , Rfl:      MULTIPLE VITAMIN-FOLIC ACID PO, Take 1 tablet by mouth 1 daily, Disp: , Rfl:      Nutritional Supplements (ENSURE PO), Ensure/ boost - 237 ml bid daily, Disp: , Rfl:      pantoprazole (PROTONIX) 40 MG EC tablet, Take 40 mg by mouth daily, Disp: , Rfl:      polyethylene glycol (MIRALAX/GLYCOLAX) packet, Take 1 packet by mouth daily , Disp: , Rfl:      promethazine (PHENERGAN) 12.5 MG tablet, Take 25 mg by mouth every 6 hours as needed for nausea, Disp: , Rfl:      QUEtiapine (SEROQUEL) 25 MG tablet, Take 50 mg by mouth At Bedtime, Disp: , Rfl:      QUEtiapine (SEROQUEL) 25 MG tablet, Take 25 mg by mouth daily as needed , Disp: , Rfl:      rifaximin (XIFAXAN) 550 MG TABS tablet, Take 550 mg by mouth 2 times daily , Disp: , Rfl:      senna (SENOKOT) 8.6 MG tablet, Take 1 tablet by mouth daily as needed for constipation, Disp: , Rfl:      spironolactone (ALDACTONE) 100 MG tablet, Take 100 mg by mouth 3 times daily, Disp: , Rfl:      tretinoin (RETIN-A) 0.025 % external cream, Apply topically At Bedtime, Disp: , Rfl:      Vitamin D, Cholecalciferol, 25 MCG (1000 UT) CAPS, Take 2,000 Units by mouth daily , Disp: , Rfl:      hydrOXYzine (ATARAX) 25 MG tablet, Take 1 tablet (25 mg) by mouth every 6 hours as needed for anxiety (Patient not taking: Reported on 12/2/2020), Disp: 30 tablet, Rfl: 0     Lidocaine (LIDOCARE) 4 % Patch, Place 1 patch onto the skin every 24 hours To prevent lidocaine toxicity, patient should be patch free for 12 hrs daily. (Patient not taking: Reported on 12/2/2020), Disp: 10 patch, Rfl: 0     oxyCODONE-acetaminophen (PERCOCET)  MG per tablet, Take 1 tablet by mouth every 4 hours as needed for moderate to severe pain (Patient not taking: Reported on 12/2/2020), Disp: 30 tablet, Rfl: 0     traZODone (DESYREL) 50 MG  tablet, Take 150 mg by mouth At Bedtime Prn , Disp: , Rfl:         PHYSICAL EXAMINATION:   General: Alert, not in respiratory or painful distress,   HEENT: anicteric sclera, PERRL, EOMI   Lungs: Unlabored breathing  Abd: Full, lax abdominal wall skin, no visible umbilical hernia on visual inspection and on coughing  Neuro: AOx3       PERTINENT STUDIES:  MELD-Na score: 8 at 10/27/2020 12:00 AM  MELD score: 8 at 10/27/2020 12:00 AM  Calculated from:  Serum Creatinine: 0.48 mg/dL (Rounded to 1 mg/dL) at 10/27/2020 12:00 AM  Serum Sodium: 135 mmol/L at 10/27/2020 12:00 AM  Total Bilirubin: 1.1 mg/dL at 10/27/2020 12:00 AM  INR(ratio): 1.1 at 10/27/2020 12:00 AM  Age: 30 years 8 months     CT abdomen 9/1/2020  Impression:   1. Decreased burden of nonocclusive thrombus primarily within the main  portal vein. There are components of nonocclusive thrombus in the  proximal left portal vein and SMV.  2. Steatosis and cirrhosis with sequelae of portal hypertension  including splenomegaly and portosystemic collaterals.  3. Stable findings of chronic pancreatitis.    ASSESSMENT/PLAN:  30 year old male with a medical history as documented below, but pertinent for Protein C deficiency with multiple clotting episodes (ischemic bowel in 2013, mesenteric vein thrombosis in 8/2014, DVT in 11/2015 and 7/2018, bilateral PE in 8/2016 s/p IVC filter placement; relapsing acute pancreatitis and resultant chronic pancreatitis, RNYGB in 2009 for weight loss, and decompensated alcoholic cirrhosis manifested by hepatic encephalopathy and ascites with SBP (now on indefinite antibiotic prophylaxis); who is now listed for liver transplant; seen for follow up.    #. Umbilical hernia  Not apparent on physical examination, even with increased intra-abdominal pressure, however he states that this is a major source of distress for him. Unclear how urgent this is. We did discuss the risk of liver decompensation after non-transplant surgery, fortunately,  he is on the transplant list. His risk is also compounded by the presence of coagulopathy, his history of bleeding and clotting, as well as chronic anticoagulation use. We recommend an in-person visit with the surgeon first before moving forward. From a liver standpoint, we recommend herniorrhaphy, only if urgent and necessary. He will also need clearance from his hematologist as his AC will need to be stopped for a prolonged period    #. Coagulopathy (Heterozygous Protein C deficiency)  Extensive history of clotting and bleeding with ischemic bowel in 2013 s/p Coumadin and then Xarelto complicated by GI bleed, mesenteric vein thrombosis in 8/2014, DVT in 11/2015 and 7/2018, bilateral PE in 8/2016 s/p IVC filter placement and history of hemoperitoneum. Followed by hematology. Last seen 9/1/2020  Remains on Enoxaparin    #. Alcohol related Cirrhosis:  Initially decompensated disease, based on the presence of ascites with SBP hx, HE. Now stable especially with extended abstinence  Etiology: Alcohol  MELD-Na of  8  Hepatic encephalopathy: No episodes in the last 12 months   Ascites: Well controlled on diuretics. Last paracentesis was 1/2020  SBP prophylaxis: On Ciprofloxacin  TIPS: None  Esophageal/Gastric varices: Last EGD was done 11/2018 with varices eradicated.  Hepatocellular carcinoma: CT abdomen 9/2020 with no HCC   Transplant: Listed.                          Blood type AB+     RECOMMENDATIONS:  -- Continue home Rifaximin 550 mg BID and Lactulose PO, titrate to 3-4 BMs per day  -- Follow up with surgery  -- Continue Enoxaparin  -- Ensure sodium restriction to 2000 mg per day  -- Continue diuretics  -- Adequate protein diet (1.2 - 1.5g/kg/day)                  - Recommend multiple meals during the day, Snacks and shakes during the day/night                  - Can use Ensure/Boost drinks TID         RTC 6 months, sooner if symptomatic.      Thank you for this consultation. It was a pleasure to participate in the  care of this patient; please contact us with any further questions.    The visit lasted up to 31 minutes, with more than half of the time spent on counseling and education. All questions were answered to patient's satisfaction    Patient seen and discussed with staff GI physician, Dr. Santos, who agrees with my assessment and plan.     Jennifer Gaming MD  Transplant Hepatology fellow  PGY 6  [phone]    Jennifer Gaming MD    Attestation:  This patient has been seen and evaluated by me, Viola Santos.  Discussed with the house staff team or resident(s) and agree with the findings and plan in this note.

## 2020-12-03 ENCOUNTER — TELEPHONE (OUTPATIENT)
Dept: HEMATOLOGY | Facility: CLINIC | Age: 30
End: 2020-12-03

## 2020-12-29 ENCOUNTER — TELEPHONE (OUTPATIENT)
Dept: TRANSPLANT | Facility: CLINIC | Age: 30
End: 2020-12-29

## 2020-12-29 DIAGNOSIS — K76.6 PORTAL HYPERTENSION (H): ICD-10-CM

## 2020-12-29 DIAGNOSIS — K70.31 ALCOHOLIC CIRRHOSIS OF LIVER WITH ASCITES (H): Primary | ICD-10-CM

## 2020-12-29 DIAGNOSIS — M54.9 BACK PAIN: ICD-10-CM

## 2020-12-29 NOTE — Clinical Note
Good afternoon, patients needs pain mgmt consult for back pain needing cortisone shot. Do we schedule that or pain clinic???  We do need to schedule Dr. Santos end of May or early June please. Thanks, tk

## 2020-12-29 NOTE — Clinical Note
Please avinash with txp sx (lacy andrade, or javan) for hernia fix consult, along with DSE and labs, in the AM of 2/1. Needs time to shuttle to hospital, back to [facility], and then shuttle to [facility] for 1pm with dentist.  Thank you, tk

## 2020-12-29 NOTE — TELEPHONE ENCOUNTER
Patient Call: Obed was wondering about the second opinion regarding having Umbilical hernia repair per Obed;  he spoke with Dr. Santos  In a  telephone visit in Oct., 2020    (Obed has a dental appt 01/12/2021 here at the [facility])  Call back needed? Yes    Return Call Needed  Same as documented in contacts section  When to return call?: Same day: [facility]

## 2020-12-31 ENCOUNTER — TELEPHONE (OUTPATIENT)
Dept: TRANSPLANT | Facility: CLINIC | Age: 30
End: 2020-12-31

## 2020-12-31 NOTE — TELEPHONE ENCOUNTER
"Per Dr. Santos: \"recommend an in-person visit with the surgeon first before moving forward. From a liver standpoint, we recommend herniorrhaphy, only if urgent and necessary.\"  Did consult with Dr. Lara.  Plan: consult with heme due to complex history of pulmonary embolism, VTE and Heterozygous protein C deficiency. He has reminded of appt on 1/13/20. Message sent if possible to see in clinic instead of virtual since he is in town.           "

## 2021-01-04 ENCOUNTER — HEALTH MAINTENANCE LETTER (OUTPATIENT)
Age: 31
End: 2021-01-04

## 2021-01-11 ENCOUNTER — ORGAN (OUTPATIENT)
Dept: TRANSPLANT | Facility: CLINIC | Age: 31
End: 2021-01-11

## 2021-01-11 NOTE — TELEPHONE ENCOUNTER
Organ Offer Encounter Information    Organ Offer Information  Organ offer date & time: 1/11/2021  9:55 AM  Coordinator/Fellow/Attending name: Sabina Jonas RN   Organ(s):  Organ UNOS ID Match Run ID Comment Organ Laterality   Liver [id number]  [id number] PATF       Recent infections?: No    New medications?: No Recent pregnancy?: No   Angicoagulation medications?: No Recent vaccinations?: No   Recent blood transfusions?: No Recent hospitalizations?: No   Has your insurance changed in the last 6-12 months?: Pos (Comment: Has Medicare now (as of December))    Discussed organ offer with: Patient  Patient/Caregiver name: Obed  Discussed risk category with Patient/Other: N/A  Understood donor criteria, verbalized understanding  Patient/Other asked to speak to a surgeon?: No  Organ offer decision status Patient/Other: Declined Offer  Additional Comments: 1/11/2021 9:57 AM  Liver: Import, DBD  MD: Maximo  OPO Contact: Valerie  Donor/Recip HCV Status: Donor HCV and HBcAb+, Recipient HCV negative  Donor Nutritional Status:   Plan (NPO, Donor OR): Called patient regarding possible liver offer.  Health assessment as above.  Dr. Marsh wants to discuss offer with patient & will call via video call.  - - -   COVID Screening  In the past month, have you had:  Any close contact with a suspect or laboratory-confirmed COVID-19 patient: No  Travel anywhere: No  Fever: No  Cough: No  Short of Breath: No  Loss of Taste/Smell: No  Rash: No  Dariana Jonas RN   Transplant Coordinator    January 11, 2021 1:43 PM  Per Dr. Marsh, patient declined liver offer.  Updated Dorothy at donor OPO.  Dariana Jonas RN   Transplant Coordinator        Attestation I have discussed all of the above with the Patient/Legal Guardian/Caregiver regarding this organ offer.: Yes  Coordinator/Fellow/Attending name: Sabina Jonas RN

## 2021-01-15 ENCOUNTER — HEALTH MAINTENANCE LETTER (OUTPATIENT)
Age: 31
End: 2021-01-15

## 2021-01-18 ENCOUNTER — TRANSFERRED RECORDS (OUTPATIENT)
Dept: HEALTH INFORMATION MANAGEMENT | Facility: CLINIC | Age: 31
End: 2021-01-18

## 2021-01-18 ENCOUNTER — VIRTUAL VISIT (OUTPATIENT)
Dept: HEMATOLOGY | Facility: CLINIC | Age: 31
End: 2021-01-18
Attending: INTERNAL MEDICINE
Payer: MEDICARE

## 2021-01-18 VITALS — WEIGHT: 179 LBS | BODY MASS INDEX: 22.98 KG/M2

## 2021-01-18 DIAGNOSIS — Z86.711 HISTORY OF PULMONARY EMBOLISM: ICD-10-CM

## 2021-01-18 DIAGNOSIS — K70.31 ALCOHOLIC CIRRHOSIS OF LIVER WITH ASCITES (H): ICD-10-CM

## 2021-01-18 DIAGNOSIS — D69.6 THROMBOCYTOPENIA (H): ICD-10-CM

## 2021-01-18 DIAGNOSIS — Z79.01 CHRONIC ANTICOAGULATION: ICD-10-CM

## 2021-01-18 DIAGNOSIS — K72.10 END-STAGE LIVER DISEASE (H): ICD-10-CM

## 2021-01-18 DIAGNOSIS — I74.9 THROMBOEMBOLIC DISORDER (H): Primary | ICD-10-CM

## 2021-01-18 DIAGNOSIS — D68.59: ICD-10-CM

## 2021-01-18 DIAGNOSIS — Z95.828 PRESENCE OF IVC FILTER: ICD-10-CM

## 2021-01-18 DIAGNOSIS — K55.059 ACUTE MESENTERIC ISCHEMIA (H): ICD-10-CM

## 2021-01-18 DIAGNOSIS — I81 PVT (PORTAL VEIN THROMBOSIS): ICD-10-CM

## 2021-01-18 PROCEDURE — 99215 OFFICE O/P EST HI 40 MIN: CPT | Mod: 95 | Performed by: PHYSICIAN ASSISTANT

## 2021-01-18 PROCEDURE — 999N001193 HC VIDEO/TELEPHONE VISIT; NO CHARGE

## 2021-01-18 NOTE — PROGRESS NOTES
Patient was contacted to complete the pre-visit call prior to their video visit with the provider.  The following statement was read:       This visit will be billed to your insurance the same as an in-person visit. Because of Coronavirus we are instituting video visits when possible to keep everyone safe. This video visit will be conducted between you and the provider.  This service lets us provide the care you need with a video conversation.  If a prescription is necessary, we can send it directly to your pharmacy.If lab work or other testing is needed, we can help arrange a place/time for that to be done at a later date.If during the course of the call the provider feels a video visit is not appropriate, then your insurance company will not be billed.       Allergies and medications were reviewed and travel screening complete.     I thanked them for their time to cover this information     Morales Sellers CMA
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
## 2021-01-18 NOTE — TELEPHONE ENCOUNTER
Spoke with patient. He is going to let me know when he is coming to  next for dental or other appts, so I can set him up with roscoe albert to discuss possible hernia at that time.
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
## 2021-01-21 NOTE — TELEPHONE ENCOUNTER
Patient coming to see [facility] dentists on 2/1/21 at 1pm on [facility].    Will request he get avinash'd with txp sx for hernia fix consult, along with DSE and labs, in the AM of 2/1.    Patient is aware of above .

## 2021-01-27 ENCOUNTER — PATIENT OUTREACH (OUTPATIENT)
Dept: SURGERY | Facility: CLINIC | Age: 31
End: 2021-01-27

## 2021-01-27 NOTE — PROGRESS NOTES
"Complex Hernia Monthly Conference Note   Date: January 27, 2021    Attendees: Dr. Ribeiro, Dr. Mills, Dr. Pradhan, Dr. Lara, Dr. Samayoa  and Carolee Howard RN    Type of hernia: umbilical    Estimated body mass index is 22.98 kg/m  as calculated from the following:    Height as of 11/25/20: 1.88 m (6' 2\").    Weight as of 1/18/21: 81.2 kg (179 lb).    Wt Readings from Last 4 Encounters:   01/18/21 81.2 kg (179 lb)   11/25/20 81.2 kg (179 lb)   09/02/20 84.6 kg (186 lb 6.4 oz)   09/01/20 84.8 kg (187 lb)       History   Smoking Status     Former Smoker   Smokeless Tobacco     Never Used     Comment: Quit 12/2019       Weight at time of initial consult: 179 pounds    HgbA1C:   Lab Results   Component Value Date    A1C 5.9 07/17/2020    A1C 6.4 06/30/2020    A1C 8.0 12/21/2019       Lab Results   Component Value Date    ALBUMIN 3.7 10/27/2020    ALBUMIN 3.7 10/27/2020       Is this a re-occurrence of hernia: No    Is mesh in place: Not applicable  Has there been multiple abdominal surgeries/previous repair: YES    Fistulas: No  Is hernia is greater than 5 cm: No  Multiple hernias present: No  Is patient immunosuppressed: No  PAST MEDICAL HISTORY:   Past Medical History:   Diagnosis Date     Alcoholic cirrhosis of liver with ascites (H) 12/23/2019     Diabetes (H)     Type 2 DM, Uses Insulin      DVT (deep vein thrombosis) in pregnancy      H/O protein C deficiency      Hepatic encephalopathy (H)      Hepatitis     Hep A when an infant      History of blood transfusion     2019 at [facility]      Leukopenia 1/11/2020     SBP (spontaneous bacterial peritonitis) (H) 11/2019       PAST SURGICAL HISTORY:   Past Surgical History:   Procedure Laterality Date     ABDOMEN SURGERY      Gastric Bypass 2009. [facility]      CARDIAC SURGERY      IVC      COLONOSCOPY       ENT SURGERY      Tonsils and Adenoids Removed at 6-7 Years Old      GALLBLADDER SURGERY  2017     GI SURGERY      Upper GI        Patient Plan:    CT reviewed: " "YES    Case reviewed by team and umbilical hernia repair is not recommended.  Benefits outweigh the risks of surgery- Dr. Santos's note was also reviewed.  Repair would only be considered if it was an emergency.    Spoke with the patient and shared the teams recommendation.  He states that he \"will find someone to fix the hernia...and has a 2nd opinion\" scheduled with Dr. Marsh on 2/1.            "

## 2021-02-01 ENCOUNTER — HOSPITAL ENCOUNTER (OUTPATIENT)
Dept: CARDIOLOGY | Facility: CLINIC | Age: 31
End: 2021-02-01
Attending: INTERNAL MEDICINE
Payer: MEDICARE

## 2021-02-01 ENCOUNTER — ALLIED HEALTH/NURSE VISIT (OUTPATIENT)
Dept: TRANSPLANT | Facility: CLINIC | Age: 31
End: 2021-02-01
Attending: TRANSPLANT SURGERY
Payer: MEDICARE

## 2021-02-01 VITALS
BODY MASS INDEX: 23.69 KG/M2 | OXYGEN SATURATION: 98 % | SYSTOLIC BLOOD PRESSURE: 120 MMHG | WEIGHT: 184.5 LBS | HEART RATE: 94 BPM | DIASTOLIC BLOOD PRESSURE: 74 MMHG

## 2021-02-01 DIAGNOSIS — K70.31 ALCOHOLIC CIRRHOSIS OF LIVER WITH ASCITES (H): ICD-10-CM

## 2021-02-01 DIAGNOSIS — K76.6 PORTAL HYPERTENSION (H): ICD-10-CM

## 2021-02-01 LAB
ALBUMIN SERPL-MCNC: 3.3 G/DL (ref 3.4–5)
BILIRUB SERPL-MCNC: 1 MG/DL (ref 0.2–1.3)
CREAT SERPL-MCNC: 0.52 MG/DL (ref 0.66–1.25)
GFR SERPL CREATININE-BSD FRML MDRD: >90 ML/MIN/{1.73_M2}
INR PPP: 1.3 (ref 0.86–1.14)
SODIUM SERPL-SCNC: 136 MMOL/L (ref 133–144)

## 2021-02-01 PROCEDURE — 82040 ASSAY OF SERUM ALBUMIN: CPT | Performed by: PATHOLOGY

## 2021-02-01 PROCEDURE — 250N000009 HC RX 250: Performed by: STUDENT IN AN ORGANIZED HEALTH CARE EDUCATION/TRAINING PROGRAM

## 2021-02-01 PROCEDURE — 36415 COLL VENOUS BLD VENIPUNCTURE: CPT | Performed by: PATHOLOGY

## 2021-02-01 PROCEDURE — 82247 BILIRUBIN TOTAL: CPT | Performed by: PATHOLOGY

## 2021-02-01 PROCEDURE — 93325 DOPPLER ECHO COLOR FLOW MAPG: CPT | Mod: TC

## 2021-02-01 PROCEDURE — 93018 CV STRESS TEST I&R ONLY: CPT | Performed by: STUDENT IN AN ORGANIZED HEALTH CARE EDUCATION/TRAINING PROGRAM

## 2021-02-01 PROCEDURE — 85610 PROTHROMBIN TIME: CPT | Performed by: PATHOLOGY

## 2021-02-01 PROCEDURE — 255N000002 HC RX 255 OP 636: Performed by: STUDENT IN AN ORGANIZED HEALTH CARE EDUCATION/TRAINING PROGRAM

## 2021-02-01 PROCEDURE — 93350 STRESS TTE ONLY: CPT | Mod: 26 | Performed by: STUDENT IN AN ORGANIZED HEALTH CARE EDUCATION/TRAINING PROGRAM

## 2021-02-01 PROCEDURE — 93321 DOPPLER ECHO F-UP/LMTD STD: CPT | Mod: 26 | Performed by: STUDENT IN AN ORGANIZED HEALTH CARE EDUCATION/TRAINING PROGRAM

## 2021-02-01 PROCEDURE — 93325 DOPPLER ECHO COLOR FLOW MAPG: CPT | Mod: 26 | Performed by: STUDENT IN AN ORGANIZED HEALTH CARE EDUCATION/TRAINING PROGRAM

## 2021-02-01 PROCEDURE — 250N000011 HC RX IP 250 OP 636: Performed by: STUDENT IN AN ORGANIZED HEALTH CARE EDUCATION/TRAINING PROGRAM

## 2021-02-01 PROCEDURE — 80321 ALCOHOLS BIOMARKERS 1OR 2: CPT | Mod: 90 | Performed by: PATHOLOGY

## 2021-02-01 PROCEDURE — 93016 CV STRESS TEST SUPVJ ONLY: CPT | Performed by: STUDENT IN AN ORGANIZED HEALTH CARE EDUCATION/TRAINING PROGRAM

## 2021-02-01 PROCEDURE — 84295 ASSAY OF SERUM SODIUM: CPT | Performed by: PATHOLOGY

## 2021-02-01 PROCEDURE — 82565 ASSAY OF CREATININE: CPT | Performed by: PATHOLOGY

## 2021-02-01 PROCEDURE — 99212 OFFICE O/P EST SF 10 MIN: CPT | Performed by: TRANSPLANT SURGERY

## 2021-02-01 RX ORDER — DOBUTAMINE HYDROCHLORIDE 200 MG/100ML
10-50 INJECTION INTRAVENOUS CONTINUOUS
Status: ACTIVE | OUTPATIENT
Start: 2021-02-01 | End: 2021-02-01

## 2021-02-01 RX ORDER — HYDROCODONE BITARTRATE AND ACETAMINOPHEN 5; 325 MG/1; MG/1
1 TABLET ORAL
COMMUNITY
Start: 2019-12-22 | End: 2021-05-14

## 2021-02-01 RX ORDER — ATROPINE SULFATE 0.4 MG/ML
.2-2 AMPUL (ML) INJECTION
Status: COMPLETED | OUTPATIENT
Start: 2021-02-01 | End: 2021-02-01

## 2021-02-01 RX ORDER — METOPROLOL TARTRATE 1 MG/ML
1-20 INJECTION, SOLUTION INTRAVENOUS
Status: ACTIVE | OUTPATIENT
Start: 2021-02-01 | End: 2021-02-01

## 2021-02-01 RX ADMIN — HUMAN ALBUMIN MICROSPHERES AND PERFLUTREN 9 ML: 10; .22 INJECTION, SOLUTION INTRAVENOUS at 11:11

## 2021-02-01 RX ADMIN — DOBUTAMINE HYDROCHLORIDE 10 MCG/KG/MIN: 200 INJECTION INTRAVENOUS at 10:57

## 2021-02-01 RX ADMIN — METOPROLOL TARTRATE 4 MG: 5 INJECTION INTRAVENOUS at 11:12

## 2021-02-01 RX ADMIN — ATROPINE SULFATE 0.6 MG: 0.4 INJECTION, SOLUTION INTRAMUSCULAR; INTRAVENOUS; SUBCUTANEOUS at 11:07

## 2021-02-01 NOTE — PROGRESS NOTES
Pt here for dobutamine stress test.  Test, meds and side effects reviewed with patient.  Achieved target HR at 40 mcg Dobutamine and a total of 0.6 mg IV atropine.  Gave a total of 4 mg IV Metoprolol to bring HR back to baseline.  Post monitoring complete and VSS.  Pt escorted out to the gold waiting room.

## 2021-02-01 NOTE — PROGRESS NOTES
Please see my previous clinical notes.  Patient has Laënnec cirrhosis.  He also has a previous gastric bypass.  Since I last saw him, he has significantly improved.  The last time he received paracentesis has been about a year ago.  No variceal bleeding or encephalopathy.  No easy fatigability as well.  From the liver disease standpoint, he has somewhat improved.  His meld score is 9.    Now his main complaint is a painful umbilical hernia.  He has had history of a reducibility.  He lives in [place] almost 2 hours away from here.    Past Medical History:   Diagnosis Date     Alcoholic cirrhosis of liver with ascites (H) 12/23/2019     Diabetes (H)     Type 2 DM, Uses Insulin      DVT (deep vein thrombosis) in pregnancy      H/O protein C deficiency      Hepatic encephalopathy (H)      Hepatitis     Hep A when an infant      History of blood transfusion     2019 at [facility]      Leukopenia 1/11/2020     SBP (spontaneous bacterial peritonitis) (H) 11/2019     Past Surgical History:   Procedure Laterality Date     ABDOMEN SURGERY      Gastric Bypass 2009. [facility]      CARDIAC SURGERY      IVC      COLONOSCOPY       ENT SURGERY      Tonsils and Adenoids Removed at 6-7 Years Old      GALLBLADDER SURGERY  2017     GI SURGERY      Upper GI      Past Surgical History:   Procedure Laterality Date     ABDOMEN SURGERY      Gastric Bypass 2009. [facility]      CARDIAC SURGERY      IVC      COLONOSCOPY       ENT SURGERY      Tonsils and Adenoids Removed at 6-7 Years Old      GALLBLADDER SURGERY  2017     GI SURGERY      Upper GI        Patient Active Problem List   Diagnosis     Alcoholic cirrhosis of liver with ascites (H)     Diabetes (H)     Acute mesenteric ischemia (H)     Constipation     Leukopenia     PVT (portal vein thrombosis)     Portal vein thrombosis     Other acute kidney failure (H)       Current Outpatient Medications   Medication Sig Dispense Refill     BELBUCA 150 MCG FILM buccal film Place 150 mcg  inside cheek daily       blood glucose (NO BRAND SPECIFIED) lancets standard Use to test blood sugar 6 times daily or as directed.       cholecalciferol (VITAMIN D3) 54979 units (1250 mcg) capsule Take 50,000 Units by mouth every 7 days Saturdays       ciprofloxacin (CIPRO) 500 MG tablet Take 500 mg by mouth daily       cyclobenzaprine (FLEXERIL) 10 MG tablet Take 1 tablet (10 mg) by mouth every 8 hours as needed for muscle spasms 30 tablet 0     diclofenac (VOLTAREN) 1 % topical gel Apply 2 g topically 4 times daily 50 g 0     ferrous sulfate (FEROSUL) 325 (65 Fe) MG tablet Take 325 mg by mouth daily (with breakfast)       folic acid (FOLVITE) 1 MG tablet Take 1 mg by mouth 2 times daily       furosemide (LASIX) 40 MG tablet Take 40 mg by mouth 3 times daily       gabapentin (NEURONTIN) 400 MG capsule Take 400 mg by mouth 3 times daily       HYDROcodone-acetaminophen (NORCO) 5-325 MG tablet Take 1 tablet by mouth       insulin aspart (NOVOLOG FLEXPEN) 100 UNIT/ML pen 1 unit for every 12 grams of carbohydrate with meals and snacks plus correction scale. Uses up to 40 units per day. 45 mL 0     insulin glargine (LANTUS PEN) 100 UNIT/ML pen Inject 26 Units Subcutaneous every morning 30 mL 0     lactulose (CHRONULAC) 10 GM/15ML solution Take 45 mLs (30 g) by mouth 3 times daily 20mg / 30 ml tid daily/ prn       lidocaine (LIDODERM) 5 % patch Place 1 patch onto the skin every 24 hours To prevent lidocaine toxicity, patient should be patch free for 12 hrs daily. 30 patch 0     magnesium oxide 400 MG CAPS Take 400 mg by mouth 3 times daily        MULTIPLE VITAMIN-FOLIC ACID PO Take 1 tablet by mouth 1 daily       Nutritional Supplements (ENSURE PO) Ensure/ boost - 237 ml bid daily       pantoprazole (PROTONIX) 40 MG EC tablet Take 40 mg by mouth daily       polyethylene glycol (MIRALAX/GLYCOLAX) packet Take 1 packet by mouth daily        promethazine (PHENERGAN) 12.5 MG tablet Take 25 mg by mouth every 6 hours as needed  for nausea       QUEtiapine (SEROQUEL) 25 MG tablet Take 50 mg by mouth At Bedtime       QUEtiapine (SEROQUEL) 25 MG tablet Take 25 mg by mouth daily as needed        rifaximin (XIFAXAN) 550 MG TABS tablet Take 550 mg by mouth 2 times daily        senna (SENOKOT) 8.6 MG tablet Take 1 tablet by mouth daily as needed for constipation       spironolactone (ALDACTONE) 100 MG tablet Take 100 mg by mouth 3 times daily       tretinoin (RETIN-A) 0.025 % external cream Apply topically At Bedtime       Vitamin D, Cholecalciferol, 25 MCG (1000 UT) CAPS Take 2,000 Units by mouth daily        hydrOXYzine (ATARAX) 25 MG tablet Take 1 tablet (25 mg) by mouth every 6 hours as needed for anxiety (Patient not taking: Reported on 2/1/2021) 30 tablet 0     lactulose (CHRONULAC) 10 GM/15ML solution Take 15 mLs (10 g) by mouth every hour as needed for constipation (Patient not taking: Reported on 2/1/2021) 946 mL 0     Lidocaine (LIDOCARE) 4 % Patch Place 1 patch onto the skin every 24 hours To prevent lidocaine toxicity, patient should be patch free for 12 hrs daily. (Patient not taking: Reported on 12/2/2020) 10 patch 0     oxyCODONE-acetaminophen (PERCOCET)  MG per tablet Take 1 tablet by mouth every 4 hours as needed for moderate to severe pain (Patient not taking: Reported on 12/2/2020) 30 tablet 0     traZODone (DESYREL) 50 MG tablet Take 150 mg by mouth At Bedtime Prn             Allergies   Allergen Reactions     Bee Anaphylaxis     Adhesive Tape Rash     Sulfa Drugs Itching     Morphine        Vitals:    02/01/21 0936   BP: 120/74   Pulse: 94   SpO2: 98%   Weight: 83.7 kg (184 lb 8 oz)     Examination of the abdomen: The abdomen is soft.  He has got a small 2 x 2 centimeter umbilical hernia.    Clinical impression:  Umbilical hernia  Recommendations: Since he is symptomatic and has got history of incarceration, would recommend mesh hernia repair.  I explained to him the risk benefits alternatives of mesh hernia repair he  understands and wishes to proceed with hernia repair.    Meanwhile he is on the liver transplant wait list with a meld score of 9.  Blood type AB.

## 2021-02-03 ENCOUNTER — TELEPHONE (OUTPATIENT)
Dept: TRANSPLANT | Facility: CLINIC | Age: 31
End: 2021-02-03

## 2021-02-03 NOTE — TELEPHONE ENCOUNTER
Transplant Social Work Services Phone Call      Data: I received a phone call from patient requesting a health care directive.  Intervention/education: Health Care Directive mailed to patient with instructions to send a copy to me when the directive is completed.  Assessment: Patient's need has been addressed.  Plan: I will remain available for the psychosocial needs of this patient.        BRANDY Alvarado, [facility]  Liver Transplant   Phone [phone]  Pager [phone]

## 2021-02-04 LAB — PETH BLD-MCNC: NEGATIVE NG/ML

## 2021-02-08 PROBLEM — K08.89 NONRESTORABLE TOOTH: Status: ACTIVE | Noted: 2021-02-08

## 2021-02-09 DIAGNOSIS — Z11.59 ENCOUNTER FOR SCREENING FOR OTHER VIRAL DISEASES: Primary | ICD-10-CM

## 2021-02-19 RX ORDER — CLONAZEPAM 0.5 MG/1
0.5 TABLET, ORALLY DISINTEGRATING ORAL
Status: ON HOLD | COMMUNITY
End: 2021-02-24

## 2021-02-21 NOTE — PRE-PROCEDURE
Oral and Maxillofacial Surgery  Pre-Op Note    Surgery Date: 2/24/2021    Pre-op Dx: caries, periodontitis, optimization for liver transplant    Planned Procedure: full mouth extraction, teeth #1-13, 15-32    Planned Anesthesia: GA w/ nasal or oral ETT, to be determined day of procedure    Allergies: bee, adhesive tape, sulfa drugs, morphine    Pre-Operative Medications: Per anesthesia    Resident Surgeon:   Jr Escobar DDS, Yehuda Mora DDS    Staff Surgeon:   Addy Washburn DDS, MD, FACS    Risks and Complications discussed with patient/guardian/parents to include, but not limited to bleeding,  infection, swelling, pain, temporary/permanent hypoesthesia/paresthesia CN V3 mental  nerve/CN V2 maxillary nerve distribution as well as CN VII/facial nerve distribution, failure of treatment, need for additional  treatment/procedures. Consent will be written. All questions were answered.    Anitra Fisher DDS  OMFS resident

## 2021-02-21 NOTE — PROGRESS NOTES
"ORAL & MAXILLOFACIAL SURGERY CONSULTATION NOTE    CC: \"Before my liver transplant I need extractions\"    HISTORY OF PRESENT ILLNESS:  31 y/o M presents for evaluation of dentition in preparation for a liver transplant. Patient presented to an outside general dentist who then referred him to our clinic. Patient reports a history of intermittent moderate pain assaocited with all teeth, also bleeding gums after brushing. Denies any other constitutional symptoms.    Patient aware that he will need all teeth (#1-13, 15-32) extracted, however he does not have a referral from a general dentist or has established care with a general dentist to fabricate CD/CD    Patient also aware that he will need all teeth extracted before liver transplant. Patient has a liver transplant coordinator:    Hudson Gaitan, Transplant Coordinator, [phone]    PMH:  alcoholic cirrhosis, acute kidney failure, diabetes, protien C deficiency, gastric bypass, GERD, depression, anxiety, abnormal bleeding    Labs from November 2020  Platelets 53  Blood sugar 354  INR 1.1    PAST SURGICAL HISTORY:  Gastric bypass, tonsil removed    MEDICATIONS:  See Epic    ALLERGIES:  sulfa, adhesive    SOCIAL HISTORY:  Denies significant social history    REVIEW OF SYSTEMS  ROS reviewed and negative aside from listed in HPI    PHYSICAL EXAM:  GEN: WD/WN, NAD  HEENT: NC/AT, EOMI, PERRL, no facial edema, induration or erythema, no abnormal skin lesions, inferior border of mandible is palpable bilaterally, neck soft with no palpable lymph nodes, FELIBERTO 55mm, OP clear, uvula midline, poor oral hygiene, severe decay, fracture and periodontitis throughout adult dentition, no vestibular edema, FOM ND/NT, no erythema/ulcerations/pigmentations/exophytic lesions  Neuro: AAOx4, CN V and CN VII intact    Mallampati II and normal neck mobility observed.    IMAGING:  OPG - imaging independently reviewed  Severe caries, fractured dentition and periodontits throughout adult dentition, " condyles seated in the glenoid fossae bilaterally, maxillary sinuses clear bilaterally, inferior border of mandible with no steps or defects, no bony pathology of the maxilla/mandible noted, partial bony impacted teeth #1 and 16    ASSESSMENT:  31 y/o M presents with severe caries, periodontitis, disturbance in tooth eruption and fractured teeth/root tips. Patient is preparing for a liver transplant, FMTE in preparation for optimization for liver transplant    PLAN:  FMTE, teeth #1-13, 15-31, in OR    Risks and benefits explained to pt including, but not limited to;  Pain, bleeding, swelling, infections, remaining tooth roots, oroantral communication, nerve injury (permanent vs temporary).  Questions were invited and answered.  Anesthesia modalities covered, patient elects to have procedure done under GA    NV:  - H&P with PCP  - Consult appt with [facility] to establish care for fabrication of CD/CD  - Obtain new labs within 1 week of OR date  - FM[facility] in [facility] at [facility]    Anitra Fisher DDS  OMFS, PGY-1    Findings, assessment, and plan discussed with Dr. Moody                See student/resident's note above for details. As the approving(supervising) , I attest that I've seen and evaluated the patient, was present for the critical or key portions of the treatment and agree with the student/resident's treatments, findings and plans as written.     Electronically signed by: Primitivo Moody DDS 02/16/2021 @ 03:35:50 PM

## 2021-02-24 ENCOUNTER — ANESTHESIA EVENT (OUTPATIENT)
Dept: SURGERY | Facility: CLINIC | Age: 31
End: 2021-02-24
Payer: MEDICARE

## 2021-02-24 ENCOUNTER — HOSPITAL ENCOUNTER (OUTPATIENT)
Facility: CLINIC | Age: 31
Setting detail: OBSERVATION
Discharge: HOME | End: 2021-02-25
Attending: DENTIST | Admitting: DENTIST
Payer: MEDICARE

## 2021-02-24 ENCOUNTER — ANESTHESIA (OUTPATIENT)
Dept: SURGERY | Facility: CLINIC | Age: 31
End: 2021-02-24
Payer: MEDICARE

## 2021-02-24 DIAGNOSIS — K08.89 NONRESTORABLE TOOTH: ICD-10-CM

## 2021-02-24 DIAGNOSIS — K08.199 LOSS OF TEETH DUE TO EXTRACTION: Primary | ICD-10-CM

## 2021-02-24 LAB
ANION GAP SERPL CALCULATED.3IONS-SCNC: 5 MMOL/L (ref 3–14)
APTT PPP: 31 SEC (ref 22–37)
BLD PROD TYP BPU: NORMAL
BLD UNIT ID BPU: 0
BLOOD PRODUCT CODE: NORMAL
BPU ID: NORMAL
BUN SERPL-MCNC: 17 MG/DL (ref 7–30)
CALCIUM SERPL-MCNC: 8.7 MG/DL (ref 8.5–10.1)
CHLORIDE SERPL-SCNC: 105 MMOL/L (ref 94–109)
CO2 SERPL-SCNC: 24 MMOL/L (ref 20–32)
CREAT SERPL-MCNC: 0.59 MG/DL (ref 0.66–1.25)
ERYTHROCYTE [DISTWIDTH] IN BLOOD BY AUTOMATED COUNT: 13.7 % (ref 10–15)
GFR SERPL CREATININE-BSD FRML MDRD: >90 ML/MIN/{1.73_M2}
GLUCOSE BLDC GLUCOMTR-MCNC: 199 MG/DL (ref 70–99)
GLUCOSE BLDC GLUCOMTR-MCNC: 213 MG/DL (ref 70–99)
GLUCOSE BLDC GLUCOMTR-MCNC: 245 MG/DL (ref 70–99)
GLUCOSE BLDC GLUCOMTR-MCNC: 251 MG/DL (ref 70–99)
GLUCOSE BLDC GLUCOMTR-MCNC: 273 MG/DL (ref 70–99)
GLUCOSE SERPL-MCNC: 288 MG/DL (ref 70–99)
HCT VFR BLD AUTO: 39.2 % (ref 40–53)
HGB BLD-MCNC: 13 G/DL (ref 13.3–17.7)
LABORATORY COMMENT REPORT: NORMAL
MCH RBC QN AUTO: 30 PG (ref 26.5–33)
MCHC RBC AUTO-ENTMCNC: 33.2 G/DL (ref 31.5–36.5)
MCV RBC AUTO: 91 FL (ref 78–100)
PLATELET # BLD AUTO: 46 10E9/L (ref 150–450)
PLATELET # BLD AUTO: 58 10E9/L (ref 150–450)
POTASSIUM SERPL-SCNC: 3.9 MMOL/L (ref 3.4–5.3)
RBC # BLD AUTO: 4.33 10E12/L (ref 4.4–5.9)
SARS-COV-2 RNA RESP QL NAA+PROBE: NEGATIVE
SODIUM SERPL-SCNC: 134 MMOL/L (ref 133–144)
SPECIMEN SOURCE: NORMAL
TRANSFUSION STATUS PATIENT QL: NORMAL
TRANSFUSION STATUS PATIENT QL: NORMAL
WBC # BLD AUTO: 4.4 10E9/L (ref 4–11)

## 2021-02-24 PROCEDURE — 250N000011 HC RX IP 250 OP 636: Performed by: NURSE ANESTHETIST, CERTIFIED REGISTERED

## 2021-02-24 PROCEDURE — 250N000013 HC RX MED GY IP 250 OP 250 PS 637: Performed by: ANESTHESIOLOGY

## 2021-02-24 PROCEDURE — 250N000009 HC RX 250: Performed by: NURSE ANESTHETIST, CERTIFIED REGISTERED

## 2021-02-24 PROCEDURE — 999N000141 HC STATISTIC PRE-PROCEDURE NURSING ASSESSMENT: Performed by: DENTIST

## 2021-02-24 PROCEDURE — U0003 INFECTIOUS AGENT DETECTION BY NUCLEIC ACID (DNA OR RNA); SEVERE ACUTE RESPIRATORY SYNDROME CORONAVIRUS 2 (SARS-COV-2) (CORONAVIRUS DISEASE [COVID-19]), AMPLIFIED PROBE TECHNIQUE, MAKING USE OF HIGH THROUGHPUT TECHNOLOGIES AS DESCRIBED BY CMS-2020-01-R: HCPCS | Performed by: DENTIST

## 2021-02-24 PROCEDURE — 250N000013 HC RX MED GY IP 250 OP 250 PS 637: Mod: GY | Performed by: STUDENT IN AN ORGANIZED HEALTH CARE EDUCATION/TRAINING PROGRAM

## 2021-02-24 PROCEDURE — 96372 THER/PROPH/DIAG INJ SC/IM: CPT | Performed by: STUDENT IN AN ORGANIZED HEALTH CARE EDUCATION/TRAINING PROGRAM

## 2021-02-24 PROCEDURE — 258N000003 HC RX IP 258 OP 636: Performed by: ANESTHESIOLOGY

## 2021-02-24 PROCEDURE — 96374 THER/PROPH/DIAG INJ IV PUSH: CPT

## 2021-02-24 PROCEDURE — 250N000009 HC RX 250: Performed by: DENTIST

## 2021-02-24 PROCEDURE — 272N000004 HC RX 272: Performed by: DENTIST

## 2021-02-24 PROCEDURE — 250N000012 HC RX MED GY IP 250 OP 636 PS 637: Performed by: STUDENT IN AN ORGANIZED HEALTH CARE EDUCATION/TRAINING PROGRAM

## 2021-02-24 PROCEDURE — 370N000017 HC ANESTHESIA TECHNICAL FEE, PER MIN: Performed by: DENTIST

## 2021-02-24 PROCEDURE — 250N000011 HC RX IP 250 OP 636: Performed by: ANESTHESIOLOGY

## 2021-02-24 PROCEDURE — 710N000010 HC RECOVERY PHASE 1, LEVEL 2, PER MIN: Performed by: DENTIST

## 2021-02-24 PROCEDURE — 80048 BASIC METABOLIC PNL TOTAL CA: CPT

## 2021-02-24 PROCEDURE — 85049 AUTOMATED PLATELET COUNT: CPT | Performed by: DENTIST

## 2021-02-24 PROCEDURE — 272N000001 HC OR GENERAL SUPPLY STERILE: Performed by: DENTIST

## 2021-02-24 PROCEDURE — G0378 HOSPITAL OBSERVATION PER HR: HCPCS

## 2021-02-24 PROCEDURE — 250N000013 HC RX MED GY IP 250 OP 250 PS 637: Performed by: STUDENT IN AN ORGANIZED HEALTH CARE EDUCATION/TRAINING PROGRAM

## 2021-02-24 PROCEDURE — 250N000011 HC RX IP 250 OP 636: Performed by: STUDENT IN AN ORGANIZED HEALTH CARE EDUCATION/TRAINING PROGRAM

## 2021-02-24 PROCEDURE — U0005 INFEC AGEN DETEC AMPLI PROBE: HCPCS | Performed by: DENTIST

## 2021-02-24 PROCEDURE — 999N001017 HC STATISTIC GLUCOSE BY METER IP

## 2021-02-24 PROCEDURE — 360N000076 HC SURGERY LEVEL 3, PER MIN: Performed by: DENTIST

## 2021-02-24 PROCEDURE — 85730 THROMBOPLASTIN TIME PARTIAL: CPT | Performed by: ANESTHESIOLOGY

## 2021-02-24 PROCEDURE — 99207 PR CDG-CODE CATEGORY CHANGED: CPT | Performed by: STUDENT IN AN ORGANIZED HEALTH CARE EDUCATION/TRAINING PROGRAM

## 2021-02-24 PROCEDURE — 85027 COMPLETE CBC AUTOMATED: CPT

## 2021-02-24 PROCEDURE — 99219 PR INITIAL OBSERVATION CARE,LEVEL II: CPT | Mod: GC | Performed by: STUDENT IN AN ORGANIZED HEALTH CARE EDUCATION/TRAINING PROGRAM

## 2021-02-24 PROCEDURE — P9037 PLATE PHERES LEUKOREDU IRRAD: HCPCS | Performed by: DENTIST

## 2021-02-24 PROCEDURE — 258N000003 HC RX IP 258 OP 636: Performed by: NURSE ANESTHETIST, CERTIFIED REGISTERED

## 2021-02-24 PROCEDURE — 250N000025 HC SEVOFLURANE, PER MIN: Performed by: DENTIST

## 2021-02-24 RX ORDER — LIDOCAINE 40 MG/G
CREAM TOPICAL
Status: CANCELLED | OUTPATIENT
Start: 2021-02-24

## 2021-02-24 RX ORDER — OXYCODONE HYDROCHLORIDE 5 MG/1
5-10 TABLET ORAL EVERY 4 HOURS PRN
Status: DISCONTINUED | OUTPATIENT
Start: 2021-02-24 | End: 2021-02-25 | Stop reason: HOSPADM

## 2021-02-24 RX ORDER — NALOXONE HYDROCHLORIDE 0.4 MG/ML
0.2 INJECTION, SOLUTION INTRAMUSCULAR; INTRAVENOUS; SUBCUTANEOUS
Status: DISCONTINUED | OUTPATIENT
Start: 2021-02-24 | End: 2021-02-24 | Stop reason: HOSPADM

## 2021-02-24 RX ORDER — FOLIC ACID 1 MG/1
1 TABLET ORAL 2 TIMES DAILY
Status: DISCONTINUED | OUTPATIENT
Start: 2021-02-24 | End: 2021-02-25 | Stop reason: HOSPADM

## 2021-02-24 RX ORDER — OXYCODONE HYDROCHLORIDE 5 MG/1
5-10 TABLET ORAL EVERY 6 HOURS PRN
Status: DISCONTINUED | OUTPATIENT
Start: 2021-02-24 | End: 2021-02-24

## 2021-02-24 RX ORDER — FENTANYL CITRATE 50 UG/ML
25-50 INJECTION, SOLUTION INTRAMUSCULAR; INTRAVENOUS
Status: DISCONTINUED | OUTPATIENT
Start: 2021-02-24 | End: 2021-02-24 | Stop reason: HOSPADM

## 2021-02-24 RX ORDER — ACETAMINOPHEN 650 MG/1
650 SUPPOSITORY RECTAL EVERY 6 HOURS PRN
Status: DISCONTINUED | OUTPATIENT
Start: 2021-02-24 | End: 2021-02-24

## 2021-02-24 RX ORDER — ONDANSETRON 2 MG/ML
INJECTION INTRAMUSCULAR; INTRAVENOUS PRN
Status: DISCONTINUED | OUTPATIENT
Start: 2021-02-24 | End: 2021-02-24

## 2021-02-24 RX ORDER — SPIRONOLACTONE 50 MG/1
100 TABLET, FILM COATED ORAL
Status: DISCONTINUED | OUTPATIENT
Start: 2021-02-25 | End: 2021-02-25 | Stop reason: HOSPADM

## 2021-02-24 RX ORDER — SENNOSIDES 8.6 MG
2 TABLET ORAL 2 TIMES DAILY
Status: DISCONTINUED | OUTPATIENT
Start: 2021-02-24 | End: 2021-02-25 | Stop reason: HOSPADM

## 2021-02-24 RX ORDER — ONDANSETRON 2 MG/ML
4 INJECTION INTRAMUSCULAR; INTRAVENOUS EVERY 30 MIN PRN
Status: DISCONTINUED | OUTPATIENT
Start: 2021-02-24 | End: 2021-02-24 | Stop reason: HOSPADM

## 2021-02-24 RX ORDER — NALOXONE HYDROCHLORIDE 0.4 MG/ML
0.4 INJECTION, SOLUTION INTRAMUSCULAR; INTRAVENOUS; SUBCUTANEOUS
Status: DISCONTINUED | OUTPATIENT
Start: 2021-02-24 | End: 2021-02-25 | Stop reason: HOSPADM

## 2021-02-24 RX ORDER — PROMETHAZINE HYDROCHLORIDE 25 MG/1
25 TABLET ORAL EVERY 6 HOURS PRN
Status: DISCONTINUED | OUTPATIENT
Start: 2021-02-24 | End: 2021-02-25 | Stop reason: HOSPADM

## 2021-02-24 RX ORDER — NALOXONE HYDROCHLORIDE 0.4 MG/ML
0.2 INJECTION, SOLUTION INTRAMUSCULAR; INTRAVENOUS; SUBCUTANEOUS
Status: DISCONTINUED | OUTPATIENT
Start: 2021-02-24 | End: 2021-02-25 | Stop reason: HOSPADM

## 2021-02-24 RX ORDER — CHLORHEXIDINE GLUCONATE ORAL RINSE 1.2 MG/ML
10 SOLUTION DENTAL ONCE
Status: COMPLETED | OUTPATIENT
Start: 2021-02-24 | End: 2021-02-24

## 2021-02-24 RX ORDER — ACETAMINOPHEN 500 MG
500 TABLET ORAL EVERY 6 HOURS PRN
Status: DISCONTINUED | OUTPATIENT
Start: 2021-02-24 | End: 2021-02-25 | Stop reason: HOSPADM

## 2021-02-24 RX ORDER — FENTANYL CITRATE 50 UG/ML
25-50 INJECTION, SOLUTION INTRAMUSCULAR; INTRAVENOUS EVERY 5 MIN PRN
Status: DISCONTINUED | OUTPATIENT
Start: 2021-02-24 | End: 2021-02-24 | Stop reason: HOSPADM

## 2021-02-24 RX ORDER — ONDANSETRON 4 MG/1
4 TABLET, ORALLY DISINTEGRATING ORAL EVERY 30 MIN PRN
Status: DISCONTINUED | OUTPATIENT
Start: 2021-02-24 | End: 2021-02-24 | Stop reason: HOSPADM

## 2021-02-24 RX ORDER — SENNOSIDES 8.6 MG
1 TABLET ORAL DAILY PRN
Status: DISCONTINUED | OUTPATIENT
Start: 2021-02-24 | End: 2021-02-24

## 2021-02-24 RX ORDER — POLYETHYLENE GLYCOL 3350 17 G/17G
17 POWDER, FOR SOLUTION ORAL DAILY PRN
Status: DISCONTINUED | OUTPATIENT
Start: 2021-02-24 | End: 2021-02-25 | Stop reason: HOSPADM

## 2021-02-24 RX ORDER — FUROSEMIDE 40 MG
40 TABLET ORAL 3 TIMES DAILY
Status: DISCONTINUED | OUTPATIENT
Start: 2021-02-24 | End: 2021-02-24

## 2021-02-24 RX ORDER — CEFAZOLIN SODIUM 2 G/100ML
2 INJECTION, SOLUTION INTRAVENOUS SEE ADMIN INSTRUCTIONS
Status: DISCONTINUED | OUTPATIENT
Start: 2021-02-24 | End: 2021-02-24 | Stop reason: HOSPADM

## 2021-02-24 RX ORDER — SODIUM CHLORIDE, SODIUM LACTATE, POTASSIUM CHLORIDE, CALCIUM CHLORIDE 600; 310; 30; 20 MG/100ML; MG/100ML; MG/100ML; MG/100ML
INJECTION, SOLUTION INTRAVENOUS CONTINUOUS PRN
Status: DISCONTINUED | OUTPATIENT
Start: 2021-02-24 | End: 2021-02-24

## 2021-02-24 RX ORDER — SPIRONOLACTONE 100 MG/1
100 TABLET, FILM COATED ORAL 3 TIMES DAILY
Status: DISCONTINUED | OUTPATIENT
Start: 2021-02-24 | End: 2021-02-24

## 2021-02-24 RX ORDER — TRAZODONE HYDROCHLORIDE 150 MG/1
150 TABLET ORAL AT BEDTIME
Status: DISCONTINUED | OUTPATIENT
Start: 2021-02-24 | End: 2021-02-25 | Stop reason: HOSPADM

## 2021-02-24 RX ORDER — DEXTROSE MONOHYDRATE 25 G/50ML
25-50 INJECTION, SOLUTION INTRAVENOUS
Status: DISCONTINUED | OUTPATIENT
Start: 2021-02-24 | End: 2021-02-25 | Stop reason: HOSPADM

## 2021-02-24 RX ORDER — HYDRALAZINE HYDROCHLORIDE 20 MG/ML
2.5-5 INJECTION INTRAMUSCULAR; INTRAVENOUS
Status: DISCONTINUED | OUTPATIENT
Start: 2021-02-24 | End: 2021-02-24 | Stop reason: HOSPADM

## 2021-02-24 RX ORDER — LACTULOSE 10 G/15ML
30 SOLUTION ORAL 3 TIMES DAILY
Status: DISCONTINUED | OUTPATIENT
Start: 2021-02-24 | End: 2021-02-24

## 2021-02-24 RX ORDER — ONDANSETRON 2 MG/ML
4 INJECTION INTRAMUSCULAR; INTRAVENOUS EVERY 6 HOURS PRN
Status: DISCONTINUED | OUTPATIENT
Start: 2021-02-24 | End: 2021-02-25 | Stop reason: HOSPADM

## 2021-02-24 RX ORDER — CIPROFLOXACIN 500 MG/1
500 TABLET, FILM COATED ORAL DAILY
Status: DISCONTINUED | OUTPATIENT
Start: 2021-02-25 | End: 2021-02-25 | Stop reason: HOSPADM

## 2021-02-24 RX ORDER — POLYETHYLENE GLYCOL 3350 17 G/17G
17 POWDER, FOR SOLUTION ORAL DAILY
Status: DISCONTINUED | OUTPATIENT
Start: 2021-02-25 | End: 2021-02-24

## 2021-02-24 RX ORDER — FENTANYL CITRATE 50 UG/ML
INJECTION, SOLUTION INTRAMUSCULAR; INTRAVENOUS PRN
Status: DISCONTINUED | OUTPATIENT
Start: 2021-02-24 | End: 2021-02-24

## 2021-02-24 RX ORDER — HYDROMORPHONE HYDROCHLORIDE 1 MG/ML
0.3 INJECTION, SOLUTION INTRAMUSCULAR; INTRAVENOUS; SUBCUTANEOUS
Status: DISCONTINUED | OUTPATIENT
Start: 2021-02-24 | End: 2021-02-25 | Stop reason: HOSPADM

## 2021-02-24 RX ORDER — PANTOPRAZOLE SODIUM 40 MG/1
40 TABLET, DELAYED RELEASE ORAL DAILY
Status: DISCONTINUED | OUTPATIENT
Start: 2021-02-25 | End: 2021-02-25 | Stop reason: HOSPADM

## 2021-02-24 RX ORDER — BUPIVACAINE HYDROCHLORIDE AND EPINEPHRINE 5; 5 MG/ML; UG/ML
INJECTION, SOLUTION PERINEURAL PRN
Status: DISCONTINUED | OUTPATIENT
Start: 2021-02-24 | End: 2021-02-24 | Stop reason: HOSPADM

## 2021-02-24 RX ORDER — FUROSEMIDE 40 MG
40 TABLET ORAL
Status: DISCONTINUED | OUTPATIENT
Start: 2021-02-25 | End: 2021-02-25 | Stop reason: HOSPADM

## 2021-02-24 RX ORDER — ALBUTEROL SULFATE 0.83 MG/ML
2.5 SOLUTION RESPIRATORY (INHALATION) EVERY 4 HOURS PRN
Status: DISCONTINUED | OUTPATIENT
Start: 2021-02-24 | End: 2021-02-24 | Stop reason: HOSPADM

## 2021-02-24 RX ORDER — QUETIAPINE FUMARATE 25 MG/1
25 TABLET, FILM COATED ORAL
Status: DISCONTINUED | OUTPATIENT
Start: 2021-02-24 | End: 2021-02-25 | Stop reason: HOSPADM

## 2021-02-24 RX ORDER — CYCLOBENZAPRINE HCL 10 MG
10 TABLET ORAL EVERY 8 HOURS PRN
Status: DISCONTINUED | OUTPATIENT
Start: 2021-02-24 | End: 2021-02-25 | Stop reason: HOSPADM

## 2021-02-24 RX ORDER — LANOLIN ALCOHOL/MO/W.PET/CERES
3 CREAM (GRAM) TOPICAL
Status: DISCONTINUED | OUTPATIENT
Start: 2021-02-24 | End: 2021-02-25 | Stop reason: HOSPADM

## 2021-02-24 RX ORDER — PROPOFOL 10 MG/ML
INJECTION, EMULSION INTRAVENOUS PRN
Status: DISCONTINUED | OUTPATIENT
Start: 2021-02-24 | End: 2021-02-24

## 2021-02-24 RX ORDER — MEPERIDINE HYDROCHLORIDE 25 MG/ML
12.5 INJECTION INTRAMUSCULAR; INTRAVENOUS; SUBCUTANEOUS
Status: DISCONTINUED | OUTPATIENT
Start: 2021-02-24 | End: 2021-02-24 | Stop reason: HOSPADM

## 2021-02-24 RX ORDER — NICOTINE POLACRILEX 4 MG
15-30 LOZENGE BUCCAL
Status: DISCONTINUED | OUTPATIENT
Start: 2021-02-24 | End: 2021-02-25 | Stop reason: HOSPADM

## 2021-02-24 RX ORDER — ONDANSETRON 4 MG/1
4 TABLET, ORALLY DISINTEGRATING ORAL EVERY 6 HOURS PRN
Status: DISCONTINUED | OUTPATIENT
Start: 2021-02-24 | End: 2021-02-25 | Stop reason: HOSPADM

## 2021-02-24 RX ORDER — PROMETHAZINE HYDROCHLORIDE 25 MG/ML
25 INJECTION INTRAMUSCULAR; INTRAVENOUS ONCE
Status: DISCONTINUED | OUTPATIENT
Start: 2021-02-24 | End: 2021-02-24 | Stop reason: CLARIF

## 2021-02-24 RX ORDER — NALOXONE HYDROCHLORIDE 0.4 MG/ML
0.4 INJECTION, SOLUTION INTRAMUSCULAR; INTRAVENOUS; SUBCUTANEOUS
Status: DISCONTINUED | OUTPATIENT
Start: 2021-02-24 | End: 2021-02-24 | Stop reason: HOSPADM

## 2021-02-24 RX ORDER — LIDOCAINE HYDROCHLORIDE 20 MG/ML
INJECTION, SOLUTION INFILTRATION; PERINEURAL PRN
Status: DISCONTINUED | OUTPATIENT
Start: 2021-02-24 | End: 2021-02-24

## 2021-02-24 RX ORDER — CEFAZOLIN SODIUM 2 G/100ML
2 INJECTION, SOLUTION INTRAVENOUS
Status: COMPLETED | OUTPATIENT
Start: 2021-02-24 | End: 2021-02-24

## 2021-02-24 RX ADMIN — CEFAZOLIN 2 G: 10 INJECTION, POWDER, FOR SOLUTION INTRAVENOUS at 14:40

## 2021-02-24 RX ADMIN — INSULIN GLARGINE 26 UNITS: 100 INJECTION, SOLUTION SUBCUTANEOUS at 22:41

## 2021-02-24 RX ADMIN — FENTANYL CITRATE 50 MCG: 50 INJECTION, SOLUTION INTRAMUSCULAR; INTRAVENOUS at 17:10

## 2021-02-24 RX ADMIN — GABAPENTIN 400 MG: 100 CAPSULE ORAL at 22:31

## 2021-02-24 RX ADMIN — QUETIAPINE FUMARATE 25 MG: 25 TABLET ORAL at 22:42

## 2021-02-24 RX ADMIN — LIDOCAINE HYDROCHLORIDE 100 MG: 20 INJECTION, SOLUTION INFILTRATION; PERINEURAL at 14:25

## 2021-02-24 RX ADMIN — AMITRIPTYLINE HYDROCHLORIDE 25 MG: 25 TABLET, FILM COATED ORAL at 22:31

## 2021-02-24 RX ADMIN — FENTANYL CITRATE 50 MCG: 50 INJECTION, SOLUTION INTRAMUSCULAR; INTRAVENOUS at 16:50

## 2021-02-24 RX ADMIN — RIFAXIMIN 550 MG: 550 TABLET ORAL at 22:31

## 2021-02-24 RX ADMIN — SODIUM CHLORIDE, POTASSIUM CHLORIDE, SODIUM LACTATE AND CALCIUM CHLORIDE: 600; 310; 30; 20 INJECTION, SOLUTION INTRAVENOUS at 14:24

## 2021-02-24 RX ADMIN — CHLORHEXIDINE GLUCONATE 0.12% ORAL RINSE 10 ML: 1.2 LIQUID ORAL at 13:15

## 2021-02-24 RX ADMIN — BUPRENORPHINE HYDROCHLORIDE 150 MCG: 150 FILM, SOLUBLE BUCCAL at 23:36

## 2021-02-24 RX ADMIN — ROCURONIUM BROMIDE 60 MG: 10 INJECTION INTRAVENOUS at 14:32

## 2021-02-24 RX ADMIN — FENTANYL CITRATE 100 MCG: 50 INJECTION, SOLUTION INTRAMUSCULAR; INTRAVENOUS at 15:43

## 2021-02-24 RX ADMIN — TRAZODONE HYDROCHLORIDE 150 MG: 150 TABLET ORAL at 22:31

## 2021-02-24 RX ADMIN — OXYCODONE HYDROCHLORIDE 5 MG: 5 TABLET ORAL at 21:45

## 2021-02-24 RX ADMIN — ONDANSETRON 4 MG: 2 INJECTION INTRAMUSCULAR; INTRAVENOUS at 16:16

## 2021-02-24 RX ADMIN — HYDROMORPHONE HYDROCHLORIDE 0.3 MG: 1 INJECTION, SOLUTION INTRAMUSCULAR; INTRAVENOUS; SUBCUTANEOUS at 22:42

## 2021-02-24 RX ADMIN — FENTANYL CITRATE 250 MCG: 50 INJECTION, SOLUTION INTRAMUSCULAR; INTRAVENOUS at 14:25

## 2021-02-24 RX ADMIN — SUGAMMADEX 200 MG: 100 INJECTION, SOLUTION INTRAVENOUS at 16:18

## 2021-02-24 RX ADMIN — PROPOFOL 150 MG: 10 INJECTION, EMULSION INTRAVENOUS at 14:30

## 2021-02-24 RX ADMIN — HUMAN INSULIN 3.5 UNITS/HR: 100 INJECTION, SOLUTION SUBCUTANEOUS at 15:04

## 2021-02-24 RX ADMIN — HUMAN INSULIN 4 UNITS: 100 INJECTION, SOLUTION SUBCUTANEOUS at 16:50

## 2021-02-24 RX ADMIN — PROMETHAZINE HYDROCHLORIDE 25 MG: 25 INJECTION INTRAMUSCULAR; INTRAVENOUS at 17:36

## 2021-02-24 ASSESSMENT — MIFFLIN-ST. JEOR: SCORE: 1881.75

## 2021-02-24 NOTE — ANESTHESIA PROCEDURE NOTES
Airway   Date/Time: 2/24/2021 2:35 PM   Patient location during procedure: OR  Staff -   Anesthesiologist:  Sigifredo Batista MD  CRNA: Sun Weems APRN CRNA  Performed By: CRNA    Consent for Airway   Urgency: elective    Indications and Patient Condition  Indications for airway management: lurdes-procedural  Induction type:intravenousMask difficulty assessment: 1 - vent by mask    Final Airway Details  Final airway type: endotracheal airway  Successful airway:ETT - single  Endotracheal Airway Details   ETT size (mm): 8.0  Cuffed: yes  Successful intubation technique: direct laryngoscopy  Grade View of Cords: 1  Adjucts: stylet  Measured from: lips  Secured at (cm): 23  Secured with: pink tape  Bite block used: None    Post intubation assessment   Placement verified by: capnometry, equal breath sounds and chest rise   Number of attempts at approach: 1  Number of other approaches attempted: 0  Secured with:pink tape  Ease of procedure: easy (vocal cords open and clear)  Dentition: Unchanged

## 2021-02-24 NOTE — BRIEF OP NOTE
[facility]    Brief Operative Note    Pre-operative diagnosis: Nonrestorable tooth [K08.89]  Post-operative diagnosis Same as pre-operative diagnosis    Procedure: Procedure(s):  Full mouth tooth extraction  Surgeon: Surgeon(s) and Role:     * Addy Washburn DDS - Primary     * Jr Escobar MD - Resident - Assisting     * Yehuda Mora DDS - Resident - Assisting  Anesthesia: General   Estimated blood loss: 150 ml  Drains: None  Specimens: * No specimens in log *  Findings:   gross caries thorughout dentition.  Complications: None  Implants: * No implants in log *    Patient received 1 unit of platelets in OR    Recommendations:    -Diet: Soft diet; No hot foods for 48 hours.   -Ice packs to face 20 minutes on/ 20 minutes off  -Pain control per primary team  -Patient should bite on moist gauze with firm pressure for 1 hour. Replace with new gauze if oozing persists. Oozing should decrease over the next 24 hours. Please contact the oncall OMFS resident with any concerns, as most bleeding can be treated with local measures.   - AVOID suction, as this can remove blood clot and cause persistent oozing.  - Patient should also avoid spitting. If he feels like he needs to spit, he should drool into a cup.      Please contact on call OMFS resident with any questions or concerns.     Yehuda Mora DDS  OMFS resident PGY3

## 2021-02-24 NOTE — ANESTHESIA CARE TRANSFER NOTE
Patient: Obed Wiley    Procedure(s):  Full mouth tooth extraction    Diagnosis: Nonrestorable tooth [K08.89]  Diagnosis Additional Information: No value filed.    Anesthesia Type:   General     Note:      Level of Consciousness: awake  Oxygen Supplementation: room air    Independent Airway: airway patency satisfactory and stable  Dentition: dentition unchanged  Vital Signs Stable: post-procedure vital signs reviewed and stable  Report to RN Given: handoff report given  Patient transferred to: PACU    Handoff Report: Identifed the Patient, Identified the Reponsible Provider, Reviewed the pertinent medical history, Discussed the surgical course, Reviewed Intra-OP anesthesia mangement and issues during anesthesia, Set expectations for post-procedure period and Allowed opportunity for questions and acknowledgement of understanding      Vitals: (Last set prior to Anesthesia Care Transfer)  CRNA VITALS  2/24/2021 1605 - 2/24/2021 1644      2/24/2021             Resp Rate (observed):  (!) 1        Electronically Signed By: JAMES Ireland CRNA  February 24, 2021  4:44 PM

## 2021-02-24 NOTE — ANESTHESIA PREPROCEDURE EVALUATION
Anesthesia Pre-Procedure Evaluation    Patient: Obed Wiley   MRN: [id number] : 1990        Preoperative Diagnosis: Nonrestorable tooth [K08.89]   Procedure : Procedure(s):  Full mouth tooth extraction     Past Medical History:   Diagnosis Date     Alcoholic cirrhosis of liver with ascites (H) 2019     Diabetes (H)     Type 2 DM, Uses Insulin      DVT (deep vein thrombosis) in pregnancy      H/O protein C deficiency      Hepatic encephalopathy (H)      Hepatitis     Hep A when an infant      History of blood transfusion      at [facility]      Leukopenia 2020     Other chronic pain     sciatica     Pancreatic disease     Chronic pancreatitis     SBP (spontaneous bacterial peritonitis) (H) 2019      Past Surgical History:   Procedure Laterality Date     ABDOMEN SURGERY      Gastric Bypass . [facility]      ABDOMEN SURGERY      Multiple paracentesis noted in preop H&P 2     CARDIAC SURGERY      IVC      CHOLECYSTECTOMY       COLONOSCOPY       ENT SURGERY      Tonsils and Adenoids Removed at 6-7 Years Old      ENT SURGERY      Sinus surgery     GALLBLADDER SURGERY  2017     GI SURGERY      Upper GI       Allergies   Allergen Reactions     Bee Anaphylaxis     Adhesive Tape Rash     Sulfa Drugs Itching     Morphine      Can't take with other pain medications      Social History     Tobacco Use     Smoking status: Former Smoker     Packs/day: 0.25     Years: 15.00     Pack years: 3.75     Smokeless tobacco: Never Used     Tobacco comment: Quit 2019   Substance Use Topics     Alcohol use: Not Currently     Comment: last drink 2018      Wt Readings from Last 1 Encounters:   21 86.2 kg (190 lb 0.6 oz)        Anesthesia Evaluation            ROS/MED HX  ENT/Pulmonary:  - neg pulmonary ROS     Neurologic: Comment: Hepatic encephalopathy      Cardiovascular:  - neg cardiovascular ROS     METS/Exercise Tolerance:     Hematologic:     (+) History of blood clots,      Musculoskeletal:       GI/Hepatic: Comment: pancreatitis    (+) hepatitis type Alcoholic, liver disease,     Renal/Genitourinary:  - neg Renal ROS     Endo:     (+) type I DM,     Psychiatric/Substance Use:     (+) alcohol abuse     Infectious Disease:  - neg infectious disease ROS     Malignancy:       Other:      (+) , H/O Chronic Pain,        Physical Exam    Airway        Mallampati: II   TM distance: > 3 FB   Neck ROM: full   Mouth opening: > 3 cm    Respiratory Devices and Support         Dental           Cardiovascular          Rhythm and rate: regular and normal     Pulmonary       Comment: Normal respiratory rate and effort            OUTSIDE LABS:  CBC:   Lab Results   Component Value Date    WBC 4.4 02/24/2021    WBC 3.2 (A) 10/27/2020    WBC 3.2 (A) 10/27/2020    HGB 13.0 (L) 02/24/2021    HGB 13.8 10/27/2020    HGB 13.8 10/27/2020    HCT 39.2 (L) 02/24/2021    HCT 40.6 (A) 10/27/2020    HCT 40.6 (A) 10/27/2020    PLT 46 (LL) 02/24/2021    PLT 52 (A) 10/27/2020    PLT 52 (A) 10/27/2020     BMP:   Lab Results   Component Value Date     02/24/2021     02/01/2021    POTASSIUM 3.9 02/24/2021    POTASSIUM 3.8 10/27/2020    CHLORIDE 105 02/24/2021    CHLORIDE 100 10/27/2020    CO2 24 02/24/2021    CO2 23 10/27/2020    BUN 17 02/24/2021    BUN 9.0 10/27/2020    CR 0.59 (L) 02/24/2021    CR 0.52 (L) 02/01/2021     (H) 02/24/2021     (A) 10/27/2020     COAGS:   Lab Results   Component Value Date    PTT 34 09/01/2020    INR 1.30 (H) 02/01/2021    FIBR 197 (L) 09/01/2020     POC:   Lab Results   Component Value Date     (H) 02/24/2021     HEPATIC:   Lab Results   Component Value Date    ALBUMIN 3.3 (L) 02/01/2021    PROTTOTAL 6.8 10/27/2020    PROTTOTAL 6.8 10/27/2020    ALT 28 10/27/2020    ALT 28 10/27/2020    AST 35 10/27/2020    AST 35 10/27/2020    ALKPHOS 121 (A) 10/27/2020    ALKPHOS 121 (A) 10/27/2020    BILITOTAL 1.0 02/01/2021    ALDEN 110 (HH) 07/19/2020     OTHER:    Lab Results   Component Value Date    LACT 1.8 07/22/2020    A1C 5.9 (H) 07/17/2020    NATE 8.7 02/24/2021    PHOS 2.2 (L) 01/11/2020    MAG 1.5 (L) 01/11/2020    LIPASE 29 (L) 07/17/2020    TSH 0.69 06/22/2020    T4 0.97 01/06/2020    SED 5 09/01/2020       Anesthesia Plan    ASA Status:  3      Anesthesia Type: General.     - Airway: ETT   Induction: Intravenous.   Maintenance: Balanced.   Techniques and Equipment:     - Airway: Nasal REA         Consents    Anesthesia Plan(s) and associated risks, benefits, and realistic alternatives discussed. Questions answered and patient/representative(s) expressed understanding.     - Discussed with:  Patient         Postoperative Care    Pain management: IV analgesics, Oral pain medications, Multi-modal analgesia.   PONV prophylaxis: Ondansetron (or other 5HT-3), Dexamethasone or Solumedrol     Comments:                Sigifredo Bean MD

## 2021-02-24 NOTE — ANESTHESIA POSTPROCEDURE EVALUATION
Patient: Obed Wiley    Procedure(s):  Full mouth tooth extraction    Diagnosis:Nonrestorable tooth [K08.89]  Diagnosis Additional Information: No value filed.    Anesthesia Type:  General    Note:  Disposition: Outpatient   Postop Pain Control: Uneventful            Sign Out: Well controlled pain   PONV: No   Neuro/Psych: Uneventful            Sign Out: Acceptable/Baseline neuro status   Airway/Respiratory: Uneventful            Sign Out: Acceptable/Baseline resp. status   CV/Hemodynamics: Uneventful            Sign Out: Acceptable CV status   Other NRE: NONE   DID A NON-ROUTINE EVENT OCCUR? No         Last vitals:  Vitals:    02/24/21 1302 02/24/21 1645 02/24/21 1700   BP: 112/76 123/84 125/80   Pulse: 86 112 117   Resp: 16 14 16   Temp: 37  C (98.6  F) 36.3  C (97.3  F)    SpO2: 97%         Last vitals prior to Anesthesia Care Transfer:  CRNA VITALS  2/24/2021 1605 - 2/24/2021 1705      2/24/2021             NIBP:  123/48    Ht Rate:  115          Electronically Signed By: Bri Putnam MD  February 24, 2021  5:46 PM

## 2021-02-24 NOTE — PROGRESS NOTES
[facility]  Transfer Triage Note      31M EtOH cirrhosis, DM, gastric bypass, GERD, anxiety, depression   H/o protein C deficiency on lovenox   c/b ischemic bowel 2013, mesenteric vein thrombosis 2014, DVT 2015 and 2018, b/o PE 2016 s/p IVC filter    P/w elective total tooth extraction by OMFS in preparation for liver transplant    To be admitted post-operatively for inpatient monitoring of bleeding and labs  Held lovenox since last dose yesterday for OR  To restart lovenox at 1.5mg/kg q24 24 hours post procedure  See hematology note from 1/16/21 for recs      Jarrod Plaza MD

## 2021-02-25 ENCOUNTER — PATIENT OUTREACH (OUTPATIENT)
Dept: CARE COORDINATION | Facility: CLINIC | Age: 31
End: 2021-02-25

## 2021-02-25 VITALS
TEMPERATURE: 98.4 F | HEART RATE: 108 BPM | OXYGEN SATURATION: 99 % | RESPIRATION RATE: 16 BRPM | BODY MASS INDEX: 24.39 KG/M2 | DIASTOLIC BLOOD PRESSURE: 58 MMHG | SYSTOLIC BLOOD PRESSURE: 114 MMHG | HEIGHT: 74 IN | WEIGHT: 190.04 LBS

## 2021-02-25 LAB
ALBUMIN SERPL-MCNC: 3.1 G/DL (ref 3.4–5)
ALP SERPL-CCNC: 113 U/L (ref 40–150)
ALT SERPL W P-5'-P-CCNC: 28 U/L (ref 0–70)
ANION GAP SERPL CALCULATED.3IONS-SCNC: 3 MMOL/L (ref 3–14)
ANION GAP SERPL CALCULATED.3IONS-SCNC: 5 MMOL/L (ref 3–14)
AST SERPL W P-5'-P-CCNC: 39 U/L (ref 0–45)
BILIRUB SERPL-MCNC: 1.6 MG/DL (ref 0.2–1.3)
BUN SERPL-MCNC: 12 MG/DL (ref 7–30)
BUN SERPL-MCNC: 12 MG/DL (ref 7–30)
CALCIUM SERPL-MCNC: 8.2 MG/DL (ref 8.5–10.1)
CALCIUM SERPL-MCNC: 8.4 MG/DL (ref 8.5–10.1)
CHLORIDE SERPL-SCNC: 105 MMOL/L (ref 94–109)
CHLORIDE SERPL-SCNC: 109 MMOL/L (ref 94–109)
CO2 SERPL-SCNC: 25 MMOL/L (ref 20–32)
CO2 SERPL-SCNC: 26 MMOL/L (ref 20–32)
CREAT SERPL-MCNC: 0.59 MG/DL (ref 0.66–1.25)
CREAT SERPL-MCNC: 0.69 MG/DL (ref 0.66–1.25)
ERYTHROCYTE [DISTWIDTH] IN BLOOD BY AUTOMATED COUNT: 13.8 % (ref 10–15)
GFR SERPL CREATININE-BSD FRML MDRD: >90 ML/MIN/{1.73_M2}
GFR SERPL CREATININE-BSD FRML MDRD: >90 ML/MIN/{1.73_M2}
GLUCOSE BLDC GLUCOMTR-MCNC: 182 MG/DL (ref 70–99)
GLUCOSE BLDC GLUCOMTR-MCNC: 229 MG/DL (ref 70–99)
GLUCOSE BLDC GLUCOMTR-MCNC: 380 MG/DL (ref 70–99)
GLUCOSE SERPL-MCNC: 192 MG/DL (ref 70–99)
GLUCOSE SERPL-MCNC: 392 MG/DL (ref 70–99)
HCT VFR BLD AUTO: 35 % (ref 40–53)
HGB BLD-MCNC: 11.4 G/DL (ref 13.3–17.7)
INR PPP: 1.25 (ref 0.86–1.14)
MCH RBC QN AUTO: 30.1 PG (ref 26.5–33)
MCHC RBC AUTO-ENTMCNC: 32.6 G/DL (ref 31.5–36.5)
MCV RBC AUTO: 92 FL (ref 78–100)
PLATELET # BLD AUTO: 50 10E9/L (ref 150–450)
POTASSIUM SERPL-SCNC: 3.8 MMOL/L (ref 3.4–5.3)
POTASSIUM SERPL-SCNC: 3.9 MMOL/L (ref 3.4–5.3)
PROT SERPL-MCNC: 6.4 G/DL (ref 6.8–8.8)
RBC # BLD AUTO: 3.79 10E12/L (ref 4.4–5.9)
SODIUM SERPL-SCNC: 135 MMOL/L (ref 133–144)
SODIUM SERPL-SCNC: 138 MMOL/L (ref 133–144)
WBC # BLD AUTO: 3.7 10E9/L (ref 4–11)

## 2021-02-25 PROCEDURE — 80053 COMPREHEN METABOLIC PANEL: CPT | Performed by: STUDENT IN AN ORGANIZED HEALTH CARE EDUCATION/TRAINING PROGRAM

## 2021-02-25 PROCEDURE — 250N000011 HC RX IP 250 OP 636: Performed by: STUDENT IN AN ORGANIZED HEALTH CARE EDUCATION/TRAINING PROGRAM

## 2021-02-25 PROCEDURE — 999N001017 HC STATISTIC GLUCOSE BY METER IP

## 2021-02-25 PROCEDURE — 80048 BASIC METABOLIC PNL TOTAL CA: CPT | Performed by: STUDENT IN AN ORGANIZED HEALTH CARE EDUCATION/TRAINING PROGRAM

## 2021-02-25 PROCEDURE — 250N000012 HC RX MED GY IP 250 OP 636 PS 637: Performed by: STUDENT IN AN ORGANIZED HEALTH CARE EDUCATION/TRAINING PROGRAM

## 2021-02-25 PROCEDURE — 85027 COMPLETE CBC AUTOMATED: CPT | Performed by: STUDENT IN AN ORGANIZED HEALTH CARE EDUCATION/TRAINING PROGRAM

## 2021-02-25 PROCEDURE — G0378 HOSPITAL OBSERVATION PER HR: HCPCS

## 2021-02-25 PROCEDURE — 258N000003 HC RX IP 258 OP 636: Performed by: STUDENT IN AN ORGANIZED HEALTH CARE EDUCATION/TRAINING PROGRAM

## 2021-02-25 PROCEDURE — 99217 PR OBSERVATION CARE DISCHARGE: CPT | Mod: GC | Performed by: STUDENT IN AN ORGANIZED HEALTH CARE EDUCATION/TRAINING PROGRAM

## 2021-02-25 PROCEDURE — 36415 COLL VENOUS BLD VENIPUNCTURE: CPT | Performed by: STUDENT IN AN ORGANIZED HEALTH CARE EDUCATION/TRAINING PROGRAM

## 2021-02-25 PROCEDURE — 250N000013 HC RX MED GY IP 250 OP 250 PS 637: Mod: GY | Performed by: STUDENT IN AN ORGANIZED HEALTH CARE EDUCATION/TRAINING PROGRAM

## 2021-02-25 PROCEDURE — 96376 TX/PRO/DX INJ SAME DRUG ADON: CPT

## 2021-02-25 PROCEDURE — 85610 PROTHROMBIN TIME: CPT | Performed by: STUDENT IN AN ORGANIZED HEALTH CARE EDUCATION/TRAINING PROGRAM

## 2021-02-25 PROCEDURE — 99207 PR CDG-CODE CATEGORY CHANGED: CPT | Performed by: STUDENT IN AN ORGANIZED HEALTH CARE EDUCATION/TRAINING PROGRAM

## 2021-02-25 PROCEDURE — 96372 THER/PROPH/DIAG INJ SC/IM: CPT | Performed by: STUDENT IN AN ORGANIZED HEALTH CARE EDUCATION/TRAINING PROGRAM

## 2021-02-25 RX ORDER — OXYCODONE HYDROCHLORIDE 5 MG/1
5 TABLET ORAL EVERY 4 HOURS PRN
Qty: 30 TABLET | Refills: 0 | Status: SHIPPED | OUTPATIENT
Start: 2021-02-25 | End: 2021-05-14

## 2021-02-25 RX ORDER — OXYCODONE HYDROCHLORIDE 5 MG/1
5-10 TABLET ORAL EVERY 4 HOURS PRN
Qty: 60 TABLET | Refills: 0 | Status: CANCELLED | OUTPATIENT
Start: 2021-02-25

## 2021-02-25 RX ADMIN — BUPRENORPHINE HYDROCHLORIDE 150 MCG: 150 FILM, SOLUBLE BUCCAL at 07:46

## 2021-02-25 RX ADMIN — OXYCODONE HYDROCHLORIDE 5 MG: 5 TABLET ORAL at 09:35

## 2021-02-25 RX ADMIN — ACETAMINOPHEN 500 MG: 500 TABLET, FILM COATED ORAL at 09:35

## 2021-02-25 RX ADMIN — FOLIC ACID 1 MG: 1 TABLET ORAL at 07:38

## 2021-02-25 RX ADMIN — SODIUM CHLORIDE, POTASSIUM CHLORIDE, SODIUM LACTATE AND CALCIUM CHLORIDE 500 ML: 600; 310; 30; 20 INJECTION, SOLUTION INTRAVENOUS at 08:43

## 2021-02-25 RX ADMIN — RIFAXIMIN 550 MG: 550 TABLET ORAL at 07:46

## 2021-02-25 RX ADMIN — GABAPENTIN 400 MG: 100 CAPSULE ORAL at 13:42

## 2021-02-25 RX ADMIN — HYDROMORPHONE HYDROCHLORIDE 0.3 MG: 1 INJECTION, SOLUTION INTRAMUSCULAR; INTRAVENOUS; SUBCUTANEOUS at 11:47

## 2021-02-25 RX ADMIN — OXYCODONE HYDROCHLORIDE 5 MG: 5 TABLET ORAL at 06:47

## 2021-02-25 RX ADMIN — HYDROMORPHONE HYDROCHLORIDE 0.3 MG: 1 INJECTION, SOLUTION INTRAMUSCULAR; INTRAVENOUS; SUBCUTANEOUS at 07:38

## 2021-02-25 RX ADMIN — OXYCODONE HYDROCHLORIDE 10 MG: 5 TABLET ORAL at 13:42

## 2021-02-25 RX ADMIN — INSULIN ASPART 4 UNITS: 100 INJECTION, SOLUTION INTRAVENOUS; SUBCUTANEOUS at 09:35

## 2021-02-25 RX ADMIN — PANTOPRAZOLE SODIUM 40 MG: 40 TABLET, DELAYED RELEASE ORAL at 07:46

## 2021-02-25 RX ADMIN — SPIRONOLACTONE 100 MG: 50 TABLET, FILM COATED ORAL at 07:38

## 2021-02-25 RX ADMIN — CIPROFLOXACIN HYDROCHLORIDE 500 MG: 500 TABLET, FILM COATED ORAL at 07:46

## 2021-02-25 RX ADMIN — GABAPENTIN 400 MG: 100 CAPSULE ORAL at 07:46

## 2021-02-25 NOTE — OP NOTE
Oral & Maxillofacial Surgery Operative Note      Procedure:   - Surgical extraction of teeth#1-13, and teeth #15-32  - Alveoplasty in four quadrants     Pre-Operative Diagnosis:   Severe generalized caries, chronic generalized periodontitis     Post-Operative Diagnosis:  Same     STAFF SURGEON: Addy Washburn DDS, MD, FACS  RESIDENT SURGEON: Jr Esocbar DDS  : Yehuda Mora DDS     PERIOPERATIVE ANTIBIOTICS: Cefazolin 2g     ESTIMATED BLOOD LOSS: 150mL      LOCAL ANESTHESIA: 10cc total of 0.5% bupivacaine with 1:200,000 epinephrine delivered via bilateral MAXX, long buccal nerve blocks and local infiltration      SPECIMENS: None.      COMPLICATIONS: None     DRAINS: None.     INDICATIONS FOR PROCEDURE: 30-year-old male with past medical history significant for alcoholic cirrhosis, acute kidney failure, diabetes, protien C deficiency, gastric bypass, GERD, depression, anxiety, abnormal bleeding.  Patient is attempting to get placed on liver transplant list and is in need of dental clearance prior to this occurring.  Patient has terminal dentition and is in need of full mouth edentulation.  Due to patient's propensity for bleeding problems as well as extent of surgery as well as possible need for transfusion decision was made to perform operation in the operating room under general anesthesia.  Immediately prior to procedure patient's platelets were evaluated and noted to be below 50,000, patient was transfused with platelets during procedure.     DESCRIPTION OF PROCEDURE: The patient was met preoperatively and the treatment plan was confirmed with the patient.  Informed consent was obtained and all questions were answered. The patient was brought back to OR 11 by the Anesthesia Service. Following induction of anesthesia, an oroendotracheal tube was placed and secured to the right with tape. The patient's arms were tucked and padded. A throat pack was placed, the oral cavity was scrubbed with  chlorhexidine, and local anesthesia was administered. The oral cavity was suctioned. The head and neck were scrubbed and painted with iodine. Surgeons stepped out to scrub and returned to don sterile gown and gloves. At that time, the surgical site was squared off and a split sheet drape was placed.      Attention was directed to the right maxilla: A 15 scalpel was used to make crestal incision over tooth #1 which was impacted, as well as mesial sulcular incision to midline at tooth #8.  Full-thickness mucoperiosteal flap was reflected #9 periosteal elevator on the buccal and lingual extension of the maxilla exposing lateral and palatal bone.  Removed bone over tooth #1 with a molts elevator and delivered with dental elevator, no extension into maxillary sinus noted.  With handpiece and fissure bur as well as copious irrigation removed buccal bone around teeth numbers 2, 3, 4, 5, 6, 7, 8: As well as sectioning teeth numbers 2 and 3.  Delivered roots and crowns of teeth numbers 1 2, 3, 4, 5, 6, 7, 8 with dental elevators.  Inspection of extraction sockets performed all tooth roots in this quadrant were removed, with rongeur forceps removed inner radicular bone and sharp bone on the alveolus, smooth to digital palpation, copious irrigation applied to surgical field, applied Surgicel to extraction sockets and closed primarily with 3-0 Vicryl in a running and interrupted fashion.    Attention was directed to the patient's right mandible: 15 scalpel was used to make sulcular incision from tooth #25 to tooth #31 with distal buccal release, full-thickness mucoperiosteal flap reflected #9 periosteal elevator on the buccal and lingual aspects of the right mandibular alveolar ridge exposing buccal bone, with handpiece and fissure bur as well as copious irrigation made buccal trough around teeth numbers 25, 26, 27, 28, 29, 30, 31, 32: Sectioned teeth #30, 31, 32 with handpiece and delivered all tooth roots with dental  elevator, confirmed complete removal of all teeth roots, with rongeur forceps smoothed bone in this quadrant removing alveolar bone and interpedicular bone, noted to be smooth to digital palpation, copious irrigation applied to surgical field, applied Surgicel to extraction sockets and close primarily 3-0 Vicryl in a running and interrupted fashion.  Surgical site noted to be hemostatic    Attention was directed to the patient's left maxilla: A 15 scalpel was used to make a crestal incision over tooth #16 which was impacted, as well as mesial sulcular incision to midline at tooth #9.  A full-thickness mucoperiosteal flap was reflected with #9 periosteal elevator on the buccal lingual extension the maxilla exposing buccal and palatal bone.  Removed bone over tooth #16 with Molt elevator and delivered with straight dental elevator, no extension into maxillary sinus was noted.  With a handpiece and fissure bur as well as copious irrigation removed buccal bone around teeth numbers 9, 10, 11, 12, 13, 15: Sectioned teeth numbers 15.  Delivered all crowns and tooth roots with straight dental elevators and luxators, confirmed removal of all tooth roots, with rongeur forceps smooth alveolar bone and removed bone is needed, noted to be smooth to digital palpation, copious irrigation applied to surgical site, placed Surgicel in extraction sockets and close primarily with 3-0 Vicryl in a running and interrupted fashion.  The surgical site noted to be hemostatic    Attention was directed to the patient's left mandible: A 15 scalpel was used to make a sulcular incision from tooth #24 to toot#17, with distal buccal release, full-thickness mucoperiosteal flap was reflected with #9 periosteal elevator on the buccal and lingual aspects of the patient's left mandibular alveolar ridge, exposing buccal bone, with handpiece and fissure bur as well as copious irrigation made buccal trough around teeth numbers 17, 18, 19, 20, 21, 22, 23,  24: Section teeth numbers 18, 19.  Delivered all tooth roots and crowns with straight dental elevator and luxators, confirmed complete removal of all teeth in this quadrant as well as roots, with rongeur forceps smooth bone in this quadrant moving alveolar and in a radicular bone, noted to be smooth to digital palpation, copious irrigation applied to surgical field, applied Surgicel to extraction sites and closed permanently 3-0 Vicryl in a running and interrupted fashion.  Surgical site noted to be hemostatic prior to closure.      The patient's oral cavity was suctioned.  An rebecca-gastric tube was passed to decompress gastric contents. The patient was turned over to the Anesthesia Service and was awakened in the OR and transferred stable to the PACU.     Attending surgeon was present for the entirety of the procedure.     Jr Escobar DDS  [facility] resident, PGY 4

## 2021-02-25 NOTE — PROGRESS NOTES
ORAL & MAXILLOFACIAL SURGERY   PROGRESS NOTE  Obed Wiley,  MRN: [id number],  : 1990         ASSESSMENT:  31 year old male with PMH alcoholic cirrhosis, heterozygous protein C deficiency, history of pulmonary embolism, DVT, mesenteric vein thrombosis and most recently with portal vein thrombosis as well as history of GI bleeding and chronic thrombocytopenia secondary to liver disease S/P permanent IVC filter in place who is s/p full mouth tooth extraction in the OR on 2021 with OMFS.    PLAN:  - Diet: Soft diet; No hot foods for 48 hours after procedure. Limit amount of chewing for the first week.   - Ice packs to face 20 minutes on/ 20 minutes off as needed for swelling  - Pain control per primary team  - For persistent oozing, place new gauze and have patient bite hard for 1 hour. Oozing should decrease 24 hours following surgery. Please contact the [facility] OMFS resident with any concerns, as most bleeding can be treated with local measures.   - AVOID suction, as this can remove blood clot and cause persistent oozing.  - Patient should also avoid spitting. If he feels like he needs to spit, he should drool into a cup.    - Encourage ambulation  - OK from OMFS perspective to discharge as his wounds are hemostatic.     Please contact the OMFS resident on-call with questions or concerns.    Discussed with chief resident and staff.    Clemente Meza DDS  Oral & Maxillofacial Surgery, PGY-1    ____________________________________      SUBJECTIVE:  Patient experiencing moderate pain and endorses mild to moderate oozing which he has been swallowing/drooling.      PHYSICAL EXAM:   GEN: WD/WN male, NAD  HEENT: NC/AT, EOMI, PERRL, mild mid and lower face edema consistent with surgery, mandible palpable bilaterally. Intraorally his maxillary and mandibular wounds are hemostatic with sutures intact. There is very mild oozing and a lot of saliva pooling in his mouth. Saliva is mostly clear with mild dark blood in it.  Mild vestibular swelling in each quadrant of mouth consistent with surgery. FOM not distended. Oropharynx clear.   NEURO: AAOx4, CN II-XII intact bilaterally  PSYCH: Appropriate mood and affect     LABS:   Reviewed  CBC RESULTS:   Recent Labs   Lab Test 02/24/21  1535 02/24/21  1329   WBC  --  4.4   RBC  --  4.33*   HGB  --  13.0*   HCT  --  39.2*   MCV  --  91   MCH  --  30.0   MCHC  --  33.2   RDW  --  13.7   PLT 58* 46*     Last Comprehensive Metabolic Panel:  Sodium   Date Value Ref Range Status   02/25/2021 138 133 - 144 mmol/L Final     Potassium   Date Value Ref Range Status   02/25/2021 3.8 3.4 - 5.3 mmol/L Final     Chloride   Date Value Ref Range Status   02/25/2021 109 94 - 109 mmol/L Final     Carbon Dioxide   Date Value Ref Range Status   02/25/2021 26 20 - 32 mmol/L Final     Anion Gap   Date Value Ref Range Status   02/25/2021 3 3 - 14 mmol/L Final     Glucose   Date Value Ref Range Status   02/25/2021 192 (H) 70 - 99 mg/dL Final     Urea Nitrogen   Date Value Ref Range Status   02/25/2021 12 7 - 30 mg/dL Final     Creatinine   Date Value Ref Range Status   02/25/2021 0.59 (L) 0.66 - 1.25 mg/dL Final     GFR Estimate   Date Value Ref Range Status   02/25/2021 >90 >60 mL/min/[1.73_m2] Final     Comment:     Non  GFR Calc  Starting 12/18/2018, serum creatinine based estimated GFR (eGFR) will be   calculated using the Chronic Kidney Disease Epidemiology Collaboration   (CKD-EPI) equation.       Calcium   Date Value Ref Range Status   02/25/2021 8.2 (L) 8.5 - 10.1 mg/dL Final       RADIOLOGY:  None new

## 2021-02-25 NOTE — PLAN OF CARE
Patient discharging. Discharge paperwork was reviewed with patient. Patient expressed understanding. A copy was given to patient. Pt discharging home. Family member will be transport. Discharge medications available in pharmacy; pt was advised to pickup medications before they leave. All patient belongings were taken with patient.     Patient alert and oriented at discharge. Pain was controlled with PO medications. Patient refused transport, wanted to walk to lobby.    Jarrod Grey RN

## 2021-02-25 NOTE — PLAN OF CARE
Problem: Adult Inpatient Plan of Care  Goal: Absence of Hospital-Acquired Illness or Injury  Intervention: Identify and Manage Fall Risk  Recent Flowsheet Documentation  Taken 2/25/2021 1015 by JESE GARZON  Safety Promotion/Fall Prevention:   clutter free environment maintained   fall prevention program maintained   nonskid shoes/slippers when out of bed   patient and family education   safety round/check completed  Intervention: Prevent Skin Injury  Recent Flowsheet Documentation  Taken 2/25/2021 1015 by JESE GARZON  Body Position: position changed independently  Taken 2/25/2021 0753 by JESE GARZON  Body Position:   position changed independently   sitting up in bed  Intervention: Prevent and Manage VTE (Venous Thromboembolism) Risk  Recent Flowsheet Documentation  Taken 2/25/2021 1015 by JESE GARZON  VTE Prevention/Management:   pneumatic compression device   ambulation promoted   fluids promoted  Goal: Optimal Comfort and Wellbeing  Intervention: Provide Person-Centered Care  Recent Flowsheet Documentation  Taken 2/25/2021 1015 by JESE GARZON  Trust Relationship/Rapport:   care explained   choices provided

## 2021-02-25 NOTE — PLAN OF CARE
"Vital signs:  Temp: 98.1  F (36.7  C) Temp src: Axillary BP: 116/63 Pulse: 88   Resp: 16 SpO2: 93 % O2 Device: Nasal cannula Oxygen Delivery: 2 LPM Height: 188 cm (6' 2\") Weight: 86.2 kg (190 lb 0.6 oz)    5350-5453:    Patient arrived onto  at 2000.   Activity: Up to bathroom with SBA.   Neuros: A & O x4. Neuro intact.   Cardiac: WDL. Asymptomatic.   Respiratory: LS clear. O2 sats high 90s on 2 L/min NC. Denies SOB.   GI/: BS+, denies passing flatus, no BM.   Diet: Soft/mechanical dental soft diet. Tolerated eating ice cream and pudding. On thin liquids. Takes meds orally. No straws. No hot foods within 48 hours.  Skin: Sutures in mouth. Large amount of drainage from mouth per baseline, just allows to drool into bucket or washcloth, no spitting, bites on wet kerlix. Wears jaw bra with ice, cold helps with pain.   Lines: PIV saline locked.   Drains: None.   Labs:  at bedtime, refused insulin. Lantus administered. Recheck  at 0300.   Pain/nausea: PRN oxycodone 5mg x1 and PRN Dilaudid 0.3mg x1 effective for mouth pain control. Slept all night. Also takes buprenorphine buccal. Denies nausea.   New changes this shift: None.   Plan: Continue POC.      "

## 2021-02-25 NOTE — PROGRESS NOTES
Admitted/transferred from: PACU   2 RN skin assessment completed by Jaky Chatman, RN and Jeanne Avelar RN.  Skin assessment finding: Gums sutured and large sanguinous output. Otherwise WDL.    Interventions/actions: Will continue to monitor.

## 2021-02-26 NOTE — DISCHARGE SUMMARY
[facility]  Discharge Summary - Medicine & Pediatrics       Date of Admission:  2/24/2021  Date of Discharge:  2/25/2021  3:12 PM  Discharging Provider: Dr. Atkinson  Discharge Service: Jenae Ridley    Discharge Diagnoses     - Complete tooth extraction  - H/O DVT, PE  - Protein C Deficiency  - Alcoholic cirrhosis  - Thrombocytopenia  - T2DM  - Insomnia  - GERD    Follow-ups Needed After Discharge   Follow-up Appointments     Adult [facility] Follow-up and recommended labs and tests      Follow up with primary care provider, Anjelica Reyes, within 7 days to   evaluate after surgery.  The following labs/tests are recommended: CBC &   INR.      Appointments on [place] and/or [facility] (with [facility] or [facility]   provider or service). Call [phone] if you haven't heard regarding   these appointments within 7 days of discharge.             Unresulted Labs Ordered in the Past 30 Days of this Admission     Date and Time Order Name Status Description    2/24/2021 1420 Platelets prepare order unit In process           Discharge Disposition   Discharged to home  Condition at discharge: Stable      Hospital Course   Obed Wiley was admitted on 2/24/2021 for postoperative observation.  The following problems were addressed during his hospitalization:    Complete tooth extraction  Completed by OMFS on 2/24 for transplant workup. Patient was evaluated by OMFS on day of discharge who felt that the patient was healing appropriately and was ok to do dicharge from surgical perspective as his wound were hemostatic. Patient was hemodynamically stable and reported improving pain control with PO pain medications  - Oxycodone 5mg q4H PRN for 30 tabs  - Diet: Soft diet; No hot foods for 48 hours.   - Ice packs to face 20 minutes on/ 20 minutes off  - Pain control per primary team  - AVOID suction, as this can remove blood clot and cause persistent oozing.  - Patient should also avoid  spitting. If he feels like he needs to spit, he should drool into a cup.    - follow up with PCP in 1 week  - for any acute surgical concerns, patient should follow up with OMFS     H/O DVT, PE  Protein C Deficiency  S/p permanent IVC filter in place, on long term AC with Lovenox 1.5mg/kg daily. Eneida-operative anticoagulation instructions graciously outlined in Heme/Onc provider note dated 1/16, re-iterated below.  - Restart current dose of enoxaparin at 24 hours post-procedure  - follow up with PCP within 1 week with a follow up CBC  - follow up with outpatient hematology      Alcoholic cirrhosis  Thrombocytopenia  Undergoing transplant workup. MeldNa of 9.   - Continue PTA senna, rifaximin  - Continue PTA Lasix, spironolactone  - Continue PTA SBP ppx (Cipro daily)     T2DM  - Continue PTA Glargine 26 units daily  - Continue PTA carb-counting short acting - 1 unit per 15g CHO  - Continue PTA medium intensity SSI     Insomnia  - Continue PTA amitriptyline, seroquel PRN, melatonin PRN  - continue PTA suvorexan     GERD  -Continue PTA PPI      Consultations This Hospital Stay   None    Code Status   Full Code       The patient was discussed with Dr. Demetrio Truong MD  Internal Medicine, PGY-3  [address] [address]  Phone: [phone]  ______________________________________________________________________    Physical Exam   Vital Signs: Temp: 98.4  F (36.9  C) Temp src: Oral BP: 114/58 Pulse: 108   Resp: 16 SpO2: 99 % O2 Device: None (Room air) Oxygen Delivery: 2 LPM  Weight: 190 lbs .58 oz    GENERAL: Alert, interactive, NAD  HEENT: s/p full mouth tooth extraction, serosanguinous drainage present in oral cavity, no conjunctivitis, anicteric sclera  LUNGS: clear to auscultation bilaterally  HEART: regular rate and rhythm, no murmurs appreciated   ABDOMEN: Soft, nontender, nondistended. +BS  EXTREMITIES: No LE edema bilaterally  SKIN:  Warm and dry, no jaundice or acute rashes  NEURO: alert, moving all 4 extremities equally, grossly nonfocal      Primary Care Physician   Anjelica Reyes    Discharge Orders      Reason for your hospital stay    You were hospitalized for observation after your surgical procedure to monitor for bleeding. You were evaluated by the surgeons on your day of discharge who felt that you wound were healing appropriately.     Adult [facility] Follow-up and recommended labs and tests    Follow up with primary care provider, Anjelica Reyes, within 7 days to evaluate after surgery.  The following labs/tests are recommended: CBC & INR.      Appointments on [place] and/or [facility] (with [facility] or [facility] provider or service). Call [phone] if you haven't heard regarding these appointments within 7 days of discharge.     Activity    Your activity upon discharge: activity as tolerated     When to contact your care team    Call your primary doctor if you have any of the following:  increased shortness of breath, increased drainage, increased swelling, increased pain or increased bleeding.     Discharge Instructions    - Ice packs to face 20 minutes on/ 20 minutes off as needed for swelling  - For persistent oozing, place new gauze and have patient bite hard for 1 hour.   - AVOID suction, as this can remove blood clot and cause persistent oozing.  - Patient should also avoid spitting. If he feels like he needs to spit, he should drool into a cup.       Wound care and dressings    HOME CARE INSTRUCTIONS FOLLOWING SURGERY    CARE OF THE MOUTH    1. WOUND CARE: Do not disturb the wound. Do not rinse your mouth or use a mouthwash for the day of surgery. If you smoke, please refrain from doing so for at least 4 days. Avoid probing the wound. A waterpick should not be used during the early healing period. The above activities will cause complications with wound healing.     2. SWELLING: Facial swelling occurs following most  dental extractions and oral surgery procedures. To help minimize swelling, apply an ice pack to the face for 20 minutes; remove for 20 minutes; alternating back and forth throughout the first day after surgery. To be most effective, the applications of ice packs should begin as soon as possible.   The maximum facial swelling normally occurs on days 2-5 following surgery. Once present, it can remain swollen for 7-10 days after surgery.     3. PAIN: A variable amount of pain follows most extractions or oral surgical procedures. If you are given a prescription for pain medication please use as directed. Many times analgesics are prescribed on a scheduled basis. Excessive or increased pain after the third day following surgery is not normal. Should this occur, please call the clinic and set up a follow up appointment if not already scheduled.     4. BLEEDING: A slight amount of bleeding or oozing is expected up to 24 hours after surgery. Theses conditions are no cause for alarm. Following your oral surgery, a small sterile gauze compress may be placed on the wound and we ask that you maintain steady biting pressure on the gauze for 1 hour.      5. NAUSEA: Nausea is not an uncommon event after surgery, and it is sometimes cause by narcotic pain medication. Nausea may be reduce by taking pills with food or water. Attempt to keep drinking small amounts of clear fluids if you find the nausea does not improve. Cola drinks with less carbonation may help with nausea.     6. DISCOLORATION: Facial discoloration (black and blue bruising) often follows many extraction and oral surgery procedures. Discoloration is normal and is no cause for alarm. It may persist for several weeks.     7. JAW STIFFNESS: For several days following most oral procedures, the jaw may become somewhat stiff. Should jaw stiffness progress or persist after one week, notify you oral surgeon.     8. DIET: Soft diet must be followed for 7 days. It is extremely  "important to maintain adequate hydration for normal healing. Examples of soft foods include: masked potatoes, soups, applesauce, jell-o, ice cream, overcooked pasta.     Please use the Internet to look up liquid diets to help with ideas     9. MOUTH RINSES: The day following surgery begin using warm salt water rinses after meals and several times during the day as directed by your oral surgeon. One-half teaspoon of table salt in a full glass of warm water is recommended.       10. PHYSICAL ACTIVITY/REST: Keep physical activity to a minimum. Avoid athletic and strenuous activities and get plenty of rest.    11. STICHES: Following many extractions and oral surgery procedures, stiches as placed in the gums. Your doctor will advise you if a return appointment is needed for stitch removal.    12. DRY SOCKET: Despite the best of care, a small percentage of patients who have teeth removed will develop a \"Dry Socket\". A \"Dry Socket\" is a condition where the wound healing is interrupted. This condition usually develops 3-10 days after your extraction. Typically, patients will note increased pain at the extraction site. The aching pain may worsen and spread to the ear and even eye area of the face. If you exhibit these symptoms please call our clinic and schedule a follow up appointment. \"Dry Sockets\" are an easily treatable condition.      13. BRUSHING: Resume your normal oral hygiene routine within 24 hours following surgery. Soreness ans swelling may not permit vigorous brushing of all areas, but please make every effort to clean your teeth within comfort.     14. NUMBNESS: Local anesthetics may be effective for as long as 24-48 hours. Should you experience numbness beyone this period, notify your oral surgeon.       AFTER HOURS CONTACT FOR EMERGENCIES:  Please call the [facility] switchboard at [phone] and ask to have the Oral and Maxillofacial Surgery Resident On-call paged.     It is our desire to make " your recovery as smooth as possible. These instructions are given to assist you following your surgery. If you have any questions please call the clinic at [phone].     Full Code     Diet    Diet: Soft diet; No hot foods for 48 hours after procedure. Limit amount of chewing for the first week.       Significant Results and Procedures   Most Recent 3 CBC's:  Recent Labs   Lab Test 21  0612 21  1535 21  1329 10/27/20   WBC 3.7*  --  4.4 3.2*  3.2*   HGB 11.4*  --  13.0* 13.8  13.8   MCV 92  --  91 86.8  86.8   PLT 50* 58* 46* 52*  52*     Most Recent 3 BMP's:  Recent Labs   Lab Test 21  1135 21  0612 21  1329    138 134   POTASSIUM 3.9 3.8 3.9   CHLORIDE 105 109 105   CO2 25 26 24   BUN 12 12 17   CR 0.69 0.59* 0.59*   ANIONGAP 5 3 5   NATE 8.4* 8.2* 8.7   * 192* 288*     Most Recent 2 LFT's:  Recent Labs   Lab Test 21  1135 21  0916 10/27/20   AST 39  --  35  35   ALT 28  --  28  28   ALKPHOS 113  --  121*  121*   BILITOTAL 1.6* 1.0 1.1  1.1     Most Recent 3 INR's:  Recent Labs   Lab Test 21  1135 21  0916 10/27/20   INR 1.25* 1.30* 1.1   ,   Results for orders placed or performed during the hospital encounter of 21   Echo Stress Echocardiogram    Narrative    [id number]  [id number]  [id number]  [id number]^SAUL^YARELI^ENRIQUE FUNES           [facility]  Echocardiography Laboratory  [address] [place] [id number]     Name: ELIDA KENNY  MRN: [id number]  : 1990  Study Date: 2021 10:51 AM  Age: 30 yrs  Gender: Male  Patient Location: [facility] [[email or web address]  Reason For Study: Alcoholic cirrhosis of liver with ascites (H)  Ordering Physician: YARELI HUGHES  Referring Physician: YARELI HUGHES  Performed By: ANTONI Mills     BSA: 2.1 m2  Height: 74 in  Weight: 178 lb  HR: 75  BP: 106/71  mmHg  _____________________________________________________________________________  __     _____________________________________________________________________________  __        Interpretation Summary  Normal, low-risk dobutamine echocardiogram without evidence of ischemia.  The target heart rate was achieved.  Normal biventricular size, thickness, and global systolic function at  baseline, LVEF=60-65%.  With low-dose dobutamine, LVEF augmented and LV cavity size decreased  appropriately.  With peak dobutamine, LVEF increased further to >70% and LV cavity size  decreased appropriately.  No regional wall motion abnormalities at rest or with dobutamine.  No angina was elicited.  No ECG evidence of ischemia. Normal heart rate and blood pressure response to  dobutamine.  No significant valvular abnormalities are noted on screening Doppler exam.  The aortic root and visualized ascending aorta are normal.  _____________________________________________________________________________  __     Stress  The drug infusion was stopped due to target heart rate achieved.  The patient did not exhibit any symptoms during drug infusion.  The maximum dose of dobutamine was 40mcg/kg/min.  The maximum dose of atropine was 0.6mg.  The maximum dose of metoprolol was 4.0mg.  Optison (NDC #5228-0314-33) given intravenously.  Patient was given 9 ml mixture of 3 ml Optison and 6 ml saline.  0 ml wasted.  IV start location R Forearm .  Optison Expiration 04/15/2022 .  Optison Lot # [id number] .  This was a normal stress EKG with no evidence of stress-induced ischemia.     Baseline  Resting ECG is normal.     Stress Results                                       Maximum Predicted HR:   189 bpm             Target HR: 161 bpm        % Maximum Predicted HR: 86 %                           Stage DurationHeart Rate  BP   Dose                               (mm:ss)   (bpm)                      Baseline  0:00      75    106/71 0.00                         Peak    10:21     162   118/5940.00                           Stress Duration:   10:21 mm:ss *                     Maximum Stress HR: 162 bpm *     Procedure  Dobutamine stress echo with two dimensional color and spectral Doppler  performed.  _____________________________________________________________________________  __     MMode/2D Measurements & Calculations  asc Aorta Diam: 3.2 cm                 _____________________________________________________________________________  __           Report approved by: MD Ronnie Patrick 02/01/2021 11:41 AM            Discharge Medications   Discharge Medication List as of 2/25/2021  2:34 PM      START taking these medications    Details   oxyCODONE (ROXICODONE) 5 MG tablet Take 1 tablet (5 mg) by mouth every 4 hours as needed for pain, Disp-30 tablet, R-0, Local Print         CONTINUE these medications which have NOT CHANGED    Details   amitriptyline (ELAVIL) 25 MG tablet Take 25 mg by mouth At Bedtime, Historical      BELBUCA 150 MCG FILM buccal film Place 150 mcg inside cheek daily OK to take am 2-24-21 per PCP, MIKE, Historical      blood glucose (NO BRAND SPECIFIED) lancets standard Use to test blood sugar 6 times daily or as directed.Historical      !! cholecalciferol (VITAMIN D3) 23078 units (1250 mcg) capsule Take 50,000 Units by mouth every 7 days Saturdays, Historical      ciprofloxacin (CIPRO) 500 MG tablet Take 500 mg by mouth daily, Historical      cyclobenzaprine (FLEXERIL) 10 MG tablet Take 1 tablet (10 mg) by mouth every 8 hours as needed for muscle spasms, Disp-30 tablet,R-0, E-Prescribe      diclofenac (VOLTAREN) 1 % topical gel Apply 2 g topically 4 times daily, Disp-50 g,R-0, E-Prescribe      enoxaparin ANTICOAGULANT (LOVENOX) 120 MG/0.8ML syringe Inject 1.5 mg/kg Subcutaneous daily 2-19-21 Currently 135 mgm every 24 hours. Last dose 24 hours prior to dental work and restart 24 hours postop. See note Edward Hansen, Historical       ferrous sulfate (FEROSUL) 325 (65 Fe) MG tablet Take 325 mg by mouth daily (with breakfast), Historical      folic acid (FOLVITE) 1 MG tablet Take 1 mg by mouth 2 times daily, Historical      furosemide (LASIX) 40 MG tablet Take 40 mg by mouth 3 times daily Will hold am DOS 2-24-21, Historical      gabapentin (NEURONTIN) 400 MG capsule Take 400 mg by mouth 3 times daily One heri at bedtime for sleep., Historical      HYDROcodone-acetaminophen (NORCO) 5-325 MG tablet Take 1 tablet by mouth, Historical      hydrOXYzine (ATARAX) 25 MG tablet Take 1 tablet (25 mg) by mouth every 6 hours as needed for anxiety, Disp-30 tablet,R-0, E-Prescribe      insulin aspart (NOVOLOG FLEXPEN) 100 UNIT/ML pen 1 unit for every 12 grams of carbohydrate with meals and snacks plus correction scale. Uses up to 40 units per day., Disp-45 mL, R-0, No Print Out      insulin glargine (LANTUS PEN) 100 UNIT/ML pen Inject 26 Units Subcutaneous every morning, Disp-30 mL, R-0, No Print OutIf Lantus is not covered by insurance, may substitute Basaglar at same dose and frequency.        lactulose (CHRONULAC) 10 GM/15ML solution Take 45 mLs (30 g) by mouth 3 times daily 20mg / 30 ml tid daily/ prn, No Print Out      lidocaine (LIDODERM) 5 % patch Place 1 patch onto the skin every 24 hours To prevent lidocaine toxicity, patient should be patch free for 12 hrs daily.Disp-30 patch,[id number]      magnesium oxide 400 MG CAPS Take 400 mg by mouth 3 times daily , Historical      MULTIPLE VITAMIN-FOLIC ACID PO Take 1 tablet by mouth 1 daily, Historical      Nutritional Supplements (ENSURE PO) Ensure/ boost - 237 ml bid daily OK before 0700 am DOS, Historical      pantoprazole (PROTONIX) 40 MG EC tablet Take 40 mg by mouth daily, Historical      polyethylene glycol (MIRALAX/GLYCOLAX) packet Take 1 packet by mouth daily , Historical      promethazine (PHENERGAN) 12.5 MG tablet Take 25 mg by mouth every 6 hours as needed for nausea, Historical       QUEtiapine (SEROQUEL) 25 MG tablet Take 25 mg by mouth nightly as needed (bedtime) , Historical      rifaximin (XIFAXAN) 550 MG TABS tablet Take 550 mg by mouth 2 times daily , Historical      senna (SENOKOT) 8.6 MG tablet Take 1 tablet by mouth daily as needed for constipation, No Print Out      spironolactone (ALDACTONE) 100 MG tablet Take 100 mg by mouth 3 times daily Holding am DOS, Historical      Suvorexant (BELSOMRA) 20 MG tablet Take 10 mg by mouth At Bedtime, Historical      tretinoin (RETIN-A) 0.025 % external cream Apply topically At BedtimeHistorical      !! Vitamin D, Cholecalciferol, 25 MCG (1000 UT) CAPS Take 2,000 Units by mouth daily , Historical       !! - Potential duplicate medications found. Please discuss with provider.        Allergies   Allergies   Allergen Reactions     Bee Anaphylaxis     Adhesive Tape Rash     Sulfa Drugs Itching     Morphine      Can't take with other pain medications

## 2021-02-26 NOTE — PROGRESS NOTES
[facility]: Post-Discharge Note  SITUATION                                                      Admission:    Admission Date: 02/24/21   Reason for Admission: Nonrestorable tooth  Discharge:   Discharge Date: 02/25/21  Discharge Diagnosis: Nonrestorable tooth    BACKGROUND                                                      31 yo M with PMH of etoh cirrhosis and heterozygous protein C deficiency admitted for overnight observation after full mouth dental extraction for his liver transplant work up.      Follows with hematology as outpatient who outlined recommendations for his protein C deficiency in regards to this dental procedure. Will continue to hold lovenox through 24hours from procedure. Likely discharge tomorrow PM - he states he has a friend who can give him a ride tomorrow afternoon.      Will continue suboxone with small amount of oral oxycodone for acute pain with plan to transition back to suboxone on discharge pending pain tolerability.     ASSESSMENT      Discharge Assessment  Patient reports symptoms are: Improved  Does the patient have all of their medications?: Yes  Does patient know what their new medications are for?: Yes  Does patient have a follow-up appointment scheduled?: Yes  Does patient have any other questions or concerns?: No    Post-op  Did the patient have surgery or a procedure: Yes  Fever: No  Chills: No  Eating & Drinking: eating and drinking without complaints/concerns;unable to tolerate solid foods  Bowel Function: normal  Urinary Status: voiding without complaint/concerns        PLAN                                                      Outpatient Plan:     Follow up with primary care provider, Anjelica Reyes, within 7 days to evaluate after surgery.  The following labs/tests are recommended: CBC & INR.           No future appointments.        Wilma Fierro

## 2021-04-09 ENCOUNTER — DOCUMENTATION ONLY (OUTPATIENT)
Dept: TRANSPLANT | Facility: CLINIC | Age: 31
End: 2021-04-09

## 2021-04-25 ENCOUNTER — HEALTH MAINTENANCE LETTER (OUTPATIENT)
Age: 31
End: 2021-04-25

## 2021-05-11 ENCOUNTER — TELEPHONE (OUTPATIENT)
Dept: ANESTHESIOLOGY | Facility: CLINIC | Age: 31
End: 2021-05-11

## 2021-05-11 NOTE — TELEPHONE ENCOUNTER
[facility] Call Center    Phone Message    May a detailed message be left on voicemail: yes     Reason for Call: Other: Pt requesting call back. Pt is needing to know for his appt on 5/14, why a consult is needed for the injection?     Action Taken: Message routed to:  [facility] ([facility] pain

## 2021-05-12 NOTE — TELEPHONE ENCOUNTER
Writer returned call to patient to address phone call and to reschedule appointment to NP's schedule for Fri 5/14. Patient questioning why he needs a consult before an injection. Writer explained that he is a referral as a new patient and has never been seen or evaluated before. Patient verbalized understanding. Patient states ok to move to Anabel Messer NP schedule for Friday as a new patient. Date and time discussed with patient.    Marianne Razo RN

## 2021-05-13 NOTE — PROGRESS NOTES
[facility]     Date of visit: 5/14/2021    Assessment:  Obed Wiley is a 31 year old male with a past medical history significant for alcoholic cirrhosis of liver with ascites, hepatic encephalopathy, type 2 diabetes mellitus, chronic pancreatitis, and DVT  who presents with complaints of low back pain.     1. Low back pain- etiology likely related to degenerative disc disease and foraminal stenosis- most significant at L5-S1, deconditioning likely contributing as well.    2. Mental Health - the patient's mental health concerns, specifically situational depression, affect his experience of pain and contribute to his clinically significant distress.    1. DDD (degenerative disc disease), lumbar    2. Lumbar foraminal stenosis    3. Chronic pain syndrome        Plan:  The following recommendations were given to the patient. Diagnosis, treatment options, risks, benefits, and alternatives were discussed, and all questions were answered. The patient expressed understanding of the plan for management.     Today's visit was to determine if Obed would be a candidate for an epidural steroid injection. Based on pain history and physical exam it does appear that he has radicular low back pain that may benefit from an epidural steroid injection. Unfortunately he has advanced liver disease with a recent platelet count of 63,000. I did discuss this case with Dr. Ca who decided that a thromboelastography as well as a PTT and INR would help determine the safety of such an injection and he may still be a candidate I have ordered these blood tests and will ask nursing to advise he schedule. Pending the results, we can consider attempting an epidural steroid injection.     Review of Electronic Chart: Today I have also reviewed available medical information in the patient's medical record at [place] ([facility]), including relevant provider notes, laboratory work, and imaging.       JAMES Fraser CNP  [facility]       -------------------------------------------------    Subjective:    Reason for consultation:    Obed Wiley is a 31 year old male who is seen in consultation today at the request of his gastroenterologist Dr. Santos for evaluation of his pain issues and recommendations for management, with specific emphasis on  My Clinical Question Is: patient need cortisone shot for back, local providers won't due to liver disease on liver transplant list    Timeframe Requested: Routine: Next available opening    Priority: Routine    Reason for Referral: Procedure Order    Procedure: Injection to be Determined by Pain Management Specialist       Please see the [facility] health questionnaire which the patient completed and reviewed with me in detail.    Review of [place] Prescription Monitoring Program (): No concern for abuse or misuse of controlled medications based on this report.     Review of Electronic Chart: Today I have also reviewed available medical information in the patient's medical record at [place] ([facility]), including relevant provider notes, laboratory work, and imaging.     Pain medications are being prescribed by Anjelica Reyes.     Chief Complaint:    Chief Complaint   Patient presents with     Pain Management     New consult        Pain history:  Obed Wiley is a 31 year old male who presents for initial evaluation of chief complaint of low back pain.      He first started having problems with low back pain in highschool. He notes that he was 450lbs at that time, attributes the development of his pain to be due to his body habitus and working long hours. Insidious onset, without acute precipitating event. His pain has gradually worsened over the years. He worked with a pain clinic in [place], [place] for a while 4-5 years ago but this was for abdominal pain related to liver disease. He was evaluated by orthopedic surgery with [facility] in [facility]  "injections were discussed but he was not able to pursue due to low platelet count. He was also evaluated at a facility in [place] who also said he was not a candidate. He has had two sessions of aquatic therapy, thought that this was going well but unfortunately had to stop as he developed an incarcerated hernia. He was told he cannot restart until he has surgery to repair and cannot have this surgery yet. He has tried chiropractic care with short term benefit and later no benefit. He tried acupuncture without benefit. Hx of complex medical hx including liver failure. He notes his health has previously limited him from pursuing injections in his back. His pain is located in bilateral low back. The pain radiates down bilateral buttocks and posterior thighs. Tingling in bilateral feet when laying down. Denies weakness in legs.       Pain description:  Location: low back  Quality: sharp  Severity/Intensity: 5-6/10 at best, 10/10 at worst, 7/10 on average  Aggravating factors include: heavy lifting, too much activity  Relieving factors include: soaking in hot water with menthol, medications- gabapentin and belbuca    The patient otherwise denies bowel or bladder incontinence, parasthesias, weakness, saddle anesthesia, unintentional weight loss, or fever/chills/sweats.     Obed Wiley has been seen at a pain clinic in the past.  [facility] 4-5 years ago.     Pain Treatments:  (H--helped; [facility]--Helped initially; [facility]--Somewhat helpful; [facility]--No help; W--worse; SE--side effects; ?--Unsure if helpful)   1. Medications:       Current pain medications:   Amitriptyline 25mg at bedtime- H for sleep    Belbuca 150mcg BID- [facility], \"way more than oxycodone or Vicodin,\" more consistent pain relief   Flexeril 10mg TID prn- [facility], taking BID or TID, helps with pain and sleep   Voltaren gel prn- [facility], short term   Gabapentin 400mg TID- [facility] for pain and sleep, was on 900mg TID but decreased due to liver disease   Seroquel 25mg at " bedtime- [facility]    1. Previous Pain Relevant Medications:  NOTE: This medication information taken from patient's intake form, not medical records.    Opiates: Vicodin- H but short term, oxycodone- H but short term, Belbuca- [facility], Fentanyl patch- H!   NSAIDS: ibuprofen- H but cannot take    Muscle Relaxants: Flexeril- [facility] for pain and sleep   Anti-migraine mediations: no   Anti-depressants: amitriptyline- H, Cymbalta- ?, had to stop because of liver, SE, drowsiness   Sleep aids: Seroquel- H, amitriptyline- H   Anxiolytics: hydroxyzine- ?, [facility]    Neuropathics: gabapentin- [facility]   Topicals: Voltaren gel- [facility]   Other medications not covered above: Tylenol- H, cannot take    2. Physical Therapy: aquatic therapy- seemed helpful but had to stop due to medical problems  traditional physical therapy- [facility], [facility]  3. Pain Psychology: no  4. Surgery: gastric bypass surgery 2008 or 2009  cholecystectomy  5. Injections: never been able to have  6. Chiropractic: yes- [facility] short term, W, uncle is a chiropractor  7. Acupuncture: yes- [facility]  8. TENS Unit: yes- [facility]    Past Medical History:  Past Medical History:   Diagnosis Date     Alcoholic cirrhosis of liver with ascites (H) 12/23/2019     Diabetes (H)     Type 2 DM, Uses Insulin      DVT (deep vein thrombosis) in pregnancy      H/O protein C deficiency      Hepatic encephalopathy (H)      Hepatitis     Hep A when an infant      History of blood transfusion     2019 at [facility]      Leukopenia 1/11/2020     Other chronic pain     sciatica     Pancreatic disease     Chronic pancreatitis     SBP (spontaneous bacterial peritonitis) (H) 11/2019       Past Surgical History:  Past Surgical History:   Procedure Laterality Date     ABDOMEN SURGERY      Gastric Bypass 2009. [facility]      ABDOMEN SURGERY      Multiple paracentesis noted in preop H&P 2-4-21     CARDIAC SURGERY      IVC      CHOLECYSTECTOMY       COLONOSCOPY       ENT SURGERY      Tonsils and Adenoids Removed at 6-7 Years Old      ENT  SURGERY      Sinus surgery     GALLBLADDER SURGERY  2017     GI SURGERY      Upper GI      ODONTECTOMY N/A 2/24/2021    Procedure: Full mouth tooth extraction;  Surgeon: Addy Washburn DDS;  Location:  OR       Medications:  Current Outpatient Medications   Medication Sig Dispense Refill     amitriptyline (ELAVIL) 25 MG tablet Take 25 mg by mouth At Bedtime       BELBUCA 150 MCG FILM buccal film Place 150 mcg inside cheek daily OK to take am 2-24-21 per PCP       blood glucose (NO BRAND SPECIFIED) lancets standard Use to test blood sugar 6 times daily or as directed.       cholecalciferol (VITAMIN D3) 34347 units (1250 mcg) capsule Take 50,000 Units by mouth every 7 days Saturdays       ciprofloxacin (CIPRO) 500 MG tablet Take 500 mg by mouth daily       cyclobenzaprine (FLEXERIL) 10 MG tablet Take 1 tablet (10 mg) by mouth every 8 hours as needed for muscle spasms 30 tablet 0     diclofenac (VOLTAREN) 1 % topical gel Apply 2 g topically 4 times daily 50 g 0     enoxaparin ANTICOAGULANT (LOVENOX) 120 MG/0.8ML syringe Inject 1.5 mg/kg Subcutaneous daily 2-19-21 Currently 135 mgm every 24 hours. Last dose 24 hours prior to dental work and restart 24 hours postop. See note Edward Hansen       ferrous sulfate (FEROSUL) 325 (65 Fe) MG tablet Take 325 mg by mouth daily (with breakfast)       folic acid (FOLVITE) 1 MG tablet Take 1 mg by mouth 2 times daily       furosemide (LASIX) 40 MG tablet Take 40 mg by mouth 3 times daily Will hold am DOS 2-24-21       gabapentin (NEURONTIN) 400 MG capsule Take 400 mg by mouth 3 times daily One heri at bedtime for sleep.       insulin aspart (NOVOLOG FLEXPEN) 100 UNIT/ML pen 1 unit for every 12 grams of carbohydrate with meals and snacks plus correction scale. Uses up to 40 units per day. (Patient taking differently: 1 unit for every 12 grams of carbohydrate with meals and snacks plus correction scale. Uses up to 40 units per day.  Hold am DOS.) 45 mL 0     insulin glargine (LANTUS  PEN) 100 UNIT/ML pen Inject 26 Units Subcutaneous every morning (Patient taking differently: Inject 26 Units Subcutaneous At Bedtime Half or 13 units evening prior to surgery) 30 mL 0     lactulose (CHRONULAC) 10 GM/15ML solution Take 45 mLs (30 g) by mouth 3 times daily 20mg / 30 ml tid daily/ prn       magnesium oxide 400 MG CAPS Take 400 mg by mouth 3 times daily        MULTIPLE VITAMIN-FOLIC ACID PO Take 1 tablet by mouth 1 daily       Nutritional Supplements (ENSURE PO) Ensure/ boost - 237 ml bid daily OK before 0700 am DOS       pantoprazole (PROTONIX) 40 MG EC tablet Take 40 mg by mouth daily       polyethylene glycol (MIRALAX/GLYCOLAX) packet Take 1 packet by mouth daily        promethazine (PHENERGAN) 12.5 MG tablet Take 25 mg by mouth every 6 hours as needed for nausea       QUEtiapine (SEROQUEL) 25 MG tablet Take 25 mg by mouth nightly as needed (bedtime)        rifaximin (XIFAXAN) 550 MG TABS tablet Take 550 mg by mouth 2 times daily        senna (SENOKOT) 8.6 MG tablet Take 1 tablet by mouth daily as needed for constipation       spironolactone (ALDACTONE) 100 MG tablet Take 100 mg by mouth 3 times daily Holding am DOS       Suvorexant (BELSOMRA) 20 MG tablet Take 10 mg by mouth At Bedtime       tretinoin (RETIN-A) 0.025 % external cream Apply topically At Bedtime       Vitamin D, Cholecalciferol, 25 MCG (1000 UT) CAPS Take 2,000 Units by mouth daily        hydrOXYzine (ATARAX) 25 MG tablet Take 1 tablet (25 mg) by mouth every 6 hours as needed for anxiety (Patient not taking: Reported on 2/1/2021) 30 tablet 0       Allergies:     Allergies   Allergen Reactions     Bee Anaphylaxis     Adhesive Tape Rash     Sulfa Drugs Itching     Morphine      Can't take with other pain medications       Social History:  Home situation: lives in a house  Support system: mom normally  Occupation/Schooling: not working  Tobacco use: no  Drug use: no  Alcohol use: hx of alcoholic abuse, sober for 3 years  History of  chemical dependency treatment: never been, worked with a counselor  Mental health admissions: no    Family history:  Family History   Problem Relation Age of Onset     Protein C deficiency Mother      Pancreatic Cancer Father      Deonte Parkinson White syndrome Father      Alcoholism Brother      Substance Abuse Brother      Heart Failure Maternal Grandmother      Heart Failure Maternal Grandfather        Review of Systems:    POSTIVE IN BOLD  GENERAL: fever/chills, fatigue, general unwell feeling, weight gain/loss.  HEAD/EYES:  headache, dizziness, or vision changes.    EARS/NOSE/THROAT: nosebleeds, hearing loss, sinus infection, earache, tinnitus.  IMMUNE:  allergies, cancer, immune deficiency, or infections.  SKIN:  itching, rash, hives  HEME/Lymphatic: anemia, easy bruising, easy bleeding.  RESPIRATORY: cough, wheezing, or shortness of breath  CARDIOVASCULAR/Circulation: extremity edema, syncope, hypertension, tachycardia, or angina.  GASTROINTESTINAL: abdominal pain, nausea/emesis, diarrhea, constipation,  hematochezia, or melena.  ENDOCRINE:  diabetes, steroid use,  thyroid disease or osteoporosis.  MUSCULOSKELETAL: joint pain, stiffness, neck pain, back pain, arthritis, or gout.  GENITOURINARY: frequency, urgency, dysuria, difficulty voiding, hematuria or incontinence.  NEUROLOGIC: weakness, numbness, paresthesias, seizure, tremor, stroke or memory loss.  PSYCHIATRIC: depression, anxiety, stress, suicidal thoughts or mood swings.     Objective:    Imaging:  MRI of lumbar spine was completed on 4/2/2021 and shows:  L1-2: No disc displacement.  No evidence of central canal stenosis or neural  foraminal narrowing.    L2-3: Disc desiccation.  Shallow disc osteophyte complex without evidence  central canal stenosis or neural foraminal narrowing.  No significant interval  change from the previous examination.    L3-4: Disc desiccation.  Mild broad-based central disc protrusion contributing  to mild central canal  "stenosis.  Mild bilateral neural foraminal narrowing.  Overall, mild interval progression from the previous examination.    L4-5: Disc desiccation.  Mild broad-based disc bulge contributing to mild  central canal stenosis.  Mild bilateral neural foraminal narrowing, right  greater than left.  No significant interval change from the previous  examination.    L5-S1: No disc bulge.  No evidence central canal stenosis.  Mild-to-moderate  right neural foraminal narrowing.  No significant interval change from the  previous examination.    IMPRESSION:  1. Mild central canal stenosis at the L3-4 disc space, mildly progressed from  the previous examination.  2. Mild central canal stenosis at the L4-5 disc space, not significantly  changed from the previous examination.  3. Mild-to-moderate right neural foraminal narrowing with abutment of the  exiting right L5 nerve root.  No significant interval change from the previous  examination.  4. No acute vertebral body fractures.  5. Other degenerative changes as discussed above.    Labs:  CBC was completed on 3/2/2021 and shows:  WBC Count 4.8 - 10.8 10(3)/uL 3.9Low     RBC Count 4.70 - 6.10 10(6)/uL 3.69Low     Hemoglobin 14.0 - 18.0 g/dL 11.5Low     Hematocrit 42.0 - 52.0 % 34.9Low     MCV 80.0 - 94.0 fL 94.6High     MCH 27.0 - 31.0 pg 31.2High     MCHC 31.0 - 36.5 g/dL 33.0    RDW 11.5 - 14.5 % 14.7High     Platelets 150 - 350 10(3)/uL 63Low     MPV 9.0 - 13.0 fL 10.1        Physical Exam:  Vitals:    05/14/21 0831   BP: 123/81   Pulse: 88   SpO2: 96%   Weight: 82.6 kg (182 lb)   Height: 1.88 m (6' 2\")     Exam:  Constitutional: Well developed, appears stated age. Thin.   HEENT: Head atraumatic, normocephalic. Eyes without conjunctival injection or jaundice. Oropharynx clear. Neck supple. No obvious neck masses.  Skin: No rash, lesions, or petechiae of exposed skin.   Extremities: Peripheral pulses intact. No clubbing, cyanosis, or edema.  Psychiatric/mental status: Alert, " without lethargy or stupor. Speech fluent. Appropriate affect. Mood normal. Able to follow commands without difficulty.     Musculoskeletal exam:  Able to walk on the heels and toes with some difficulty. Patient has antalgic gait favoring neither side.   Normal bulk and tone. Unremarkable spinal curvature.     Cervical spine:  Range of motion within normal limits.  Tenderness in the cervical paraspinal muscles.No  Rotation/ext to right: painful   Rotation/ext to left: painful     Thoracic spine:    Kyphosis. No   Tenderness in the thoracic paraspinal muscles.Yes    Lumbar spine:    Flex: 40 degrees   Ext: 20 degrees   Tenderness in the lumbar paraspinal muscles.Yes   Rotation/ext to right: painful    Rotation/ext to left: painful     Myofascial tenderness: thoracic and lumbar paraspinals  Focal tenderness: No SI joint, gluteal, piriformis, or GT tenderness  Straight leg raise: positive bilaterally  FADIR: neg    Hip exam:   Normal internal and external range of motion bilaterally. NORMAN negative.     Neurologic exam:  CN:  Cranial nerves 2-12 are grossly intact  Motor:  5/5 UE and LE strength  Strength:       C4 (shoulder shrug)  symmetric 5/5       C5 (shoulder abduction) symmetric 5/5       C6 (elbow flexion) symmetric 5/5       C7 (elbow extension) symmetric 5/5       C8 (finger abduction, thumb flexion) symmetric 5/5    Reflexes:     Biceps:     R:  2/4 L: 2/4   Brachioradialis   R:  1/4 L: 1/4   Patella:  R:  2/4 L: 2/4   Achilles:  R:  2/4 L: 2/4    Sensory:   Light touch: normal bilateral upper and lower extremities    No allodynia, dysesthesia, or hyperalgesia.    BILLING TIME DOCUMENTATION:   The total TIME spent on this patient on the date of the encounter/appointment was 50 minutes.      TOTAL TIME includes:   Time spent preparing to see the patient (reviewing records and tests)   Time spent face to face (or over the phone) with the patient   Time spent ordering tests, medications, procedures and  referrals  Time spent Referring and communicating with other healthcare professionals   Time spent documenting clinical information in Epic

## 2021-05-14 ENCOUNTER — OFFICE VISIT (OUTPATIENT)
Dept: ANESTHESIOLOGY | Facility: CLINIC | Age: 31
End: 2021-05-14
Attending: INTERNAL MEDICINE
Payer: MEDICARE

## 2021-05-14 ENCOUNTER — TELEPHONE (OUTPATIENT)
Dept: ANESTHESIOLOGY | Facility: CLINIC | Age: 31
End: 2021-05-14

## 2021-05-14 VITALS
HEART RATE: 88 BPM | HEIGHT: 74 IN | OXYGEN SATURATION: 96 % | DIASTOLIC BLOOD PRESSURE: 81 MMHG | BODY MASS INDEX: 23.36 KG/M2 | SYSTOLIC BLOOD PRESSURE: 123 MMHG | WEIGHT: 182 LBS

## 2021-05-14 DIAGNOSIS — D69.6 THROMBOCYTOPENIA (H): ICD-10-CM

## 2021-05-14 DIAGNOSIS — M51.369 DDD (DEGENERATIVE DISC DISEASE), LUMBAR: Primary | ICD-10-CM

## 2021-05-14 DIAGNOSIS — G89.4 CHRONIC PAIN SYNDROME: ICD-10-CM

## 2021-05-14 DIAGNOSIS — M48.061 LUMBAR FORAMINAL STENOSIS: ICD-10-CM

## 2021-05-14 PROCEDURE — 99204 OFFICE O/P NEW MOD 45 MIN: CPT | Performed by: NURSE PRACTITIONER

## 2021-05-14 ASSESSMENT — MIFFLIN-ST. JEOR: SCORE: 1845.3

## 2021-05-14 ASSESSMENT — PAIN SCALES - GENERAL: PAINLEVEL: SEVERE PAIN (7)

## 2021-05-14 NOTE — LETTER
5/14/2021       RE: Obed Wiley  [address] [facility] [address]     Dear Colleague,    Thank you for referring your patient, Obed Wiley, to the [facility] FOR COMPREHENSIVE PAIN MANAGEMENT [place] at [facility]. Please see a copy of my visit note below.    [facility] Pain Management     Date of visit: 5/14/2021    Assessment:  Obed Wiley is a 31 year old male with a past medical history significant for alcoholic cirrhosis of liver with ascites, hepatic encephalopathy, type 2 diabetes mellitus, chronic pancreatitis, and DVT  who presents with complaints of low back pain.     1. Low back pain- etiology likely related to degenerative disc disease and foraminal stenosis- most significant at L5-S1, deconditioning likely contributing as well.    2. Mental Health - the patient's mental health concerns, specifically situational depression, affect his experience of pain and contribute to his clinically significant distress.    1. DDD (degenerative disc disease), lumbar    2. Lumbar foraminal stenosis    3. Chronic pain syndrome        Plan:  The following recommendations were given to the patient. Diagnosis, treatment options, risks, benefits, and alternatives were discussed, and all questions were answered. The patient expressed understanding of the plan for management.     Today's visit was to determine if Obed would be a candidate for an epidural steroid injection. Based on pain history and physical exam it does appear that he has radicular low back pain that may benefit from an epidural steroid injection. Unfortunately he has advanced liver disease with a recent platelet count of 63,000. I did discuss this case with Dr. Ca who decided that a thromboelastography as well as a PTT and INR would help determine the safety of such an injection and he may still be a candidate I have ordered these blood tests and will ask nursing to advise  he schedule. Pending the results, we can consider attempting an epidural steroid injection.     Review of Electronic Chart: Today I have also reviewed available medical information in the patient's medical record at [facility] ([facility]), including relevant provider notes, laboratory work, and imaging.       JAMES Fraser CNP  [facility] Pain Management       -------------------------------------------------    Subjective:    Reason for consultation:    Obed Wiley is a 31 year old male who is seen in consultation today at the request of his gastroenterologist Dr. Santos for evaluation of his pain issues and recommendations for management, with specific emphasis on  My Clinical Question Is: patient need cortisone shot for back, local providers won't due to liver disease on liver transplant list    Timeframe Requested: Routine: Next available opening    Priority: Routine    Reason for Referral: Procedure Order    Procedure: Injection to be Determined by Pain Management Specialist       Please see the [facility] health questionnaire which the patient completed and reviewed with me in detail.    Review of [place] Prescription Monitoring Program (): No concern for abuse or misuse of controlled medications based on this report.     Review of Electronic Chart: Today I have also reviewed available medical information in the patient's medical record at [facility] ([facility]), including relevant provider notes, laboratory work, and imaging.     Pain medications are being prescribed by Anjelica Reyes.     Chief Complaint:    Chief Complaint   Patient presents with     Pain Management     New consult        Pain history:  Obed Wiley is a 31 year old male who presents for initial evaluation of chief complaint of low back pain.      He first started having problems with low back pain in highschool. He notes that he was 450lbs at that time, attributes the development of his pain to be due to his  body habitus and working long hours. Insidious onset, without acute precipitating event. His pain has gradually worsened over the years. He worked with a pain clinic in [place], [place] for a while 4-5 years ago but this was for abdominal pain related to liver disease. He was evaluated by orthopedic surgery with [facility] in [place], injections were discussed but he was not able to pursue due to low platelet count. He was also evaluated at a facility in [place] who also said he was not a candidate. He has had two sessions of aquatic therapy, thought that this was going well but unfortunately had to stop as he developed an incarcerated hernia. He was told he cannot restart until he has surgery to repair and cannot have this surgery yet. He has tried chiropractic care with short term benefit and later no benefit. He tried acupuncture without benefit. Hx of complex medical hx including liver failure. He notes his health has previously limited him from pursuing injections in his back. His pain is located in bilateral low back. The pain radiates down bilateral buttocks and posterior thighs. Tingling in bilateral feet when laying down. Denies weakness in legs.       Pain description:  Location: low back  Quality: sharp  Severity/Intensity: 5-6/10 at best, 10/10 at worst, 7/10 on average  Aggravating factors include: heavy lifting, too much activity  Relieving factors include: soaking in hot water with menthol, medications- gabapentin and belbuca    The patient otherwise denies bowel or bladder incontinence, parasthesias, weakness, saddle anesthesia, unintentional weight loss, or fever/chills/sweats.     Obed Wiley has been seen at a pain clinic in the past.  [place] [facility] 4-5 years ago.     Pain Treatments:  (H--helped; HI--Helped initially; [facility]--Somewhat helpful; NH--No help; W--worse; SE--side effects; ?--Unsure if helpful)   1. Medications:       Current pain medications:   Amitriptyline 25mg at bedtime- H for sleep  "   Belbuca 150mcg BID- SW, \"way more than oxycodone or Vicodin,\" more consistent pain relief   Flexeril 10mg TID prn- [facility], taking BID or TID, helps with pain and sleep   Voltaren gel prn- [facility], short term   Gabapentin 400mg TID- [facility] for pain and sleep, was on 900mg TID but decreased due to liver disease   Seroquel 25mg at bedtime- [facility]    1. Previous Pain Relevant Medications:  NOTE: This medication information taken from patient's intake form, not medical records.    Opiates: Vicodin- H but short term, oxycodone- H but short term, Belbuca- [facility], Fentanyl patch- H!   NSAIDS: ibuprofen- H but cannot take    Muscle Relaxants: Flexeril- [facility] for pain and sleep   Anti-migraine mediations: no   Anti-depressants: amitriptyline- H, Cymbalta- ?, had to stop because of liver, SE, drowsiness   Sleep aids: Seroquel- H, amitriptyline- H   Anxiolytics: hydroxyzine- ?, [facility]    Neuropathics: gabapentin- [facility]   Topicals: Voltaren gel- [facility]   Other medications not covered above: Tylenol- H, cannot take    2. Physical Therapy: aquatic therapy- seemed helpful but had to stop due to medical problems  traditional physical therapy- NH, W  3. Pain Psychology: no  4. Surgery: gastric bypass surgery 2008 or 2009  cholecystectomy  5. Injections: never been able to have  6. Chiropractic: yes- [facility] short term, W, uncle is a chiropractor  7. Acupuncture: yes- NH  8. TENS Unit: yes- NH    Past Medical History:  Past Medical History:   Diagnosis Date     Alcoholic cirrhosis of liver with ascites (H) 12/23/2019     Diabetes (H)     Type 2 DM, Uses Insulin      DVT (deep vein thrombosis) in pregnancy      H/O protein C deficiency      Hepatic encephalopathy (H)      Hepatitis     Hep A when an infant      History of blood transfusion     2019 at [facility]      Leukopenia 1/11/2020     Other chronic pain     sciatica     Pancreatic disease     Chronic pancreatitis     SBP (spontaneous bacterial peritonitis) (H) 11/2019       Past Surgical History:  Past " Surgical History:   Procedure Laterality Date     ABDOMEN SURGERY      Gastric Bypass 2009. [facility]      ABDOMEN SURGERY      Multiple paracentesis noted in preop H&P 2-4-21     CARDIAC SURGERY      IVC      CHOLECYSTECTOMY       COLONOSCOPY       ENT SURGERY      Tonsils and Adenoids Removed at 6-7 Years Old      ENT SURGERY      Sinus surgery     GALLBLADDER SURGERY  2017     GI SURGERY      Upper GI      ODONTECTOMY N/A 2/24/2021    Procedure: Full mouth tooth extraction;  Surgeon: Addy Washburn DDS;  Location:  OR       Medications:  Current Outpatient Medications   Medication Sig Dispense Refill     amitriptyline (ELAVIL) 25 MG tablet Take 25 mg by mouth At Bedtime       BELBUCA 150 MCG FILM buccal film Place 150 mcg inside cheek daily OK to take am 2-24-21 per PCP       blood glucose (NO BRAND SPECIFIED) lancets standard Use to test blood sugar 6 times daily or as directed.       cholecalciferol (VITAMIN D3) 49664 units (1250 mcg) capsule Take 50,000 Units by mouth every 7 days Saturdays       ciprofloxacin (CIPRO) 500 MG tablet Take 500 mg by mouth daily       cyclobenzaprine (FLEXERIL) 10 MG tablet Take 1 tablet (10 mg) by mouth every 8 hours as needed for muscle spasms 30 tablet 0     diclofenac (VOLTAREN) 1 % topical gel Apply 2 g topically 4 times daily 50 g 0     enoxaparin ANTICOAGULANT (LOVENOX) 120 MG/0.8ML syringe Inject 1.5 mg/kg Subcutaneous daily 2-19-21 Currently 135 mgm every 24 hours. Last dose 24 hours prior to dental work and restart 24 hours postop. See note Edward Hansen       ferrous sulfate (FEROSUL) 325 (65 Fe) MG tablet Take 325 mg by mouth daily (with breakfast)       folic acid (FOLVITE) 1 MG tablet Take 1 mg by mouth 2 times daily       furosemide (LASIX) 40 MG tablet Take 40 mg by mouth 3 times daily Will hold am DOS 2-24-21       gabapentin (NEURONTIN) 400 MG capsule Take 400 mg by mouth 3 times daily One heri at bedtime for sleep.       insulin aspart (NOVOLOG FLEXPEN) 100  UNIT/ML pen 1 unit for every 12 grams of carbohydrate with meals and snacks plus correction scale. Uses up to 40 units per day. (Patient taking differently: 1 unit for every 12 grams of carbohydrate with meals and snacks plus correction scale. Uses up to 40 units per day.  Hold am DOS.) 45 mL 0     insulin glargine (LANTUS PEN) 100 UNIT/ML pen Inject 26 Units Subcutaneous every morning (Patient taking differently: Inject 26 Units Subcutaneous At Bedtime Half or 13 units evening prior to surgery) 30 mL 0     lactulose (CHRONULAC) 10 GM/15ML solution Take 45 mLs (30 g) by mouth 3 times daily 20mg / 30 ml tid daily/ prn       magnesium oxide 400 MG CAPS Take 400 mg by mouth 3 times daily        MULTIPLE VITAMIN-FOLIC ACID PO Take 1 tablet by mouth 1 daily       Nutritional Supplements (ENSURE PO) Ensure/ boost - 237 ml bid daily OK before 0700 am DOS       pantoprazole (PROTONIX) 40 MG EC tablet Take 40 mg by mouth daily       polyethylene glycol (MIRALAX/GLYCOLAX) packet Take 1 packet by mouth daily        promethazine (PHENERGAN) 12.5 MG tablet Take 25 mg by mouth every 6 hours as needed for nausea       QUEtiapine (SEROQUEL) 25 MG tablet Take 25 mg by mouth nightly as needed (bedtime)        rifaximin (XIFAXAN) 550 MG TABS tablet Take 550 mg by mouth 2 times daily        senna (SENOKOT) 8.6 MG tablet Take 1 tablet by mouth daily as needed for constipation       spironolactone (ALDACTONE) 100 MG tablet Take 100 mg by mouth 3 times daily Holding am DOS       Suvorexant (BELSOMRA) 20 MG tablet Take 10 mg by mouth At Bedtime       tretinoin (RETIN-A) 0.025 % external cream Apply topically At Bedtime       Vitamin D, Cholecalciferol, 25 MCG (1000 UT) CAPS Take 2,000 Units by mouth daily        hydrOXYzine (ATARAX) 25 MG tablet Take 1 tablet (25 mg) by mouth every 6 hours as needed for anxiety (Patient not taking: Reported on 2/1/2021) 30 tablet 0       Allergies:     Allergies   Allergen Reactions     Bee Anaphylaxis      Adhesive Tape Rash     Sulfa Drugs Itching     Morphine      Can't take with other pain medications       Social History:  Home situation: lives in a house  Support system: mom normally  Occupation/Schooling: not working  Tobacco use: no  Drug use: no  Alcohol use: hx of alcoholic abuse, sober for 3 years  History of chemical dependency treatment: never been, worked with a counselor  Mental health admissions: no    Family history:  Family History   Problem Relation Age of Onset     Protein C deficiency Mother      Pancreatic Cancer Father      Deonte Parkinson White syndrome Father      Alcoholism Brother      Substance Abuse Brother      Heart Failure Maternal Grandmother      Heart Failure Maternal Grandfather        Review of Systems:    POSTIVE IN BOLD  GENERAL: fever/chills, fatigue, general unwell feeling, weight gain/loss.  HEAD/EYES:  headache, dizziness, or vision changes.    EARS/NOSE/THROAT: nosebleeds, hearing loss, sinus infection, earache, tinnitus.  IMMUNE:  allergies, cancer, immune deficiency, or infections.  SKIN:  itching, rash, hives  HEME/Lymphatic: anemia, easy bruising, easy bleeding.  RESPIRATORY: cough, wheezing, or shortness of breath  CARDIOVASCULAR/Circulation: extremity edema, syncope, hypertension, tachycardia, or angina.  GASTROINTESTINAL: abdominal pain, nausea/emesis, diarrhea, constipation,  hematochezia, or melena.  ENDOCRINE:  diabetes, steroid use,  thyroid disease or osteoporosis.  MUSCULOSKELETAL: joint pain, stiffness, neck pain, back pain, arthritis, or gout.  GENITOURINARY: frequency, urgency, dysuria, difficulty voiding, hematuria or incontinence.  NEUROLOGIC: weakness, numbness, paresthesias, seizure, tremor, stroke or memory loss.  PSYCHIATRIC: depression, anxiety, stress, suicidal thoughts or mood swings.     Objective:    Imaging:  MRI of lumbar spine was completed on 4/2/2021 and shows:  L1-2: No disc displacement.  No evidence of central canal stenosis or  "neural  foraminal narrowing.    L2-3: Disc desiccation.  Shallow disc osteophyte complex without evidence  central canal stenosis or neural foraminal narrowing.  No significant interval  change from the previous examination.    L3-4: Disc desiccation.  Mild broad-based central disc protrusion contributing  to mild central canal stenosis.  Mild bilateral neural foraminal narrowing.  Overall, mild interval progression from the previous examination.    L4-5: Disc desiccation.  Mild broad-based disc bulge contributing to mild  central canal stenosis.  Mild bilateral neural foraminal narrowing, right  greater than left.  No significant interval change from the previous  examination.    L5-S1: No disc bulge.  No evidence central canal stenosis.  Mild-to-moderate  right neural foraminal narrowing.  No significant interval change from the  previous examination.    IMPRESSION:  1. Mild central canal stenosis at the L3-4 disc space, mildly progressed from  the previous examination.  2. Mild central canal stenosis at the L4-5 disc space, not significantly  changed from the previous examination.  3. Mild-to-moderate right neural foraminal narrowing with abutment of the  exiting right L5 nerve root.  No significant interval change from the previous  examination.  4. No acute vertebral body fractures.  5. Other degenerative changes as discussed above.    Labs:  CBC was completed on 3/2/2021 and shows:  WBC Count 4.8 - 10.8 10(3)/uL 3.9Low     RBC Count 4.70 - 6.10 10(6)/uL 3.69Low     Hemoglobin 14.0 - 18.0 g/dL 11.5Low     Hematocrit 42.0 - 52.0 % 34.9Low     MCV 80.0 - 94.0 fL 94.6High     MCH 27.0 - 31.0 pg 31.2High     MCHC 31.0 - 36.5 g/dL 33.0    RDW 11.5 - 14.5 % 14.7High     Platelets 150 - 350 10(3)/uL 63Low     MPV 9.0 - 13.0 fL 10.1        Physical Exam:  Vitals:    05/14/21 0831   BP: 123/81   Pulse: 88   SpO2: 96%   Weight: 82.6 kg (182 lb)   Height: 1.88 m (6' 2\")     Exam:  Constitutional: Well developed, appears " stated age. Thin.   HEENT: Head atraumatic, normocephalic. Eyes without conjunctival injection or jaundice. Oropharynx clear. Neck supple. No obvious neck masses.  Skin: No rash, lesions, or petechiae of exposed skin.   Extremities: Peripheral pulses intact. No clubbing, cyanosis, or edema.  Psychiatric/mental status: Alert, without lethargy or stupor. Speech fluent. Appropriate affect. Mood normal. Able to follow commands without difficulty.     Musculoskeletal exam:  Able to walk on the heels and toes with some difficulty. Patient has antalgic gait favoring neither side.   Normal bulk and tone. Unremarkable spinal curvature.     Cervical spine:  Range of motion within normal limits.  Tenderness in the cervical paraspinal muscles.No  Rotation/ext to right: painful   Rotation/ext to left: painful     Thoracic spine:    Kyphosis. No   Tenderness in the thoracic paraspinal muscles.Yes    Lumbar spine:    Flex: 40 degrees   Ext: 20 degrees   Tenderness in the lumbar paraspinal muscles.Yes   Rotation/ext to right: painful    Rotation/ext to left: painful     Myofascial tenderness: thoracic and lumbar paraspinals  Focal tenderness: No SI joint, gluteal, piriformis, or GT tenderness  Straight leg raise: positive bilaterally  FADIR: neg    Hip exam:   Normal internal and external range of motion bilaterally. NORMAN negative.     Neurologic exam:  CN:  Cranial nerves 2-12 are grossly intact  Motor:  5/5 UE and LE strength  Strength:       C4 (shoulder shrug)  symmetric 5/5       C5 (shoulder abduction) symmetric 5/5       C6 (elbow flexion) symmetric 5/5       C7 (elbow extension) symmetric 5/5       C8 (finger abduction, thumb flexion) symmetric 5/5    Reflexes:     Biceps:     R:  2/4 L: 2/4   Brachioradialis   R:  1/4 L: 1/4   Patella:  R:  2/4 L: 2/4   Achilles:  R:  2/4 L: 2/4    Sensory:   Light touch: normal bilateral upper and lower extremities    No allodynia, dysesthesia, or hyperalgesia.    BILLING TIME  DOCUMENTATION:   The total TIME spent on this patient on the date of the encounter/appointment was 50 minutes.      TOTAL TIME includes:   Time spent preparing to see the patient (reviewing records and tests)   Time spent face to face (or over the phone) with the patient   Time spent ordering tests, medications, procedures and referrals  Time spent Referring and communicating with other healthcare professionals   Time spent documenting clinical information in Epic    Again, thank you for allowing me to participate in the care of your patient.      Sincerely,    JAMES Yeung CNP

## 2021-05-14 NOTE — PATIENT INSTRUCTIONS
I will speak directly with Dr. Ca regarding an injection and your platelet count. We will let you know if blood work is needed and if we can consider an injection.    Continue current medication regimen, gentle stretches and exercises.

## 2021-05-14 NOTE — CONFIDENTIAL NOTE
Please contact Obed and let him know that Dr. Ca would like to review some blood work prior to determining if he would be a candidate for an epidural steroid injection. I have placed the orders for this blood work and will call with the results. THanks!    JAMES Fraser [facility]

## 2021-05-17 ENCOUNTER — TELEPHONE (OUTPATIENT)
Dept: ANESTHESIOLOGY | Facility: CLINIC | Age: 31
End: 2021-05-17

## 2021-05-17 NOTE — TELEPHONE ENCOUNTER
RN returned call to patient to address phone call. Discussed with patient the provider in the pain clinic ordered blood work to determine if he's a candidate for an injection. Patient verbalized understanding and will have lab work completed and will follow up after results.    Marianne Razo RN

## 2021-05-17 NOTE — TELEPHONE ENCOUNTER
[facility] Call Center    Phone Message    May a detailed message be left on voicemail: yes     Reason for Call: Order(s): Other:   Reason for requested: INR - Partial thromboplastin time - TEG with Heparinase for Plt Inhibition  Date needed: ASAP - Please fax the orders to [phone]  Provider name: Sharee Messer      Action Taken: Message routed to:  [facility]    Travel Screening: Not Applicable

## 2021-05-17 NOTE — TELEPHONE ENCOUNTER
[facility] Call Center    Phone Message    May a detailed message be left on voicemail: yes     Reason for Call: Other: Patient said he was supposed to get a call last Friday to see if he can get a injection or not. Please call patient to update him. Thank you.      Action Taken: Message routed to:  [facility]    Travel Screening: Not Applicable

## 2021-05-18 NOTE — TELEPHONE ENCOUNTER
RN called patient to inform that lab orders were faxed to [facility] and advised patient to call them to schedule appointment. Patient verbalized understanding.      Marianne Razo RN

## 2021-05-19 ENCOUNTER — TRANSFERRED RECORDS (OUTPATIENT)
Dept: HEALTH INFORMATION MANAGEMENT | Facility: CLINIC | Age: 31
End: 2021-05-19

## 2021-06-02 ENCOUNTER — VIRTUAL VISIT (OUTPATIENT)
Dept: GASTROENTEROLOGY | Facility: CLINIC | Age: 31
End: 2021-06-02
Attending: INTERNAL MEDICINE
Payer: MEDICARE

## 2021-06-02 DIAGNOSIS — K70.31 ALCOHOLIC CIRRHOSIS OF LIVER WITH ASCITES (H): ICD-10-CM

## 2021-06-02 DIAGNOSIS — K76.6 PORTAL HYPERTENSION (H): ICD-10-CM

## 2021-06-02 PROCEDURE — 99214 OFFICE O/P EST MOD 30 MIN: CPT | Mod: 95 | Performed by: INTERNAL MEDICINE

## 2021-06-02 ASSESSMENT — PAIN SCALES - GENERAL: PAINLEVEL: SEVERE PAIN (7)
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
## 2021-06-22 ENCOUNTER — TRANSFERRED RECORDS (OUTPATIENT)
Dept: HEALTH INFORMATION MANAGEMENT | Facility: CLINIC | Age: 31
End: 2021-06-22

## 2021-07-02 ENCOUNTER — TRANSFERRED RECORDS (OUTPATIENT)
Dept: HEALTH INFORMATION MANAGEMENT | Facility: CLINIC | Age: 31
End: 2021-07-02

## 2021-07-02 ENCOUNTER — MYC MEDICAL ADVICE (OUTPATIENT)
Dept: ANESTHESIOLOGY | Facility: CLINIC | Age: 31
End: 2021-07-02

## 2021-07-05 NOTE — TELEPHONE ENCOUNTER
[facility] Call Center    Phone Message    May a detailed message be left on voicemail: yes     Reason for Call: Other: [facility] Called to let the pain clinic care team know that they don't perform this type of test in there facility/labs, I did see in the original referral said to Schedule this test with [facility] call [phone]. Vidya review and follow up with the patient, with [facility] response that they can't perform this test he can contact them and they'll explain if needed, thank you.     Action Taken: Message routed to:  [facility] ([facility] pain    Travel Screening: Not Applicable

## 2021-07-05 NOTE — TELEPHONE ENCOUNTER
I called and spoke with the pt and let him know that after talking to the lab. [facility] does not do this test so the pt will have to schedule this lab appointment at the [facility] since the test needs to be run within 2 hours of his blood being drawn and the pt lives about 2 hours from the [facility].    Pt stated verbal understanding and would be calling to schedule the appointment.    Fang Forte, CMA

## 2021-08-15 ENCOUNTER — HEALTH MAINTENANCE LETTER (OUTPATIENT)
Age: 31
End: 2021-08-15

## 2021-10-10 ENCOUNTER — HEALTH MAINTENANCE LETTER (OUTPATIENT)
Age: 31
End: 2021-10-10

## 2022-01-10 ENCOUNTER — TRANSFERRED RECORDS (OUTPATIENT)
Dept: HEALTH INFORMATION MANAGEMENT | Facility: CLINIC | Age: 32
End: 2022-01-10
Payer: COMMERCIAL

## 2022-01-12 ENCOUNTER — TRANSFERRED RECORDS (OUTPATIENT)
Dept: HEALTH INFORMATION MANAGEMENT | Facility: CLINIC | Age: 32
End: 2022-01-12
Payer: COMMERCIAL

## 2022-01-13 ENCOUNTER — TRANSFERRED RECORDS (OUTPATIENT)
Dept: HEALTH INFORMATION MANAGEMENT | Facility: CLINIC | Age: 32
End: 2022-01-13
Payer: COMMERCIAL

## 2022-01-13 LAB
ALT SERPL-CCNC: 59 U/L (ref 4–50)
AST SERPL-CCNC: 69 U/L (ref 17–59)
CREATININE (EXTERNAL): 0.8 MG/DL (ref 0.66–1.25)
GFR ESTIMATED (EXTERNAL): >60 ML/MIN/1.73M2
GLUCOSE (EXTERNAL): 152 MG/DL (ref 70–100)
POTASSIUM (EXTERNAL): 3.5 MMOL/L (ref 3.5–5.1)

## 2022-01-18 ENCOUNTER — TRANSFERRED RECORDS (OUTPATIENT)
Dept: HEALTH INFORMATION MANAGEMENT | Facility: CLINIC | Age: 32
End: 2022-01-18
Payer: COMMERCIAL

## 2022-01-18 LAB
ALT SERPL-CCNC: 52 U/L (ref 4–50)
AST SERPL-CCNC: 73 U/L (ref 17–59)
CREATININE (EXTERNAL): 0.6 MG/DL (ref 0.66–1.25)
GFR ESTIMATED (EXTERNAL): >60 ML/MIN/1.73M2
GLUCOSE (EXTERNAL): 306 MG/DL (ref 70–100)
POTASSIUM (EXTERNAL): 3.5 MMOL/L (ref 3.5–5.1)

## 2022-01-21 ENCOUNTER — TELEPHONE (OUTPATIENT)
Dept: GASTROENTEROLOGY | Facility: CLINIC | Age: 32
End: 2022-01-21
Payer: COMMERCIAL

## 2022-01-21 ENCOUNTER — TRANSFERRED RECORDS (OUTPATIENT)
Dept: HEALTH INFORMATION MANAGEMENT | Facility: CLINIC | Age: 32
End: 2022-01-21
Payer: COMMERCIAL

## 2022-01-21 LAB — INR (EXTERNAL): 1.7 (ref 1–2.5)

## 2022-01-22 NOTE — TELEPHONE ENCOUNTER
Telephone conversation    Ago the telephone from Dr. Danny Heath from [facility].  He called us about the patient by the name Saurabh who is known to us as he is listed here for her liver transplant with a low meld score.  It is currently 17.    He has multiple episodes of clothing as protein C deficiency.  He had in the past ischemic bowel, mesenteric vein thrombosis, bilateral PE and he had IVC filter placement.  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    Now he came to that healthcare facility with bilateral lower extremity edema and abdominal pain and ultrasound showed that he has bilateral lower extremity DVT.    The following plan was formulated with hematology from [facility] which is:  Check the level of the Lovenox and see if it is therapeutic if is not therapeutic then that will explain the breakthrough.  He will have a CT scan there with contrast to check if he has mesenteric vein thrombosis to explain his abdominal pain.  We also discussed it with our patient placement group and he will be brought down here to [facility] for management when a bed becomes available.    Viola Santos MD

## 2022-01-23 ENCOUNTER — MEDICAL CORRESPONDENCE (OUTPATIENT)
Dept: HEALTH INFORMATION MANAGEMENT | Facility: CLINIC | Age: 32
End: 2022-01-23
Payer: COMMERCIAL

## 2022-01-23 ENCOUNTER — TRANSFERRED RECORDS (OUTPATIENT)
Dept: HEALTH INFORMATION MANAGEMENT | Facility: CLINIC | Age: 32
End: 2022-01-23
Payer: COMMERCIAL

## 2022-01-23 LAB
ALT SERPL-CCNC: 42 U/L (ref 4–50)
AST SERPL-CCNC: 57 U/L (ref 17–59)
CREATININE (EXTERNAL): 0.7 MG/DL (ref 0.66–1.25)
GFR ESTIMATED (EXTERNAL): >60 ML/MIN/1.73M2
GLUCOSE (EXTERNAL): 178 MG/DL (ref 70–100)
POTASSIUM (EXTERNAL): 4.2 MMOL/L (ref 3.5–5.1)

## 2022-01-25 ENCOUNTER — TRANSCRIBE ORDERS (OUTPATIENT)
Dept: OTHER | Age: 32
End: 2022-01-25
Payer: COMMERCIAL

## 2022-01-25 DIAGNOSIS — K70.31 ASCITES DUE TO ALCOHOLIC CIRRHOSIS (H): Primary | ICD-10-CM

## 2022-01-25 DIAGNOSIS — D69.6 THROMBOCYTOPENIA (H): ICD-10-CM

## 2022-01-25 DIAGNOSIS — K76.6 PORTAL HYPERTENSION (H): ICD-10-CM

## 2022-01-25 DIAGNOSIS — K55.069 THROMBOSIS OF MESENTERIC VEIN (H): ICD-10-CM

## 2022-01-25 DIAGNOSIS — F10.20 ALCOHOLISM (H): ICD-10-CM

## 2022-01-25 DIAGNOSIS — K70.31 ALCOHOLIC CIRRHOSIS OF LIVER WITH ASCITES (H): ICD-10-CM

## 2022-01-30 ENCOUNTER — HEALTH MAINTENANCE LETTER (OUTPATIENT)
Age: 32
End: 2022-01-30

## 2022-02-14 ENCOUNTER — VIRTUAL VISIT (OUTPATIENT)
Dept: GASTROENTEROLOGY | Facility: CLINIC | Age: 32
End: 2022-02-14
Payer: COMMERCIAL

## 2022-02-14 DIAGNOSIS — K70.31 ALCOHOLIC CIRRHOSIS OF LIVER WITH ASCITES (H): Primary | ICD-10-CM

## 2022-02-14 DIAGNOSIS — I81 PORTAL VEIN THROMBOSIS: ICD-10-CM

## 2022-02-14 PROCEDURE — 99214 OFFICE O/P EST MOD 30 MIN: CPT | Mod: 95 | Performed by: INTERNAL MEDICINE

## 2022-02-14 NOTE — PROGRESS NOTES
Obed is a 32 year old who is being evaluated via a billable video visit.      How would you like to obtain your AVS? MyChart  If the video visit is dropped, the invitation should be resent by: Text to cell phone: [phone]  Will anyone else be joining your video visit? Shefali       Renita Eder GRANT    Video Start Time: 2:09 PM.    [facility] Hepatology    Follow-up Visit    Follow-up visit for cirrhosis of the liver.    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  We follow him here for alcoholic cirrhosis mostly complicated by hepatic encephalopathy.  He does have a history of decompensation with ascites and SBP he is listed for liver transplantation but his MELD  score has remained lately low.    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  Then he had a deep venous thrombosis again and he continued to be on Lovenox.  Had also admissions for hepatic encephalopathy.     Today he tells me that his mom recently passed away and he feels very sad and might relocate in [place].  He otherwise denies any hepatic encephalopathy, edema or abdominal distention.  He stated that he is taking his lactulose.  He has no GI bleed.  His weight has remained stable, appetite is good and denies any nausea vomiting.  He has bowel movements like 5-6 times a day with no blood in it.  As far as Covid vaccination is concerned it he had modern and has been issued all 3 doses.    Medical hx Surgical hx   Past Medical History:   Diagnosis Date      Alcoholic cirrhosis of liver with ascites (H) 12/23/2019     Diabetes (H)     Type 2 DM, Uses Insulin      DVT (deep vein thrombosis) in pregnancy      H/O protein C deficiency      Hepatic encephalopathy (H)      Hepatitis     Hep A when an infant      History of blood transfusion     2019 at [facility]      Leukopenia 1/11/2020     Other chronic pain     sciatica     Pancreatic disease     Chronic pancreatitis     SBP (spontaneous bacterial peritonitis) (H) 11/2019      Past Surgical History:   Procedure Laterality Date     ABDOMEN SURGERY      Gastric Bypass 2009. [facility]      ABDOMEN SURGERY      Multiple paracentesis noted in preop H&P 2-4-21     CARDIAC SURGERY      IVC      CHOLECYSTECTOMY       COLONOSCOPY       ENT SURGERY      Tonsils and Adenoids Removed at 6-7 Years Old      ENT SURGERY      Sinus surgery     GALLBLADDER SURGERY  2017     GI SURGERY      Upper GI      ODONTECTOMY N/A 2/24/2021    Procedure: Full mouth tooth extraction;  Surgeon: Addy Washburn DDS;  Location: [facility] OR          Medications  Prior to Admission medications    Medication Sig Start Date End Date Taking? Authorizing Provider   amitriptyline (ELAVIL) 25 MG tablet Take 25 mg by mouth At Bedtime    Reported, Patient   BELBUCA 150 MCG FILM buccal film Place 150 mcg inside cheek daily OK to take am 2-24-21 per PCP 10/29/20   Reported, Patient   blood glucose (NO BRAND SPECIFIED) lancets standard Use to test blood sugar 6 times daily or as directed.    Reported, Patient   cholecalciferol (VITAMIN D3) 94605 units (1250 mcg) capsule Take 50,000 Units by mouth three times a week     Reported, Patient   ciprofloxacin (CIPRO) 500 MG tablet Take 500 mg by mouth daily 1/27/19   Reported, Patient   cyclobenzaprine (FLEXERIL) 10 MG tablet Take 1 tablet (10 mg) by mouth every 8 hours as needed for muscle spasms 7/27/20   Danny Cottrell MD   diclofenac (VOLTAREN) 1 % topical gel Apply 2 g topically 4 times daily 7/27/20   Ronit  Danny Burrell MD   enoxaparin ANTICOAGULANT (LOVENOX) 120 MG/0.8ML syringe Inject 1.5 mg/kg Subcutaneous daily 2-19-21 Currently 135 mgm every 24 hours. Last dose 24 hours prior to dental work and restart 24 hours postop. See note Edward Hansen    Reported, Patient   ferrous sulfate (FEROSUL) 325 (65 Fe) MG tablet Take 325 mg by mouth daily (with breakfast)    Reported, Patient   folic acid (FOLVITE) 1 MG tablet Take 1 mg by mouth 2 times daily    Reported, Patient   furosemide (LASIX) 40 MG tablet Take 40 mg by mouth 3 times daily  4/20/20   Reported, Patient   gabapentin (NEURONTIN) 400 MG capsule Take 400 mg by mouth 3 times daily One heri at bedtime for sleep.    Reported, Patient   hydrOXYzine (ATARAX) 25 MG tablet Take 1 tablet (25 mg) by mouth every 6 hours as needed for anxiety  Patient not taking: Reported on 2/1/2021 7/27/20   Danny Cottrell MD   insulin aspart (NOVOLOG FLEXPEN) 100 UNIT/ML pen 1 unit for every 12 grams of carbohydrate with meals and snacks plus correction scale. Uses up to 40 units per day.  Patient taking differently: 1 unit for every 12 grams of carbohydrate with meals and snacks plus correction scale. Uses up to 40 units per day.  Hold am DOS. 11/9/20   Waylon Leon MD   insulin glargine (LANTUS PEN) 100 UNIT/ML pen Inject 26 Units Subcutaneous every morning  Patient taking differently: Inject 26 Units Subcutaneous At Bedtime Half or 13 units evening prior to surgery 11/9/20   Waylon Leon MD   lactulose (CHRONULAC) 10 GM/15ML solution Take 45 mLs (30 g) by mouth 3 times daily 20mg / 30 ml tid daily/ prn 1/10/20   Jesusita Oneal MD   magnesium oxide 400 MG CAPS Take 400 mg by mouth 3 times daily     Reported, Patient   MULTIPLE VITAMIN-FOLIC ACID PO Take 1 tablet by mouth 1 daily    Reported, Patient   Nutritional Supplements (ENSURE PO) Ensure/ boost - 237 ml bid daily OK before 0700 am DOS    Reported, Patient   pantoprazole (PROTONIX) 40 MG EC tablet Take 40  mg by mouth daily    Reported, Patient   polyethylene glycol (MIRALAX/GLYCOLAX) packet Take 1 packet by mouth daily     Reported, Patient   promethazine (PHENERGAN) 12.5 MG tablet Take 25 mg by mouth every 6 hours as needed for nausea    Reported, Patient   QUEtiapine (SEROQUEL) 25 MG tablet Take 25 mg by mouth nightly as needed (bedtime)     Reported, Patient   rifaximin (XIFAXAN) 550 MG TABS tablet Take 550 mg by mouth 2 times daily     Reported, Patient   senna (SENOKOT) 8.6 MG tablet Take 1 tablet by mouth daily as needed for constipation 1/10/20   Jesusita Oneal MD   spironolactone (ALDACTONE) 100 MG tablet Take 100 mg by mouth 3 times daily Holding am DOS    Reported, Patient   Suvorexant (BELSOMRA) 20 MG tablet Take 10 mg by mouth At Bedtime    Reported, Patient   tretinoin (RETIN-A) 0.025 % external cream Apply topically At Bedtime    Reported, Patient   Vitamin D, Cholecalciferol, 25 MCG (1000 UT) CAPS Take 2,000 Units by mouth daily     Reported, Patient       Allergies  Allergies   Allergen Reactions     Bee Anaphylaxis     Adhesive Tape Rash     Sulfa Drugs Itching     Morphine      Can't take with other pain medications       Review of systems  A 10-point review of systems was negative    Examination  There were no vitals taken for this visit.  There is no height or weight on file to calculate BMI.    Gen- well, NAD, A+Ox3, normal color  Psych- normal mood    Laboratory  Lab Results   Component Value Date     02/25/2021    POTASSIUM 3.9 02/25/2021    CHLORIDE 105 02/25/2021    CO2 25 02/25/2021    BUN 12 02/25/2021    CR 0.69 02/25/2021       Lab Results   Component Value Date    BILITOTAL 1.6 02/25/2021    ALT 28 02/25/2021    AST 39 02/25/2021    ALKPHOS 113 02/25/2021       Lab Results   Component Value Date    ALBUMIN 3.1 02/25/2021    PROTTOTAL 6.4 02/25/2021        Lab Results   Component Value Date    WBC 3.7 02/25/2021    HGB 11.4 02/25/2021    MCV 92 02/25/2021    PLT 50 02/25/2021        Lab Results   Component Value Date    INR 1.25 02/25/2021     MELD-Na score: 11 at 2/25/2021 11:35 AM  MELD score: 11 at 2/25/2021 11:35 AM  Calculated from:  Serum Creatinine: 0.69 mg/dL (Using min of 1 mg/dL) at 2/25/2021 11:35 AM  Serum Sodium: 135 mmol/L at 2/25/2021 11:35 AM  Total Bilirubin: 1.6 mg/dL at 2/25/2021 11:35 AM  INR(ratio): 1.25 at 2/25/2021 11:35 AM  Age: 31 years    Radiology    Assessment  32 year old male with history of protein C deficiency multiple episodes of thrombosis  in different parts of his body and cirrhosis of the liver with a low MELD  score.  For the first he is on Lovenox.  Plan as far as the second is concerned  he was evaluated and listed for liver transplant but his MELD  score has remained low.  He occasionally develops hepatic encephalopathy but currently does not have any significant fluid retention.  He Is planning to move to [place].    Plan    We gave him gastroenterologists he can follow in [place].  He might need to be evaluated there also for liver transplant as there is no penalty if listed in 2 different transplant centers.  He will need surveillance for HCC.  He will need to follow-up with hematology for his protein C deficiency.  Otherwise we will see him here via telemedicine in 3 months.  He will need to let his coordinator know  If he moves  to [place].      For all his other medical issues he will follow up with his primary care physician and he will be seen here in 3 months.    I spent 30 minutes in this encounter of February 14, 2022 in chart reviewing, history taking, and documentation.  Some time was also spent in counseling and coordination of care.     Viola Santos MD  Hepatology  [facility]    Video-Visit Details    Type of service:  Video Visit    Video End Time:2:35 PM.    Originating Location (pt. Location): Home.    Distant Location (provider location):  [facility]  used for Video Visit: Roel

## 2022-03-21 DIAGNOSIS — K70.30 ALCOHOLIC CIRRHOSIS (H): Primary | ICD-10-CM

## 2022-03-21 NOTE — Clinical Note
Please schedule all in 1 day and give patent a few weeks notice to set up a ride, NO urgency son can be done in 1-2 months Thanks Kat

## 2022-03-21 NOTE — PROGRESS NOTES
Orders placed for annual liver surveillance. DM2 on insulin- CTA ordered. Low MELD, however bld type AB and inpt 2/22 for HE.

## 2022-03-30 NOTE — H&P
[facility]    History and Physical - [facility]        Date of Admission:  2/24/2021    Assessment & Plan   Obed Wiley is a 30 year old male with PMH alcoholic cirrhosis, heterozygous protein C deficiency, history of pulmonary embolism, DVT, mesenteric vein thrombosis and most recently with portal vein thrombosis as well as history of GI bleeding and chronic thrombocytopenia secondary to liver disease S/P permanent IVC filter in place who presents as observation admit for post-op bleeding after elective dental extraction.    Complete tooth extraction  Completed by OMFS on 2/24 for transplant workup.  - Diet: Soft diet; No hot foods for 48 hours.   - Ice packs to face 20 minutes on/ 20 minutes off  - Pain control per primary team  - Patient should bite on moist gauze with firm pressure for 1 hour. Replace with new gauze if oozing persists. Oozing should decrease over the next 24 hours. Please contact the oncall OMFS resident with any concerns, as most bleeding can be treated with local measures.   - AVOID suction, as this can remove blood clot and cause persistent oozing.  - Patient should also avoid spitting. If he feels like he needs to spit, he should drool into a cup.      H/O DVT, PE  Protein C Deficiency  S/p permanent IVC filter in place, on long term AC with Lovenox 1.5mg/kg daily. Eneida-operative anticoagulation instructions graciously outlined in Heme/Onc provider note dated 1/16, re-iterated below.  - Restart current dose of enoxaparin at 24 hours post-procedure  - Overnight observation for any bleeding complications  - CBC in AM    Alcoholic cirrhosis  Thrombocytopenia  Undergoing transplant workup. MeldNa of 9.   - Continue PTA senna, rifaximin  - Continue PTA Lasix, spironolactone  - Continue PTA SBP ppx (Cipro daily)    T2DM  - Continue PTA Glargine 26 units daily  - Continue PTA carb-counting short acting - 1 unit per 15g CHO  - Continue  PTA medium intensity SSI    Insomnia  - Continue PTA amitriptyline, seroquel PRN, melatonin PRN  - PTA suvorexant not formulary, will substitute trazodone 150mg instead (patient used to take this in the past prior to suvorexant initiation)    GERD: Continue PTA PPI    Diet:  Dental soft, no hot foods x 48 hours  Fluids: None  DVT Prophylaxis: Restart PTA therapeutic lovenox (135mg subcutaneous daily) 24 hours post-op (tomorrow ~3pm)  Glaser Catheter: not present  Code Status:   Full         Disposition Plan   Expected discharge: Tomorrow, recommended to prior living arrangement once stable post-op without evidence of bleeding.  Entered: Nehemias Cooney MD 02/24/2021, 8:43 PM       The patient's care was discussed with the Attending Physician, Dr. Mata, Bedside Nurse and Patient.    Nehemias Cooney MD  [facility]  Contact information available via [facility] Paging/Directory    ______________________________________________________________________    Chief Complaint   Post-op monitoring    History is obtained from the patient    History of Present Illness   Obed Wiley is a 30 year old male with PMH alcoholic cirrhosis, heterozygous protein C deficiency, history of pulmonary embolism, DVT, mesenteric vein thrombosis and most recently with portal vein thrombosis as well as history of GI bleeding and chronic thrombocytopenia secondary to liver disease S/P permanent IVC filter in place who presents as observation admit for post-op bleeding after elective dental extraction.    Patient is undergoing evaluation for liver transplant. As part of that workup, underwent dental exam. Noted to have caries, periodontitis. Required full mouth teeth extraction, performed today (2/24) under general anesthesia without complication.       Review of Systems    The 10 point Review of Systems is negative other than noted in the HPI or here.     Past Medical History    I  have reviewed this patient's medical history and updated it with pertinent information if needed.   Past Medical History:   Diagnosis Date     Alcoholic cirrhosis of liver with ascites (H) 12/23/2019     Diabetes (H)     Type 2 DM, Uses Insulin      DVT (deep vein thrombosis) in pregnancy      H/O protein C deficiency      Hepatic encephalopathy (H)      Hepatitis     Hep A when an infant      History of blood transfusion     2019 at [facility]      Leukopenia 1/11/2020     Other chronic pain     sciatica     Pancreatic disease     Chronic pancreatitis     SBP (spontaneous bacterial peritonitis) (H) 11/2019        Past Surgical History   I have reviewed this patient's surgical history and updated it with pertinent information if needed.  Past Surgical History:   Procedure Laterality Date     ABDOMEN SURGERY      Gastric Bypass 2009. [facility]      ABDOMEN SURGERY      Multiple paracentesis noted in preop H&P 2-4-21     CARDIAC SURGERY      IVC      CHOLECYSTECTOMY       COLONOSCOPY       ENT SURGERY      Tonsils and Adenoids Removed at 6-7 Years Old      ENT SURGERY      Sinus surgery     GALLBLADDER SURGERY  2017     GI SURGERY      Upper GI         Social History   I have reviewed this patient's social history and updated it with pertinent information if needed. Obed KEYS Wiley  reports that he has quit smoking. He has a 3.75 pack-year smoking history. He has never used smokeless tobacco. He reports previous alcohol use. He reports previous drug use.    Family History   I have reviewed this patient's family history and updated it with pertinent information if needed.  Family History   Problem Relation Age of Onset     Protein C deficiency Mother      Pancreatic Cancer Father      Deonte Parkinson White syndrome Father      Alcoholism Brother      Substance Abuse Brother      Heart Failure Maternal Grandmother      Heart Failure Maternal Grandfather        Prior to Admission Medications   Prior to Admission  Medications   Prescriptions Last Dose Informant Patient Reported? Taking?   BELBUCA 150 MCG FILM buccal film 2/24/2021 at 0500  Yes Yes   Sig: Place 150 mcg inside cheek daily OK to take am 2-24-21 per PCP   HYDROcodone-acetaminophen (NORCO) 5-325 MG tablet More than a month at Unknown time  Yes No   Sig: Take 1 tablet by mouth   MULTIPLE VITAMIN-FOLIC ACID PO 2/24/2021 at 0500 Mother Yes Yes   Sig: Take 1 tablet by mouth 1 daily   Nutritional Supplements (ENSURE PO) 2/24/2021 at 0500 Mother Yes Yes   Sig: Ensure/ boost - 237 ml bid daily OK before 0700 am DOS   QUEtiapine (SEROQUEL) 25 MG tablet 2/23/2021 at 2200 Mother Yes Yes   Sig: Take 25 mg by mouth nightly as needed (bedtime)    Suvorexant (BELSOMRA) 20 MG tablet 2/23/2021 at 2100  Yes Yes   Sig: Take 10 mg by mouth At Bedtime   Vitamin D, Cholecalciferol, 25 MCG (1000 UT) CAPS 2/24/2021 at 0500 Mother Yes Yes   Sig: Take 2,000 Units by mouth daily    amitriptyline (ELAVIL) 25 MG tablet 2/23/2021 at 2000  Yes Yes   Sig: Take 25 mg by mouth At Bedtime   blood glucose (NO BRAND SPECIFIED) lancets standard 2/24/2021 at Unknown time  Yes Yes   Sig: Use to test blood sugar 6 times daily or as directed.   cholecalciferol (VITAMIN D3) 25728 units (1250 mcg) capsule Past Week at Unknown time Mother Yes Yes   Sig: Take 50,000 Units by mouth every 7 days Saturdays   ciprofloxacin (CIPRO) 500 MG tablet 2/24/2021 at 0500  Yes Yes   Sig: Take 500 mg by mouth daily   cyclobenzaprine (FLEXERIL) 10 MG tablet 2/23/2021 at 2000  No Yes   Sig: Take 1 tablet (10 mg) by mouth every 8 hours as needed for muscle spasms   diclofenac (VOLTAREN) 1 % topical gel 2/23/2021 at 2200  No Yes   Sig: Apply 2 g topically 4 times daily   enoxaparin ANTICOAGULANT (LOVENOX) 120 MG/0.8ML syringe Past Week at Unknown time  Yes Yes   Sig: Inject 1.5 mg/kg Subcutaneous daily 2-19-21 Currently 135 mgm every 24 hours. Last dose 24 hours prior to dental work and restart 24 hours postop. See note Edward  Tyrone   ferrous sulfate (FEROSUL) 325 (65 Fe) MG tablet 2021 at 0500 Mother Yes Yes   Sig: Take 325 mg by mouth daily (with breakfast)   folic acid (FOLVITE) 1 MG tablet 2021 at 0500 Mother Yes Yes   Sig: Take 1 mg by mouth 2 times daily   furosemide (LASIX) 40 MG tablet 2021 at 0500  Yes Yes   Sig: Take 40 mg by mouth 3 times daily Will hold am DOS 21   gabapentin (NEURONTIN) 400 MG capsule 2021 at 1130 Mother Yes Yes   Sig: Take 400 mg by mouth 3 times daily One heri at bedtime for sleep.   hydrOXYzine (ATARAX) 25 MG tablet More than a month at Unknown time  No No   Sig: Take 1 tablet (25 mg) by mouth every 6 hours as needed for anxiety   Patient not taking: Reported on 2021   insulin aspart (NOVOLOG FLEXPEN) 100 UNIT/ML pen 2021 at 2030  No Yes   Si unit for every 12 grams of carbohydrate with meals and snacks plus correction scale. Uses up to 40 units per day.   Patient taking differently: 1 unit for every 12 grams of carbohydrate with meals and snacks plus correction scale. Uses up to 40 units per day.  Hold am DOS.   insulin glargine (LANTUS PEN) 100 UNIT/ML pen 2021 at 2000  No Yes   Sig: Inject 26 Units Subcutaneous every morning   Patient taking differently: Inject 26 Units Subcutaneous At Bedtime Half or 13 units evening prior to surgery   lactulose (CHRONULAC) 10 GM/15ML solution More than a month at Unknown time  No No   Sig: Take 45 mLs (30 g) by mouth 3 times daily 20mg / 30 ml tid daily/ prn   lidocaine (LIDODERM) 5 % patch Past Week at Unknown time  No Yes   Sig: Place 1 patch onto the skin every 24 hours To prevent lidocaine toxicity, patient should be patch free for 12 hrs daily.   magnesium oxide 400 MG CAPS 2021 at 0800 Mother Yes Yes   Sig: Take 400 mg by mouth 3 times daily    pantoprazole (PROTONIX) 40 MG EC tablet 2021 at 2000 Mother Yes Yes   Sig: Take 40 mg by mouth daily   polyethylene glycol (MIRALAX/GLYCOLAX) packet More than a month at  Unknown time Mother Yes No   Sig: Take 1 packet by mouth daily    promethazine (PHENERGAN) 12.5 MG tablet 2/23/2021 at 1400 Mother Yes Yes   Sig: Take 25 mg by mouth every 6 hours as needed for nausea   rifaximin (XIFAXAN) 550 MG TABS tablet 2/24/2021 at 0500 Mother Yes Yes   Sig: Take 550 mg by mouth 2 times daily    senna (SENOKOT) 8.6 MG tablet 2/24/2021 at 0500  No Yes   Sig: Take 1 tablet by mouth daily as needed for constipation   spironolactone (ALDACTONE) 100 MG tablet 2/24/2021 at 0500 Mother Yes Yes   Sig: Take 100 mg by mouth 3 times daily Holding am DOS   tretinoin (RETIN-A) 0.025 % external cream Past Week at Unknown time Mother Yes Yes   Sig: Apply topically At Bedtime      Facility-Administered Medications: None     Allergies   Allergies   Allergen Reactions     Bee Anaphylaxis     Adhesive Tape Rash     Sulfa Drugs Itching     Morphine      Can't take with other pain medications       Physical Exam   Vital Signs: Temp: 97.4  F (36.3  C) Temp src: Axillary BP: 109/61 Pulse: 80   Resp: 16 SpO2: 97 % O2 Device: Nasal cannula Oxygen Delivery: 2 LPM  Weight: 190 lbs .58 oz  GEN: NAD  HEENT: No scleral icterus, post-op changes s/p dental extraction. Perioral, labial, and mandibular soft tissue swelling with some mild oral oozing.   CV: RRR, Warm, well-perfused extremities, no JVD  RESP: CTAB, normal WOB  GI: soft, non-tender, non-distended, +BS  MSK: No gross deformities appreciated  Skin: Warm, dry. Petechial rash along torso.  Neuro: No gross focal deficits  Psych: Appropriate mood and affect.    Data   Data reviewed today: I reviewed all medications, new labs and imaging results over the last 24 hours. I    Recent Labs   Lab 02/24/21  1535 02/24/21  1329   WBC  --  4.4   HGB  --  13.0*   MCV  --  91   PLT 58* 46*   NA  --  134   POTASSIUM  --  3.9   CHLORIDE  --  105   CO2  --  24   BUN  --  17   CR  --  0.59*   ANIONGAP  --  5   NATE  --  8.7   GLC  --  288*     No results found for this or any previous  visit (from the past 24 hour(s)).   If you are a smoker, it is important for your health to stop smoking. Please be aware that second hand smoke is also harmful.

## 2022-05-04 ENCOUNTER — VIRTUAL VISIT (OUTPATIENT)
Dept: HEMATOLOGY | Facility: CLINIC | Age: 32
End: 2022-05-04
Payer: COMMERCIAL

## 2022-05-04 DIAGNOSIS — Z53.9 NO SHOW: Primary | ICD-10-CM

## 2022-05-04 NOTE — PROGRESS NOTES
"    [facility]  [address] [place] [address], [place] [address]  Main: [phone], Fax: [phone]    Patient seen at: [facility] at [address] [place] [address]    Video Virtual Visit Note:    Patient: Obed Wiley  MRN: [id number]  : 1990  CURLY: 21        Text sent @9:35 am  email sent @9:43 AM  Call cell @9:47 AM  Left voicemail- call back [phone]-     Closed video visit @10:01 AM  No call back from patient    Patient will need to reschedule appointment      Reason of today's visit:  History of intra-abdominal venous thrombosis. ESLD secondary to alcoholic cirrhosis. S/P Yan-en-Y. New portal vein thrombosis in Aug 2020. Ventral Hernia. Chronic anticoagulation with LMWH.      Clinical History Summary:  Obed is a 32 year old male with a very complicated past medical history  Including alcoholic cirrhosis, heterozygous protein C deficiency, history of pulmonary embolism, DVT, mesenteric vein thrombosis and most recently with portal vein thrombosis as well as history of GI bleeding and chronic thrombocytopenia secondary to liver disease S/P permanent IVC filter in place, participates in today's scheduled video visit for discussion of anticoagulation therapy management for his upcoming dental procedure and hernia repair procedure. This patient was last seen in [facility] clinic via video visit on 21 by Edward Hansen. Has also been seen by Dr. Patti Mcallister, staff hematologist at this clinic on 2020 and by Dr. John Calhoun, staff hematologist during his recent hospitalization in 2020. Please refer to their previous notes for this patient's detail complicated clinical history.      Briefly, Obed was diagnosed with first blood clot at age 23 in his lower intestine. Was placed on coumadin but apparently had some internal bleeding. Then he tried Xarelto as well as LMWH and also had some \"internal bleeding\". Eventually, he " was on unfractionated heparin TID dosing for a long period of time. He apparently has had extensive right leg clot in the past for which he did have an IVC filter placed at age 24. Then he was at one point placed on aspirin. Then in Jan 2020, he apparently had another venous thrombosis in the lower intestine and spleen. Has since been on enoxaparin at 1.5 mg/kg SubQ Q 24 hours. He is currently also on insulin as well as occasional paracentesis.      Back on 7/17/2020, he was admitted with abdominal pain concerning for acute portal vein and mesenteric vein thrombosis. During this admission, he was seen by Dr. Calhoun who recommended that the patient continue enoxaparin at discharge.      Interim History:   No visit completed    Diagnosis:  1. History of intra-abdominal venous thrombosis.   2. History of DVT  3. History of pulmonary embolism.  4. Heterozygous for protein C deficiency.  5. Chronic thrombocytopenia secondary to cirrhosis.  6. Alcoholic cirrhosis.   7. End stage liver disease.   8. S/P permanent IVC filter in place.   9. Chronic anticoagulation therapy.     Plan:  No visit completed--- refer to previous notes

## 2022-05-09 ENCOUNTER — VIRTUAL VISIT (OUTPATIENT)
Dept: GASTROENTEROLOGY | Facility: CLINIC | Age: 32
End: 2022-05-09
Payer: COMMERCIAL

## 2022-05-09 DIAGNOSIS — K70.31 ALCOHOLIC CIRRHOSIS OF LIVER WITH ASCITES (H): Primary | ICD-10-CM

## 2022-05-09 PROCEDURE — 99214 OFFICE O/P EST MOD 30 MIN: CPT | Mod: 95 | Performed by: INTERNAL MEDICINE

## 2022-05-09 NOTE — PROGRESS NOTES
Obed is a 32 year old who is being evaluated via a billable telephone visit.      What phone number would you like to be contacted at? [phone]   How would you like to obtain your AVS? My chart.  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      We are following him here for alcoholic cirrhosis. H is main complication was hepatic encephalopathy and he was requiring multiple ER visits.  He also had in the past ascites and he got evaluated and eventually got listed.  His MELD  score dropped.     Since I last had a virtual visit with him on February 14,  he did not have any hospital or ER visits for hepatic encephalopathy.  He claims that he remained lucid as he was taking his lactulose and rifaximin. In fact he claims that he was having 5-6 bowel movements which according to him were soft but not liquid.      As far as the fluid retention is concerned  he does not have any abdominal distention now and he claims that his last paracentesis was over 2 years ago.  He does not have any edema.  He also denies any gastrointestinal bleeding like melena hematemesis or hematochezia.  He is on Lovenox for all his thrombophilia and is getting 100 mg twice daily.    He also denies any abdominal pain.  He has no nausea or vomiting.  He denies also any recent infections and he stated that he has done at the.he vaccination with the Pfizer and he did 3 doses so far. Patient denies fevers, sweats or chills.  Weight stable.    Medical hx Surgical hx   Past Medical  History:   Diagnosis Date     Alcoholic cirrhosis of liver with ascites (H) 12/23/2019     Diabetes (H)     Type 2 DM, Uses Insulin      DVT (deep vein thrombosis) in pregnancy      H/O protein C deficiency      Hepatic encephalopathy (H)      Hepatitis     Hep A when an infant      History of blood transfusion     2019 at [facility]      Leukopenia 1/11/2020     Other chronic pain     sciatica     Pancreatic disease     Chronic pancreatitis     SBP (spontaneous bacterial peritonitis) (H) 11/2019      Past Surgical History:   Procedure Laterality Date     ABDOMEN SURGERY      Gastric Bypass 2009. [facility]      ABDOMEN SURGERY      Multiple paracentesis noted in preop H&P 2-4-21     CARDIAC SURGERY      IVC      CHOLECYSTECTOMY       COLONOSCOPY       ENT SURGERY      Tonsils and Adenoids Removed at 6-7 Years Old      ENT SURGERY      Sinus surgery     GALLBLADDER SURGERY  2017     GI SURGERY      Upper GI      ODONTECTOMY N/A 2/24/2021    Procedure: Full mouth tooth extraction;  Surgeon: Addy Washburn DDS;  Location: [facility] OR          Medications  Prior to Admission medications    Medication Sig Start Date End Date Taking? Authorizing Provider   amitriptyline (ELAVIL) 25 MG tablet Take 25 mg by mouth At Bedtime    Reported, Patient   BELBUCA 150 MCG FILM buccal film Place 150 mcg inside cheek daily OK to take am 2-24-21 per PCP 10/29/20   Reported, Patient   blood glucose (NO BRAND SPECIFIED) lancets standard Use to test blood sugar 6 times daily or as directed.    Reported, Patient   cholecalciferol (VITAMIN D3) 53267 units (1250 mcg) capsule Take 50,000 Units by mouth three times a week     Reported, Patient   ciprofloxacin (CIPRO) 500 MG tablet Take 500 mg by mouth daily 1/27/19   Reported, Patient   cyclobenzaprine (FLEXERIL) 10 MG tablet Take 1 tablet (10 mg) by mouth every 8 hours as needed for muscle spasms 7/27/20   Danny Cottrell MD   diclofenac (VOLTAREN) 1 % topical gel Apply 2 g topically 4  times daily 7/27/20   Danny Cottrell MD   enoxaparin ANTICOAGULANT (LOVENOX) 120 MG/0.8ML syringe Inject 1.5 mg/kg Subcutaneous daily 2-19-21 Currently 135 mgm every 24 hours. Last dose 24 hours prior to dental work and restart 24 hours postop. See note Edward Hansen    Reported, Patient   ferrous sulfate (FEROSUL) 325 (65 Fe) MG tablet Take 325 mg by mouth daily (with breakfast)    Reported, Patient   folic acid (FOLVITE) 1 MG tablet Take 1 mg by mouth 2 times daily    Reported, Patient   furosemide (LASIX) 40 MG tablet Take 40 mg by mouth 3 times daily  4/20/20   Reported, Patient   gabapentin (NEURONTIN) 400 MG capsule Take 400 mg by mouth 3 times daily One heri at bedtime for sleep.    Reported, Patient   hydrOXYzine (ATARAX) 25 MG tablet Take 1 tablet (25 mg) by mouth every 6 hours as needed for anxiety  Patient not taking: Reported on 2/1/2021 7/27/20   Danny Cottrell MD   insulin aspart (NOVOLOG FLEXPEN) 100 UNIT/ML pen 1 unit for every 12 grams of carbohydrate with meals and snacks plus correction scale. Uses up to 40 units per day.  Patient taking differently: 1 unit for every 12 grams of carbohydrate with meals and snacks plus correction scale. Uses up to 40 units per day.  Hold am DOS. 11/9/20   Waylon Leon MD   insulin glargine (LANTUS PEN) 100 UNIT/ML pen Inject 26 Units Subcutaneous every morning  Patient taking differently: Inject 26 Units Subcutaneous At Bedtime Half or 13 units evening prior to surgery 11/9/20   Waylon Leon MD   lactulose (CHRONULAC) 10 GM/15ML solution Take 45 mLs (30 g) by mouth 3 times daily 20mg / 30 ml tid daily/ prn 1/10/20   Jesusita Oneal MD   magnesium oxide 400 MG CAPS Take 400 mg by mouth 3 times daily     Reported, Patient   MULTIPLE VITAMIN-FOLIC ACID PO Take 1 tablet by mouth 1 daily    Reported, Patient   Nutritional Supplements (ENSURE PO) Ensure/ boost - 237 ml bid daily OK before 0700 am DOS    Reported, Patient   pantoprazole  (PROTONIX) 40 MG EC tablet Take 40 mg by mouth daily    Reported, Patient   polyethylene glycol (MIRALAX/GLYCOLAX) packet Take 1 packet by mouth daily     Reported, Patient   promethazine (PHENERGAN) 12.5 MG tablet Take 25 mg by mouth every 6 hours as needed for nausea    Reported, Patient   QUEtiapine (SEROQUEL) 25 MG tablet Take 25 mg by mouth nightly as needed (bedtime)     Reported, Patient   rifaximin (XIFAXAN) 550 MG TABS tablet Take 550 mg by mouth 2 times daily     Reported, Patient   senna (SENOKOT) 8.6 MG tablet Take 1 tablet by mouth daily as needed for constipation 1/10/20   Jesusita Oneal MD   spironolactone (ALDACTONE) 100 MG tablet Take 100 mg by mouth 3 times daily Holding am DOS    Reported, Patient   Suvorexant (BELSOMRA) 20 MG tablet Take 10 mg by mouth At Bedtime    Reported, Patient   tretinoin (RETIN-A) 0.025 % external cream Apply topically At Bedtime    Reported, Patient   Vitamin D, Cholecalciferol, 25 MCG (1000 UT) CAPS Take 2,000 Units by mouth daily     Reported, Patient       Allergies  Allergies   Allergen Reactions     Bee Anaphylaxis     Adhesive Tape Rash     Sulfa Drugs Itching     Morphine      Can't take with other pain medications       Review of systems  A 10-point review of systems was negative    Examination  There were no vitals taken for this visit.  There is no height or weight on file to calculate BMI.    Gen- well, NAD, A+Ox3.  Psych- normal mood    Laboratory  Lab Results   Component Value Date     02/25/2021    POTASSIUM 3.9 02/25/2021    CHLORIDE 105 02/25/2021    CO2 25 02/25/2021    BUN 12 02/25/2021    CR 0.69 02/25/2021       Lab Results   Component Value Date    BILITOTAL 1.6 02/25/2021    ALT 28 02/25/2021    AST 39 02/25/2021    ALKPHOS 113 02/25/2021       Lab Results   Component Value Date    ALBUMIN 3.1 02/25/2021    PROTTOTAL 6.4 02/25/2021        Lab Results   Component Value Date    WBC 3.7 02/25/2021    HGB 11.4 02/25/2021    MCV 92 02/25/2021     PLT 50 02/25/2021       Lab Results   Component Value Date    INR 1.7 01/21/2022    INR 1.25 02/25/2021       Radiology    Assessment and plan:  32 year old male with protein C deficiency and multiple episodes of thrombosis.  He has also cirrhosis of the liver with a very low MELD score.  He is on Lovenox as I have said in my previous dictation.  He also has problems with hepatic encephalopathy and has required ER visits.  He has according to him, clinical improvement and did not require any ER visit is in the last 3 months.      He is entertaining moving to [facility].  We advised him that he will need to contact us before he leaves so we can sign him off on local gastroenterologist/hepatologist.  He might need also to have a hematologist there.    For all his other medical issues he will follow with his primary care physician.    I spent 30 minutes this on the encounter of May 9, 2022 in chart reviewing, history taking and documentation.  We spent some of the time also in coordination of care and counseling    Viola Santos MD  Hepatology  [facility]

## 2022-05-22 ENCOUNTER — HEALTH MAINTENANCE LETTER (OUTPATIENT)
Age: 32
End: 2022-05-22

## 2022-06-20 ENCOUNTER — TELEPHONE (OUTPATIENT)
Dept: GASTROENTEROLOGY | Facility: CLINIC | Age: 32
End: 2022-06-20

## 2022-06-20 ENCOUNTER — ANCILLARY PROCEDURE (OUTPATIENT)
Dept: ULTRASOUND IMAGING | Facility: CLINIC | Age: 32
End: 2022-06-20
Attending: INTERNAL MEDICINE
Payer: COMMERCIAL

## 2022-06-20 ENCOUNTER — OFFICE VISIT (OUTPATIENT)
Dept: TRANSPLANT | Facility: CLINIC | Age: 32
End: 2022-06-20
Attending: INTERNAL MEDICINE
Payer: COMMERCIAL

## 2022-06-20 ENCOUNTER — LAB (OUTPATIENT)
Dept: LAB | Facility: CLINIC | Age: 32
End: 2022-06-20
Payer: COMMERCIAL

## 2022-06-20 VITALS
OXYGEN SATURATION: 97 % | HEART RATE: 90 BPM | DIASTOLIC BLOOD PRESSURE: 74 MMHG | SYSTOLIC BLOOD PRESSURE: 116 MMHG | BODY MASS INDEX: 27.86 KG/M2 | WEIGHT: 217 LBS

## 2022-06-20 DIAGNOSIS — K70.30 ALCOHOLIC CIRRHOSIS (H): ICD-10-CM

## 2022-06-20 DIAGNOSIS — Z76.82 ORGAN TRANSPLANT CANDIDATE: Primary | ICD-10-CM

## 2022-06-20 DIAGNOSIS — K70.31 ALCOHOLIC CIRRHOSIS OF LIVER WITH ASCITES (H): ICD-10-CM

## 2022-06-20 LAB
ALBUMIN SERPL-MCNC: 2.7 G/DL (ref 3.4–5)
ALP SERPL-CCNC: 118 U/L (ref 40–150)
ALT SERPL W P-5'-P-CCNC: 44 U/L (ref 0–70)
ANION GAP SERPL CALCULATED.3IONS-SCNC: 7 MMOL/L (ref 3–14)
AST SERPL W P-5'-P-CCNC: 53 U/L (ref 0–45)
BASOPHILS # BLD AUTO: 0.1 10E3/UL (ref 0–0.2)
BASOPHILS NFR BLD AUTO: 1 %
BILIRUB DIRECT SERPL-MCNC: 0.4 MG/DL (ref 0–0.2)
BILIRUB SERPL-MCNC: 1 MG/DL (ref 0.2–1.3)
BUN SERPL-MCNC: 9 MG/DL (ref 7–30)
CALCIUM SERPL-MCNC: 8.4 MG/DL (ref 8.5–10.1)
CHLORIDE BLD-SCNC: 110 MMOL/L (ref 94–109)
CO2 SERPL-SCNC: 24 MMOL/L (ref 20–32)
CREAT SERPL-MCNC: 0.62 MG/DL (ref 0.66–1.25)
EOSINOPHIL # BLD AUTO: 0.1 10E3/UL (ref 0–0.7)
EOSINOPHIL NFR BLD AUTO: 2 %
ERYTHROCYTE [DISTWIDTH] IN BLOOD BY AUTOMATED COUNT: 18 % (ref 10–15)
GFR SERPL CREATININE-BSD FRML MDRD: >90 ML/MIN/1.73M2
GLUCOSE BLD-MCNC: 134 MG/DL (ref 70–99)
HCT VFR BLD AUTO: 43.8 % (ref 40–53)
HGB BLD-MCNC: 14.4 G/DL (ref 13.3–17.7)
IMM GRANULOCYTES # BLD: 0 10E3/UL
IMM GRANULOCYTES NFR BLD: 0 %
INR PPP: 1.26 (ref 0.85–1.15)
LYMPHOCYTES # BLD AUTO: 1 10E3/UL (ref 0.8–5.3)
LYMPHOCYTES NFR BLD AUTO: 26 %
MCH RBC QN AUTO: 29.2 PG (ref 26.5–33)
MCHC RBC AUTO-ENTMCNC: 32.9 G/DL (ref 31.5–36.5)
MCV RBC AUTO: 89 FL (ref 78–100)
MONOCYTES # BLD AUTO: 0.2 10E3/UL (ref 0–1.3)
MONOCYTES NFR BLD AUTO: 6 %
NEUTROPHILS # BLD AUTO: 2.4 10E3/UL (ref 1.6–8.3)
NEUTROPHILS NFR BLD AUTO: 65 %
NRBC # BLD AUTO: 0 10E3/UL
NRBC BLD AUTO-RTO: 0 /100
PLATELET # BLD AUTO: 60 10E3/UL (ref 150–450)
POTASSIUM BLD-SCNC: 4.6 MMOL/L (ref 3.4–5.3)
PROT SERPL-MCNC: 6.2 G/DL (ref 6.8–8.8)
RADIOLOGIST FLAGS: ABNORMAL
RBC # BLD AUTO: 4.93 10E6/UL (ref 4.4–5.9)
SODIUM SERPL-SCNC: 141 MMOL/L (ref 133–144)
WBC # BLD AUTO: 3.7 10E3/UL (ref 4–11)

## 2022-06-20 PROCEDURE — 85025 COMPLETE CBC W/AUTO DIFF WBC: CPT | Performed by: PATHOLOGY

## 2022-06-20 PROCEDURE — 99213 OFFICE O/P EST LOW 20 MIN: CPT | Performed by: TRANSPLANT SURGERY

## 2022-06-20 PROCEDURE — 85610 PROTHROMBIN TIME: CPT | Performed by: PATHOLOGY

## 2022-06-20 PROCEDURE — 36415 COLL VENOUS BLD VENIPUNCTURE: CPT | Performed by: PATHOLOGY

## 2022-06-20 PROCEDURE — 82248 BILIRUBIN DIRECT: CPT | Performed by: PATHOLOGY

## 2022-06-20 PROCEDURE — 93975 VASCULAR STUDY: CPT | Mod: GC | Performed by: RADIOLOGY

## 2022-06-20 PROCEDURE — 99000 SPECIMEN HANDLING OFFICE-LAB: CPT | Performed by: PATHOLOGY

## 2022-06-20 PROCEDURE — 80053 COMPREHEN METABOLIC PANEL: CPT | Performed by: PATHOLOGY

## 2022-06-20 ASSESSMENT — PAIN SCALES - GENERAL: PAINLEVEL: SEVERE PAIN (7)

## 2022-06-20 NOTE — TELEPHONE ENCOUNTER
Brief GI note    I was paged by radiology for a critical finding of progression of portal vein thrombosis seen on U/S abdomen. I called Mr. Wiley, he does not have any new symptoms of abdominal pain or GI bleeding. He feels like he is at his baseline. He is on lovenox and has hx of protein C deficiency and prior Hx of multiple thrombi. He is going to see hematology 6/29/22. Dr. Santos was notified.     Viola Villalta MD  GI Fellow

## 2022-06-20 NOTE — LETTER
6/20/2022         RE: Obed Wiley  [address]  [facility] [address]        Dear Colleague,    Thank you for referring your patient, Obed Wiley, to the [facility]. Please see a copy of my visit note below.    Patient is well-known to me.  I have seen him twice before.  He has end-stage liver disease secondary to Laënnec cirrhosis.  Since I last saw him, he is much improved.  He does not have any more ascites, his umbilical hernia is improved he does not have any symptoms related to the umbilical hernia.  His main complaints appear to be back pain and he is currently on disability for those results.  He told me that he is trying to move to [place] and would like to get double listed there.    Past Medical History:   Diagnosis Date     Alcoholic cirrhosis of liver with ascites (H) 12/23/2019     Diabetes (H)     Type 2 DM, Uses Insulin      DVT (deep vein thrombosis) in pregnancy      H/O protein C deficiency      Hepatic encephalopathy (H)      Hepatitis     Hep A when an infant      History of blood transfusion     2019 at [facility]      Leukopenia 1/11/2020     Other chronic pain     sciatica     Pancreatic disease     Chronic pancreatitis     SBP (spontaneous bacterial peritonitis) (H) 11/2019     Past Surgical History:   Procedure Laterality Date     ABDOMEN SURGERY      Gastric Bypass 2009. [facility]      ABDOMEN SURGERY      Multiple paracentesis noted in preop H&P 2-4-21     CARDIAC SURGERY      IVC      CHOLECYSTECTOMY       COLONOSCOPY       ENT SURGERY      Tonsils and Adenoids Removed at 6-7 Years Old      ENT SURGERY      Sinus surgery     GALLBLADDER SURGERY  2017     GI SURGERY      Upper GI      ODONTECTOMY N/A 2/24/2021    Procedure: Full mouth tooth extraction;  Surgeon: Addy Washburn DDS;  Location:  OR     Past Surgical History:   Procedure Laterality Date     ABDOMEN SURGERY      Gastric Bypass 2009. [facility]      ABDOMEN SURGERY      Multiple  paracentesis noted in preop H&P 2-4-21     CARDIAC SURGERY      IVC      CHOLECYSTECTOMY       COLONOSCOPY       ENT SURGERY      Tonsils and Adenoids Removed at 6-7 Years Old      ENT SURGERY      Sinus surgery     GALLBLADDER SURGERY  2017     GI SURGERY      Upper GI      ODONTECTOMY N/A 2/24/2021    Procedure: Full mouth tooth extraction;  Surgeon: Addy Washburn DDS;  Location: [facility] OR       Patient Active Problem List   Diagnosis     Alcoholic cirrhosis of liver with ascites (H)     Diabetes (H)     Acute mesenteric ischemia (H)     Constipation     Leukopenia     PVT (portal vein thrombosis)     Portal vein thrombosis     Other acute kidney failure (H)     Nonrestorable tooth       Current Outpatient Medications   Medication Sig Dispense Refill     amitriptyline (ELAVIL) 25 MG tablet Take 25 mg by mouth At Bedtime       BELBUCA 150 MCG FILM buccal film Place 150 mcg inside cheek daily OK to take am 2-24-21 per PCP       blood glucose (NO BRAND SPECIFIED) lancets standard Use to test blood sugar 6 times daily or as directed.       ciprofloxacin (CIPRO) 500 MG tablet Take 500 mg by mouth daily       cyclobenzaprine (FLEXERIL) 10 MG tablet Take 1 tablet (10 mg) by mouth every 8 hours as needed for muscle spasms 30 tablet 0     diclofenac (VOLTAREN) 1 % topical gel Apply 2 g topically 4 times daily 50 g 0     ferrous sulfate (FEROSUL) 325 (65 Fe) MG tablet Take 325 mg by mouth daily (with breakfast)       folic acid (FOLVITE) 1 MG tablet Take 1 mg by mouth 2 times daily       furosemide (LASIX) 40 MG tablet Take 40 mg by mouth 3 times daily        gabapentin (NEURONTIN) 400 MG capsule Take 400 mg by mouth 3 times daily One heri at bedtime for sleep.       lactulose (CHRONULAC) 10 GM/15ML solution Take 45 mLs (30 g) by mouth 3 times daily 20mg / 30 ml tid daily/ prn       magnesium oxide 400 MG CAPS Take 400 mg by mouth 3 times daily        MULTIPLE VITAMIN-FOLIC ACID PO Take 1 tablet by mouth 1 daily        Nutritional Supplements (ENSURE PO) Ensure/ boost - 237 ml bid daily OK before 0700 am DOS       pantoprazole (PROTONIX) 40 MG EC tablet Take 40 mg by mouth daily       polyethylene glycol (MIRALAX/GLYCOLAX) packet Take 1 packet by mouth daily        promethazine (PHENERGAN) 12.5 MG tablet Take 25 mg by mouth every 6 hours as needed for nausea       QUEtiapine (SEROQUEL) 25 MG tablet Take 25 mg by mouth nightly as needed (bedtime)        rifaximin (XIFAXAN) 550 MG TABS tablet Take 550 mg by mouth 2 times daily        senna (SENOKOT) 8.6 MG tablet Take 1 tablet by mouth daily as needed for constipation       spironolactone (ALDACTONE) 100 MG tablet Take 100 mg by mouth 3 times daily Holding am DOS       Suvorexant (BELSOMRA) 20 MG tablet Take 10 mg by mouth At Bedtime       cholecalciferol (VITAMIN D3) 45126 units (1250 mcg) capsule Take 50,000 Units by mouth three times a week        enoxaparin ANTICOAGULANT (LOVENOX) 120 MG/0.8ML syringe Inject 1.5 mg/kg Subcutaneous daily 2-19-21 Currently 135 mgm every 24 hours. Last dose 24 hours prior to dental work and restart 24 hours postop. See note Edward Hansen       hydrOXYzine (ATARAX) 25 MG tablet Take 1 tablet (25 mg) by mouth every 6 hours as needed for anxiety (Patient not taking: Reported on 2/1/2021) 30 tablet 0     insulin aspart (NOVOLOG FLEXPEN) 100 UNIT/ML pen 1 unit for every 12 grams of carbohydrate with meals and snacks plus correction scale. Uses up to 40 units per day. (Patient taking differently: 1 unit for every 12 grams of carbohydrate with meals and snacks plus correction scale. Uses up to 40 units per day.  Hold am DOS.) 45 mL 0     insulin glargine (LANTUS PEN) 100 UNIT/ML pen Inject 26 Units Subcutaneous every morning (Patient taking differently: Inject 26 Units Subcutaneous At Bedtime Half or 13 units evening prior to surgery) 30 mL 0     tretinoin (RETIN-A) 0.025 % external cream Apply topically At Bedtime       Vitamin D, Cholecalciferol, 25 MCG  (1000 UT) CAPS Take 2,000 Units by mouth daily             Allergies   Allergen Reactions     Bee Anaphylaxis     Adhesive Tape Rash     Sulfa Drugs Itching     Morphine      Can't take with other pain medications       Vitals:    06/20/22 1126   BP: 116/74   Pulse: 90   SpO2: 97%   Weight: 98.4 kg (217 lb)     Examination of the abdomen: The abdomen is soft.  Ascites could not be appreciated.  No tenderness.    Clinical impression: Sarah's cirrhosis now well compensated with a meld score of 9.,  History of portal vein thrombosis.  Blood type AB.    Recommendations: CT scan of the abdomen with IV contrast and portal venous phase to evaluate the portal vein and the superior mesenteric vein.    I would consider putting him on hold on  the liver transplant list as he is doing much better.      Again, thank you for allowing me to participate in the care of your patient.        Sincerely,        Terrance Marsh MD

## 2022-06-20 NOTE — PROGRESS NOTES
Patient is well-known to me.  I have seen him twice before.  He has end-stage liver disease secondary to Laënnec cirrhosis.  Since I last saw him, he is much improved.  He does not have any more ascites, his umbilical hernia is improved he does not have any symptoms related to the umbilical hernia.  His main complaints appear to be back pain and he is currently on disability for those results.  He told me that he is trying to move to [place] and would like to get double listed there.    Past Medical History:   Diagnosis Date     Alcoholic cirrhosis of liver with ascites (H) 12/23/2019     Diabetes (H)     Type 2 DM, Uses Insulin      DVT (deep vein thrombosis) in pregnancy      H/O protein C deficiency      Hepatic encephalopathy (H)      Hepatitis     Hep A when an infant      History of blood transfusion     2019 at [facility]      Leukopenia 1/11/2020     Other chronic pain     sciatica     Pancreatic disease     Chronic pancreatitis     SBP (spontaneous bacterial peritonitis) (H) 11/2019     Past Surgical History:   Procedure Laterality Date     ABDOMEN SURGERY      Gastric Bypass 2009. [facility]      ABDOMEN SURGERY      Multiple paracentesis noted in preop H&P 2-4-21     CARDIAC SURGERY      IVC      CHOLECYSTECTOMY       COLONOSCOPY       ENT SURGERY      Tonsils and Adenoids Removed at 6-7 Years Old      ENT SURGERY      Sinus surgery     GALLBLADDER SURGERY  2017     GI SURGERY      Upper GI      ODONTECTOMY N/A 2/24/2021    Procedure: Full mouth tooth extraction;  Surgeon: Addy Washburn DDS;  Location:  OR     Past Surgical History:   Procedure Laterality Date     ABDOMEN SURGERY      Gastric Bypass 2009. [facility]      ABDOMEN SURGERY      Multiple paracentesis noted in preop H&P 2-4-21     CARDIAC SURGERY      IVC      CHOLECYSTECTOMY       COLONOSCOPY       ENT SURGERY      Tonsils and Adenoids Removed at 6-7 Years Old      ENT SURGERY      Sinus surgery     GALLBLADDER SURGERY  2017      GI SURGERY      Upper GI      ODONTECTOMY N/A 2/24/2021    Procedure: Full mouth tooth extraction;  Surgeon: Addy Washburn DDS;  Location: [facility] OR       Patient Active Problem List   Diagnosis     Alcoholic cirrhosis of liver with ascites (H)     Diabetes (H)     Acute mesenteric ischemia (H)     Constipation     Leukopenia     PVT (portal vein thrombosis)     Portal vein thrombosis     Other acute kidney failure (H)     Nonrestorable tooth       Current Outpatient Medications   Medication Sig Dispense Refill     amitriptyline (ELAVIL) 25 MG tablet Take 25 mg by mouth At Bedtime       BELBUCA 150 MCG FILM buccal film Place 150 mcg inside cheek daily OK to take am 2-24-21 per PCP       blood glucose (NO BRAND SPECIFIED) lancets standard Use to test blood sugar 6 times daily or as directed.       ciprofloxacin (CIPRO) 500 MG tablet Take 500 mg by mouth daily       cyclobenzaprine (FLEXERIL) 10 MG tablet Take 1 tablet (10 mg) by mouth every 8 hours as needed for muscle spasms 30 tablet 0     diclofenac (VOLTAREN) 1 % topical gel Apply 2 g topically 4 times daily 50 g 0     ferrous sulfate (FEROSUL) 325 (65 Fe) MG tablet Take 325 mg by mouth daily (with breakfast)       folic acid (FOLVITE) 1 MG tablet Take 1 mg by mouth 2 times daily       furosemide (LASIX) 40 MG tablet Take 40 mg by mouth 3 times daily        gabapentin (NEURONTIN) 400 MG capsule Take 400 mg by mouth 3 times daily One heri at bedtime for sleep.       lactulose (CHRONULAC) 10 GM/15ML solution Take 45 mLs (30 g) by mouth 3 times daily 20mg / 30 ml tid daily/ prn       magnesium oxide 400 MG CAPS Take 400 mg by mouth 3 times daily        MULTIPLE VITAMIN-FOLIC ACID PO Take 1 tablet by mouth 1 daily       Nutritional Supplements (ENSURE PO) Ensure/ boost - 237 ml bid daily OK before 0700 am DOS       pantoprazole (PROTONIX) 40 MG EC tablet Take 40 mg by mouth daily       polyethylene glycol (MIRALAX/GLYCOLAX) packet Take 1 packet by mouth daily         promethazine (PHENERGAN) 12.5 MG tablet Take 25 mg by mouth every 6 hours as needed for nausea       QUEtiapine (SEROQUEL) 25 MG tablet Take 25 mg by mouth nightly as needed (bedtime)        rifaximin (XIFAXAN) 550 MG TABS tablet Take 550 mg by mouth 2 times daily        senna (SENOKOT) 8.6 MG tablet Take 1 tablet by mouth daily as needed for constipation       spironolactone (ALDACTONE) 100 MG tablet Take 100 mg by mouth 3 times daily Holding am DOS       Suvorexant (BELSOMRA) 20 MG tablet Take 10 mg by mouth At Bedtime       cholecalciferol (VITAMIN D3) 64420 units (1250 mcg) capsule Take 50,000 Units by mouth three times a week        enoxaparin ANTICOAGULANT (LOVENOX) 120 MG/0.8ML syringe Inject 1.5 mg/kg Subcutaneous daily 2-19-21 Currently 135 mgm every 24 hours. Last dose 24 hours prior to dental work and restart 24 hours postop. See note Edward Hansen       hydrOXYzine (ATARAX) 25 MG tablet Take 1 tablet (25 mg) by mouth every 6 hours as needed for anxiety (Patient not taking: Reported on 2/1/2021) 30 tablet 0     insulin aspart (NOVOLOG FLEXPEN) 100 UNIT/ML pen 1 unit for every 12 grams of carbohydrate with meals and snacks plus correction scale. Uses up to 40 units per day. (Patient taking differently: 1 unit for every 12 grams of carbohydrate with meals and snacks plus correction scale. Uses up to 40 units per day.  Hold am DOS.) 45 mL 0     insulin glargine (LANTUS PEN) 100 UNIT/ML pen Inject 26 Units Subcutaneous every morning (Patient taking differently: Inject 26 Units Subcutaneous At Bedtime Half or 13 units evening prior to surgery) 30 mL 0     tretinoin (RETIN-A) 0.025 % external cream Apply topically At Bedtime       Vitamin D, Cholecalciferol, 25 MCG (1000 UT) CAPS Take 2,000 Units by mouth daily             Allergies   Allergen Reactions     Bee Anaphylaxis     Adhesive Tape Rash     Sulfa Drugs Itching     Morphine      Can't take with other pain medications       Vitals:    06/20/22 1126   BP:  116/74   Pulse: 90   SpO2: 97%   Weight: 98.4 kg (217 lb)     Examination of the abdomen: The abdomen is soft.  Ascites could not be appreciated.  No tenderness.    Clinical impression: Sarah's cirrhosis now well compensated with a meld score of 9.,  History of portal vein thrombosis.  Blood type AB.    Recommendations: CT scan of the abdomen with IV contrast and portal venous phase to evaluate the portal vein and the superior mesenteric vein.    I would consider putting him on hold on  the liver transplant list as he is doing much better.

## 2022-06-20 NOTE — NURSING NOTE
Chief Complaint   Patient presents with     Transplant Waitlist Maintenance     Liver tx wait list follow up      Blood pressure 116/74, pulse 90, weight 98.4 kg (217 lb), SpO2 97 %.    Bridget Causey, CMA

## 2022-06-23 LAB
PLPETH BLD-MCNC: <10 NG/ML
POPETH BLD-MCNC: <10 NG/ML

## 2022-06-24 ENCOUNTER — DOCUMENTATION ONLY (OUTPATIENT)
Dept: TRANSPLANT | Facility: CLINIC | Age: 32
End: 2022-06-24

## 2022-06-24 NOTE — LETTER
PHYSICIAN ORDERS    DATE & TIME ISSUED: 2022 10:35 AM  PATIENT NAME: Obed Wiley   : 1990     [facility] MR# : [id number]     DIAGNOSIS:  cirrhosis, leg swelling   ICD-10 CODE:  K74.60, M79.89   Orders  1 year after issue.  Please have done as soon as possible   These labs must be drawn all together on the same day when done:    CT scan of the abdomen with IV contrast and portal venous phase: evaluate the portal vein and the superior mesenteric vein    Right low extremity US: rule out DVT due to unilateral swelling      PLEASE FAX RESULTS AS SOON AS POSSIBLE TO [phone], ATTN:LabDE  Please call Hudson Gaitan RN at [phone] with any questions      .

## 2022-06-24 NOTE — PROGRESS NOTES
Placed temporarily inactive as we await for PA from transplant for new insurance.  Patient notified.

## 2022-06-29 ENCOUNTER — VIRTUAL VISIT (OUTPATIENT)
Dept: HEMATOLOGY | Facility: CLINIC | Age: 32
End: 2022-06-29
Attending: INTERNAL MEDICINE
Payer: COMMERCIAL

## 2022-06-29 VITALS — WEIGHT: 195 LBS | BODY MASS INDEX: 25.04 KG/M2

## 2022-06-29 DIAGNOSIS — I81 PVT (PORTAL VEIN THROMBOSIS): Primary | ICD-10-CM

## 2022-06-29 PROCEDURE — 99214 OFFICE O/P EST MOD 30 MIN: CPT | Mod: 95 | Performed by: INTERNAL MEDICINE

## 2022-06-29 RX ORDER — FONDAPARINUX SODIUM 7.5 MG/.6ML
7.5 INJECTION SUBCUTANEOUS EVERY 24 HOURS
Qty: 18 ML | Refills: 0 | Status: SHIPPED | OUTPATIENT
Start: 2022-06-29 | End: 2022-08-01

## 2022-06-29 NOTE — PATIENT INSTRUCTIONS
[facility]  [facility]  [facility], [place] [address]  Main: [phone], Fax: [phone]    It was a pleasure talking with you today for a visit. Thank you for allowing us to be involved in your care.  Please let us know if there is anything else we can do for you, so that we can be sure you are leaving completely satisfied with your care experience.    Imaging studies- ordered for 7/5/22 at your local center in [place], [place]-- we will follow up    Change your blood thinner from Lovenox to Arixtra. This medication dose is 7.5 mg once a day.     Wear compression stockings if you have swelling in your leg. Take them off while you are sleeping. You may need to get new stockings every 3-6 months with regular wear. Prescription will be mailed to you.    As you are moving to [place] we will send referral to Dr. Carmen Holman MD/PhD at the [facility] in [place], [place]    Patient Education & Resources:  For additional information, please see the following web links:  www.stoptheclot.org, www.clotconnect.org.    Call the [facility]  at [phone].     -If surgeries or procedures are planned (for holding instructions).     -If off anticoagulation, please call during high risk times (long-distance travel, broken bones or trauma, immobilization, surgery, pregnancy, or taking estrogen).     -Any new symptoms of DVT (deep vein thrombosis) or PE (pulmonary embolism)    -pain     -swelling     -redness    -warmth    -shortness of breath    -chest pain    -coughing up blood    We would like a provider on our team to see you at least annually for optimal care and to allow us to continue to prescribe for you. We have three physician assistants that are specialized in bleeding and clotting disorders that you may be able to see more readily.    Return to clinic in  6 months (to be coordinated with return  visit with liver transplant group).  Please schedule return appointment with Dr. Mcallister or any PA/NP available.    Your nurse clinician is Katerine Juares -[phone].   If they are unavailable and you have immediate concerns, please call [phone] and ask for a nurse.

## 2022-06-29 NOTE — PROGRESS NOTES
Called pt to do pre-visit check in. Reviewed patients medications and allergies. I thanked the patient for their time to go over this.     Kerry Schultz, [facility]    [facility]  [phone]

## 2022-06-29 NOTE — PROGRESS NOTES
"    [facility]  [address] [place] [address] [address], [place] [address]  Main: [phone], Fax: [phone]    Patient seen at: [facility] at [address] [place] [address]    Video Virtual Visit Note:    Patient: Obed Wiley  MRN: [id number]  : 1990  CURLY: 22        Total time on telephone: 23 minutes    Reason of today's visit:  History of intra-abdominal venous thrombosis. ESLD secondary to alcoholic cirrhosis. S/P Yan-en-Y. New portal vein thrombosis in Aug 2020. Ventral Hernia. Chronic anticoagulation with LMWH.      Clinical History Summary:  Obed is a 32 year old male with a very complicated past medical history  Including alcoholic cirrhosis, heterozygous protein C deficiency, history of pulmonary embolism, DVT, mesenteric vein thrombosis and most recently with portal vein thrombosis as well as history of GI bleeding and chronic thrombocytopenia secondary to liver disease S/P permanent IVC filter in place, This patient was last seen in [facility] clinic via video visit on 21 by Edward Hansen. Has also been seen by Dr. Patti Mcallister, staff hematologist at this clinic on 2020 and by Dr. John Calhoun, staff hematologist during his recent hospitalization in 2020. Please refer to their previous notes for this patient's detail complicated clinical history.      Briefly, Obed was diagnosed with first blood clot at age 23 in his lower intestine. Was placed on coumadin but apparently had some internal bleeding. Then he tried Xarelto as well as LMWH and also had some \"internal bleeding\". Eventually, he was on unfractionated heparin TID dosing for a long period of time. He apparently has had extensive right leg clot in the past for which he did have an IVC filter placed at age 24. Then he was at one point placed on aspirin. Then in 2020, he apparently had another venous thrombosis in the lower intestine and spleen. Has since been " on enoxaparin at 1.5 mg/kg SubQ Q 24 hours. He is currently also on insulin as well as occasional paracentesis.      Back on 7/17/2020, he was admitted with abdominal pain concerning for acute portal vein and mesenteric vein thrombosis. During this admission, he was seen by Dr. Calhoun who recommended that the patient continue enoxaparin at discharge.      Interim History:   Obed reports that his right upper thigh to foot is sore since Monday. Skin is not red. WHen he presses down it takes time to come back up. It is larger compared to the left leg. Bending over makes it feel worse (pinching). Elevation does not work. Compression sock that helps (thigh high).     No issues with swelling elsewhere.     Obed reports some minor bruising where he got the injection every once in a while. Typically walks 1-5 miles a day- less due to swelling in leg.     Medical History  Past Medical History:   Diagnosis Date     Alcoholic cirrhosis of liver with ascites (H) 12/23/2019     Diabetes (H)     Type 2 DM, Uses Insulin      DVT (deep vein thrombosis) in pregnancy      H/O protein C deficiency      Hepatic encephalopathy (H)      Hepatitis     Hep A when an infant      History of blood transfusion     2019 at [facility]      Leukopenia 1/11/2020     Other chronic pain     sciatica     Pancreatic disease     Chronic pancreatitis     SBP (spontaneous bacterial peritonitis) (H) 11/2019     Surgical History  Past Surgical History:   Procedure Laterality Date     ABDOMEN SURGERY      Gastric Bypass 2009. [facility]      ABDOMEN SURGERY      Multiple paracentesis noted in preop H&P 2-4-21     CARDIAC SURGERY      IVC      CHOLECYSTECTOMY       COLONOSCOPY       ENT SURGERY      Tonsils and Adenoids Removed at 6-7 Years Old      ENT SURGERY      Sinus surgery     GALLBLADDER SURGERY  2017     GI SURGERY      Upper GI      ODONTECTOMY N/A 2/24/2021    Procedure: Full mouth tooth extraction;  Surgeon: Addy Washburn DDS;   Location: [facility] OR       Medication  Current Outpatient Medications   Medication     amitriptyline (ELAVIL) 25 MG tablet     BELBUCA 150 MCG FILM buccal film     blood glucose (NO BRAND SPECIFIED) lancets standard     cholecalciferol (VITAMIN D3) 90478 units (1250 mcg) capsule     ciprofloxacin (CIPRO) 500 MG tablet     cyclobenzaprine (FLEXERIL) 10 MG tablet     diclofenac (VOLTAREN) 1 % topical gel     enoxaparin ANTICOAGULANT (LOVENOX) 120 MG/0.8ML syringe     ferrous sulfate (FEROSUL) 325 (65 Fe) MG tablet     folic acid (FOLVITE) 1 MG tablet     furosemide (LASIX) 40 MG tablet     gabapentin (NEURONTIN) 400 MG capsule     insulin aspart (NOVOLOG FLEXPEN) 100 UNIT/ML pen     lactulose (CHRONULAC) 10 GM/15ML solution     magnesium oxide 400 MG CAPS     MULTIPLE VITAMIN-FOLIC ACID PO     Nutritional Supplements (ENSURE PO)     pantoprazole (PROTONIX) 40 MG EC tablet     promethazine (PHENERGAN) 12.5 MG tablet     QUEtiapine (SEROQUEL) 25 MG tablet     rifaximin (XIFAXAN) 550 MG TABS tablet     senna (SENOKOT) 8.6 MG tablet     spironolactone (ALDACTONE) 100 MG tablet     Suvorexant (BELSOMRA) 20 MG tablet     tretinoin (RETIN-A) 0.025 % external cream     Vitamin D, Cholecalciferol, 25 MCG (1000 UT) CAPS     polyethylene glycol (MIRALAX/GLYCOLAX) packet     No current facility-administered medications for this visit.     Family History  Family History   Problem Relation Age of Onset     Protein C deficiency Mother      Pancreatic Cancer Father      Deonte Parkinson White syndrome Father      Alcoholism Brother      Substance Abuse Brother      Heart Failure Maternal Grandmother      Heart Failure Maternal Grandfather      Objective  Physical Exam  The rest of a comprehensive physical examination is deferred due to Public Health Emergency video/telephone visit restrictions    Labs  Lab Results   Component Value Date    WBC 3.7 06/20/2022    WBC 3.7 02/25/2021     Lab Results   Component Value Date    RBC 4.93  06/20/2022    RBC 3.79 02/25/2021     Lab Results   Component Value Date    HGB 14.4 06/20/2022    HGB 11.4 02/25/2021     Lab Results   Component Value Date    HCT 43.8 06/20/2022    HCT 35.0 02/25/2021     No components found for: MCT  Lab Results   Component Value Date    MCV 89 06/20/2022    MCV 92 02/25/2021     Lab Results   Component Value Date    MCH 29.2 06/20/2022    MCH 30.1 02/25/2021     Lab Results   Component Value Date    MCHC 32.9 06/20/2022    MCHC 32.6 02/25/2021     Lab Results   Component Value Date    RDW 18.0 06/20/2022    RDW 13.8 02/25/2021     Lab Results   Component Value Date    PLT 60 06/20/2022    PLT 50 02/25/2021     Recent Labs   Lab Test 06/20/22  1108 02/25/21  1135    135   POTASSIUM 4.6 3.9   CHLORIDE 110* 105   CO2 24 25   ANIONGAP 7 5   * 392*   BUN 9 12   CR 0.62* 0.69   NATE 8.4* 8.4*     Imaging  6/20/22  EXAMINATION: US ABDOMEN COMPLETE WITH DOPPLER COMPLETE 6/20/2022 1:39  PM      COMPARISON: CT abdomen pelvis 9/1/2020, abdominal ultrasound  7/17/2020, liver MRI 7/21/2020     HISTORY: Alcoholic cirrhosis     TECHNIQUE: The abdomen was scanned in standard fashion with  specialized ultrasound transducer(s) using both gray-scale, color  Doppler, and spectral flow techniques.     Findings:  Pancreas: Visualized portions of the head and body of the pancreas are  unremarkable.      Liver: The liver measures 14.6 cm and demonstrates echogenic coarsened  parenchyma with capsular nodularity. No evidence of a focal hepatic  mass.    US visualization score: A - No or minimal limitations     Gallbladder: Cholecystectomy.     Bile Ducts: No intrahepatic biliary ductal dilatation. The common bile  duct is not seen.     Right Kidney: Measures 13.3 cm in length with normal parenchymal  echogenicity and cortical thickness. No hydronephrosis.  Left Kidney: Measures 13.2 cm in length with normal parenchymal  echogenicity and cortical thickness. No hydronephrosis.     Spleen: The  spleen is enlarged,  measuring 15.6 cm in length.     Aorta and IVC: The visualized portions of the aorta and IVC are  unremarkable.      Fluid: Trace ascites.     Minimal color Doppler flow in the extrahepatic main portal vein.  The right portal vein is not seen.  Retrograde versus to and fro flow in the left portal vein (cardiac  motion), 15 cm/s.     Flow in the hepatic artery is towards the liver and:  96 cm/s peak systolic  0.78 resistive index.      63/17 cm/s in the right hepatic artery, resistive index 0.73.  73/24 cm/s in the left hepatic artery, resistive index 0.68.     The splenic vein is patent and flow is towards the liver.  The left,  middle, and right hepatic veins are patent with flow towards the IVC.  The IVC is patent with flow towards the heart.                                                                       Impression:   1. Hepatic steatosis with cirrhotic morphology. No focal lesion  demonstrated.    a. LI-RADS US Category: US-1 Negative: No US evidence of HCC    b. Recommend continued surveillance US.  2. Largely occluded portal venous system, progressed since 9/1/2020  CT.  3. Sequela of portal hypertension including splenomegaly and trace  ascites.      Diagnosis:  1. History of intra-abdominal venous thrombosis.   2. History of DVT  3. History of pulmonary embolism.  4. Heterozygous for protein C deficiency.  5. Chronic thrombocytopenia secondary to cirrhosis.  6. Alcoholic cirrhosis.   7. End stage liver disease.   8. S/P permanent IVC filter in place.   9. Chronic anticoagulation therapy.     Plan:  1. Change for 1.5 mg/kg subcutaneous daily LMWH to fondaparinux 7.5 mg subcutaneous daily  2. Compression stockings  3. Will follow up with imaging (to be done in [place]).     Patient is moving to [place]. Will place referral to Dr. Carmen Holman with [facility] (phone [phone])    Patient can schedule return visit with [facility] provider(s) when he is in [place] for  follow up hepatology care.     Patti Mcallister MD/PhD   of Medicine  Division of Hematology, Oncology and Transplantation

## 2022-07-05 ENCOUNTER — TRANSFERRED RECORDS (OUTPATIENT)
Dept: HEALTH INFORMATION MANAGEMENT | Facility: CLINIC | Age: 32
End: 2022-07-05

## 2022-07-26 ENCOUNTER — TELEPHONE (OUTPATIENT)
Dept: HEMATOLOGY | Facility: CLINIC | Age: 32
End: 2022-07-26

## 2022-07-26 NOTE — TELEPHONE ENCOUNTER
Records faxed on Obedmariely Wiley on 7.25.2022 to Dr. Carmen Holman at [facility] in [place], [place] as patient is moving there.  Staff from that office called to get phone referral for this visit.  This was provider and they will reach out to patient after the provider reviews and approves.  They have our contact information for further questions/concerns.    Katerine TAYLORN, RN   Nurse Clinician  [facility]  Office: [phone]  Main Office: [phone] (ask to speak with a nurse)  Fax: [phone]

## 2022-08-01 RX ORDER — FONDAPARINUX SODIUM 7.5 MG/.6ML
7.5 INJECTION SUBCUTANEOUS EVERY 24 HOURS
Qty: 18 ML | Refills: 4 | Status: SHIPPED | OUTPATIENT
Start: 2022-08-01

## 2022-09-18 ENCOUNTER — HEALTH MAINTENANCE LETTER (OUTPATIENT)
Age: 32
End: 2022-09-18

## 2022-10-11 ENCOUNTER — MEDICAL CORRESPONDENCE (OUTPATIENT)
Dept: HEALTH INFORMATION MANAGEMENT | Facility: CLINIC | Age: 32
End: 2022-10-11

## 2022-10-14 ENCOUNTER — TELEPHONE (OUTPATIENT)
Dept: ANESTHESIOLOGY | Facility: CLINIC | Age: 32
End: 2022-10-14

## 2022-10-14 ENCOUNTER — TRANSCRIBE ORDERS (OUTPATIENT)
Dept: OTHER | Age: 32
End: 2022-10-14

## 2022-10-14 DIAGNOSIS — G89.29 CHRONIC PAIN OF BOTH LOWER EXTREMITIES: Primary | ICD-10-CM

## 2022-10-14 DIAGNOSIS — M79.604 CHRONIC PAIN OF BOTH LOWER EXTREMITIES: Primary | ICD-10-CM

## 2022-10-14 DIAGNOSIS — M79.605 CHRONIC PAIN OF BOTH LOWER EXTREMITIES: Primary | ICD-10-CM

## 2022-10-14 DIAGNOSIS — M79.643 CHRONIC HAND PAIN, UNSPECIFIED LATERALITY: ICD-10-CM

## 2022-10-14 DIAGNOSIS — G89.29 CHRONIC HAND PAIN, UNSPECIFIED LATERALITY: ICD-10-CM

## 2022-10-14 DIAGNOSIS — R25.2 MUSCLE CRAMPS: ICD-10-CM

## 2022-10-14 NOTE — TELEPHONE ENCOUNTER
"A referral for a comprehensive evaluation has been received for this patient from an external source, [facility]. The patient was contacted and informed that the [facility] are only accepting internal referrals at this time. The patient states that he is not a new patient, this writer informed the patient that because it has been more than one year since the last appointment he would be considered a new patient. [facility] to [facility], patient states he would like to speak to someone \"higher up\".    Please review and contact patient.  "

## 2022-10-18 NOTE — TELEPHONE ENCOUNTER
Left message for patient that he would have to have a new referral from an internal physician such as Dr. Santos because he has not been seen in more than 1 year.    Kusum Chacon  [facility]

## 2023-01-29 ENCOUNTER — HEALTH MAINTENANCE LETTER (OUTPATIENT)
Age: 33
End: 2023-01-29

## 2023-01-31 DIAGNOSIS — K76.6 PORTAL HYPERTENSION (H): Primary | ICD-10-CM

## 2023-01-31 DIAGNOSIS — K70.31 ALCOHOLIC CIRRHOSIS OF LIVER WITH ASCITES (H): ICD-10-CM

## 2023-05-07 ENCOUNTER — HEALTH MAINTENANCE LETTER (OUTPATIENT)
Age: 33
End: 2023-05-07

## 2023-06-12 ENCOUNTER — TELEPHONE (OUTPATIENT)
Dept: GASTROENTEROLOGY | Facility: CLINIC | Age: 33
End: 2023-06-12
Payer: COMMERCIAL

## 2023-06-12 NOTE — TELEPHONE ENCOUNTER
LVM & sent mychart for pt to reschedule appt on 7.19.23 with Dr. Santos to be on 9.18.23 wt Dr. Elder - MANUALLY reschedule into held slot with Dr. Elder // first attempt, AN 6.12.23

## 2023-06-15 ENCOUNTER — TELEPHONE (OUTPATIENT)
Dept: GASTROENTEROLOGY | Facility: CLINIC | Age: 33
End: 2023-06-15
Payer: COMMERCIAL

## 2023-06-15 NOTE — TELEPHONE ENCOUNTER
LVM // pt needs to reschedule the now cancelled appt on 7.19.23 with Dr. Santos to be with Dr. Elder or Dr. Leventhal, next available // second attempt, AN 6.15.23

## 2023-07-27 ENCOUNTER — DOCUMENTATION ONLY (OUTPATIENT)
Dept: TRANSPLANT | Facility: CLINIC | Age: 33
End: 2023-07-27
Payer: COMMERCIAL

## 2023-07-27 NOTE — PROGRESS NOTES
July 27, 2023 10:49 AM - Ned Wolfe, RN:     This RN spoke with MD at [facility] who was inquiring into suggestions for balance of lactulose titration, ammonia levels & HE versus blood sugar control. MD reported patient unable to have BM with QID lactulose, requiring more frequency d/t constipation r/t narcotic pain medication.     This RN suggested getting an endocrinology consult and considering adding additions to bowel regimen, such as sennakot, colace, Miralax, etc.    This RN provided MD with his direct callback phone number if any additional guidance is needed.

## 2023-07-31 ENCOUNTER — DOCUMENTATION ONLY (OUTPATIENT)
Dept: TRANSPLANT | Facility: CLINIC | Age: 33
End: 2023-07-31
Payer: COMMERCIAL

## 2023-08-28 ENCOUNTER — TELEPHONE (OUTPATIENT)
Dept: TRANSPLANT | Facility: CLINIC | Age: 33
End: 2023-08-28
Payer: COMMERCIAL

## 2023-08-28 NOTE — TELEPHONE ENCOUNTER
[facility] Call Center    Phone Message    May a detailed message be left on voicemail: no     Reason for Call: Appointment Intake    Outbound call made to patient to reschedule missed appointments.  Labs, US, CTA, SW, Surgeon (Geovanni), and Hepatology (Shaylee).  Patient did not want to schedule at this time due to things going on at home and recent discharge from hospital.  He stated that he will call back when he is ready.  Confirmed call back # for scheduling.      Action Taken: Message routed to:  [facility] CASI DEVINE    Travel Screening: Not Applicable

## 2023-10-08 ENCOUNTER — HEALTH MAINTENANCE LETTER (OUTPATIENT)
Age: 33
End: 2023-10-08

## 2023-12-17 ENCOUNTER — HEALTH MAINTENANCE LETTER (OUTPATIENT)
Age: 33
End: 2023-12-17

## 2024-02-25 ENCOUNTER — HEALTH MAINTENANCE LETTER (OUTPATIENT)
Age: 34
End: 2024-02-25

## 2024-04-16 ENCOUNTER — TELEPHONE (OUTPATIENT)
Dept: TRANSPLANT | Facility: CLINIC | Age: 34
End: 2024-04-16
Payer: COMMERCIAL

## 2024-04-16 NOTE — LETTER
April 16, 2024    Obed Wiley  [address] [place] [id number]      Dear Mr. WileyDanie, My name is Titus Lai. I am taking over your case from Hudson Gaitan, and I wanted to touch base with you about your intent to continue to pursue liver transplantation. Given the time that has passed since your evaluation, we should talk about how our team can continue to support your health and management of liver disease moving forward. I hope this letter finds you well and in good health. Be well.        Respectfully,    Titus Lai, RN       Pre-Liver Transplant Coordinator       [phone]

## 2024-04-16 NOTE — TELEPHONE ENCOUNTER
Patient is lost to follow-up. Writer has reached out via phone x2 weekly with no VM established or left. Will mail patient a letter to make contact.

## 2024-04-16 NOTE — LETTER
April 16, 2024    Obed Wiley  [address] [place] [id number]      Dear Mr. WileyDanie, My name is Titus Lai. I am taking over your case from Hudson Gaitan, and I wanted to touch base with you about your intent to continue to pursue liver transplantation. Given the time that has passed since your evaluation, we should talk about how our team can continue to support your health and management of liver disease moving forward. I hope this letter finds you well and in good health. Be well.        Respectfully,    Titus Lai, RN       Pre-Liver Transplant Coordinator       [phone]

## 2024-05-14 ENCOUNTER — COMMITTEE REVIEW (OUTPATIENT)
Dept: TRANSPLANT | Facility: CLINIC | Age: 34
End: 2024-05-14
Payer: COMMERCIAL

## 2024-05-14 NOTE — COMMITTEE REVIEW
Liver Committee Review Note     Evaluation Date: 1/6/2020  Committee Review Date: 5/14/2024    Organ being evaluated for: Liver    Transplant Phase: Waitlist  Transplant Status: Inactive    Transplant Coordinator: Titus Lai  Transplant Surgeon:   Terrance Marsh    Referring Physician: Anjelica Reyes    Primary Diagnosis: Alcoholic Cirrhosis  Secondary Diagnosis:     Transplant Eligibility: Cirrhosis with MELD, ETOH, Autoimmune Hepatitis    Committee Review Decision: Remove    Relative Contraindications: Other, unable to contact    Absolute Contraindications: Other, unable to contact    Live Donor: No     Committee Chair Leventhal, Thomas Michael, MD verbally attested to the committee's decision.    Committee Discussion Details: removed from [facility]OS as we are unable to contact patient.    Committee Review Members:  Nutrition Anabel Gustafson, RD   Pharmacist Sun Casas, [facility]    - Clinical Wale Trimble, MSW, Karlee Renee, [facility]   Transplant Mariia Chang, HUY, Shawna Clemens, RN, Carrie Huggins, LPN, Almita Shrestha, RN, Ana Rosa Correa, HUY, Jr Konrad Gaitan, HUY, Airam Rodríguez, APRN CNP, Lenore Penny, HUY, Titus Lai, RN, Ned Wolfe, RN   Transplant Hepatology  Kelly Vieyra MD, Ramiro Bay MD, CHEY GuilloryC, Celso Najera MD, Nasrin June MD, Chet Elder MD, Thomas M. Leventhal, MD   Transplant Surgery John Sagastume MD, Lety Nuñez PA-C, Arabella Small, DIOMEDES, Terrance Marsh MD, Levi Galarza MD         Delist patient due to lost to follow up  Letters sent:  - 6/19/23  - 4/16/24  - de-listing letter sent 5/16/24    Numerous phone calls tried as well.     P: [phone]   [email or web address]   [address] [place] [address]

## 2024-05-14 NOTE — LETTER
May 16, 2024    Obed Wiley  [address] [facility] [address]    Dear Mr. Wiley,    The purpose of this letter is to let you know the [facility] Multi-Disciplinary Selection Team made the decision to remove you from the liver transplant list.  This is because we have been unable to contact you for regular follow up visits and communication.  Important things you should know:  If you would like to discuss the decision, or if your medical status changes, you may call [phone] and ask to speak to your transplant coordinator.  We recommend that you continue to follow up with your primary care and referring physicians in order to manage your health concerns.  You should establish care with a GI provider close to home to manage your liver health. We now have a liver provider who visits [place], at both [place] and [facility], if that is more convenient for you.    We want you to know that our program has physician and surgeon coverage 24 hours a day, 365 days a year.  Attached is a letter from [facility] that describes the services and information offered to patients by [facility] and the Organ Procurement and Transplantation Network (OPTN).  Thank you for allowing us to participate in your care.  We wish you well.  Sincerely,  Konrad Gaitan Jr., BSN, RN  Liver Transplant Coordinator  [phone]  Liver Transplant Team  Enclosure:  [facility] letter  CC:  care team        The Organ Procurement and Transplantation Network Toll-free patient services line:  1-893.293.9835  Your resource for organ transplant information    Staffed 8:30 am - 5:00 pm ET Monday - Friday Leave a message 24/7 to receive a call back    The Organ Procurement and Transplantation Network (OPTN) is the national transplant system. It makes the policies that decide how donated organs are matched to patients waiting for a transplant. The OPTN:    Makes sure donated organs get matched to people on the transplant waiting list  Tells people about the donation and  transplant processes  Makes sure that the public knows about the need for more organ and tissue donations    The OPTN has a free patient services line that you can call to:  Get more information about:  Organ donation and organ transplants  Donation and transplant policies  Get an information kit with:  A list of transplant hospitals  Waiting list information  Talk about any questions you may have about your transplant hospital or organ procurement organization. The staff will do their best to help you or point you to others who may help.  Find out how you can volunteer with the OPTN and help shape transplant policy     The patient services line number is: 5-488-689-6584    Patient services line staff CANNOT answer questions about your own medical care, including:  Waiting list status  Test results  Medical records  You will need to call your transplant hospital for this information.    The following websites have more information about transplantation and donation:    OPTN: https://optn.transplant.hrsa.gov/    For potential living donors and transplant recipients:    Living with transplant: https://www.transplantliving.org/    Living donation process: https://optn.transplant.hrsa.gov/living-donation/    Financial assistance: https://www.livingdonorassistance.org/    Transplantation data: https://www.srtr.org/    Organ donation: https://www.organdonor.gov/    Volunteer with the OPTN: https://optn.transplant.hrsa.gov/get-involved/

## 2024-05-14 NOTE — LETTER
May 16, 2024    Obed Wiley  [address] [facility] [address]    Dear Mr. Wiley,    The purpose of this letter is to let you know the [facility] Multi-Disciplinary Selection Team made the decision to remove you from the liver transplant list.  This is because we have been unable to contact you for regular follow up visits and communication.  Important things you should know:  If you would like to discuss the decision, or if your medical status changes, you may call [phone] and ask to speak to your transplant coordinator.  We recommend that you continue to follow up with your primary care and referring physicians in order to manage your health concerns.  You should establish care with a GI provider close to home to manage your liver health. We now have a liver provider who visits [place], at both [place] and [facility], if that is more convenient for you.    We want you to know that our program has physician and surgeon coverage 24 hours a day, 365 days a year.  Attached is a letter from [facility] that describes the services and information offered to patients by [facility] and the Organ Procurement and Transplantation Network (OPTN).  Thank you for allowing us to participate in your care.  We wish you well.  Sincerely,  Konrad Gaitan Jr., BSN, RN  Liver Transplant Coordinator  [phone]  Liver Transplant Team  Enclosure:  [facility] letter  CC:  care team        The Organ Procurement and Transplantation Network Toll-free patient services line:  1-101.523.6959  Your resource for organ transplant information    Staffed 8:30 am - 5:00 pm ET Monday - Friday Leave a message 24/7 to receive a call back    The Organ Procurement and Transplantation Network (OPTN) is the national transplant system. It makes the policies that decide how donated organs are matched to patients waiting for a transplant. The OPTN:    Makes sure donated organs get matched to people on the transplant waiting list  Tells people about the donation and  transplant processes  Makes sure that the public knows about the need for more organ and tissue donations    The OPTN has a free patient services line that you can call to:  Get more information about:  Organ donation and organ transplants  Donation and transplant policies  Get an information kit with:  A list of transplant hospitals  Waiting list information  Talk about any questions you may have about your transplant hospital or organ procurement organization. The staff will do their best to help you or point you to others who may help.  Find out how you can volunteer with the OPTN and help shape transplant policy     The patient services line number is: 0-988-432-9407    Patient services line staff CANNOT answer questions about your own medical care, including:  Waiting list status  Test results  Medical records  You will need to call your transplant hospital for this information.    The following websites have more information about transplantation and donation:    OPTN: https://optn.transplant.hrsa.gov/    For potential living donors and transplant recipients:    Living with transplant: https://www.transplantliving.org/    Living donation process: https://optn.transplant.hrsa.gov/living-donation/    Financial assistance: https://www.livingdonorassistance.org/    Transplantation data: https://www.srtr.org/    Organ donation: https://www.organdonor.gov/    Volunteer with the OPTN: https://optn.transplant.hrsa.gov/get-involved/

## 2024-06-11 ENCOUNTER — TRANSFERRED RECORDS (OUTPATIENT)
Dept: HEALTH INFORMATION MANAGEMENT | Facility: CLINIC | Age: 34
End: 2024-06-11
Payer: COMMERCIAL

## 2024-06-21 ENCOUNTER — TRANSFERRED RECORDS (OUTPATIENT)
Dept: HEALTH INFORMATION MANAGEMENT | Facility: CLINIC | Age: 34
End: 2024-06-21
Payer: COMMERCIAL

## 2024-06-28 ENCOUNTER — TRANSCRIBE ORDERS (OUTPATIENT)
Dept: OTHER | Age: 34
End: 2024-06-28

## 2024-06-28 DIAGNOSIS — Z98.84 HISTORY OF GASTRIC BYPASS: ICD-10-CM

## 2024-06-28 DIAGNOSIS — K86.89 CALCULUS OF PANCREATIC DUCT: ICD-10-CM

## 2024-06-28 DIAGNOSIS — K86.89 PANCREATIC DUCT DILATED: Primary | ICD-10-CM

## 2024-06-28 DIAGNOSIS — K86.1 CHRONIC PANCREATITIS, UNSPECIFIED PANCREATITIS TYPE (H): ICD-10-CM

## 2024-06-28 DIAGNOSIS — K70.30 ALCOHOLIC CIRRHOSIS OF LIVER WITHOUT ASCITES (H): ICD-10-CM

## 2024-07-05 ENCOUNTER — TELEPHONE (OUTPATIENT)
Dept: GASTROENTEROLOGY | Facility: CLINIC | Age: 34
End: 2024-07-05
Payer: COMMERCIAL

## 2024-07-05 NOTE — TELEPHONE ENCOUNTER
[facility]     Referring provider: Leyda Mora [facility] [phone]  fax [phone]    Referred to: [facility]     Provider Requested: na     Referral Received: 07/05/24       Records received: CareEverywhere     Images received: PACS    Insurance Coverage: [employer]- [facility] does not accept [employer] insurance    Evaluation for: EDGE ERCP, Pancreatic stone, needs ERCP, history of gastric bypass unable to do here.      Clinical History (per RN review):     Referring provider note 6/11/24    This is a 34 year old male presenting as a new patient to the GI clinic for above complaint. Problem list includes chronic pancreatitis, DMII with hx of DKA, s/p gastric bypass, alcoholism, alcoholic cirrhosis with esophageal varices, portal hypertension, prior PE, and prior mesenteric thrombosis. Patient previously seen by GI service November 2023 while hospitalized for hepatic encephalopathy, patient at that time was found obtunded at outside facility with ammonia level at 386. Last ammonia level 124 on 5/6/24. CBC 5/6/24 with plt 71, Hgb 12% on 5/6/24, and CMP with glucose 397 and Tbili 2.2.    Obed presents to the GI department today for consultation on chronic pancreatitis and cirrhosis. States he is having some abdominal pain, it is chronic wondering if is from his history of chronic pancreatitis or postsurgical adhesions.     -tried pancreatic enzymes without relief      CT A/p 6/21/24    IMPRESSION:   1. Cirrhosis with evidence of portal hypertension. Diffuse hepatic steatosis.   2.  Chronic pancreatitis. Worsening dilatation of the main pancreatic duct that appears to be due to a 1 cm stone within or compressing the main pancreatic duct in the pancreatic head. ERCP may be helpful for further evaluation and treatment purposes if clinically necessary.       MD review date:   MD Decision for clinic consultation/Orders:            Referral updates/Patient contacted:  07/05/24  Reject, patient has [employer] insurance

## 2024-07-14 ENCOUNTER — HEALTH MAINTENANCE LETTER (OUTPATIENT)
Age: 34
End: 2024-07-14

## 2024-07-21 NOTE — PLAN OF CARE
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   Vitals: VSS  Neuros: A/O x4. N/t to BLE at baseline. R ear n/ and decreased sensation   IV: PIV running Heparin @ 14.5 units/hr   Diet: 2g Na  Bowel status: No BM     : Voiding spontaneously  Pain: c/o pain to abdomen, lidocaine patch applied to right of abd. Tylenol scheduled. IV and PO dilaudid given   Activity: Up ad red  Plan: US done this jorge a. Continue to follow POC   Alert and oriented to person, place and time

## 2024-12-01 ENCOUNTER — HEALTH MAINTENANCE LETTER (OUTPATIENT)
Age: 34
End: 2024-12-01

## 2025-01-12 ENCOUNTER — HEALTH MAINTENANCE LETTER (OUTPATIENT)
Age: 35
End: 2025-01-12

## 2025-03-15 ENCOUNTER — HEALTH MAINTENANCE LETTER (OUTPATIENT)
Age: 35
End: 2025-03-15

## 2025-06-28 ENCOUNTER — HEALTH MAINTENANCE LETTER (OUTPATIENT)
Age: 35
End: 2025-06-28

## (undated) DEVICE — SUCTION TIP YANKAUER W/O VENT K86

## (undated) DEVICE — BUR STRK SIDE CUT 1.6X5.1X44.8MM CARBIDE 6FLUTE 1607-002-107

## (undated) DEVICE — BUR STRK SIDE CUT 2.1X5.1X44.8MM CARBIDE 6FLUTE 1607-002-109

## (undated) DEVICE — DRSG JAWBRA  95

## (undated) DEVICE — TOOTHBRUSH ADULT NON STERILE MDS136850

## (undated) DEVICE — SUCTION MANIFOLD NEPTUNE 2 SYS 4 PORT 0702-020-000

## (undated) DEVICE — GLOVE PROTEXIS POWDER FREE 8.5 ORTHOPEDIC 2D73ET85

## (undated) DEVICE — DRSG GAUZE 4X4" TRAY 6939

## (undated) DEVICE — SURGICEL HEMOSTAT 4X8" 1952

## (undated) DEVICE — LINEN TOWEL PACK X5 5464

## (undated) DEVICE — SPONGE SURGIFOAM 100 1974

## (undated) DEVICE — PAD CHUX UNDERPAD 23X24" 7136

## (undated) DEVICE — SOL WATER IRRIG 1000ML BOTTLE 2F7114

## (undated) DEVICE — SU CHROMIC 3-0 FS-2 27" 636

## (undated) DEVICE — LINEN TOWEL PACK X6 WHITE 5487

## (undated) DEVICE — SU VICRYL 3-0 SH 27" UND J416H

## (undated) DEVICE — SOL NACL 0.9% IRRIG 1000ML BOTTLE 2F7124

## (undated) DEVICE — PACK NEURO MINOR UMMC SNE32MNMU4

## (undated) DEVICE — SYR EAR BULB 3OZ 0035830

## (undated) RX ORDER — DOBUTAMINE HYDROCHLORIDE 200 MG/100ML
INJECTION INTRAVENOUS
Status: DISPENSED
Start: 2021-02-01

## (undated) RX ORDER — FENTANYL CITRATE 50 UG/ML
INJECTION, SOLUTION INTRAMUSCULAR; INTRAVENOUS
Status: DISPENSED
Start: 2021-02-24

## (undated) RX ORDER — ATROPINE SULFATE 0.4 MG/ML
AMPUL (ML) INJECTION
Status: DISPENSED
Start: 2020-01-07

## (undated) RX ORDER — METOPROLOL TARTRATE 1 MG/ML
INJECTION, SOLUTION INTRAVENOUS
Status: DISPENSED
Start: 2021-02-01

## (undated) RX ORDER — CHLORHEXIDINE GLUCONATE ORAL RINSE 1.2 MG/ML
SOLUTION DENTAL
Status: DISPENSED
Start: 2021-02-24

## (undated) RX ORDER — ATROPINE SULFATE 0.4 MG/ML
AMPUL (ML) INJECTION
Status: DISPENSED
Start: 2021-02-01

## (undated) RX ORDER — METOPROLOL TARTRATE 1 MG/ML
INJECTION, SOLUTION INTRAVENOUS
Status: DISPENSED
Start: 2020-01-07

## (undated) RX ORDER — HYDROMORPHONE HYDROCHLORIDE 1 MG/ML
INJECTION, SOLUTION INTRAMUSCULAR; INTRAVENOUS; SUBCUTANEOUS
Status: DISPENSED
Start: 2021-02-24

## (undated) RX ORDER — ONDANSETRON 2 MG/ML
INJECTION INTRAMUSCULAR; INTRAVENOUS
Status: DISPENSED
Start: 2021-02-24

## (undated) RX ORDER — DOBUTAMINE HYDROCHLORIDE 200 MG/100ML
INJECTION INTRAVENOUS
Status: DISPENSED
Start: 2020-01-07

## (undated) RX ORDER — CEFAZOLIN SODIUM 2 G/100ML
INJECTION, SOLUTION INTRAVENOUS
Status: DISPENSED
Start: 2021-02-24